# Patient Record
Sex: FEMALE | Race: WHITE | NOT HISPANIC OR LATINO
[De-identification: names, ages, dates, MRNs, and addresses within clinical notes are randomized per-mention and may not be internally consistent; named-entity substitution may affect disease eponyms.]

---

## 2017-03-24 ENCOUNTER — APPOINTMENT (OUTPATIENT)
Dept: THORACIC SURGERY | Facility: CLINIC | Age: 82
End: 2017-03-24

## 2017-03-24 VITALS
HEART RATE: 105 BPM | WEIGHT: 100 LBS | RESPIRATION RATE: 20 BRPM | OXYGEN SATURATION: 96 % | SYSTOLIC BLOOD PRESSURE: 133 MMHG | DIASTOLIC BLOOD PRESSURE: 68 MMHG | TEMPERATURE: 97.3 F | BODY MASS INDEX: 18.89 KG/M2

## 2017-03-29 ENCOUNTER — APPOINTMENT (OUTPATIENT)
Dept: RADIATION ONCOLOGY | Facility: CLINIC | Age: 82
End: 2017-03-29

## 2017-03-29 VITALS
BODY MASS INDEX: 19.24 KG/M2 | DIASTOLIC BLOOD PRESSURE: 65 MMHG | HEART RATE: 102 BPM | WEIGHT: 101.9 LBS | HEIGHT: 61 IN | OXYGEN SATURATION: 94 % | SYSTOLIC BLOOD PRESSURE: 103 MMHG

## 2017-04-04 ENCOUNTER — APPOINTMENT (OUTPATIENT)
Dept: RADIATION ONCOLOGY | Facility: CLINIC | Age: 82
End: 2017-04-04

## 2017-04-04 VITALS
HEIGHT: 61 IN | OXYGEN SATURATION: 93 % | WEIGHT: 101.8 LBS | SYSTOLIC BLOOD PRESSURE: 137 MMHG | BODY MASS INDEX: 19.22 KG/M2 | HEART RATE: 95 BPM | DIASTOLIC BLOOD PRESSURE: 87 MMHG

## 2017-05-04 ENCOUNTER — APPOINTMENT (OUTPATIENT)
Dept: THORACIC SURGERY | Facility: CLINIC | Age: 82
End: 2017-05-04

## 2017-05-04 VITALS
TEMPERATURE: 97 F | RESPIRATION RATE: 18 BRPM | DIASTOLIC BLOOD PRESSURE: 59 MMHG | SYSTOLIC BLOOD PRESSURE: 127 MMHG | WEIGHT: 100 LBS | BODY MASS INDEX: 18.89 KG/M2 | OXYGEN SATURATION: 95 % | HEART RATE: 94 BPM

## 2017-05-18 ENCOUNTER — APPOINTMENT (OUTPATIENT)
Dept: RADIATION ONCOLOGY | Facility: CLINIC | Age: 82
End: 2017-05-18

## 2017-05-18 ENCOUNTER — APPOINTMENT (OUTPATIENT)
Dept: THORACIC SURGERY | Facility: CLINIC | Age: 82
End: 2017-05-18

## 2017-05-18 VITALS
WEIGHT: 102 LBS | BODY MASS INDEX: 19.27 KG/M2 | TEMPERATURE: 97.1 F | HEART RATE: 90 BPM | SYSTOLIC BLOOD PRESSURE: 141 MMHG | DIASTOLIC BLOOD PRESSURE: 63 MMHG | OXYGEN SATURATION: 93 %

## 2017-06-14 ENCOUNTER — APPOINTMENT (OUTPATIENT)
Age: 82
End: 2017-06-14

## 2017-06-14 VITALS
HEART RATE: 85 BPM | RESPIRATION RATE: 18 BRPM | SYSTOLIC BLOOD PRESSURE: 155 MMHG | DIASTOLIC BLOOD PRESSURE: 86 MMHG | OXYGEN SATURATION: 95 %

## 2017-07-20 ENCOUNTER — APPOINTMENT (OUTPATIENT)
Dept: RADIATION ONCOLOGY | Facility: CLINIC | Age: 82
End: 2017-07-20

## 2017-07-20 VITALS
DIASTOLIC BLOOD PRESSURE: 80 MMHG | SYSTOLIC BLOOD PRESSURE: 121 MMHG | BODY MASS INDEX: 18.97 KG/M2 | OXYGEN SATURATION: 91 % | WEIGHT: 100.38 LBS | HEART RATE: 88 BPM

## 2017-07-20 RX ORDER — AZITHROMYCIN 250 MG/1
250 TABLET, FILM COATED ORAL
Qty: 6 | Refills: 0 | Status: DISCONTINUED | COMMUNITY
Start: 2017-04-13

## 2017-09-20 ENCOUNTER — APPOINTMENT (OUTPATIENT)
Dept: RADIATION ONCOLOGY | Facility: CLINIC | Age: 82
End: 2017-09-20
Payer: MEDICARE

## 2017-09-20 VITALS
DIASTOLIC BLOOD PRESSURE: 81 MMHG | OXYGEN SATURATION: 92 % | SYSTOLIC BLOOD PRESSURE: 141 MMHG | WEIGHT: 104.38 LBS | HEART RATE: 79 BPM | BODY MASS INDEX: 19.72 KG/M2

## 2017-09-20 PROCEDURE — 99214 OFFICE O/P EST MOD 30 MIN: CPT | Mod: GC

## 2017-12-20 ENCOUNTER — APPOINTMENT (OUTPATIENT)
Dept: RADIATION ONCOLOGY | Facility: CLINIC | Age: 82
End: 2017-12-20

## 2018-01-23 ENCOUNTER — APPOINTMENT (OUTPATIENT)
Dept: CT IMAGING | Facility: IMAGING CENTER | Age: 83
End: 2018-01-23

## 2018-03-06 ENCOUNTER — APPOINTMENT (OUTPATIENT)
Age: 83
End: 2018-03-06
Payer: MEDICARE

## 2018-03-06 VITALS
HEART RATE: 78 BPM | BODY MASS INDEX: 20.41 KG/M2 | WEIGHT: 108 LBS | RESPIRATION RATE: 18 BRPM | DIASTOLIC BLOOD PRESSURE: 86 MMHG | OXYGEN SATURATION: 95 % | SYSTOLIC BLOOD PRESSURE: 149 MMHG

## 2018-03-06 PROCEDURE — 99214 OFFICE O/P EST MOD 30 MIN: CPT

## 2018-07-18 ENCOUNTER — APPOINTMENT (OUTPATIENT)
Age: 83
End: 2018-07-18
Payer: MEDICARE

## 2018-07-18 VITALS
WEIGHT: 96 LBS | SYSTOLIC BLOOD PRESSURE: 104 MMHG | OXYGEN SATURATION: 96 % | HEART RATE: 94 BPM | RESPIRATION RATE: 18 BRPM | DIASTOLIC BLOOD PRESSURE: 66 MMHG | BODY MASS INDEX: 18.14 KG/M2

## 2018-07-18 PROCEDURE — 99214 OFFICE O/P EST MOD 30 MIN: CPT

## 2018-09-19 ENCOUNTER — APPOINTMENT (OUTPATIENT)
Age: 83
End: 2018-09-19
Payer: MEDICARE

## 2018-09-19 VITALS
BODY MASS INDEX: 17.5 KG/M2 | RESPIRATION RATE: 6 BRPM | DIASTOLIC BLOOD PRESSURE: 93 MMHG | SYSTOLIC BLOOD PRESSURE: 143 MMHG | OXYGEN SATURATION: 97 % | WEIGHT: 92.6 LBS

## 2018-09-19 PROCEDURE — 99214 OFFICE O/P EST MOD 30 MIN: CPT

## 2018-09-19 NOTE — LETTER CLOSING
[Consult Closing:] : Thank you for allowing me to participate in the care of this patient.  If you have any questions, please do not hesitate to contact me. [Sincerely yours,] : Sincerely yours, [Desmond Abbott MD] : Desmond Abbott MD [Attending] : Attending [Department of Radiation Medicine] : Department of Radiation Medicine [ of Radiation Medicine] :  of Radiation Medicine [[facility]] : [facility]

## 2018-09-19 NOTE — VITALS
[Maximal Pain Intensity: 0/10] : 0/10 [Least Pain Intensity: 0/10] : 0/10 [90: Able to carry normal activity; minor signs or symptoms of disease.] : 90: Able to carry normal activity; minor signs or symptoms of disease.  [ECOG Performance Status: 1 - Restricted in physically strenuous activity but ambulatory and able to carry out work of a light or sedentary nature] : Performance Status: 1 - Restricted in physically strenuous activity but ambulatory and able to carry out work of a light or sedentary nature, e.g., light house work, office work

## 2018-09-21 NOTE — REVIEW OF SYSTEMS
[Recent Change In Weight] : ~T recent weight change [SOB on Exertion] : shortness of breath during exertion [Fever] : no fever [Chills] : no chills [Night Sweats] : no night sweats [Fatigue] : no fatigue [Visual Disturbances] : no visual disturbances [Dysphagia] : no dysphagia [Palpitations] : no palpitations [Abdominal Pain] : no abdominal pain [Muscle Weakness] : no muscle weakness [Gait Disturbance] : no gait disturbance [Skin Rash] : no skin rash [Difficulty Walking] : no difficulty walking [Swollen Glands] : no swollen glands [Negative] : Constitutional [FreeTextEntry2] : with exertion [Chest Pain] : no chest pain [Shortness Of Breath] : no shortness of breath [Cough] : no cough

## 2018-09-21 NOTE — REASON FOR VISIT
[Routine Follow-Up] : routine follow-up visit for [Lung Cancer] : lung cancer [Clinical -- TNM Staging] : TNM Stage: c [T: ___] : T[unfilled] [N: ___] : N[unfilled] [M: ___] : M[unfilled] [de-identified] : Right Lung

## 2018-09-21 NOTE — HISTORY OF PRESENT ILLNESS
[FreeTextEntry1] : Ms. Woods received 4800 cGy on 6/14/17  to the right middle lobe for a new NSCLC . She is s/p Right VATS robotic assisted right sub-lobar resection with lymph node dissection 11/2013 and a history of multiple lung cancer including SBRT\par \par 9/19/18- Follow up\par At her last visit  imaging showed new nodules: infectious vs. inflammatory. Plan was to get images for tumor board review, and consider repeat short interval imaging but await recommendations of tumor board.\par \par Today she notes that she feels well with good energy level.  Denies sob or cough. She has notes some wt. loss due to eating less sweets.She has been f/u with her PMD.\par \par 7/18/17-Follow up\par Ms. Woods received 4800 cGy on 6/14/17  to the right middle lobe for a new NSCLC . She is s/p Right VATS robotic assisted right sub-lobar resection with lymph node dissection 11/2013 and a history of multiple lung cancer including SBRT\par At her last visit  imaging was without  clear evidence of recurrent/residual disease with increased atelectatic changes likely due to post-treatment radiation change. \par \par CT chest  with contrast 7/9/18 showed two new nodules in the right upper lobe and one in the left upper division. Differential diagnosis of these representing post inflammatory changes. Metastatic foci should be considered. Follow up CT recommended in 3 weeks. worsening atelectatic changes and bronchiectasis of the lower lungs. There is newly developed bronchiolitis in the left lower lobe. Severe compression of T7 vertebral body without change\par \par Since she has been well until recently when she contracted the flu and is now recovering. She has  a 12 lb wt loss since last visit\par and reports no change in appetite. Notes diarrhea 1-2x day. Has dry cough and sob with exertion. Denies pain.\par She has f/u with PMD 3 weeks ago.\par \par 3/6/18-Follow up\par Ms. Woods is an 84 year old female here for a routine follow up appointment. She received 4800 cGy on 6/14/17  to the right middle lobe for adenocarcinoma. At her last visit  9/2017 she has two lesions and was referred to dermatology.\par \par Since her last f/u appt. in September 2017 she has been feeling well She has gained weight and has f/u with dematology. She had a bx done which was positive for Seborrheic Keratosis. No treatment ordered.\par -denies fatigue. has sob with exertion. Denies cough or chest pain\par \par  CT Chest w/o contrast- more extensive atelectatic change of the lingula. No significant change from prior study\par \par \par \par \par \par \par \par ONCOLOGIC HISTORY- In 2013 she underwent  a VATS right sub-lobar resection with lymph node dissection for biopsy proven lower lobe.  She has been followed thereafter with radiographic surveillance.  In 2014, a left upper lobe nodule increased in size with associated PET avidity.  This lesion was treated with SBRT by Dr. Bailey to a dose of 5000cGy over 5 fractions from 10/6/14-10/15/14. \par \par 12/2016- Post-treatment imaging has shows a complete response of the treated lesion.  However, there is a right spiculated middle lobe nodule that has been increasing in size with minimal associated FDG avidity.  She was recommended for biopsy, which she is adamantly refusing.  She presented to us for consideration of SBRT at which time the decision was made to follow radiographically.  \par 3/29/2017 - Ms Woods had a PET/CT 3/21/2017.  This shows three areas of concern.  In the right lung apex is a 1.3 X 2.1 cm region of scar and bronchiectasis with an SUV of 1.9.  It is mentioned that this likely is secondary to post radiation change.  However, radiation was delivered to the left upper lobe, not right upper lobe.  There is an additional 1.2 X 2.2 cm spiculated mass in the right middle lobe with an SUV of 3.4, prior 2.2.  This is concerning for disease.  We had recommended SBRT to this at time of last f/u.  The third area of concern is an area of consolidation within the lingula of the left lung adjacent to the left fissure.  There is a focus measuring 1.1 X 1.1 cm with an suv max of 2.6.  There was persistent uptake of a rectal lesion that has not changed - etiology unclear.  \par \par 4/4 - She presents today for a f/u visit to discuss review of her imaging.  Clinically she continues to be asymptomatic.  Radiographically, there does not appear to be significant change in the right upper lobe lesion over the previous few years.  Etiology of this is unclear.  The lingula lesion does appear to have uptake and seems enlarged.  This may warrant further evaluation.  \par \par 5/17/17- We reviewed Ms Corona most recent CT scan in Thoracic Tumor board where it was felt that the lesion in the left lung of interest was most consistent with an effusion rather than malignancy.  The recommendation was to proceed with SBRT planning for her right lung nodule.  I discussed this with Ms. Woods.  She has previously had SBRT and so is familiar with the treatment planning process.  Informed consent was signed.  Scheduling will follow. \par \par 9/20/17- \par She last saw us in July 2017 for PTE, at which time she felt well.  CT scan 9/15/17 showed stable right apical consolidation with traction bronchiectasis likely postradiation fibrosis. Stable right middle lobe nodule and adjacent fibrotic scarring\par Stable spiculated perihilar lingula consolidation. New right lower lobe reticulonodular infiltrate. New bilateral lower lobe subpleural wedge shaped opacities. This could be inflammatory.\par \par She feels well today. Starla wheezing, cough. She notes no changes in her breathing since before radiation, although she notes she had a cold a couple weeks ago. She has two lesions on her skin which she is concerned are related to skin cancer. One of these is on her right hip, and one is on her left flank.  She had mild pain with coughing, not not at baseline.\par

## 2018-09-21 NOTE — PHYSICAL EXAM
[Outer Ear] : the ears and nose were normal in appearance [Heart Rate And Rhythm] : heart rate and rhythm were normal [Cervical Lymph Nodes Enlarged Posterior Bilaterally] : posterior cervical [Cervical Lymph Nodes Enlarged Anterior Bilaterally] : anterior cervical [Supraclavicular Lymph Nodes Enlarged Bilaterally] : supraclavicular [Axillary Lymph Nodes Enlarged Bilaterally] : axillary [Musculoskeletal - Swelling] : no joint swelling [Nail Clubbing] : no clubbing  or cyanosis of the fingernails [Motor Tone] : muscle strength and tone were normal [Skin Color & Pigmentation] : normal skin color and pigmentation [No Focal Deficits] : no focal deficits [General Appearance - Alert] : alert [General Appearance - In No Acute Distress] : in no acute distress [Thin] : thin [] : no respiratory distress [Heart Sounds] : normal S1 and S2 [Oriented To Time, Place, And Person] : oriented to person, place, and time [Sclera] : the sclera and conjunctiva were normal [Extraocular Movements] : extraocular movements were intact [Hearing Threshold Finger Rub Not [place]] : hearing was normal [Abdomen Soft] : soft [Normal] : no focal deficits [de-identified] : BS diminished  to  right mid lobe  with scattered rhonchi right base

## 2019-02-08 NOTE — LETTER CLOSING
[Desmond Abbott MD] : Desmond Abbott MD [Attending] : Attending [Department of Radiation Medicine] : Department of Radiation Medicine [ of Radiation Medicine] :  of Radiation Medicine [[facility]] : [facility]

## 2019-02-15 ENCOUNTER — APPOINTMENT (OUTPATIENT)
Dept: RADIATION ONCOLOGY | Facility: CLINIC | Age: 84
End: 2019-02-15
Payer: MEDICARE

## 2019-02-15 VITALS
SYSTOLIC BLOOD PRESSURE: 112 MMHG | RESPIRATION RATE: 18 BRPM | DIASTOLIC BLOOD PRESSURE: 74 MMHG | OXYGEN SATURATION: 96 % | BODY MASS INDEX: 19.46 KG/M2 | HEART RATE: 88 BPM | WEIGHT: 103 LBS | TEMPERATURE: 98.2 F

## 2019-02-15 PROCEDURE — 99214 OFFICE O/P EST MOD 30 MIN: CPT

## 2019-02-15 RX ORDER — COLD-HOT PACK
125 MCG EACH MISCELLANEOUS
Refills: 0 | Status: ACTIVE | COMMUNITY

## 2019-02-15 NOTE — REVIEW OF SYSTEMS
[Negative] : Constitutional [Cough: Grade 0] : Cough: Grade 0 [Chest Pain] : no chest pain [Shortness Of Breath] : no shortness of breath [Cough] : no cough [FreeTextEntry2] : with exertion

## 2019-02-15 NOTE — REASON FOR VISIT
[Routine Follow-Up] : routine follow-up visit for [Lung Cancer] : lung cancer [Clinical -- TNM Staging] : TNM Stage: c [T: ___] : T[unfilled] [N: ___] : N[unfilled] [M: ___] : M[unfilled] [de-identified] : Right Lung

## 2019-02-15 NOTE — HISTORY OF PRESENT ILLNESS
[FreeTextEntry1] : Ms. Woods received 48 Gy over 4 fractions, completed 6/14/17 to the right middle lobe for a new NSCLC . She is s/p Right VATS robotic assisted right sub-lobar resection with lymph node dissection 11/2013 and a history of multiple lung cancer including SBRT to LLL (5000 cGy over 5 fractions, completed October 2014).\par \par 2/15/19- FOLLOW UP\par Ms. Arvizu returns for routine follow up. When she was last seen on 9/19/18, recent imaging showed while ill with cough productive of yellow sputum, new nodules, infectious vs. inflammatory. Plan was for repeat CT chest and tumor board review of imaging. \par \par Chest CT on 2/8/19 showed the following:\par 1.  Compared to 7/9/2018, the small airways disease in the left upper division shows much improvement with resolution of 7 mm and smaller satellite nodules.\par 2.  The atelectatic changes/scars of both lungs are again noted. The right upper lobe changes are slightly more confluent.\par 3.  Status post wedge resection right lower lobe.\par 4.  Emphysematous changes.\par 5.  No new nodule.\par 6.  T7 compression deformity.\par \par Today, she reports she is feeling generally well. Denies SOB, cough, dysphagia, fever, chills, unintentional weight loss. She has gained weight recently, which she attributes to being less active on account of the cold weather. \par \par \par 9/19/18- Follow up\par At her last visit  imaging showed new nodules: infectious vs. inflammatory. Plan was to get images for tumor board review, and consider repeat short interval imaging but await recommendations of tumor board.\par \par Today she notes that she feels well with good energy level.  Denies sob or cough. She has notes some wt. loss due to eating less sweets.She has been f/u with her PMD.\par \par 7/18/17-Follow up\par Ms. Woods received 4800 cGy on 6/14/17  to the right middle lobe for a new NSCLC . She is s/p Right VATS robotic assisted right sub-lobar resection with lymph node dissection 11/2013 and a history of multiple lung cancer including SBRT\par At her last visit  imaging was without  clear evidence of recurrent/residual disease with increased atelectatic changes likely due to post-treatment radiation change. \par \par CT chest  with contrast 7/9/18 showed two new nodules in the right upper lobe and one in the left upper division. Differential diagnosis of these representing post inflammatory changes. Metastatic foci should be considered. Follow up CT recommended in 3 weeks. worsening atelectatic changes and bronchiectasis of the lower lungs. There is newly developed bronchiolitis in the left lower lobe. Severe compression of T7 vertebral body without change\par \par Since she has been well until recently when she contracted the flu and is now recovering. She has  a 12 lb wt loss since last visit\par and reports no change in appetite. Notes diarrhea 1-2x day. Has dry cough and sob with exertion. Denies pain.\par She has f/u with PMD 3 weeks ago.\par \par 3/6/18-Follow up\par Ms. Woods is an 84 year old female here for a routine follow up appointment. She received 4800 cGy on 6/14/17  to the right middle lobe for adenocarcinoma. At her last visit  9/2017 she has two lesions and was referred to dermatology.\par \par Since her last f/u appt. in September 2017 she has been feeling well She has gained weight and has f/u with dematology. She had a bx done which was positive for Seborrheic Keratosis. No treatment ordered.\par -denies fatigue. has sob with exertion. Denies cough or chest pain\par \par  CT Chest w/o contrast- more extensive atelectatic change of the lingula. No significant change from prior study\par \par \par \par \par ONCOLOGIC HISTORY- In 2013 she underwent  a VATS right sub-lobar resection with lymph node dissection for biopsy proven lower lobe.  She has been followed thereafter with radiographic surveillance.  In 2014, a left upper lobe nodule increased in size with associated PET avidity.  This lesion was treated with SBRT by Dr. Bailey to a dose of 5000cGy over 5 fractions from 10/6/14-10/15/14. \par \par 12/2016- Post-treatment imaging has shows a complete response of the treated lesion.  However, there is a right spiculated middle lobe nodule that has been increasing in size with minimal associated FDG avidity.  She was recommended for biopsy, which she is adamantly refusing.  She presented to us for consideration of SBRT at which time the decision was made to follow radiographically.  \par 3/29/2017 - Ms Woods had a PET/CT 3/21/2017.  This shows three areas of concern.  In the right lung apex is a 1.3 X 2.1 cm region of scar and bronchiectasis with an SUV of 1.9.  It is mentioned that this likely is secondary to post radiation change.  However, radiation was delivered to the left upper lobe, not right upper lobe.  There is an additional 1.2 X 2.2 cm spiculated mass in the right middle lobe with an SUV of 3.4, prior 2.2.  This is concerning for disease.  We had recommended SBRT to this at time of last f/u.  The third area of concern is an area of consolidation within the lingula of the left lung adjacent to the left fissure.  There is a focus measuring 1.1 X 1.1 cm with an suv max of 2.6.  There was persistent uptake of a rectal lesion that has not changed - etiology unclear.  \par \par 4/4 - She presents today for a f/u visit to discuss review of her imaging.  Clinically she continues to be asymptomatic.  Radiographically, there does not appear to be significant change in the right upper lobe lesion over the previous few years.  Etiology of this is unclear.  The lingula lesion does appear to have uptake and seems enlarged.  This may warrant further evaluation.  \par \par 5/17/17- We reviewed Ms Corona most recent CT scan in Thoracic Tumor board where it was felt that the lesion in the left lung of interest was most consistent with an effusion rather than malignancy.  The recommendation was to proceed with SBRT planning for her right lung nodule.  I discussed this with Ms. Woods.  She has previously had SBRT and so is familiar with the treatment planning process.  Informed consent was signed.  Scheduling will follow. \par \par 9/20/17- \par She last saw us in July 2017 for PTE, at which time she felt well.  CT scan 9/15/17 showed stable right apical consolidation with traction bronchiectasis likely postradiation fibrosis. Stable right middle lobe nodule and adjacent fibrotic scarring\par Stable spiculated perihilar lingula consolidation. New right lower lobe reticulonodular infiltrate. New bilateral lower lobe subpleural wedge shaped opacities. This could be inflammatory.\par \par She feels well today. Starla wheezing, cough. She notes no changes in her breathing since before radiation, although she notes she had a cold a couple weeks ago. She has two lesions on her skin which she is concerned are related to skin cancer. One of these is on her right hip, and one is on her left flank.  She had mild pain with coughing, not not at baseline.\par

## 2019-02-15 NOTE — PHYSICAL EXAM
[General Appearance - Alert] : alert [General Appearance - In No Acute Distress] : in no acute distress [Thin] : thin [Sclera] : the sclera and conjunctiva were normal [Extraocular Movements] : extraocular movements were intact [Hearing Threshold Finger Rub Not [place]] : hearing was normal [] : no respiratory distress [Heart Sounds] : normal S1 and S2 [Abdomen Soft] : soft [Normal] : no focal deficits [Oriented To Time, Place, And Person] : oriented to person, place, and time [Respiration, Rhythm And Depth] : normal respiratory rhythm and effort [de-identified] : BS diminished  to RML with scattered rhonchi to right base; otherwise clear.

## 2019-08-21 ENCOUNTER — APPOINTMENT (OUTPATIENT)
Dept: RADIATION ONCOLOGY | Facility: CLINIC | Age: 84
End: 2019-08-21
Payer: MEDICARE

## 2019-08-21 VITALS
OXYGEN SATURATION: 96 % | DIASTOLIC BLOOD PRESSURE: 70 MMHG | RESPIRATION RATE: 17 BRPM | BODY MASS INDEX: 19.22 KG/M2 | HEART RATE: 94 BPM | SYSTOLIC BLOOD PRESSURE: 116 MMHG | WEIGHT: 101.7 LBS

## 2019-08-21 PROCEDURE — 99214 OFFICE O/P EST MOD 30 MIN: CPT

## 2019-08-22 NOTE — PHYSICAL EXAM
[General Appearance - Alert] : alert [General Appearance - In No Acute Distress] : in no acute distress [Thin] : thin [Extraocular Movements] : extraocular movements were intact [Sclera] : the sclera and conjunctiva were normal [Hearing Threshold Finger Rub Not [place]] : hearing was normal [Respiration, Rhythm And Depth] : normal respiratory rhythm and effort [] : no respiratory distress [Heart Sounds] : normal S1 and S2 [Abdomen Soft] : soft [Normal] : normal skin color and pigmentation and no rash [Oriented To Time, Place, And Person] : oriented to person, place, and time [Outer Ear] : the ears and nose were normal in appearance [de-identified] : BS diminished  to RML; otherwise clear.

## 2019-08-22 NOTE — HISTORY OF PRESENT ILLNESS
[FreeTextEntry1] : Ms. Woods received 48 Gy over 4 fractions to a RML tumor from 6/6/17- 6/14/17 for a [id number], stage IA NSCLC . She is s/p Right VATS robotic assisted right sub-lobar resection with lymph node dissection 11/2013 and a history of multiple lung cancer including SBRT to LLL (5000 cGy over 5 fractions, completed October 2014).\par \par 8/21/19- FOLLOW UP\par Ms. Woods returns for routine follow up. When she was last seen on 2/15/19, she was doing well and ELIZABETH; plan was to RTC six months with CT chest. \par \par Chest CT done 8/15/19 (R) showed the following:\par -Severe centrolobular emphysematous changes. \par -s/p wedge resection RLL. Patchy consolidation surrounding the sutures appears to be slowly increasing in size. \par -Right apical geographic opacity measures 1.7 x 3.6 x 5 cm, previously measured 1 x 2.9 x 4.7 cm on 7/9/18.\par -Recommend PET/ CT. \par \par Today, she reports she is feeling generally well. She reports SOB after walking more than 5 blocks. Denies SOB at rest, cough, CP,  dysphagia, fever, chills, unintentional weight loss, pain.  \par \par \par 2/15/19- FOLLOW UP\par Ms. Arvizu returns for routine follow up. When she was last seen on 9/19/18, recent imaging showed while ill with cough productive of yellow sputum, new nodules, infectious vs. inflammatory. Plan was for repeat CT chest and tumor board review of imaging. \par \par Chest CT on 2/8/19 showed the following:\par 1.  Compared to 7/9/2018, the small airways disease in the left upper division shows much improvement with resolution of 7 mm and smaller satellite nodules.\par 2.  The atelectatic changes/scars of both lungs are again noted. The right upper lobe changes are slightly more confluent.\par 3.  Status post wedge resection right lower lobe.\par 4.  Emphysematous changes.\par 5.  No new nodule.\par 6.  T7 compression deformity.\par \par Today, she reports she is feeling generally well. Denies SOB, cough, dysphagia, fever, chills, unintentional weight loss. She has gained weight recently, which she attributes to being less active on account of the cold weather. \par \par \par 9/19/18- Follow up\par At her last visit  imaging showed new nodules: infectious vs. inflammatory. Plan was to get images for tumor board review, and consider repeat short interval imaging but await recommendations of tumor board.\par \par Today she notes that she feels well with good energy level.  Denies sob or cough. She has notes some wt. loss due to eating less sweets.She has been f/u with her PMD.\par \par 7/18/17-Follow up\par Ms. Woods received 4800 cGy on 6/14/17  to the right middle lobe for a new NSCLC . She is s/p Right VATS robotic assisted right sub-lobar resection with lymph node dissection 11/2013 and a history of multiple lung cancer including SBRT\par At her last visit  imaging was without  clear evidence of recurrent/residual disease with increased atelectatic changes likely due to post-treatment radiation change. \par \par CT chest  with contrast 7/9/18 showed two new nodules in the right upper lobe and one in the left upper division. Differential diagnosis of these representing post inflammatory changes. Metastatic foci should be considered. Follow up CT recommended in 3 weeks. worsening atelectatic changes and bronchiectasis of the lower lungs. There is newly developed bronchiolitis in the left lower lobe. Severe compression of T7 vertebral body without change\par \par Since she has been well until recently when she contracted the flu and is now recovering. She has  a 12 lb wt loss since last visit\par and reports no change in appetite. Notes diarrhea 1-2x day. Has dry cough and sob with exertion. Denies pain.\par She has f/u with PMD 3 weeks ago.\par \par 3/6/18-Follow up\par Ms. Woods is an 84 year old female here for a routine follow up appointment. She received 4800 cGy on 6/14/17  to the right middle lobe for adenocarcinoma. At her last visit  9/2017 she has two lesions and was referred to dermatology.\par \par Since her last f/u appt. in September 2017 she has been feeling well She has gained weight and has f/u with dematology. She had a bx done which was positive for Seborrheic Keratosis. No treatment ordered.\par -denies fatigue. has sob with exertion. Denies cough or chest pain\par \par  CT Chest w/o contrast- more extensive atelectatic change of the lingula. No significant change from prior study\par \par \par \par \par ONCOLOGIC HISTORY- In 2013 she underwent  a VATS right sub-lobar resection with lymph node dissection for biopsy proven lower lobe.  She has been followed thereafter with radiographic surveillance.  In 2014, a left upper lobe nodule increased in size with associated PET avidity.  This lesion was treated with SBRT by Dr. Bailey to a dose of 5000cGy over 5 fractions from 10/6/14-10/15/14. \par \par 12/2016- Post-treatment imaging has shows a complete response of the treated lesion.  However, there is a right spiculated middle lobe nodule that has been increasing in size with minimal associated FDG avidity.  She was recommended for biopsy, which she is adamantly refusing.  She presented to us for consideration of SBRT at which time the decision was made to follow radiographically.  \par 3/29/2017 - Ms Woods had a PET/CT 3/21/2017.  This shows three areas of concern.  In the right lung apex is a 1.3 X 2.1 cm region of scar and bronchiectasis with an SUV of 1.9.  It is mentioned that this likely is secondary to post radiation change.  However, radiation was delivered to the left upper lobe, not right upper lobe.  There is an additional 1.2 X 2.2 cm spiculated mass in the right middle lobe with an SUV of 3.4, prior 2.2.  This is concerning for disease.  We had recommended SBRT to this at time of last f/u.  The third area of concern is an area of consolidation within the lingula of the left lung adjacent to the left fissure.  There is a focus measuring 1.1 X 1.1 cm with an suv max of 2.6.  There was persistent uptake of a rectal lesion that has not changed - etiology unclear.  \par \par 4/4 - She presents today for a f/u visit to discuss review of her imaging.  Clinically she continues to be asymptomatic.  Radiographically, there does not appear to be significant change in the right upper lobe lesion over the previous few years.  Etiology of this is unclear.  The lingula lesion does appear to have uptake and seems enlarged.  This may warrant further evaluation.  \par \par 5/17/17- We reviewed Ms Corona most recent CT scan in Thoracic Tumor board where it was felt that the lesion in the left lung of interest was most consistent with an effusion rather than malignancy.  The recommendation was to proceed with SBRT planning for her right lung nodule.  I discussed this with Ms. Woods.  She has previously had SBRT and so is familiar with the treatment planning process.  Informed consent was signed.  Scheduling will follow. \par \par 9/20/17- \par She last saw us in July 2017 for PTE, at which time she felt well.  CT scan 9/15/17 showed stable right apical consolidation with traction bronchiectasis likely postradiation fibrosis. Stable right middle lobe nodule and adjacent fibrotic scarring\par Stable spiculated perihilar lingula consolidation. New right lower lobe reticulonodular infiltrate. New bilateral lower lobe subpleural wedge shaped opacities. This could be inflammatory.\par \par She feels well today. Starla wheezing, cough. She notes no changes in her breathing since before radiation, although she notes she had a cold a couple weeks ago. She has two lesions on her skin which she is concerned are related to skin cancer. One of these is on her right hip, and one is on her left flank.  She had mild pain with coughing, not not at baseline.\par

## 2019-08-22 NOTE — REVIEW OF SYSTEMS
[Cough: Grade 0] : Cough: Grade 0 [SOB on Exertion] : shortness of breath during exertion [Negative] : Psychiatric [Chest Pain] : no chest pain [Shortness Of Breath] : no shortness of breath [Wheezing] : no wheezing [Cough] : no cough [FreeTextEntry2] : with exertion

## 2019-08-22 NOTE — REASON FOR VISIT
[Routine Follow-Up] : routine follow-up visit for [Lung Cancer] : lung cancer [T: ___] : T[unfilled] [Clinical -- TNM Staging] : TNM Stage: c [N: ___] : N[unfilled] [M: ___] : M[unfilled] [de-identified] : Right Lung

## 2019-09-04 ENCOUNTER — APPOINTMENT (OUTPATIENT)
Dept: RADIATION ONCOLOGY | Facility: CLINIC | Age: 84
End: 2019-09-04
Payer: MEDICARE

## 2019-09-04 ENCOUNTER — RESULT CHARGE (OUTPATIENT)
Age: 84
End: 2019-09-04

## 2019-09-04 VITALS
HEART RATE: 86 BPM | DIASTOLIC BLOOD PRESSURE: 69 MMHG | WEIGHT: 101 LBS | BODY MASS INDEX: 19.08 KG/M2 | SYSTOLIC BLOOD PRESSURE: 120 MMHG | OXYGEN SATURATION: 96 % | RESPIRATION RATE: 18 BRPM

## 2019-09-04 PROCEDURE — 99214 OFFICE O/P EST MOD 30 MIN: CPT

## 2019-09-06 ENCOUNTER — OUTPATIENT (OUTPATIENT)
Dept: OUTPATIENT SERVICES | Facility: HOSPITAL | Age: 84
LOS: 1 days | End: 2019-09-06
Payer: MEDICARE

## 2019-09-06 DIAGNOSIS — Z01.818 ENCOUNTER FOR OTHER PREPROCEDURAL EXAMINATION: ICD-10-CM

## 2019-09-06 LAB
ANION GAP SERPL CALC-SCNC: 10 MMOL/L — SIGNIFICANT CHANGE UP (ref 5–17)
BASOPHILS # BLD AUTO: 0.05 K/UL — SIGNIFICANT CHANGE UP (ref 0–0.2)
BASOPHILS NFR BLD AUTO: 0.6 % — SIGNIFICANT CHANGE UP (ref 0–2)
BUN SERPL-MCNC: 18 MG/DL — SIGNIFICANT CHANGE UP (ref 7–23)
CALCIUM SERPL-MCNC: 9.4 MG/DL — SIGNIFICANT CHANGE UP (ref 8.4–10.5)
CHLORIDE SERPL-SCNC: 106 MMOL/L — SIGNIFICANT CHANGE UP (ref 96–108)
CO2 SERPL-SCNC: 26 MMOL/L — SIGNIFICANT CHANGE UP (ref 22–31)
CREAT SERPL-MCNC: 0.94 MG/DL — SIGNIFICANT CHANGE UP (ref 0.5–1.3)
EOSINOPHIL # BLD AUTO: 0.15 K/UL — SIGNIFICANT CHANGE UP (ref 0–0.5)
EOSINOPHIL NFR BLD AUTO: 1.8 % — SIGNIFICANT CHANGE UP (ref 0–6)
GLUCOSE SERPL-MCNC: 77 MG/DL — SIGNIFICANT CHANGE UP (ref 70–99)
HCT VFR BLD CALC: 43.1 % — SIGNIFICANT CHANGE UP (ref 34.5–45)
HGB BLD-MCNC: 13.7 G/DL — SIGNIFICANT CHANGE UP (ref 11.5–15.5)
IMM GRANULOCYTES NFR BLD AUTO: 0.1 % — SIGNIFICANT CHANGE UP (ref 0–1.5)
LYMPHOCYTES # BLD AUTO: 3.23 K/UL — SIGNIFICANT CHANGE UP (ref 1–3.3)
LYMPHOCYTES # BLD AUTO: 38.4 % — SIGNIFICANT CHANGE UP (ref 13–44)
MCHC RBC-ENTMCNC: 31.1 PG — SIGNIFICANT CHANGE UP (ref 27–34)
MCHC RBC-ENTMCNC: 31.8 GM/DL — LOW (ref 32–36)
MCV RBC AUTO: 98 FL — SIGNIFICANT CHANGE UP (ref 80–100)
MONOCYTES # BLD AUTO: 0.77 K/UL — SIGNIFICANT CHANGE UP (ref 0–0.9)
MONOCYTES NFR BLD AUTO: 9.2 % — SIGNIFICANT CHANGE UP (ref 2–14)
NEUTROPHILS # BLD AUTO: 4.2 K/UL — SIGNIFICANT CHANGE UP (ref 1.8–7.4)
NEUTROPHILS NFR BLD AUTO: 49.9 % — SIGNIFICANT CHANGE UP (ref 43–77)
NRBC # BLD: 0 /100 WBCS — SIGNIFICANT CHANGE UP (ref 0–0)
PLATELET # BLD AUTO: 234 K/UL — SIGNIFICANT CHANGE UP (ref 150–400)
POTASSIUM SERPL-MCNC: 5.5 MMOL/L — HIGH (ref 3.5–5.3)
POTASSIUM SERPL-SCNC: 5.5 MMOL/L — HIGH (ref 3.5–5.3)
RBC # BLD: 4.4 M/UL — SIGNIFICANT CHANGE UP (ref 3.8–5.2)
RBC # FLD: 15.9 % — HIGH (ref 10.3–14.5)
SODIUM SERPL-SCNC: 142 MMOL/L — SIGNIFICANT CHANGE UP (ref 135–145)
WBC # BLD: 8.41 K/UL — SIGNIFICANT CHANGE UP (ref 3.8–10.5)
WBC # FLD AUTO: 8.41 K/UL — SIGNIFICANT CHANGE UP (ref 3.8–10.5)

## 2019-09-06 PROCEDURE — 93005 ELECTROCARDIOGRAM TRACING: CPT

## 2019-09-06 PROCEDURE — 93010 ELECTROCARDIOGRAM REPORT: CPT

## 2019-09-06 PROCEDURE — 85610 PROTHROMBIN TIME: CPT

## 2019-09-06 PROCEDURE — 85730 THROMBOPLASTIN TIME PARTIAL: CPT

## 2019-09-06 PROCEDURE — 85025 COMPLETE CBC W/AUTO DIFF WBC: CPT

## 2019-09-06 PROCEDURE — 80048 BASIC METABOLIC PNL TOTAL CA: CPT

## 2019-09-06 NOTE — HISTORY OF PRESENT ILLNESS
[FreeTextEntry1] : Ms. Woods received 48 Gy over 4 fractions to a RML tumor from 6/6/17- 6/14/17 for a [id number], stage IA NSCLC . She is s/p Right VATS robotic assisted right sub-lobar resection with lymph node dissection 11/2013 and a history of multiple lung cancer including SBRT to LLL (5000 cGy over 5 fractions, completed October 2014).\par \par 9/4/19- FOLLOW UP\par Ms. Woods returns for routine follow up. When she was last seen on 8/21/19, she was doing well clinically, but recent chest CT showed enlargement of patchy consolidation surrounding sutures in RLL and enlargement of right apical opacity. Plan was to obtain PET/ CT and RTC following the study.  \par \par PET/ CT done 8/27/19 (R) revealed the following:\par -Hypermetabolic parenchymal opacity in the right upper lobe of the lung, which has increased in size, density and intensity since prior PET/CT dated 3/21/2017. These findings are highly suspicious for malignancy.\par -Mildly hypermetabolic parenchymal opacities in the right lower lobe of the lung adjacent to surgical sutures. These opacities have increased in size since prior PET/CT dated 3/21/2017. These findings are nonspecific, and may be infectious/inflammatory in etiology, possibly post therapeutic in etiology as the patient has a history of prior radiation therapy. Continued follow-up chest CT in 6- 12 months would be helpful in further evaluation.\par -Mildly hypermetabolic wedge-shaped opacity in the lingula, which has increased in size since prior PET/CT dated 3/21/2017. These findings are nonspecific, but suggest atelectasis.\par -Otherwise, no abnormal hypermetabolic activity to suggest malignancy at this time.\par -Trace right pleural effusion.\par -Imaging evidence prior granulomatous disease in the abdomen.\par -Diverticular disease. \par \par Today, she reports no change in health status and no interval medical events since she was last seen on 8/21/19.  She reports SOB after walking more than 5 blocks. Denies SOB at rest, cough, CP,  dysphagia, fever, chills, unintentional weight loss, pain.  \par \par 8/21/19- FOLLOW UP\par Ms. Woods returns for routine follow up. When she was last seen on 2/15/19, she was doing well and ELIZABETH; plan was to RTC six months with CT chest. \par \par Chest CT done 8/15/19 (R) showed the following:\par -Severe centrolobular emphysematous changes. \par -s/p wedge resection RLL. Patchy consolidation surrounding the sutures appears to be slowly increasing in size. \par -Right apical geographic opacity measures 1.7 x 3.6 x 5 cm, previously measured 1 x 2.9 x 4.7 cm on 7/9/18.\par -Recommend PET/ CT. \par \par Today, she reports she is feeling generally well. She reports SOB after walking more than 5 blocks. Denies SOB at rest, cough, CP,  dysphagia, fever, chills, unintentional weight loss, pain.  \par \par \par 2/15/19- FOLLOW UP\par Ms. Arvizu returns for routine follow up. When she was last seen on 9/19/18, recent imaging showed while ill with cough productive of yellow sputum, new nodules, infectious vs. inflammatory. Plan was for repeat CT chest and tumor board review of imaging. \par \par Chest CT on 2/8/19 showed the following:\par 1.  Compared to 7/9/2018, the small airways disease in the left upper division shows much improvement with resolution of 7 mm and smaller satellite nodules.\par 2.  The atelectatic changes/scars of both lungs are again noted. The right upper lobe changes are slightly more confluent.\par 3.  Status post wedge resection right lower lobe.\par 4.  Emphysematous changes.\par 5.  No new nodule.\par 6.  T7 compression deformity.\par \par Today, she reports she is feeling generally well. Denies SOB, cough, dysphagia, fever, chills, unintentional weight loss. She has gained weight recently, which she attributes to being less active on account of the cold weather. \par \par \par 9/19/18- Follow up\par At her last visit  imaging showed new nodules: infectious vs. inflammatory. Plan was to get images for tumor board review, and consider repeat short interval imaging but await recommendations of tumor board.\par \par Today she notes that she feels well with good energy level.  Denies sob or cough. She has notes some wt. loss due to eating less sweets.She has been f/u with her PMD.\par \par 7/18/17-Follow up\par Ms. Woods received 4800 cGy on 6/14/17  to the right middle lobe for a new NSCLC . She is s/p Right VATS robotic assisted right sub-lobar resection with lymph node dissection 11/2013 and a history of multiple lung cancer including SBRT\par At her last visit  imaging was without  clear evidence of recurrent/residual disease with increased atelectatic changes likely due to post-treatment radiation change. \par \par CT chest  with contrast 7/9/18 showed two new nodules in the right upper lobe and one in the left upper division. Differential diagnosis of these representing post inflammatory changes. Metastatic foci should be considered. Follow up CT recommended in 3 weeks. worsening atelectatic changes and bronchiectasis of the lower lungs. There is newly developed bronchiolitis in the left lower lobe. Severe compression of T7 vertebral body without change\par \par Since she has been well until recently when she contracted the flu and is now recovering. She has  a 12 lb wt loss since last visit\par and reports no change in appetite. Notes diarrhea 1-2x day. Has dry cough and sob with exertion. Denies pain.\par She has f/u with PMD 3 weeks ago.\par \par 3/6/18-Follow up\par Ms. Woods is an 84 year old female here for a routine follow up appointment. She received 4800 cGy on 6/14/17  to the right middle lobe for adenocarcinoma. At her last visit  9/2017 she has two lesions and was referred to dermatology.\par \par Since her last f/u appt. in September 2017 she has been feeling well She has gained weight and has f/u with dematology. She had a bx done which was positive for Seborrheic Keratosis. No treatment ordered.\par -denies fatigue. has sob with exertion. Denies cough or chest pain\par \par  CT Chest w/o contrast- more extensive atelectatic change of the lingula. No significant change from prior study\par \par \par \par \par ONCOLOGIC HISTORY- In 2013 she underwent  a VATS right sub-lobar resection with lymph node dissection for biopsy proven lower lobe.  She has been followed thereafter with radiographic surveillance.  In 2014, a left upper lobe nodule increased in size with associated PET avidity.  This lesion was treated with SBRT by Dr. Bailey to a dose of 5000cGy over 5 fractions from 10/6/14-10/15/14. \par \par 12/2016- Post-treatment imaging has shows a complete response of the treated lesion.  However, there is a right spiculated middle lobe nodule that has been increasing in size with minimal associated FDG avidity.  She was recommended for biopsy, which she is adamantly refusing.  She presented to us for consideration of SBRT at which time the decision was made to follow radiographically.  \par 3/29/2017 - Ms Woods had a PET/CT 3/21/2017.  This shows three areas of concern.  In the right lung apex is a 1.3 X 2.1 cm region of scar and bronchiectasis with an SUV of 1.9.  It is mentioned that this likely is secondary to post radiation change.  However, radiation was delivered to the left upper lobe, not right upper lobe.  There is an additional 1.2 X 2.2 cm spiculated mass in the right middle lobe with an SUV of 3.4, prior 2.2.  This is concerning for disease.  We had recommended SBRT to this at time of last f/u.  The third area of concern is an area of consolidation within the lingula of the left lung adjacent to the left fissure.  There is a focus measuring 1.1 X 1.1 cm with an suv max of 2.6.  There was persistent uptake of a rectal lesion that has not changed - etiology unclear.  \par \par 4/4 - She presents today for a f/u visit to discuss review of her imaging.  Clinically she continues to be asymptomatic.  Radiographically, there does not appear to be significant change in the right upper lobe lesion over the previous few years.  Etiology of this is unclear.  The lingula lesion does appear to have uptake and seems enlarged.  This may warrant further evaluation.  \par \par 5/17/17- We reviewed Ms Corona most recent CT scan in Thoracic Tumor board where it was felt that the lesion in the left lung of interest was most consistent with an effusion rather than malignancy.  The recommendation was to proceed with SBRT planning for her right lung nodule.  I discussed this with Ms. Woods.  She has previously had SBRT and so is familiar with the treatment planning process.  Informed consent was signed.  Scheduling will follow. \par \par 9/20/17- \par She last saw us in July 2017 for PTE, at which time she felt well.  CT scan 9/15/17 showed stable right apical consolidation with traction bronchiectasis likely postradiation fibrosis. Stable right middle lobe nodule and adjacent fibrotic scarring\par Stable spiculated perihilar lingula consolidation. New right lower lobe reticulonodular infiltrate. New bilateral lower lobe subpleural wedge shaped opacities. This could be inflammatory.\par \par She feels well today. Starla wheezing, cough. She notes no changes in her breathing since before radiation, although she notes she had a cold a couple weeks ago. She has two lesions on her skin which she is concerned are related to skin cancer. One of these is on her right hip, and one is on her left flank.  She had mild pain with coughing, not not at baseline.\par

## 2019-09-06 NOTE — REVIEW OF SYSTEMS
[SOB on Exertion] : shortness of breath during exertion [Negative] : Neurological [Cough: Grade 0] : Cough: Grade 0 [Chest Pain] : no chest pain [Shortness Of Breath] : no shortness of breath [Wheezing] : no wheezing [Cough] : no cough [FreeTextEntry2] : with exertion

## 2019-09-06 NOTE — PHYSICAL EXAM
[General Appearance - Alert] : alert [General Appearance - In No Acute Distress] : in no acute distress [Thin] : thin [Sclera] : the sclera and conjunctiva were normal [Extraocular Movements] : extraocular movements were intact [Outer Ear] : the ears and nose were normal in appearance [Hearing Threshold Finger Rub Not [place]] : hearing was normal [] : no respiratory distress [Respiration, Rhythm And Depth] : normal respiratory rhythm and effort [Heart Sounds] : normal S1 and S2 [Abdomen Soft] : soft [Normal] : no focal deficits [Oriented To Time, Place, And Person] : oriented to person, place, and time [de-identified] : BS diminished to RUL and RML; otherwise clear.

## 2019-09-06 NOTE — REASON FOR VISIT
[Routine Follow-Up] : routine follow-up visit for [Lung Cancer] : lung cancer [Clinical -- TNM Staging] : TNM Stage: c [T: ___] : T[unfilled] [N: ___] : N[unfilled] [M: ___] : M[unfilled] [de-identified] : Right Lung

## 2019-09-12 ENCOUNTER — RESULT CHARGE (OUTPATIENT)
Age: 84
End: 2019-09-12

## 2019-09-13 ENCOUNTER — APPOINTMENT (OUTPATIENT)
Dept: PULMONOLOGY | Facility: CLINIC | Age: 84
End: 2019-09-13
Payer: MEDICARE

## 2019-09-13 VITALS
DIASTOLIC BLOOD PRESSURE: 70 MMHG | OXYGEN SATURATION: 94 % | HEIGHT: 60 IN | BODY MASS INDEX: 19.83 KG/M2 | SYSTOLIC BLOOD PRESSURE: 120 MMHG | TEMPERATURE: 97.6 F | WEIGHT: 101 LBS | HEART RATE: 91 BPM

## 2019-09-13 PROCEDURE — 99204 OFFICE O/P NEW MOD 45 MIN: CPT

## 2019-09-13 NOTE — HISTORY OF PRESENT ILLNESS
[FreeTextEntry1] : 86 year old female nonsmoker with PMHx of lung adenocarcinoma (likely multiple primaries) s/p radiation therapy (patient has refused systemic therapy & biopsies in the past) referred to our team for a navigational bronchoscopy with biopsy of enlarging right apical density which is hypermetabolic on recent surveillance CT & PET from August 2019.\par \par The lung adenocarcinoma was originally diagnosed in 2013  s/p right VATS robotic assisted right sub-lobar resection & lymph node dissection. Patient received radiation with Dr. Bailey for LEFT upper lobe lesion which had increased in size, consistent with new primary lung cancer, in October 2014. PET done April 2015 revealed stability of ground glass mildly FDG-avid opacity in RIGHT lung apex. Patient refused biopsy at that time and instead had a PET CT in September 2017 which demonstrated spiculated lesion in RIGHT middle lobe with increased FDG activity. Refused biopsy again and instead followed with radiation oncology. PET March 2017 revealed increase in size and FDG-avidity in RML lesion with new hypermetabolic lesion in LEFT lingula. Referred for Navigation bronchoscopy but refused at that time; instead received radiation from 6/6/17-6/14/17 for stage 1A NSCLC. Of note, also received SBRT to left lower lobe. Most recent PET from August 2019 revealed hypermetabolic activity in opacity in RUL which has increased in size, density & FDG activity since 2017, as well as mildly hypermetabolic opacities in the RIGHT lower lobe and lingula (likely consistent with radiation changes) which have also increased in size since prior PET in 2017.  \par \par Today, patient reports feeling well overall. Denies shortness of breath at rest but does feel SOB after walking 3 blocks. Does not climb stairs. Denies fever, chills, unintentional weight loss or night sweats. Denies chest pain/tightness, palpitations or lower extremity edema. Patient recalls that she did have a productive cough with yellow sputum 2 weeks ago which has since resolved. \par \par

## 2019-09-13 NOTE — ASSESSMENT
[FreeTextEntry1] : 86 year old female with PMHx of lung adenocarcinoma s/p VATS and sub-lobar resection in 2013 of RLL, originally diagnosed in 2013, as well as multiple lung cancers. Of note, patient is s/p radiation therapy to LLL and RML lesion as she has refused biopsy & systemic therapy, in 2014 & 2017, respectively. Patient now with enlarging opacity in right apex which is FDG positive demonstrated on PET from August 2019, concerning for malignancy (recurrence vs. new primary).\par \par Oxygen saturation on room air at rest 94% \par \par Pulmonary nodule\par - Navigational bronchoscopy with biopsy of RUL opacity as well as linear EBUS with lymph node TBNA for diagnosis & staging. We have discussed the procedure in detail and answered all questions the patient had to her apparent satisfaction. We reviewed her PET-CT during office visit. Additionally, we have discussed the risks of anesthesia, hypoxia, bleeding, infection and pneumothorax. Patient has no questions or concerns at this time & feels comfortable with proceeding with procedure. Procedure scheduled for 9/20/19 730 AM with Dr. Nguyen. Gave patient pre-procedure instructions (NPO past midnight night before) & will follow up with her again via phone next week before procedure. \par - Will obtain pre-procedure coags today \par - RTC in 1 month to see Dr. Addison. Will do PFTs at that time (or sooner should she be a surgical candidate). \par \par \par

## 2019-09-13 NOTE — REVIEW OF SYSTEMS
[Dyspnea] : dyspnea [Negative] : Pulmonary Hypertension [Fever] : no fever [Chills] : no chills [Fatigue] : no fatigue [Poor Appetite] : normal appetite  [Recent Wt Loss (___ Lbs)] : no recent weight loss [Cough] : no cough [Sputum] : not coughing up ~M sputum [Hemoptysis] : no hemoptysis [Chest Tightness] : no chest tightness [Pleuritic Pain] : no pleuritic pain [Frequent URIs] : no frequent upper respiratory infections [Wheezing] : no wheezing [FreeTextEntry8] : +dyspnea on exertion

## 2019-09-13 NOTE — PROCEDURE
[FreeTextEntry1] : CT Chest 8/15/19\par - No mediastinal/hilar lymphadenopathy. Patchy consolidation in right lower lobe surrounding the area of prior wedge resection as well as right apical opacity measuring 1.7 x 3.6 x 5.0 cm; both increasing in size from imaging in 2017 and 2018. Stable lingular consolidation. \par \par PET-CT 8/27/19\par - Hypermetabolic parenchymal opacity in RUL which has increased in density & intensity since March 2017 PET, mildly hypermetabolic parenchymal opacities in RLL adjacent to surgical sutures which has also increased in size from March 2017. Mild hypermetabolic wedge-shaped opacity in lingula. \par

## 2019-09-13 NOTE — PHYSICAL EXAM
[General Appearance - Well Developed] : well developed [Normal Appearance] : normal appearance [Well Groomed] : well groomed [General Appearance - Well Nourished] : well nourished [No Deformities] : no deformities [General Appearance - In No Acute Distress] : no acute distress [Normal Conjunctiva] : the conjunctiva exhibited no abnormalities [Eyelids - No Xanthelasma] : the eyelids demonstrated no xanthelasmas [Normal Oropharynx] : normal oropharynx [Neck Appearance] : the appearance of the neck was normal [Neck Cervical Mass (___cm)] : no neck mass was observed [Jugular Venous Distention Increased] : there was no jugular-venous distention [Thyroid Nodule] : there were no palpable thyroid nodules [Thyroid Diffuse Enlargement] : the thyroid was not enlarged [Heart Rate And Rhythm] : heart rate and rhythm were normal [Heart Sounds] : normal S1 and S2 [Murmurs] : no murmurs present [Respiration, Rhythm And Depth] : normal respiratory rhythm and effort [Exaggerated Use Of Accessory Muscles For Inspiration] : no accessory muscle use [Auscultation Breath Sounds / Voice Sounds] : lungs were clear to auscultation bilaterally [Abnormal Walk] : normal gait [Gait - Sufficient For Exercise Testing] : the gait was sufficient for exercise testing [Nail Clubbing] : no clubbing of the fingernails [Cyanosis, Localized] : no localized cyanosis [Petechial Hemorrhages (___cm)] : no petechial hemorrhages [Skin Color & Pigmentation] : normal skin color and pigmentation [Skin Turgor] : normal skin turgor [] : no rash [Oriented To Time, Place, And Person] : oriented to person, place, and time [Impaired Insight] : insight and judgment were intact [Affect] : the affect was normal

## 2019-09-16 LAB
APTT BLD: 31.2 SEC
INR PPP: 0.92 RATIO
PT BLD: 10.4 SEC

## 2019-10-11 ENCOUNTER — APPOINTMENT (OUTPATIENT)
Dept: PULMONOLOGY | Facility: CLINIC | Age: 84
End: 2019-10-11

## 2019-11-11 ENCOUNTER — APPOINTMENT (OUTPATIENT)
Dept: PULMONOLOGY | Facility: CLINIC | Age: 84
End: 2019-11-11
Payer: MEDICARE

## 2019-11-11 VITALS — SYSTOLIC BLOOD PRESSURE: 128 MMHG | DIASTOLIC BLOOD PRESSURE: 91 MMHG | HEART RATE: 96 BPM

## 2019-11-11 DIAGNOSIS — R91.1 SOLITARY PULMONARY NODULE: ICD-10-CM

## 2019-11-11 PROCEDURE — 99214 OFFICE O/P EST MOD 30 MIN: CPT

## 2019-11-12 DIAGNOSIS — Z01.818 ENCOUNTER FOR OTHER PREPROCEDURAL EXAMINATION: ICD-10-CM

## 2019-11-13 ENCOUNTER — APPOINTMENT (OUTPATIENT)
Dept: PULMONOLOGY | Facility: CLINIC | Age: 84
End: 2019-11-13
Payer: MEDICARE

## 2019-11-13 PROCEDURE — 36415 COLL VENOUS BLD VENIPUNCTURE: CPT

## 2019-11-13 NOTE — DISCUSSION/SUMMARY
[FreeTextEntry1] : 86 year old female with PMHx of lung adenocarcinoma s/p VATS and sub-lobar resection in 2013 of RLL, originally diagnosed in 2013, as well as multiple lung cancers. Of note, patient is s/p radiation therapy to LLL and RML lesion as she has refused biopsy & systemic therapy, in 2014 & 2017, respectively. Patient now with enlarging opacity in right apex which is FDG positive demonstrated on PET from August 2019, concerning for malignancy (recurrence vs. new primary).\par Patient has refused biopsy in past but today, she came back again after discussion with PCP and Dr. Godfrey office. She agreed to get biopsy done.\par \par Oxygen saturation on room air at rest 98% \par \par Plan:\par - Navigational bronchoscopy with biopsy of RUL opacity as well as linear EBUS with lymph node TBNA for diagnosis & staging. \par - I discussed the procedure in detail again with her (showed her PET scan pictures and CT chest pictures again) and answered all questions again.\par - She will need pre-op labs\par

## 2019-11-13 NOTE — REVIEW OF SYSTEMS
[Chills] : no chills [Fever] : no fever [Dry Eyes] : no dryness of the eyes [Eye Irritation] : no ~T irritation of the eyes [Cough] : no cough [Sputum] : not coughing up ~M sputum [Orthopnea] : no orthopnea [Edema] : ~T edema was not present [FreeTextEntry1] : rest of ROS negative

## 2019-11-13 NOTE — PHYSICAL EXAM
[General Appearance - Well Developed] : well developed [Normal Conjunctiva] : the conjunctiva exhibited no abnormalities [General Appearance - Well Nourished] : well nourished [Eyelids - No Xanthelasma] : the eyelids demonstrated no xanthelasmas [Neck Appearance] : the appearance of the neck was normal [Jugular Venous Distention Increased] : there was no jugular-venous distention [Heart Sounds] : normal S1 and S2 [Murmurs] : no murmurs present [Auscultation Breath Sounds / Voice Sounds] : lungs were clear to auscultation bilaterally [Abdomen Soft] : soft [Abnormal Walk] : normal gait [Abdomen Tenderness] : non-tender [Gait - Sufficient For Exercise Testing] : the gait was sufficient for exercise testing [Nail Clubbing] : no clubbing of the fingernails [Cyanosis, Localized] : no localized cyanosis [] : no rash [No Venous Stasis] : no venous stasis [Sensation] : the sensory exam was normal to light touch and pinprick [No Focal Deficits] : no focal deficits [Oriented To Time, Place, And Person] : oriented to person, place, and time [Affect] : the affect was normal [FreeTextEntry1] : thin built [Normal Oropharynx] : abnormal oropharynx [Erythema] : no erythema of the pharynx [Retrognathia] : no retrognathia

## 2019-11-13 NOTE — HISTORY OF PRESENT ILLNESS
[FreeTextEntry1] : 86 year old female nonsmoker with PMHx of lung adenocarcinoma (likely multiple primaries) s/p radiation therapy (patient has refused systemic therapy & biopsies in the past) referred to our team for a navigational bronchoscopy with biopsy of enlarging right apical density which is hypermetabolic on recent surveillance CT & PET from August 2019.\par \par The lung adenocarcinoma was originally diagnosed in 2013 s/p right VATS robotic assisted right sub-lobar resection & lymph node dissection. Patient received radiation with Dr. Bailey for LEFT upper lobe lesion which had increased in size, consistent with new primary lung cancer, in October 2014. PET done April 2015 revealed stability of ground glass mildly FDG-avid opacity in RIGHT lung apex. Patient refused biopsy at that time and instead had a PET CT in September 2017 which demonstrated spiculated lesion in RIGHT middle lobe with increased FDG activity. Refused biopsy again and instead followed with radiation oncology. PET March 2017 revealed increase in size and FDG-avidity in RML lesion with new hypermetabolic lesion in LEFT lingula. Referred for Navigation bronchoscopy but refused at that time; instead received radiation from 6/6/17-6/14/17 for stage 1A NSCLC. Of note, also received SBRT to left lower lobe. Most recent PET from August 2019 revealed hypermetabolic activity in opacity in RUL which has increased in size, density & FDG activity since 2017, as well as mildly hypermetabolic opacities in the RIGHT lower lobe and lingula (likely consistent with radiation changes) which have also increased in size since prior PET in 2017. \par \par Today, patient reports feeling well overall. Denies shortness of breath at rest but does feel SOB after walking 3 blocks. Does not climb stairs. Denies fever, chills, unintentional weight loss or night sweats. Denies chest pain/tightness, palpitations or lower extremity edema. Patient recalls that she did have a productive cough with yellow sputum 2 weeks ago which has since resolved. \par \par 11/13/19:\par Patient missed earlier appointment after repeat CT chest and MRI brain. No brain metastasis on MRI. CT chest showed persistent RUL opacity (which was PET avid in past). Patient has denied for biopsy in past but she discussed with office of Dr. Godfrey, who also suggested her for EBUS with TBNA with TBLB.

## 2019-11-13 NOTE — CONSULT LETTER
[Dear  ___] : Dear  [unfilled], [Please see my note below.] : Please see my note below. [Consult Letter:] : I had the pleasure of evaluating your patient, [unfilled]. [Consult Closing:] : Thank you very much for allowing me to participate in the care of this patient.  If you have any questions, please do not hesitate to contact me. [FreeTextEntry3] : Sincerely\par \par Jose G Addison MD FCCP\par Pulmonary and Critical Care\par [facility]\par

## 2019-11-14 LAB
ANION GAP SERPL CALC-SCNC: 18 MMOL/L
APTT BLD: 29.5 SEC
BASOPHILS # BLD AUTO: 0.05 K/UL
BASOPHILS NFR BLD AUTO: 0.6 %
BUN SERPL-MCNC: 17 MG/DL
CALCIUM SERPL-MCNC: 9.6 MG/DL
CHLORIDE SERPL-SCNC: 104 MMOL/L
CO2 SERPL-SCNC: 23 MMOL/L
CREAT SERPL-MCNC: 1.09 MG/DL
EOSINOPHIL # BLD AUTO: 0.19 K/UL
EOSINOPHIL NFR BLD AUTO: 2.2 %
GLUCOSE SERPL-MCNC: 114 MG/DL
HCT VFR BLD CALC: 47 %
HGB BLD-MCNC: 14.8 G/DL
IMM GRANULOCYTES NFR BLD AUTO: 0.2 %
INR PPP: 0.96 RATIO
LYMPHOCYTES # BLD AUTO: 3 K/UL
LYMPHOCYTES NFR BLD AUTO: 34.4 %
MAN DIFF?: NORMAL
MCHC RBC-ENTMCNC: 31.2 PG
MCHC RBC-ENTMCNC: 31.5 GM/DL
MCV RBC AUTO: 98.9 FL
MONOCYTES # BLD AUTO: 0.75 K/UL
MONOCYTES NFR BLD AUTO: 8.6 %
NEUTROPHILS # BLD AUTO: 4.71 K/UL
NEUTROPHILS NFR BLD AUTO: 54 %
PLATELET # BLD AUTO: 263 K/UL
POTASSIUM SERPL-SCNC: 5.2 MMOL/L
PT BLD: 10.9 SEC
RBC # BLD: 4.75 M/UL
RBC # FLD: 15 %
SODIUM SERPL-SCNC: 145 MMOL/L
WBC # FLD AUTO: 8.72 K/UL

## 2019-11-22 ENCOUNTER — RESULT REVIEW (OUTPATIENT)
Age: 84
End: 2019-11-22

## 2019-11-22 ENCOUNTER — OUTPATIENT (OUTPATIENT)
Dept: OUTPATIENT SERVICES | Facility: HOSPITAL | Age: 84
LOS: 1 days | Discharge: HOME | End: 2019-11-22
Payer: MEDICARE

## 2019-11-22 ENCOUNTER — INPATIENT (INPATIENT)
Facility: HOSPITAL | Age: 84
LOS: 6 days | Discharge: HOME HEALTH | DRG: 306 | End: 2019-11-29
Attending: INTERNAL MEDICINE | Admitting: INTERNAL MEDICINE
Payer: MEDICARE

## 2019-11-22 VITALS
TEMPERATURE: 97 F | SYSTOLIC BLOOD PRESSURE: 108 MMHG | OXYGEN SATURATION: 97 % | HEART RATE: 70 BPM | DIASTOLIC BLOOD PRESSURE: 60 MMHG | WEIGHT: 100.09 LBS | RESPIRATION RATE: 22 BRPM

## 2019-11-22 LAB
ALBUMIN SERPL ELPH-MCNC: 3.5 G/DL — SIGNIFICANT CHANGE UP (ref 3.3–5)
ALP SERPL-CCNC: 81 U/L — SIGNIFICANT CHANGE UP (ref 40–120)
ALT FLD-CCNC: 18 U/L — SIGNIFICANT CHANGE UP (ref 10–45)
ANION GAP SERPL CALC-SCNC: 11 MMOL/L — SIGNIFICANT CHANGE UP (ref 5–17)
ANION GAP SERPL CALC-SCNC: 8 MMOL/L — SIGNIFICANT CHANGE UP (ref 5–17)
APTT BLD: 26.8 SEC — LOW (ref 27.5–36.3)
AST SERPL-CCNC: 30 U/L — SIGNIFICANT CHANGE UP (ref 10–40)
BASE EXCESS BLDA CALC-SCNC: -2.2 MMOL/L — LOW (ref -2–3)
BASOPHILS # BLD AUTO: 0.04 K/UL — SIGNIFICANT CHANGE UP (ref 0–0.2)
BASOPHILS NFR BLD AUTO: 0.4 % — SIGNIFICANT CHANGE UP (ref 0–2)
BILIRUB SERPL-MCNC: 0.2 MG/DL — SIGNIFICANT CHANGE UP (ref 0.2–1.2)
BUN SERPL-MCNC: 19 MG/DL — SIGNIFICANT CHANGE UP (ref 7–23)
BUN SERPL-MCNC: 20 MG/DL — SIGNIFICANT CHANGE UP (ref 7–23)
CALCIUM SERPL-MCNC: 8.8 MG/DL — SIGNIFICANT CHANGE UP (ref 8.4–10.5)
CALCIUM SERPL-MCNC: 9 MG/DL — SIGNIFICANT CHANGE UP (ref 8.4–10.5)
CHLORIDE SERPL-SCNC: 104 MMOL/L — SIGNIFICANT CHANGE UP (ref 96–108)
CHLORIDE SERPL-SCNC: 106 MMOL/L — SIGNIFICANT CHANGE UP (ref 96–108)
CK MB CFR SERPL CALC: 2.6 NG/ML — SIGNIFICANT CHANGE UP (ref 0–6.7)
CK SERPL-CCNC: 75 U/L — SIGNIFICANT CHANGE UP (ref 25–170)
CO2 SERPL-SCNC: 29 MMOL/L — SIGNIFICANT CHANGE UP (ref 22–31)
CO2 SERPL-SCNC: 29 MMOL/L — SIGNIFICANT CHANGE UP (ref 22–31)
CREAT SERPL-MCNC: 1.02 MG/DL — SIGNIFICANT CHANGE UP (ref 0.5–1.3)
CREAT SERPL-MCNC: 1.09 MG/DL — SIGNIFICANT CHANGE UP (ref 0.5–1.3)
EOSINOPHIL # BLD AUTO: 0.03 K/UL — SIGNIFICANT CHANGE UP (ref 0–0.5)
EOSINOPHIL NFR BLD AUTO: 0.3 % — SIGNIFICANT CHANGE UP (ref 0–6)
GAS PNL BLDA: SIGNIFICANT CHANGE UP
GLUCOSE SERPL-MCNC: 139 MG/DL — HIGH (ref 70–99)
GLUCOSE SERPL-MCNC: 179 MG/DL — HIGH (ref 70–99)
GRAM STN FLD: SIGNIFICANT CHANGE UP
HBA1C BLD-MCNC: 5.9 % — HIGH (ref 4–5.6)
HCO3 BLDA-SCNC: 24 MMOL/L — SIGNIFICANT CHANGE UP (ref 21–28)
HCT VFR BLD CALC: 40.1 % — SIGNIFICANT CHANGE UP (ref 34.5–45)
HGB BLD-MCNC: 12.9 G/DL — SIGNIFICANT CHANGE UP (ref 11.5–15.5)
IMM GRANULOCYTES NFR BLD AUTO: 0.6 % — SIGNIFICANT CHANGE UP (ref 0–1.5)
INR BLD: 1.03 — SIGNIFICANT CHANGE UP (ref 0.88–1.16)
LACTATE SERPL-SCNC: 1.7 MMOL/L — SIGNIFICANT CHANGE UP (ref 0.5–2)
LYMPHOCYTES # BLD AUTO: 0.61 K/UL — LOW (ref 1–3.3)
LYMPHOCYTES # BLD AUTO: 6.3 % — LOW (ref 13–44)
MCHC RBC-ENTMCNC: 32.2 GM/DL — SIGNIFICANT CHANGE UP (ref 32–36)
MCHC RBC-ENTMCNC: 32.2 PG — SIGNIFICANT CHANGE UP (ref 27–34)
MCV RBC AUTO: 100 FL — SIGNIFICANT CHANGE UP (ref 80–100)
MONOCYTES # BLD AUTO: 0.17 K/UL — SIGNIFICANT CHANGE UP (ref 0–0.9)
MONOCYTES NFR BLD AUTO: 1.7 % — LOW (ref 2–14)
NEUTROPHILS # BLD AUTO: 8.82 K/UL — HIGH (ref 1.8–7.4)
NEUTROPHILS NFR BLD AUTO: 90.7 % — HIGH (ref 43–77)
NRBC # BLD: 0 /100 WBCS — SIGNIFICANT CHANGE UP (ref 0–0)
NT-PROBNP SERPL-SCNC: 518 PG/ML — HIGH (ref 0–300)
PCO2 BLDA: 45 MMHG — SIGNIFICANT CHANGE UP (ref 32–45)
PH BLDA: 7.34 — LOW (ref 7.35–7.45)
PLATELET # BLD AUTO: 234 K/UL — SIGNIFICANT CHANGE UP (ref 150–400)
PO2 BLDA: 113 MMHG — HIGH (ref 83–108)
POTASSIUM SERPL-MCNC: 4.9 MMOL/L — SIGNIFICANT CHANGE UP (ref 3.5–5.3)
POTASSIUM SERPL-MCNC: 5.4 MMOL/L — HIGH (ref 3.5–5.3)
POTASSIUM SERPL-SCNC: 4.9 MMOL/L — SIGNIFICANT CHANGE UP (ref 3.5–5.3)
POTASSIUM SERPL-SCNC: 5.4 MMOL/L — HIGH (ref 3.5–5.3)
PROT SERPL-MCNC: 5.9 G/DL — LOW (ref 6–8.3)
PROTHROM AB SERPL-ACNC: 11.7 SEC — SIGNIFICANT CHANGE UP (ref 10–12.9)
RBC # BLD: 4.01 M/UL — SIGNIFICANT CHANGE UP (ref 3.8–5.2)
RBC # FLD: 15 % — HIGH (ref 10.3–14.5)
SAO2 % BLDA: 98 % — SIGNIFICANT CHANGE UP (ref 95–100)
SODIUM SERPL-SCNC: 143 MMOL/L — SIGNIFICANT CHANGE UP (ref 135–145)
SODIUM SERPL-SCNC: 144 MMOL/L — SIGNIFICANT CHANGE UP (ref 135–145)
SPECIMEN SOURCE: SIGNIFICANT CHANGE UP
TROPONIN T SERPL-MCNC: <0.01 NG/ML — SIGNIFICANT CHANGE UP (ref 0–0.01)
WBC # BLD: 9.73 K/UL — SIGNIFICANT CHANGE UP (ref 3.8–10.5)
WBC # FLD AUTO: 9.73 K/UL — SIGNIFICANT CHANGE UP (ref 3.8–10.5)

## 2019-11-22 PROCEDURE — 71045 X-RAY EXAM CHEST 1 VIEW: CPT | Mod: 26

## 2019-11-22 PROCEDURE — 31653 BRONCH EBUS SAMPLNG 3/> NODE: CPT | Mod: GC

## 2019-11-22 PROCEDURE — 31627 NAVIGATIONAL BRONCHOSCOPY: CPT | Mod: GC

## 2019-11-22 PROCEDURE — 99284 EMERGENCY DEPT VISIT MOD MDM: CPT

## 2019-11-22 PROCEDURE — 71045 X-RAY EXAM CHEST 1 VIEW: CPT | Mod: 26,76

## 2019-11-22 PROCEDURE — 31625 BRONCHOSCOPY W/BIOPSY(S): CPT | Mod: GC

## 2019-11-22 PROCEDURE — 71045 X-RAY EXAM CHEST 1 VIEW: CPT | Mod: 26,77

## 2019-11-22 PROCEDURE — 93010 ELECTROCARDIOGRAM REPORT: CPT

## 2019-11-22 PROCEDURE — 99223 1ST HOSP IP/OBS HIGH 75: CPT | Mod: GC

## 2019-11-22 PROCEDURE — 31624 DX BRONCHOSCOPE/LAVAGE: CPT | Mod: GC

## 2019-11-22 PROCEDURE — 31654 BRONCH EBUS IVNTJ PERPH LES: CPT | Mod: GC

## 2019-11-22 RX ORDER — AMLODIPINE BESYLATE 2.5 MG/1
10 TABLET ORAL DAILY
Refills: 0 | Status: DISCONTINUED | OUTPATIENT
Start: 2019-11-22 | End: 2019-11-22

## 2019-11-22 RX ORDER — SODIUM CHLORIDE 9 MG/ML
3 INJECTION INTRAMUSCULAR; INTRAVENOUS; SUBCUTANEOUS ONCE
Refills: 0 | Status: COMPLETED | OUTPATIENT
Start: 2019-11-22 | End: 2019-11-22

## 2019-11-22 RX ORDER — NOREPINEPHRINE BITARTRATE/D5W 8 MG/250ML
0.05 PLASTIC BAG, INJECTION (ML) INTRAVENOUS
Qty: 8 | Refills: 0 | Status: DISCONTINUED | OUTPATIENT
Start: 2019-11-22 | End: 2019-11-22

## 2019-11-22 RX ORDER — FENTANYL CITRATE 50 UG/ML
25 INJECTION INTRAVENOUS ONCE
Refills: 0 | Status: DISCONTINUED | OUTPATIENT
Start: 2019-11-22 | End: 2019-11-22

## 2019-11-22 RX ORDER — PROPOFOL 10 MG/ML
20 INJECTION, EMULSION INTRAVENOUS ONCE
Refills: 0 | Status: COMPLETED | OUTPATIENT
Start: 2019-11-22 | End: 2019-11-22

## 2019-11-22 RX ORDER — AMLODIPINE BESYLATE 2.5 MG/1
10 TABLET ORAL EVERY 24 HOURS
Refills: 0 | Status: DISCONTINUED | OUTPATIENT
Start: 2019-11-22 | End: 2019-11-22

## 2019-11-22 RX ORDER — PANTOPRAZOLE SODIUM 20 MG/1
40 TABLET, DELAYED RELEASE ORAL EVERY 24 HOURS
Refills: 0 | Status: DISCONTINUED | OUTPATIENT
Start: 2019-11-22 | End: 2019-11-26

## 2019-11-22 RX ORDER — PROPOFOL 10 MG/ML
5 INJECTION, EMULSION INTRAVENOUS
Qty: 1000 | Refills: 0 | Status: DISCONTINUED | OUTPATIENT
Start: 2019-11-22 | End: 2019-11-22

## 2019-11-22 RX ORDER — CHLORHEXIDINE GLUCONATE 213 G/1000ML
1 SOLUTION TOPICAL
Refills: 0 | Status: DISCONTINUED | OUTPATIENT
Start: 2019-11-22 | End: 2019-11-24

## 2019-11-22 RX ORDER — PROPOFOL 10 MG/ML
30 INJECTION, EMULSION INTRAVENOUS ONCE
Refills: 0 | Status: COMPLETED | OUTPATIENT
Start: 2019-11-22 | End: 2019-11-22

## 2019-11-22 RX ADMIN — PROPOFOL 1.36 MICROGRAM(S)/KG/MIN: 10 INJECTION, EMULSION INTRAVENOUS at 13:46

## 2019-11-22 RX ADMIN — FENTANYL CITRATE 25 MICROGRAM(S): 50 INJECTION INTRAVENOUS at 12:23

## 2019-11-22 RX ADMIN — FENTANYL CITRATE 25 MICROGRAM(S): 50 INJECTION INTRAVENOUS at 14:00

## 2019-11-22 RX ADMIN — Medication 4.26 MICROGRAM(S)/KG/MIN: at 13:46

## 2019-11-22 RX ADMIN — PROPOFOL 20 MILLIGRAM(S): 10 INJECTION, EMULSION INTRAVENOUS at 12:10

## 2019-11-22 RX ADMIN — SODIUM CHLORIDE 3 MILLILITER(S): 9 INJECTION INTRAMUSCULAR; INTRAVENOUS; SUBCUTANEOUS at 12:43

## 2019-11-22 RX ADMIN — PANTOPRAZOLE SODIUM 40 MILLIGRAM(S): 20 TABLET, DELAYED RELEASE ORAL at 15:11

## 2019-11-22 RX ADMIN — PROPOFOL 30 MILLIGRAM(S): 10 INJECTION, EMULSION INTRAVENOUS at 12:30

## 2019-11-22 NOTE — ED PROVIDER NOTE - OBJECTIVE STATEMENT
86F unknown full PMH p/w respiratory failure. PT was intubated for outpt Bronch today/lung ca/bx. Pt was hypertensive pre and post procedure. Post extubation, pt hypoxic and hypertensive and re-intubated. Already evaluated by ICU - reported +lung sliding and b-line predominant and given 40 lasix. Presumed APE. Pt currently intubated and accepted by ICU but no beds available and per protocol needs to go through ED. No additional concerns reported.

## 2019-11-22 NOTE — H&P ADULT - NSHPLABSRESULTS_GEN_ALL_CORE
LABS:                         12.9   9.73  )-----------( 234      ( 22 Nov 2019 12:43 )             40.1     11-22    143  |  106  |  19  ----------------------------<  179<H>  5.4<H>   |  29  |  1.02    Ca    8.8      22 Nov 2019 12:43    TPro  5.9<L>  /  Alb  3.5  /  TBili  0.2  /  DBili  x   /  AST  30  /  ALT  18  /  AlkPhos  81  11-22    PT/INR - ( 22 Nov 2019 12:43 )   PT: 11.7 sec;   INR: 1.03          PTT - ( 22 Nov 2019 12:43 )  PTT:26.8 sec    CARDIAC MARKERS ( 22 Nov 2019 12:43 )  x     / <0.01 ng/mL / 75 U/L / x     / 2.6 ng/mL      Serum Pro-Brain Natriuretic Peptide: 518 pg/mL (11-22 @ 12:43)    Lactate, Blood: 1.7 mmoL/L (11-22 @ 12:39)      RADIOLOGY, EKG & ADDITIONAL TESTS:

## 2019-11-22 NOTE — ED PROVIDER NOTE - CLINICAL SUMMARY MEDICAL DECISION MAKING FREE TEXT BOX
86F unknown full PMH p/w respiratory failure. PT was intubated for outpt Bronch today/lung ca/bx. Pt was hypertensive pre and post procedure. Post extubation, pt hypoxic and hypertensive and re-intubated. Already evaluated by ICU - reported +lung sliding and b-line predominant and given 40 lasix. Presumed APE. Pt currently intubated and accepted by ICU but no beds available and per protocol needs to go through ED. No additional concerns reported.   Will admit to ICU, pt being managed by ICU team. clinically no indication for further immediate ED management.

## 2019-11-22 NOTE — H&P ADULT - ASSESSMENT
86F with pmh of lung adenocarcinoma s/p radiation presented for opt elective bronchoscopy likely developed flash pulm edema requiring reintubation.  Plan for admission to MICU for diuresis and extubation.     Neurology  Awake and alert. Off sedation     Cardiovascular   Initially on Levophed in setting of hypotension due to sedation. Now off pressors and all sedation. Maintaining MAPs >65 with /48   -F/u echo     Respiratory  #Acute Hypoxic Respiratory Failure   2/2 to flash pulmonary edema s/p extubation in setting of outpatient bronchoscopy and hypertensive with sBPs> 200. Re-intubated. CXR with bilateral asymmetrical opacities/right pleural effusion. Received Lasix 40mg IV in ED.   -Good urine output- 100-150cc/hr s/p Lasix 40mg x1, continue to monitor and diurese as needed   -Strict Is/Os  -Repeat cxr with stable lung pathology/right pleural effusion.  -Successfully extubated in later afternoon, now saturating at 95% on 2L NC. Hemodynamically stable     Heme  H/o Lung adenocarcinoma s/p radiation.      F: none  E: replete K <4, Mg <2  N: Mechanical soft  GI Ppx: Protonix   DVT Ppx:SCDs  Code status: FULL   Dispo: MICU

## 2019-11-22 NOTE — CHART NOTE - NSCHARTNOTEFT_GEN_A_CORE
HOSPITAL COURSE:   86F with pmh of lung adenocarcinoma s/p radiation presented for outpatient elective bronchoscopy for BAL and lymph node biopsy. She was hypertensive to SBP 200s during the procedure. Post procedure, pt was extubated however was noted to be hypoxic to 70s with tachypnea. Pt was then reintubated. Xray chest showed pulm congestion and US lungs with B lines likely 2/2 to flash pulmonary edema. She was given 40 IV lasix. In ER, Pt was briefly hypotensive after propofol initiation and required Levophed. By the time she was brought up to the floors she was off sedation and off Levophed. She reported pain due to the intubation but denied chest pain, abdominal pain, sob. Unable to obtain full history and ROS as patient intubated at time of exam. In ED vs: 97.3, 70, 108/60, 22, 100% on vent 40% FiO2. Significant Labs: K+ 5.5, Glucose 179, HgbA1C 5.9, . CXR with bilateral asymmetrical opacities/right pleural effusion. EKG nsr. Patient admitted to the MICU for further management. In the late afternoon, she was successfully extubated and saturating well on 2L NC. She was placed on strict Is/Os with good urine output and is hemodynamically stable. F/u echo. At this time she is deemed stable for stepdown to [facility].

## 2019-11-22 NOTE — ED ADULT NURSE REASSESSMENT NOTE - NS ED NURSE REASSESS COMMENT FT1
Patient transported to [facility] at 1330. Bedside report given to CHIDI Bobby. Patient safely transported on cardiac monitor to floor without incidents during transport.

## 2019-11-22 NOTE — CONSULT NOTE ADULT - SUBJECTIVE AND OBJECTIVE BOX
86F with pmh of lung adenocarcinoma s/p radiation presented for opt elective bronchoscopy. She underwent BAL and lymph node biopsy.  She was hypertensive to SBP 200s during the procedure. Post procedure, pt was extubated however was noted to be hypoxic to 70s with tachypnea. Pt was reintubated. Xray chest showed pulm congestion. US lungs with B lines. She was given 40 IV lasix. Pt to be admitted to ICU.   In ER, Pt was briefly hypotensive after propofol initiation and required Levophed.       SUBJECTIVE / INTERVAL HPI: Patient seen and examined at bedside. Intubated and sedated     VITAL SIGNS:  Vital Signs Last 24 Hrs  T(C): 36.4 (22 Nov 2019 14:00), Max: 36.4 (22 Nov 2019 14:00)  T(F): 97.6 (22 Nov 2019 14:00), Max: 97.6 (22 Nov 2019 14:00)  HR: 60 (22 Nov 2019 16:39) (50 - 70)  BP: 169/86 (22 Nov 2019 13:30) (50/26 - 169/86)  BP(mean): --  RR: 14 (22 Nov 2019 16:39) (11 - 22)  SpO2: 94% (22 Nov 2019 16:39) (88% - 100%)    PHYSICAL EXAM:    General: Intubated and sedated   HEENT: NC/AT; PERRL, anicteric sclera;   Neck: supple  Cardiovascular: +S1/S2; RRR  Respiratory: Bibasilar crackles   Gastrointestinal: soft, NT/ND; +BSx4  Extremities: WWP; no edema, clubbing or cyanosis  Vascular: 2+ radial, DP/PT pulses B/L  Neurological: Intubated and sedated.    MEDICATIONS:  MEDICATIONS  (STANDING):  chlorhexidine 2% Cloths 1 Application(s) Topical <User Schedule>  norepinephrine Infusion 0.05 MICROgram(s)/kG/Min (4.256 mL/Hr) IV Continuous <Continuous>  pantoprazole  Injectable 40 milliGRAM(s) IV Push every 24 hours  propofol Infusion 5 MICROgram(s)/kG/Min (1.362 mL/Hr) IV Continuous <Continuous>    MEDICATIONS  (PRN):      ALLERGIES:  Allergies    Bactrim (Unknown)  Cleocin HCl (Unknown)  Flagyl (Unknown)  iodine (Anaphylaxis)  IV Contrast (Anaphylaxis)  Levaquin (Other; Rash)  penicillin (Unknown)  Zithromax (Unknown)    Intolerances        LABS:                        12.9   9.73  )-----------( 234      ( 22 Nov 2019 12:43 )             40.1     11-22    143  |  106  |  19  ----------------------------<  179<H>  5.4<H>   |  29  |  1.02    Ca    8.8      22 Nov 2019 12:43    TPro  5.9<L>  /  Alb  3.5  /  TBili  0.2  /  DBili  x   /  AST  30  /  ALT  18  /  AlkPhos  81  11-22    PT/INR - ( 22 Nov 2019 12:43 )   PT: 11.7 sec;   INR: 1.03          PTT - ( 22 Nov 2019 12:43 )  PTT:26.8 sec    CAPILLARY BLOOD GLUCOSE          RADIOLOGY & ADDITIONAL TESTS: Reviewed.    ASSESSMENT:    PLAN:

## 2019-11-22 NOTE — ED ADULT TRIAGE NOTE - CHIEF COMPLAINT QUOTE
as per endoscopy staff patient had bronchoscopy but after extubation patient started to go into respiratory distress.

## 2019-11-22 NOTE — ED ADULT NURSE NOTE - OBJECTIVE STATEMENT
Patient brought in from outpatient endoscopy status post bronchoscopy. Patient was brought in with anesthesiologist, 2 outpatient RNs, and MD. Per anesthesiologist, patient was extubated after procedure was complete. Patient became extremely SOB and was reintubated due to flash pulmonary edema. Per MDs, patient's BP was ranging in 200s/100s after procedure. Patient presented to ED with A line L radius, 20g IV R wrist; patient was on propofol and LR running through R wrist IV. Patient presented with BP of 108/60; patient arousable and agitated. Wrist restraints applied to prevent self-extubation and pulling out lines. NP Rickey Moore administered 20 mg propofol IVP due to agitation. Patient was started on Levophed due to BP of 89/57 and HR 66. Patient's BP and HR vigorously monitored due to propofol and levophed drips.

## 2019-11-22 NOTE — AIRWAY REMOVAL NOTE  ADULT & PEDS - ARTIFICAL AIRWAY REMOVAL COMMENTS
PT extubated, refused the bipap, RN made aware. PT  tolerating well on RA. no respiratory distress noted at this time.

## 2019-11-22 NOTE — H&P ADULT - NSICDXPASTSURGICALHX_GEN_ALL_CORE_FT
PAST SURGICAL HISTORY:  History of surgery to major organs, presenting hazards to health removal of R-sided adenocarcinoma, negative lymphnodes

## 2019-11-22 NOTE — H&P ADULT - NSHPPHYSICALEXAM_GEN_ALL_CORE
VITAL SIGNS:  T(C): 36.5 (11-22-19 @ 17:33), Max: 36.5 (11-22-19 @ 17:33)  T(F): 97.7 (11-22-19 @ 17:33), Max: 97.7 (11-22-19 @ 17:33)  HR: 66 (11-22-19 @ 19:00) (50 - 82)  BP: 169/86 (11-22-19 @ 13:30) (50/26 - 169/86)  BP(mean): --  RR: 13 (11-22-19 @ 19:00) (11 - 51)  SpO2: 95% (11-22-19 @ 19:00) (88% - 100%)  Wt(kg): --    PHYSICAL EXAM:    Constitutional: WDWN resting in bed; NAD  Head: NC/AT  Eyes: PERRL, EOMI, anicteric sclera  ENT: no nasal discharge; uvula midline, no oropharyngeal erythema or exudates; MMM  Neck: supple; no JVD or thyromegaly  Respiratory: intubated, crackles heard at bases b/l   Cardiac: +S1/S2; RRR; no M/R/G; PMI non-displaced  Extremities: WWP, no clubbing or cyanosis; no peripheral edema  Musculoskeletal: no joint swelling, tenderness or erythema  Vascular: 2+ radial, femoral, DP/PT pulses B/L  Dermatologic: skin warm, dry and intact  Neurologic: awake and alert, off sedation

## 2019-11-22 NOTE — H&P ADULT - HISTORY OF PRESENT ILLNESS
86F with pmh of lung adenocarcinoma s/p radiation presented for outpatient elective bronchoscopy for BAL and lymph node biopsy. She was hypertensive to SBP 200s during the procedure. Post procedure, pt was extubated however was noted to be hypoxic to 70s with tachypnea. Pt was then reintubated. Xray chest showed pulm congestion and US lungs with B lines likely 2/2 to flash pulmonary edema. She was given 40 IV lasix. In ER, Pt was briefly hypotensive after propofol initiation and required Levophed. By the time she was brought up to the floors she was off sedation and off Levophed. She reported pain due to the intubation but denied chest pain, abdominal pain, sob. Unable to obtain full history and ROS as patient intubated at time of exam.     In ED vs: 97.3, 70, 108/60, 22, 100% on vent 40% FiO2  Significant Labs: K+ 5.5, Glucose 179, HgbA1C 5.9,    CXR with bilateral asymmetrical opacities/right pleural effusion. EKG nsr.   Patient admitted to the MICU for further management 86F with pmh of lung adenocarcinoma s/p radiation presented for outpatient elective bronchoscopy for BAL and lymph node biopsy. She was hypertensive to SBP 200s during the procedure. Post procedure, pt was extubated however was noted to be hypoxic to 70s with tachypnea. Pt was then reintubated. Xray chest showed pulm congestion and US lungs with B lines likely 2/2 to flash pulmonary edema. She was given 40 IV lasix. In ER, Pt was briefly hypotensive after propofol initiation and required Levophed. By the time she was brought up to the floors she was off sedation and off Levophed. She reported pain due to the intubation but denied chest pain, abdominal pain, sob. Unable to obtain full history and ROS as patient intubated at time of exam.     In ED vs: 97.3, 70, 108/60, 22, 100% on vent 40% FiO2.   Significant Labs: K+ 5.5, Glucose 179, HgbA1C 5.9, .   CXR with bilateral asymmetrical opacities/right pleural effusion. EKG nsr.   Patient admitted to the MICU for further management.

## 2019-11-22 NOTE — ED ADULT NURSE REASSESSMENT NOTE - NS ED NURSE REASSESS COMMENT FT1
At approximately 1230, patient became agitated and anxious. NP Oscar in the room and administered 30 mg propofol IVP due to patient agitation. Patient's BP decreased to 50s/30s and HR of 50s at approximately 1240. Levophed increased and propofol paused. Levophed and propofol then titrated to acceptable vital sign readings and patient presentation. ICU called to bedside as well as GRAHAM Moore. Will continue to monitor.

## 2019-11-23 LAB
ANION GAP SERPL CALC-SCNC: 7 MMOL/L — SIGNIFICANT CHANGE UP (ref 5–17)
B PERT IGG+IGM PNL SER: SIGNIFICANT CHANGE UP
BUN SERPL-MCNC: 19 MG/DL — SIGNIFICANT CHANGE UP (ref 7–23)
CALCIUM SERPL-MCNC: 8.7 MG/DL — SIGNIFICANT CHANGE UP (ref 8.4–10.5)
CHLORIDE SERPL-SCNC: 103 MMOL/L — SIGNIFICANT CHANGE UP (ref 96–108)
CO2 SERPL-SCNC: 29 MMOL/L — SIGNIFICANT CHANGE UP (ref 22–31)
COLOR FLD: SIGNIFICANT CHANGE UP
COMMENT - FLUIDS: SIGNIFICANT CHANGE UP
CREAT SERPL-MCNC: 1.07 MG/DL — SIGNIFICANT CHANGE UP (ref 0.5–1.3)
FLUID INTAKE SUBSTANCE CLASS: SIGNIFICANT CHANGE UP
FLUID SEGMENTED GRANULOCYTES: SIGNIFICANT CHANGE UP
GLUCOSE SERPL-MCNC: 92 MG/DL — SIGNIFICANT CHANGE UP (ref 70–99)
HCT VFR BLD CALC: 34.7 % — SIGNIFICANT CHANGE UP (ref 34.5–45)
HGB BLD-MCNC: 11.3 G/DL — LOW (ref 11.5–15.5)
MAGNESIUM SERPL-MCNC: 1.8 MG/DL — SIGNIFICANT CHANGE UP (ref 1.6–2.6)
MCHC RBC-ENTMCNC: 32 PG — SIGNIFICANT CHANGE UP (ref 27–34)
MCHC RBC-ENTMCNC: 32.6 GM/DL — SIGNIFICANT CHANGE UP (ref 32–36)
MCV RBC AUTO: 98.3 FL — SIGNIFICANT CHANGE UP (ref 80–100)
NIGHT BLUE STAIN TISS: SIGNIFICANT CHANGE UP
NRBC # BLD: 0 /100 WBCS — SIGNIFICANT CHANGE UP (ref 0–0)
PHOSPHATE SERPL-MCNC: 3.5 MG/DL — SIGNIFICANT CHANGE UP (ref 2.5–4.5)
PLATELET # BLD AUTO: 236 K/UL — SIGNIFICANT CHANGE UP (ref 150–400)
POTASSIUM SERPL-MCNC: 4.2 MMOL/L — SIGNIFICANT CHANGE UP (ref 3.5–5.3)
POTASSIUM SERPL-SCNC: 4.2 MMOL/L — SIGNIFICANT CHANGE UP (ref 3.5–5.3)
RBC # BLD: 3.53 M/UL — LOW (ref 3.8–5.2)
RBC # FLD: 15.1 % — HIGH (ref 10.3–14.5)
RCV VOL RI: 4 /UL — SIGNIFICANT CHANGE UP (ref 0–5)
SODIUM SERPL-SCNC: 139 MMOL/L — SIGNIFICANT CHANGE UP (ref 135–145)
SPECIMEN SOURCE FLD: SIGNIFICANT CHANGE UP
SPECIMEN SOURCE: SIGNIFICANT CHANGE UP
TOTAL NUCLEATED CELL COUNT, BODY FLUID: 11 /UL — HIGH (ref 0–5)
TUBE TYPE: SIGNIFICANT CHANGE UP
WBC # BLD: 10.88 K/UL — HIGH (ref 3.8–10.5)
WBC # FLD AUTO: 10.88 K/UL — HIGH (ref 3.8–10.5)

## 2019-11-23 PROCEDURE — 71045 X-RAY EXAM CHEST 1 VIEW: CPT | Mod: 26

## 2019-11-23 PROCEDURE — 93306 TTE W/DOPPLER COMPLETE: CPT | Mod: 26

## 2019-11-23 PROCEDURE — 99233 SBSQ HOSP IP/OBS HIGH 50: CPT | Mod: GC

## 2019-11-23 RX ORDER — HEPARIN SODIUM 5000 [USP'U]/ML
5000 INJECTION INTRAVENOUS; SUBCUTANEOUS EVERY 8 HOURS
Refills: 0 | Status: DISCONTINUED | OUTPATIENT
Start: 2019-11-23 | End: 2019-11-23

## 2019-11-23 RX ORDER — HEPARIN SODIUM 5000 [USP'U]/ML
5000 INJECTION INTRAVENOUS; SUBCUTANEOUS EVERY 12 HOURS
Refills: 0 | Status: DISCONTINUED | OUTPATIENT
Start: 2019-11-23 | End: 2019-11-29

## 2019-11-23 RX ADMIN — CHLORHEXIDINE GLUCONATE 1 APPLICATION(S): 213 SOLUTION TOPICAL at 06:38

## 2019-11-23 RX ADMIN — PANTOPRAZOLE SODIUM 40 MILLIGRAM(S): 20 TABLET, DELAYED RELEASE ORAL at 15:11

## 2019-11-23 RX ADMIN — HEPARIN SODIUM 5000 UNIT(S): 5000 INJECTION INTRAVENOUS; SUBCUTANEOUS at 17:58

## 2019-11-23 NOTE — PROGRESS NOTE ADULT - SUBJECTIVE AND OBJECTIVE BOX
OVERNIGHT EVENTS:    SUBJECTIVE / INTERVAL HPI: Patient seen and examined at bedside.     VITAL SIGNS:  Vital Signs Last 24 Hrs  T(C): 36.5 (23 Nov 2019 01:43), Max: 36.5 (22 Nov 2019 17:33)  T(F): 97.7 (23 Nov 2019 01:43), Max: 97.7 (22 Nov 2019 17:33)  HR: 66 (23 Nov 2019 04:00) (50 - 82)  BP: 169/86 (22 Nov 2019 13:30) (50/26 - 169/86)  BP(mean): --  RR: 22 (23 Nov 2019 04:00) (11 - 51)  SpO2: 94% (23 Nov 2019 04:00) (88% - 100%)    PHYSICAL EXAM:    General: WDWN  HEENT: NC/AT; PERRL, anicteric sclera; MMM  Neck: supple  Cardiovascular: +S1/S2; RRR  Respiratory: CTA B/L; no W/R/R  Gastrointestinal: soft, NT/ND; +BSx4  Extremities: WWP; no edema, clubbing or cyanosis  Vascular: 2+ radial, DP/PT pulses B/L  Neurological: AAOx3; no focal deficits    MEDICATIONS:  MEDICATIONS  (STANDING):  chlorhexidine 2% Cloths 1 Application(s) Topical <User Schedule>  pantoprazole  Injectable 40 milliGRAM(s) IV Push every 24 hours    MEDICATIONS  (PRN):      ALLERGIES:  Allergies    Bactrim (Unknown)  Cleocin HCl (Unknown)  Flagyl (Unknown)  iodine (Anaphylaxis)  IV Contrast (Anaphylaxis)  Levaquin (Other; Rash)  penicillin (Unknown)  Zithromax (Unknown)    Intolerances        LABS:                        12.9   9.73  )-----------( 234      ( 22 Nov 2019 12:43 )             40.1     11-22    144  |  104  |  20  ----------------------------<  139<H>  4.9   |  29  |  1.09    Ca    9.0      22 Nov 2019 19:41    TPro  5.9<L>  /  Alb  3.5  /  TBili  0.2  /  DBili  x   /  AST  30  /  ALT  18  /  AlkPhos  81  11-22    PT/INR - ( 22 Nov 2019 12:43 )   PT: 11.7 sec;   INR: 1.03          PTT - ( 22 Nov 2019 12:43 )  PTT:26.8 sec    CAPILLARY BLOOD GLUCOSE          RADIOLOGY & ADDITIONAL TESTS: Reviewed. HOSPITAL COURSE:   HOSPITAL COURSE:   86F with pmh of lung adenocarcinoma s/p radiation presented for outpatient elective bronchoscopy for BAL and lymph node biopsy. She was hypertensive to SBP 200s during the procedure. Post procedure, pt was extubated however was noted to be hypoxic to 70s with tachypnea. Pt was then reintubated. Xray chest showed pulm congestion and US lungs with B lines likely 2/2 to flash pulmonary edema. She was given 40 IV lasix. In ER, Pt was briefly hypotensive after propofol initiation and required Levophed. By the time she was brought up to the floors she was off sedation and off Levophed. She reported pain due to the intubation but denied chest pain, abdominal pain, sob. Unable to obtain full history and ROS as patient intubated at time of exam. In ED vs: 97.3, 70, 108/60, 22, 100% on vent 40% FiO2. Significant Labs: K+ 5.5, Glucose 179, HgbA1C 5.9, . CXR with bilateral asymmetrical opacities/right pleural effusion. EKG nsr. Patient admitted to the MICU for further management. In the late afternoon, she was successfully extubated and saturating well on 2L NC. She was placed on strict Is/Os with good urine output and is hemodynamically stable. F/u echo. At this time she is deemed stable for stepdown to RMF.       OVERNIGHT EVENTS: K+ improved. Noted to have 10 beats of possible Vtach but asymptomatic and sleeping. Reported throat pain after extubation.     SUBJECTIVE / INTERVAL HPI: Patient seen and examined at bedside. Patient resting in bed reporting a cough. She says she feels better, denies chest pain, sob, nausea, vomiting, abdominal pain.     VITAL SIGNS:  Vital Signs Last 24 Hrs  T(C): 36.5 (23 Nov 2019 01:43), Max: 36.5 (22 Nov 2019 17:33)  T(F): 97.7 (23 Nov 2019 01:43), Max: 97.7 (22 Nov 2019 17:33)  HR: 66 (23 Nov 2019 04:00) (50 - 82)  BP: 169/86 (22 Nov 2019 13:30) (50/26 - 169/86)  BP(mean): --  RR: 22 (23 Nov 2019 04:00) (11 - 51)  SpO2: 94% (23 Nov 2019 04:00) (88% - 100%)    PHYSICAL EXAM:    General: WDWN, resting comfortably in bed, NAD   HEENT: NC/AT; PERRL, anicteric sclera; MMM  Neck: supple  Cardiovascular: +S1/S2; RRR  Respiratory: on 2L NC, CTA B/L; no W/R/R  Gastrointestinal: soft, NT/ND; +BSx4  Extremities: WWP; no edema, clubbing or cyanosis  Vascular: 2+ radial, DP/PT pulses B/L  Neurological: AAOx3; no focal deficits    MEDICATIONS:  MEDICATIONS  (STANDING):  chlorhexidine 2% Cloths 1 Application(s) Topical <User Schedule>  pantoprazole  Injectable 40 milliGRAM(s) IV Push every 24 hours    MEDICATIONS  (PRN):      ALLERGIES:  Allergies    Bactrim (Unknown)  Cleocin HCl (Unknown)  Flagyl (Unknown)  iodine (Anaphylaxis)  IV Contrast (Anaphylaxis)  Levaquin (Other; Rash)  penicillin (Unknown)  Zithromax (Unknown)    Intolerances        LABS:                        12.9   9.73  )-----------( 234      ( 22 Nov 2019 12:43 )             40.1     11-22    144  |  104  |  20  ----------------------------<  139<H>  4.9   |  29  |  1.09    Ca    9.0      22 Nov 2019 19:41    TPro  5.9<L>  /  Alb  3.5  /  TBili  0.2  /  DBili  x   /  AST  30  /  ALT  18  /  AlkPhos  81  11-22    PT/INR - ( 22 Nov 2019 12:43 )   PT: 11.7 sec;   INR: 1.03          PTT - ( 22 Nov 2019 12:43 )  PTT:26.8 sec    CAPILLARY BLOOD GLUCOSE          RADIOLOGY & ADDITIONAL TESTS: Reviewed.

## 2019-11-23 NOTE — PROGRESS NOTE ADULT - ATTENDING COMMENTS
Comfortable on NCO2. F/U Echo and with Cardiology. F/U biopsy. Comfortable on NCO2. Echo noted with severe MR and suspect flash pulm edema after volume load; f/u with Cardiology consult. consider low dose Ace I for afterload reduction.  s/p diuresis. F/U biopsy.

## 2019-11-23 NOTE — PROGRESS NOTE ADULT - SUBJECTIVE AND OBJECTIVE BOX
86F with pmh of lung adenocarcinoma s/p radiation presented for outpatient elective bronchoscopy for BAL and lymph node biopsy. She was hypertensive to SBP 200s during the procedure. Post procedure, pt was extubated however was noted to be hypoxic to 70s with tachypnea. Pt was then reintubated. Xray chest showed pulm congestion and US lungs with B lines likely 2/2 to flash pulmonary edema. She was given 40 IV lasix. In ER, Pt was briefly hypotensive after propofol initiation and required Levophed. By the time she was brought up to the floors she was off sedation and off Levophed. She reported pain due to the intubation but denied chest pain, abdominal pain, sob. Unable to obtain full history and ROS as patient intubated at time of exam.     In ED vs: 97.3, 70, 108/60, 22, 100% on vent 40% FiO2.   Significant Labs: K+ 5.5, Glucose 179, HgbA1C 5.9, .   CXR with bilateral asymmetrical opacities/right pleural effusion. EKG nsr.   Patient admitted to the MICU for further management.     Patient History:   Past Medical, Past Surgical, and Family History:  PAST MEDICAL HISTORY:  Malignant neoplasm of bronchus and lung, unspecified site Lung cancer.     PAST SURGICAL HISTORY:  History of surgery to major organs, presenting hazards to health removal of R-sided adenocarcinoma, negative lymphnodes.       	  MEDICATIONS:          pantoprazole  Injectable 40 milliGRAM(s) IV Push every 24 hours      chlorhexidine 2% Cloths 1 Application(s) Topical <User Schedule>  heparin  Injectable 5000 Unit(s) SubCutaneous every 12 hours      Complaint:     Otherwise 12 point review of systems is normal.    PHYSICAL EXAM:    Constitutional: NAD  Neck: supple, no masses, no JVD  Respiratory: CTA b/l, good air entry b/l, no wheezing, rhonchi, rales, with normal respiratory effort and no intercostal retractions  Cardiovascular: RRR, normal S1S2, no M/R/G  Gastrointestinal: soft, NTND, no masses palpable, BS normal in all four quadrants,   Extremities:  no c/c/e  Neurological: AAOx3      ICU Vital Signs Last 24 Hrs  T(C): 37.1 (23 Nov 2019 16:13), Max: 37.2 (23 Nov 2019 10:15)  T(F): 98.7 (23 Nov 2019 16:13), Max: 98.9 (23 Nov 2019 10:15)  HR: 78 (23 Nov 2019 16:13) (52 - 94)  BP: 138/76 (23 Nov 2019 16:13) (120/82 - 151/97)  BP(mean): 101 (23 Nov 2019 11:07) (101 - 101)  ABP: 124/46 (23 Nov 2019 10:00) (102/46 - 138/50)  ABP(mean): 70 (23 Nov 2019 10:00) (62 - 90)  RR: 18 (23 Nov 2019 16:13) (11 - 51)  SpO2: 98% (23 Nov 2019 16:13) (89% - 98%)    LABS:	 	  CARDIAC MARKERS:  Troponin T, Serum: <0.01 ng/mL [0.00 - 0.01] (11-22 @ 12:43)                      11.3   10.88 )-----------( 236      ( 23 Nov 2019 05:50 )             34.7     11-23    139  |  103  |  19  ----------------------------<  92  4.2   |  29  |  1.07    Ca    8.7      23 Nov 2019 05:50  Phos  3.5     11-23  Mg     1.8     11-23    TPro  5.9<L>  /  Alb  3.5  /  TBili  0.2  /  DBili  x   /  AST  30  /  ALT  18  /  AlkPhos  81  11-22    proBNP: Serum Pro-Brain Natriuretic Peptide: 518 pg/mL (11-22 @ 12:43)    Lipid Profile:   HgA1c: Hemoglobin A1C, Whole Blood: 5.9 % (11-22 @ 12:43)      ASSESSMENT/PLAN: 	            86F with pmh of lung adenocarcinoma s/p radiation presented for opt elective bronchoscopy likely developed flash pulm edema requiring reintubation.  Plan for admission to MICU for diuresis and extubation.     Neurology  Awake and alert. Off sedation     Cardiovascular   Initially on Levophed in setting of hypotension due to sedation. Now off pressors and all sedation. Maintaining MAPs >65 with /48   -F/u echo     Respiratory  #Acute Hypoxic Respiratory Failure   2/2 to flash pulmonary edema s/p extubation in setting of outpatient bronchoscopy and hypertensive with sBPs> 200. Re-intubated. CXR with bilateral asymmetrical opacities/right pleural effusion. Received Lasix 40mg IV in ED.   -Good urine output- 100-150cc/hr s/p Lasix 40mg x1, continue to monitor and diurese as needed   -Strict Is/Os  -Repeat cxr with stable lung pathology/right pleural effusion.  -Successfully extubated in later afternoon, now saturating at 95% on 2L NC. Hemodynamically stable       Heme  H/o Lung adenocarcinoma s/p radiation.      F: none  E: replete K <4, Mg <2  N: Mechanical soft  GI Ppx: Protonix   DVT Ppx:SCDs  Code status: FULL   Dispo: MICU

## 2019-11-23 NOTE — PROGRESS NOTE ADULT - ASSESSMENT
86F with pmh of lung adenocarcinoma s/p radiation presented for opt elective bronchoscopy likely developed flash pulm edema requiring reintubation.  Plan for admission to MICU for diuresis and extubation.     Neurology  Awake and alert. Off sedation     Cardiovascular   Initially on Levophed in setting of hypotension due to sedation. Now off pressors and all sedation. Maintaining MAPs >65 with /48   -F/u echo     Respiratory  #Acute Hypoxic Respiratory Failure   2/2 to flash pulmonary edema s/p extubation in setting of outpatient bronchoscopy and hypertensive with sBPs> 200. Re-intubated. CXR with bilateral asymmetrical opacities/right pleural effusion. Received Lasix 40mg IV in ED.   -Good urine output- 100-150cc/hr s/p Lasix 40mg x1, continue to monitor and diurese as needed   -Strict Is/Os  -Repeat cxr with stable lung pathology/right pleural effusion.  -Successfully extubated in later afternoon, now saturating at 95% on 2L NC. Hemodynamically stable     Heme  H/o Lung adenocarcinoma s/p radiation.      F: none  E: replete K <4, Mg <2  N: Mechanical soft  GI Ppx: Protonix   DVT Ppx:SCDs  Code status: FULL   Dispo: MICU 86F with pmh of lung adenocarcinoma s/p radiation presented for opt elective bronchoscopy likely developed flash pulm edema requiring reintubation.  Plan for admission to MICU for diuresis and extubation.     Neurology  AAOx3    Cardiovascular   Initially on Levophed in setting of hypotension due to sedation. Now off pressors and all sedation. Maintaining MAPs >65 with /48   -F/u echo     Respiratory  #Acute Hypoxic Respiratory Failure   2/2 to flash pulmonary edema s/p extubation in setting of outpatient bronchoscopy and hypertensive with sBPs> 200. Re-intubated. CXR with bilateral asymmetrical opacities/right pleural effusion. Received Lasix 40mg IV in ED.   -Good urine output- 100-150cc/hr s/p Lasix 40mg x1, continue to monitor and diurese as needed   -Strict Is/Os  -Repeat cxr with stable lung pathology/right pleural effusion.  -Successfully extubated in late afternoon on 11/22, saturating at 95% on 2L NC. Hemodynamically stable     Heme  H/o Lung adenocarcinoma s/p radiation.      F: none  E: replete K <4, Mg <2  N: Mechanical soft  GI Ppx: Protonix   DVT Ppx:SCDs  Code status: FULL   Dispo: MICU

## 2019-11-24 DIAGNOSIS — R63.8 OTHER SYMPTOMS AND SIGNS CONCERNING FOOD AND FLUID INTAKE: ICD-10-CM

## 2019-11-24 DIAGNOSIS — K59.00 CONSTIPATION, UNSPECIFIED: ICD-10-CM

## 2019-11-24 DIAGNOSIS — I34.0 NONRHEUMATIC MITRAL (VALVE) INSUFFICIENCY: ICD-10-CM

## 2019-11-24 DIAGNOSIS — Z91.89 OTHER SPECIFIED PERSONAL RISK FACTORS, NOT ELSEWHERE CLASSIFIED: ICD-10-CM

## 2019-11-24 DIAGNOSIS — J96.90 RESPIRATORY FAILURE, UNSPECIFIED, UNSPECIFIED WHETHER WITH HYPOXIA OR HYPERCAPNIA: ICD-10-CM

## 2019-11-24 DIAGNOSIS — C34.90 MALIGNANT NEOPLASM OF UNSPECIFIED PART OF UNSPECIFIED BRONCHUS OR LUNG: ICD-10-CM

## 2019-11-24 LAB
ALBUMIN SERPL ELPH-MCNC: 3.6 G/DL — SIGNIFICANT CHANGE UP (ref 3.3–5)
ALP SERPL-CCNC: 90 U/L — SIGNIFICANT CHANGE UP (ref 40–120)
ALT FLD-CCNC: 17 U/L — SIGNIFICANT CHANGE UP (ref 10–45)
ANION GAP SERPL CALC-SCNC: 11 MMOL/L — SIGNIFICANT CHANGE UP (ref 5–17)
AST SERPL-CCNC: 24 U/L — SIGNIFICANT CHANGE UP (ref 10–40)
BILIRUB SERPL-MCNC: 0.4 MG/DL — SIGNIFICANT CHANGE UP (ref 0.2–1.2)
BUN SERPL-MCNC: 16 MG/DL — SIGNIFICANT CHANGE UP (ref 7–23)
CALCIUM SERPL-MCNC: 9.5 MG/DL — SIGNIFICANT CHANGE UP (ref 8.4–10.5)
CHLORIDE SERPL-SCNC: 103 MMOL/L — SIGNIFICANT CHANGE UP (ref 96–108)
CO2 SERPL-SCNC: 29 MMOL/L — SIGNIFICANT CHANGE UP (ref 22–31)
CREAT SERPL-MCNC: 0.99 MG/DL — SIGNIFICANT CHANGE UP (ref 0.5–1.3)
GLUCOSE SERPL-MCNC: 89 MG/DL — SIGNIFICANT CHANGE UP (ref 70–99)
HCT VFR BLD CALC: 39.8 % — SIGNIFICANT CHANGE UP (ref 34.5–45)
HGB BLD-MCNC: 12.8 G/DL — SIGNIFICANT CHANGE UP (ref 11.5–15.5)
MAGNESIUM SERPL-MCNC: 1.9 MG/DL — SIGNIFICANT CHANGE UP (ref 1.6–2.6)
MCHC RBC-ENTMCNC: 32 PG — SIGNIFICANT CHANGE UP (ref 27–34)
MCHC RBC-ENTMCNC: 32.2 GM/DL — SIGNIFICANT CHANGE UP (ref 32–36)
MCV RBC AUTO: 99.5 FL — SIGNIFICANT CHANGE UP (ref 80–100)
NRBC # BLD: 0 /100 WBCS — SIGNIFICANT CHANGE UP (ref 0–0)
PHOSPHATE SERPL-MCNC: 3 MG/DL — SIGNIFICANT CHANGE UP (ref 2.5–4.5)
PLATELET # BLD AUTO: 239 K/UL — SIGNIFICANT CHANGE UP (ref 150–400)
POTASSIUM SERPL-MCNC: 4.4 MMOL/L — SIGNIFICANT CHANGE UP (ref 3.5–5.3)
POTASSIUM SERPL-SCNC: 4.4 MMOL/L — SIGNIFICANT CHANGE UP (ref 3.5–5.3)
PROT SERPL-MCNC: 6.5 G/DL — SIGNIFICANT CHANGE UP (ref 6–8.3)
RBC # BLD: 4 M/UL — SIGNIFICANT CHANGE UP (ref 3.8–5.2)
RBC # FLD: 15 % — HIGH (ref 10.3–14.5)
SODIUM SERPL-SCNC: 143 MMOL/L — SIGNIFICANT CHANGE UP (ref 135–145)
WBC # BLD: 11.16 K/UL — HIGH (ref 3.8–10.5)
WBC # FLD AUTO: 11.16 K/UL — HIGH (ref 3.8–10.5)

## 2019-11-24 PROCEDURE — 71045 X-RAY EXAM CHEST 1 VIEW: CPT | Mod: 26

## 2019-11-24 RX ORDER — POLYETHYLENE GLYCOL 3350 17 G/17G
17 POWDER, FOR SOLUTION ORAL DAILY
Refills: 0 | Status: DISCONTINUED | OUTPATIENT
Start: 2019-11-24 | End: 2019-11-25

## 2019-11-24 RX ADMIN — HEPARIN SODIUM 5000 UNIT(S): 5000 INJECTION INTRAVENOUS; SUBCUTANEOUS at 06:55

## 2019-11-24 RX ADMIN — PANTOPRAZOLE SODIUM 40 MILLIGRAM(S): 20 TABLET, DELAYED RELEASE ORAL at 14:09

## 2019-11-24 RX ADMIN — HEPARIN SODIUM 5000 UNIT(S): 5000 INJECTION INTRAVENOUS; SUBCUTANEOUS at 18:32

## 2019-11-24 RX ADMIN — Medication 1 TABLET(S): at 14:12

## 2019-11-24 NOTE — DIETITIAN INITIAL EVALUATION ADULT. - CONTINUE CURRENT NUTRITION CARE PLAN
Recommend use of oral nutrition supplements should pt be agreeable - recommend ensure enlive x2 per day (350kcal/20gm prot per 1 can)/yes

## 2019-11-24 NOTE — CHART NOTE - NSCHARTNOTEFT_GEN_A_CORE
Upon Nutritional Assessment by the Registered Dietitian your patient was determined to meet criteria / has evidence of the following diagnosis/diagnoses:          [ ]  Mild Protein Calorie Malnutrition        [ x ]  Moderate Protein Calorie Malnutrition        [ ] Severe Protein Calorie Malnutrition        [ ] Unspecified Protein Calorie Malnutrition        [ ] Underweight / BMI <19        [ ] Morbid Obesity / BMI > 40      Findings as based on:  •  Comprehensive nutrition assessment and consultation: +NPFE: Muscle Wasting- Temporal [ moderate ]  Clavicle/Pectoral [ Mild ]  Shoulder/Deltoid [ Mild ]  Interosseous [ Mild ]; Fat Wasting- Orbital [ Mild ]; 1+ edema which may be masking wt loss        The following diet has been recommended: Please see RD note (11/24)       PROVIDER Section:     By signing this assessment you are acknowledging and agree with the diagnosis/diagnoses assigned by the Registered Dietitian    Comments:

## 2019-11-24 NOTE — PROGRESS NOTE ADULT - PROBLEM SELECTOR PLAN 2
Patient has a history of adenocarcinoma of the lung s/p radiation.  - continues to complain of hemoptysis   - no longer requiring Lasix

## 2019-11-24 NOTE — DIETITIAN INITIAL EVALUATION ADULT. - ADD RECOMMEND
1. Monitor PO intake/appetite, GI distress (recommend bowel regime PRN, will provide prune juice at meals), diet tolerance, labs, weights.  2. Recommend MVI daily.  3. RD to remain available for additional nutrition interventions as needed.

## 2019-11-24 NOTE — PROGRESS NOTE ADULT - SUBJECTIVE AND OBJECTIVE BOX
Interventional, Pulmonary, Critical, Chest Special Procedures Initial for     Pt was seen and fully examined by myself.     Time spent with patient in minutes:123    Patient is a 86y old  Female who presents with a chief complaint of Acute hypoxic respiratory failure 2/2 pulmonary edema (23 Nov 2019 16:46)Events leading to this admission and hospital course reviewed. The patient ill appearing, now off BIPAP, some productive cough pain free when seen    HPI:  86F with pmh of lung adenocarcinoma s/p RLLobectomy , S/P contralateral ANANYA lesions XRT, no CHX,  presented for outpatient elective bronchoscopy for BAL and lymph node biopsy. She was hypertensive to SBP 200s during the procedure. Post procedure, pt was extubated however was noted to be hypoxic to 70s with tachypnea. Pt was then reintubated. Xray chest showed pulm congestion and US lungs with B lines likely 2/2 to flash pulmonary edema. She was given 40 IV lasix. In ER, Pt was briefly hypotensive after propofol initiation and required Levophed. By the time she was brought up to the floors she was off sedation and off Levophed. She reported pain due to the intubation but denied chest pain, abdominal pain, sob.     In ED vs: 97.3, 70, 108/60, 22, 100% on vent 40% FiO2.   Significant Labs: K+ 5.5, Glucose 179, HgbA1C 5.9, .   CXR with bilateral asymmetrical opacities/right pleural effusion. EKG nsr.   Patient admitted to the MICU for further management. (22 Nov 2019 15:55)      REVIEW OF SYSTEMS: as above +  Constitutional: No fever, weight loss, chills + fatigue  Eyes: No eye pain, visual disturbances, or discharge  ENMT:  + difficulty hearing, tinnitus, vertigo; No sinus or throat pain. No epistaxis, dysphagia, +dysphonia, hoarseness no odynophagia  Neck: No pain, stiffness or neck swelling.  No masses or deformities  Respiratory: N+cough, wheezing, hemoptysis  - COPD  - ILD   - PE   - ASTHMA     - PNEUMONIA  Cardiovascular: No chest pain, dysrhythmia, palpitations, dizziness or edema - CAD   - CHF   - HTN  Gastrointestinal: No abdominal or epigastric pain. No nausea, vomiting or hematemesis; No diarrhea or constipation. No melena or hematochezia, Icterus.          Genitourinary: No dysuria, frequency, hematuria or incontinence   - CKD/FREDI      - ESRD  Neurological: No headaches, memory loss, loss of strength, numbness or tremors      -DEMENTIA     - STROKE    - SEIZURE  Skin: No itching, burning, rashes or lesions   Lymph Nodes: No enlarged glands  Endocrine: No heat or cold intolerance; No hair loss       - DM     - THYROID DISORDER  Musculoskeletal: No joint pain or swelling; No muscle, back or extremity pain, No edema  Psychiatric: No depression, anxiety, mood swings or difficulty sleeping  Heme/Lymph: No easy bruising or bleeding gums         - ANEMIA      + CANCER   -COAGULOPATHY  Allergy and Immunologic: No hives or eczema    PAST MEDICAL & SURGICAL HISTORY:  Malignant neoplasm of bronchus and lung, unspecified site: Lung cancer  History of surgery to major organs, presenting hazards to health: removal of R-sided adenocarcinoma, negative lymphnodes    FAMILY HISTORY:  No pertinent family history: No significant family history    SOCIAL HISTORY:      - Tobacco     - ETOH    Allergies    Bactrim (Unknown)  Cleocin HCl (Unknown)  Flagyl (Unknown)  Honey (Unknown)  iodine (Anaphylaxis)  IV Contrast (Anaphylaxis)  Levaquin (Other; Rash)  penicillin (Unknown)  Zithromax (Unknown)    Intolerances      Vital Signs Last 24 Hrs  T(C): 36.6 (24 Nov 2019 08:41), Max: 37.1 (23 Nov 2019 16:13)  T(F): 97.8 (24 Nov 2019 08:41), Max: 98.7 (23 Nov 2019 16:13)  HR: 77 (24 Nov 2019 08:41) (74 - 90)  BP: 134/79 (24 Nov 2019 08:41) (123/79 - 138/76)  BP(mean): --  RR: 18 (24 Nov 2019 08:41) (17 - 19)  SpO2: 95% (24 Nov 2019 08:41) (93% - 98%)    11-23 @ 07:01  -  11-24 @ 07:00  --------------------------------------------------------  IN: 0 mL / OUT: 160 mL / NET: -160 mL          MEDICATIONS:  MEDICATIONS  (STANDING):  heparin  Injectable 5000 Unit(s) SubCutaneous every 12 hours  multivitamin 1 Tablet(s) Oral daily  pantoprazole  Injectable 40 milliGRAM(s) IV Push every 24 hours    MEDICATIONS  (PRN):      PHYSICAL EXAM:  Un Comfortable, no immediater distress  Eyes: PERRL, EOM intact; conjunctiva and sclera clear  Head: Normocephalic;  No Trauma  ENMT: No nasal discharge, +hoarseness, cough no  hemoptysis  Neck: Supple; non tender; no masses or deformities.    No JVD  Respiratory:  - WHEEZING   + scattered  RHONCHI  - RALES  + CRACKLES.  Diminished breath sounds  BILATERAL  RIGHT > LEFT   Cardiovascular: Regular rate and rhythm. S1 and S2 Normal; 3/5 CODY murmurs, gallops or rubs     - PPM/AICD  Gastrointestinal: Soft non-tender, non-distended; Normal bowel sounds; No hepatosplenomegaly.     -PEG    -  GT   - SCHROEDER  Genitourinary: No costovertebral angle tenderness. No dysuria  Extremities: AROM, No clubbing, cyanosis or edema    Vascular: Peripheral pulses palpable 2+ bilaterally  Neurological: Alert and responisve to stimuli   Skin: Warm and dry. No obvious rash  Lymph Nodes: No acute cervical or supraclavicular adenopathy  Psychiatric: Cooperative and appropriate mood    DEVICES:  - DENTURES   +IV R / L     - ETUBE   -TRACH   -CTUBE  R / L    LABS:                          12.8   11.16 )-----------( 239      ( 24 Nov 2019 07:54 )             39.8     11-24    143  |  103  |  16  ----------------------------<  89  4.4   |  29  |  0.99    Ca    9.5      24 Nov 2019 07:54  Phos  3.0     11-24  Mg     1.9     11-24    TPro  6.5  /  Alb  3.6  /  TBili  0.4  /  DBili  x   /  AST  24  /  ALT  17  /  AlkPhos  90  11-24    < from: Xray Chest 1 View- PORTABLE-Routine (11.24.19 @ 07:09) >    EXAM:  XR CHEST PORTABLE ROUTINE 1V                          PROCEDURE DATE:  11/24/2019          INTERPRETATION:  Clinical History: Pulmonary edema    Portable examination of the chest demonstrates no interval change lung   pathology in comparisonto prior examination of the chest 11/23/2019. The   heart is within normal limits in transverse diameter. Dextroscoliosis   thoracic spine.    Impression: No interval change lung pathology    < end of copied text >    RADIOLOGY & ADDITIONAL STUDIES (The following images were personally reviewed):

## 2019-11-24 NOTE — PROGRESS NOTE ADULT - PROBLEM SELECTOR PLAN 4
Patient found to have severe mitral regurge on echo.   - spoke with Dr. Monreal, who will see the patient tomorrow  - f/u cardiology recommendations

## 2019-11-24 NOTE — DIETITIAN INITIAL EVALUATION ADULT. - MALNUTRITION
Suspect Moderate malnutrition: +NPFE: Muscle Wasting- Temporal [ moderate ]  Clavicle/Pectoral [ Mild ]  Shoulder/Deltoid [ Mild ]  Interosseous [ Mild ]; Fat Wasting- Orbital [ Mild ]; 1+ edema (left ankle;  right ankle) Suspect Moderate

## 2019-11-24 NOTE — PROGRESS NOTE ADULT - ASSESSMENT
ASSESSMENT/PLAN 86F with MR. koengi/ of bilateral lung adenocarcinoma , respiratory failure,    1. O2 2LNC humidify, BIPAP on stand by  2. Bronchodilators:  Atrovent/ albuterol q 4 – 6 hours as needed  3. Corticosteroids: off  4. ID/Antibiotics: off pending bronch CX  5. Cardiac/HTN: Cardiology consult, ECHO  6. GI: Rx/ prophylaxis c PPI/H2B  7. Heme: Rx/VT prophylaxis c SQH/SCD/AC  8. Aspiration precautions at all times  9. Obtain chest CT non contrast to compare c 08/19  Discussed with managing team

## 2019-11-24 NOTE — DIETITIAN INITIAL EVALUATION ADULT. - OTHER INFO
86F with pmhx of lung adenocarcinoma s/p radiation presented for outpatient elective bronchoscopy for BAL and lymph node biopsy. She was hypertensive during the procedure. Post procedure, was extubated however was noted to be hypoxic with tachypnea then reintubated - Acute Hypoxic Respiratory Failure 2/2 to flash pulmonary edema s/p extubation in setting of outpatient bronchoscopy and hypertensive. Now off pressors and all sedation, Pt successfully extubated now on RMF. EF 66%. No pain per flow sheets. 1+ edema (left ankle;  right ankle). No pressure ulcers, Abrasion noted. +BM (11/21).  Pt on [facility] soft diet. 86F with pmhx of lung adenocarcinoma s/p radiation presented for outpatient elective bronchoscopy for BAL and lymph node biopsy. She was hypertensive during the procedure. Post procedure, was extubated however was noted to be hypoxic with tachypnea then reintubated - Acute Hypoxic Respiratory Failure 2/2 to flash pulmonary edema s/p extubation in setting of outpatient bronchoscopy and hypertensive. Now off pressors and all sedation, Pt successfully extubated now on RMF. EF 66%. No pain per flow sheets. 1+ edema (left ankle;  right ankle). No pressure ulcers, Abrasion noted. +BM (11/21).  Pt reports eating simple regular food PTA, good PO intake does not take oral nutrition supplements. Honey allergy reported, added to EMR today. Pt on [facility] soft diet - noted with limited upper teeth, however No issues chewing/swallowing at this time. Pt reports decreased PO intake presently 2/2 lack of BM x3 days, pt however unwilling to accept oral nutrition supplements. Reports wt stable at 100 pounds, noted wt of 100 pounds presently however pt with edema as well. +NPFE: Muscle Wasting- Temporal [ moderate ]  Clavicle/Pectoral [ Mild ]  Shoulder/Deltoid [ Mild ]  Interosseous [ Mild ]; Fat Wasting- Orbital [ Mild ].   Encourage PO intake during meals & reviewed importance. Discussed tips for GI relief.   Please see below for nutritions recommendations. Recs made with team.

## 2019-11-24 NOTE — PROGRESS NOTE ADULT - PROBLEM SELECTOR PLAN 5
E: replete K >4, Mg >2  N: Mechanical soft  GI Ppx: Protonix   DVT Ppx:SCDs  Code status: FULL   Dispo: [facility]

## 2019-11-24 NOTE — DIETITIAN INITIAL EVALUATION ADULT. - PERTINENT MEDS FT
For your acne:  Apply Finacea gel to all affected areas in the morning.  At night, apply a pea-size amount of the tazorac gel diffusely to all areas of the face.   Use a gentle wash such as Cetaphil, CeraVe or neutrogena.  If you experience dryness, add in a Cetaphil, CeraVe or Neutrogena or other non-comedogenic moisturizer. It is important it says 'non-comedogenic' because this implies that it won't clog pores.  If the dryness is severe or continues despite use of the moisturizer, decrease use of the nighttime medication to every other night until your skin adjusts to this medication.    It is important to avoid picking and squeezing/popping the acne bumps. This causes the acne to persist longer and may lead to scarring or dark spots.  Throwing away magnifying mirrors can help minimize the urge to pick.  Spot treating acne (attempting to treat once the acne bump appears) is not considered particularly effective.  Spot treatment (salicylic acid with sandalwood) may be helpful. This is available over-the counter.   It takes 3 months to see the full effect. Keep using this medication until you follow up so we can see the peak benefit.      Spironolactone is a treatment for acne which is used in adult females who tend to have outbreaks on the lower cheeks and chin. This type of acne tends to be hormonal and benefits most from this medication.   This medication overall is very well tolerated. Those with kidney disease or heart failure should not take this medication. You should also avoid this medication if you are on a diuretic or other medication which increases your potassium levels.  We check baseline labs to make sure your kidney function and potassium levels are normal. This medication can also cause dizziness upon standing, particularly in patient's with low blood pressure. This typically improves after 2 weeks and is helped by drinking lots of water. Other side effects include breast swelling/tenderness or  spotting in between periods. These side effects improve if you take an oral contraceptive with the spironolactone. It is crucial that you do not get pregnant on this medication. If you are concerned that you may have gotten pregnant while on this medication, please stop the medication and contact the clinic.    Instructions:  Please go to the lab and get your baseline labs, if ordered.  Once the labs return as normal, take 1 pill of the spironolactone (50mg) once daily in morning.    Miradry is available at [facility]  We recommend you make an appointment with Mildred Zhong NP or Arturo Smyth MD.  [facility] [place] [address]  Call [phone] or [phone]      MiraDry is a non-invasive device which creates heat to result in selective destruction of the sweat glands.  Wile the sweat glands are being heated, the top layers of the skin are simultaneously cooled and protected.  Sweat glands are not believed to grow back after treatment so the effect can be seen almost immediately and can permanently reduce sweat reduction. Fortunately, the sweat glands in your armpits only represent 2% of your bodies sweat glands so your body should still be able to prevent itself from overheating. Some patients may only need one treatment but, for best results, two procedures spaced three months apart are recommended (occasionally a third treatment may be required.) Since the benefits are long lasting, this is an expensive procedure and commonly costs around $3,000.    miraDry was also FDA-cleared in 2015 to permanently eliminate underarm hair and odor-producing glands.      In April 2012, clinical data from the [facility] showed that miraDry was successful in reducing underarm sweat in over 90% of patients. The average sweat reduction was 82%. Patients rated their satisfaction with the treatment at 90%.    This device is not currently being used to treat the hands or  feet.     The miraDry procedure is performed in the physician's office and typically takes one hour. Local anesthesia (usually lidocaine injections) will be administered before starting the treatment. Patients usually experience little to no discomfort during the procedure and there is minimal to no downtime afterwards. A mild over-the-counter pain medication and ice packs are generally recommended for a few days. Most patients are able to return to normal activities or work right after the procedure, and can typically resume exercise within several days.    Common and minor side effects of miraDry include underarm swelling, redness, and tenderness lasting for several days. Numbness and tingling can occur in the upper arm or armpit and may last for about 5 weeks.    A miraDry representative reports that \"it's normal\" that immediately after a treatment there will be no sweat at all in the area treated (100% dry). After 2 to 3 weeks, though, some sweating will return. This change in sweating output is due to post-treatment swelling and its dissipation. The amount of long-term sweat reduction achieved via miraDry treatment is very personal. On average for hyperhidrosis patients, after two treatments, sweat is reduced 82%. Some patients needing more sweat reduction could benefit from a third treatment.   Very rarely, a person may have some sweat glands that are either too deep or too shallow for the microwave energy to reach (the microwave energy works in a zone 2mm to 5mm below the skin's surface, where most sweat glands reside). Also rarely, some patients may have \"resistant\" glands.     Take glycopyrrolate: take as directed for hyperhidrosis           reviewed (11/24)

## 2019-11-24 NOTE — DIETITIAN INITIAL EVALUATION ADULT. - ENERGY NEEDS
Height: 5 feet 3 inches, Weight (Current): 100 pounds,  pounds +/-10%, %IBW %86.9, BMI 17.7  current body wt used for energy calculations as pt falls within % IBW; Adjusted for suspected malnutrition

## 2019-11-24 NOTE — PROGRESS NOTE ADULT - PROBLEM SELECTOR PLAN 1
#Acute Hypoxic Respiratory Failure   2/2 to flash pulmonary edema s/p extubation in setting of outpatient bronchoscopy and hypertensive with sBPs> 200. Re-intubated. CXR with bilateral asymmetrical opacities/right pleural effusion. Received Lasix 40mg IV in ED.   - patient hemodynamically stable, saturating well on 2L NC  - s/p Lasix yesterday, good urinary out-put  - no longer requiring lasix  - strict Is/Os

## 2019-11-24 NOTE — PROGRESS NOTE ADULT - SUBJECTIVE AND OBJECTIVE BOX
OVERNIGHT EVENTS:  No events overnight.    SUBJECTIVE / INTERVAL HPI:   Patient seen and examined at bedside. Complains of coughing up blood, increased urination, and constipation. Patient denies nausea, vomiting, diarrhea, fever, chills, night sweats, shortness of breath, wheezing, chest pain, dysuria, or increased urinary frequency.      VITAL SIGNS:  Vital Signs Last 24 Hrs  T(C): 36.6 (24 Nov 2019 08:41), Max: 37.1 (23 Nov 2019 16:13)  T(F): 97.8 (24 Nov 2019 08:41), Max: 98.7 (23 Nov 2019 16:13)  HR: 77 (24 Nov 2019 08:41) (74 - 90)  BP: 134/79 (24 Nov 2019 08:41) (123/79 - 138/76)  BP(mean): --  RR: 18 (24 Nov 2019 08:41) (17 - 19)  SpO2: 95% (24 Nov 2019 08:41) (93% - 98%)    11-23-19 @ 07:01  -  11-24-19 @ 07:00  --------------------------------------------------------  IN: 0 mL / OUT: 160 mL / NET: -160 mL        PHYSICAL EXAM:  General: WDWN, elderly female, sitting in bed comfrotably   HEENT: NC/AT; PERRL, anicteric sclera; MMM  Neck: supple  Cardiovascular: +S1/S2; RRR  Respiratory: +coughing, +hemoptysis, +rhonchi b/l; no W/R  Gastrointestinal: soft, NT/ND; +BSx4  Extremities: WWP; no lower extremity edema, clubbing or cyanosis  Vascular: 2+ radial, DP/PT pulses B/L  Neurological: AAOx3; no focal deficits, CN2-12 grossly intact, mentating well     MEDICATIONS:  MEDICATIONS  (STANDING):  heparin  Injectable 5000 Unit(s) SubCutaneous every 12 hours  multivitamin 1 Tablet(s) Oral daily  pantoprazole  Injectable 40 milliGRAM(s) IV Push every 24 hours    MEDICATIONS  (PRN):      ALLERGIES:  Allergies    Bactrim (Unknown)  Cleocin HCl (Unknown)  Flagyl (Unknown)  Honey (Unknown)  iodine (Anaphylaxis)  IV Contrast (Anaphylaxis)  Levaquin (Other; Rash)  penicillin (Unknown)  Zithromax (Unknown)    Intolerances        LABS:                        12.8   11.16 )-----------( 239      ( 24 Nov 2019 07:54 )             39.8     11-24    143  |  103  |  16  ----------------------------<  89  4.4   |  29  |  0.99    Ca    9.5      24 Nov 2019 07:54  Phos  3.0     11-24  Mg     1.9     11-24    TPro  6.5  /  Alb  3.6  /  TBili  0.4  /  DBili  x   /  AST  24  /  ALT  17  /  AlkPhos  90  11-24        CAPILLARY BLOOD GLUCOSE            RADIOLOGY & ADDITIONAL TESTS:   Reviewed. OVERNIGHT EVENTS:  No events overnight.    SUBJECTIVE / INTERVAL HPI:   Patient seen and examined at bedside. Complains of coughing up blood, increased urination, and constipation. Patient denies nausea, vomiting, diarrhea, fever, chills, night sweats, shortness of breath, wheezing, chest pain, dysuria, or increased urinary frequency.      VITAL SIGNS:  Vital Signs Last 24 Hrs  T(C): 36.6 (24 Nov 2019 08:41), Max: 37.1 (23 Nov 2019 16:13)  T(F): 97.8 (24 Nov 2019 08:41), Max: 98.7 (23 Nov 2019 16:13)  HR: 77 (24 Nov 2019 08:41) (74 - 90)  BP: 134/79 (24 Nov 2019 08:41) (123/79 - 138/76)  BP(mean): --  RR: 18 (24 Nov 2019 08:41) (17 - 19)  SpO2: 95% (24 Nov 2019 08:41) (93% - 98%)    11-23-19 @ 07:01  -  11-24-19 @ 07:00  --------------------------------------------------------  IN: 0 mL / OUT: 160 mL / NET: -160 mL        PHYSICAL EXAM:  General: WDWN, elderly female, sitting in bed comfortably   HEENT: NC/AT; PERRL, anicteric sclera; MMM  Neck: supple  Cardiovascular: +S1/S2; RRR  Respiratory: +coughing, +hemoptysis, +rhonchi b/l; no W/R, +on NC  Gastrointestinal: soft, NT/ND; +BSx4  Extremities: WWP; no lower extremity edema, clubbing or cyanosis  Vascular: 2+ radial, DP/PT pulses B/L  Neurological: AAOx3; no focal deficits, CN2-12 grossly intact, mentating well     MEDICATIONS:  MEDICATIONS  (STANDING):  heparin  Injectable 5000 Unit(s) SubCutaneous every 12 hours  multivitamin 1 Tablet(s) Oral daily  pantoprazole  Injectable 40 milliGRAM(s) IV Push every 24 hours    MEDICATIONS  (PRN):      ALLERGIES:  Allergies    Bactrim (Unknown)  Cleocin HCl (Unknown)  Flagyl (Unknown)  Honey (Unknown)  iodine (Anaphylaxis)  IV Contrast (Anaphylaxis)  Levaquin (Other; Rash)  penicillin (Unknown)  Zithromax (Unknown)    Intolerances        LABS:                        12.8   11.16 )-----------( 239      ( 24 Nov 2019 07:54 )             39.8     11-24    143  |  103  |  16  ----------------------------<  89  4.4   |  29  |  0.99    Ca    9.5      24 Nov 2019 07:54  Phos  3.0     11-24  Mg     1.9     11-24    TPro  6.5  /  Alb  3.6  /  TBili  0.4  /  DBili  x   /  AST  24  /  ALT  17  /  AlkPhos  90  11-24        CAPILLARY BLOOD GLUCOSE            RADIOLOGY & ADDITIONAL TESTS:   Reviewed.

## 2019-11-25 DIAGNOSIS — J15.9 UNSPECIFIED BACTERIAL PNEUMONIA: ICD-10-CM

## 2019-11-25 LAB
-  AMPICILLIN/SULBACTAM: SIGNIFICANT CHANGE UP
-  AMPICILLIN: SIGNIFICANT CHANGE UP
-  CEFTRIAXONE: SIGNIFICANT CHANGE UP
-  CHLORAMPHENICOL: SIGNIFICANT CHANGE UP
-  CIPROFLOXACIN: SIGNIFICANT CHANGE UP
-  CLARITHROMYCIN: SIGNIFICANT CHANGE UP
-  LEVOFLOXACIN: SIGNIFICANT CHANGE UP
-  TRIMETHOPRIM/SULFAMETHOXAZOLE: SIGNIFICANT CHANGE UP
ANION GAP SERPL CALC-SCNC: 7 MMOL/L — SIGNIFICANT CHANGE UP (ref 5–17)
BUN SERPL-MCNC: 14 MG/DL — SIGNIFICANT CHANGE UP (ref 7–23)
CALCIUM SERPL-MCNC: 9.2 MG/DL — SIGNIFICANT CHANGE UP (ref 8.4–10.5)
CHLORIDE SERPL-SCNC: 104 MMOL/L — SIGNIFICANT CHANGE UP (ref 96–108)
CO2 SERPL-SCNC: 31 MMOL/L — SIGNIFICANT CHANGE UP (ref 22–31)
CREAT SERPL-MCNC: 0.88 MG/DL — SIGNIFICANT CHANGE UP (ref 0.5–1.3)
CULTURE RESULTS: SIGNIFICANT CHANGE UP
GALACTOMANNAN AG SPEC IA-ACNC: <0.5 INDEX — SIGNIFICANT CHANGE UP
GLUCOSE SERPL-MCNC: 96 MG/DL — SIGNIFICANT CHANGE UP (ref 70–99)
HCT VFR BLD CALC: 37.7 % — SIGNIFICANT CHANGE UP (ref 34.5–45)
HGB BLD-MCNC: 12.4 G/DL — SIGNIFICANT CHANGE UP (ref 11.5–15.5)
MAGNESIUM SERPL-MCNC: 2 MG/DL — SIGNIFICANT CHANGE UP (ref 1.6–2.6)
MCHC RBC-ENTMCNC: 32.5 PG — SIGNIFICANT CHANGE UP (ref 27–34)
MCHC RBC-ENTMCNC: 32.9 GM/DL — SIGNIFICANT CHANGE UP (ref 32–36)
MCV RBC AUTO: 98.7 FL — SIGNIFICANT CHANGE UP (ref 80–100)
METHOD TYPE: SIGNIFICANT CHANGE UP
NON-GYNECOLOGICAL CYTOLOGY STUDY: SIGNIFICANT CHANGE UP
NRBC # BLD: 0 /100 WBCS — SIGNIFICANT CHANGE UP (ref 0–0)
ORGANISM # SPEC MICROSCOPIC CNT: SIGNIFICANT CHANGE UP
ORGANISM # SPEC MICROSCOPIC CNT: SIGNIFICANT CHANGE UP
PHOSPHATE SERPL-MCNC: 3.1 MG/DL — SIGNIFICANT CHANGE UP (ref 2.5–4.5)
PLATELET # BLD AUTO: 242 K/UL — SIGNIFICANT CHANGE UP (ref 150–400)
POTASSIUM SERPL-MCNC: 4.1 MMOL/L — SIGNIFICANT CHANGE UP (ref 3.5–5.3)
POTASSIUM SERPL-SCNC: 4.1 MMOL/L — SIGNIFICANT CHANGE UP (ref 3.5–5.3)
RBC # BLD: 3.82 M/UL — SIGNIFICANT CHANGE UP (ref 3.8–5.2)
RBC # FLD: 14.9 % — HIGH (ref 10.3–14.5)
SODIUM SERPL-SCNC: 142 MMOL/L — SIGNIFICANT CHANGE UP (ref 135–145)
SPECIMEN SOURCE: SIGNIFICANT CHANGE UP
WBC # BLD: 9.09 K/UL — SIGNIFICANT CHANGE UP (ref 3.8–10.5)
WBC # FLD AUTO: 9.09 K/UL — SIGNIFICANT CHANGE UP (ref 3.8–10.5)

## 2019-11-25 PROCEDURE — 99232 SBSQ HOSP IP/OBS MODERATE 35: CPT

## 2019-11-25 PROCEDURE — 71045 X-RAY EXAM CHEST 1 VIEW: CPT | Mod: 26

## 2019-11-25 PROCEDURE — 99024 POSTOP FOLLOW-UP VISIT: CPT

## 2019-11-25 PROCEDURE — 99232 SBSQ HOSP IP/OBS MODERATE 35: CPT | Mod: GC

## 2019-11-25 PROCEDURE — 99223 1ST HOSP IP/OBS HIGH 75: CPT

## 2019-11-25 RX ORDER — CEFPODOXIME PROXETIL 100 MG
200 TABLET ORAL EVERY 24 HOURS
Refills: 0 | Status: DISCONTINUED | OUTPATIENT
Start: 2019-11-25 | End: 2019-11-29

## 2019-11-25 RX ORDER — LISINOPRIL 2.5 MG/1
2.5 TABLET ORAL DAILY
Refills: 0 | Status: DISCONTINUED | OUTPATIENT
Start: 2019-11-25 | End: 2019-11-29

## 2019-11-25 RX ORDER — FUROSEMIDE 40 MG
20 TABLET ORAL ONCE
Refills: 0 | Status: COMPLETED | OUTPATIENT
Start: 2019-11-25 | End: 2019-11-25

## 2019-11-25 RX ORDER — POLYETHYLENE GLYCOL 3350 17 G/17G
17 POWDER, FOR SOLUTION ORAL EVERY 12 HOURS
Refills: 0 | Status: DISCONTINUED | OUTPATIENT
Start: 2019-11-25 | End: 2019-11-29

## 2019-11-25 RX ADMIN — Medication 20 MILLIGRAM(S): at 19:16

## 2019-11-25 RX ADMIN — LISINOPRIL 2.5 MILLIGRAM(S): 2.5 TABLET ORAL at 13:26

## 2019-11-25 RX ADMIN — POLYETHYLENE GLYCOL 3350 17 GRAM(S): 17 POWDER, FOR SOLUTION ORAL at 17:32

## 2019-11-25 RX ADMIN — Medication 1 TABLET(S): at 11:05

## 2019-11-25 RX ADMIN — HEPARIN SODIUM 5000 UNIT(S): 5000 INJECTION INTRAVENOUS; SUBCUTANEOUS at 17:31

## 2019-11-25 RX ADMIN — PANTOPRAZOLE SODIUM 40 MILLIGRAM(S): 20 TABLET, DELAYED RELEASE ORAL at 13:26

## 2019-11-25 RX ADMIN — HEPARIN SODIUM 5000 UNIT(S): 5000 INJECTION INTRAVENOUS; SUBCUTANEOUS at 06:38

## 2019-11-25 RX ADMIN — Medication 200 MILLIGRAM(S): at 17:32

## 2019-11-25 RX ADMIN — POLYETHYLENE GLYCOL 3350 17 GRAM(S): 17 POWDER, FOR SOLUTION ORAL at 11:05

## 2019-11-25 NOTE — PROGRESS NOTE ADULT - SUBJECTIVE AND OBJECTIVE BOX
OVERNIGHT EVENTS:  Overnight, no acute events.     SUBJECTIVE / INTERVAL HPI:  Patient seen and examined at bedside. Patient denies nausea, vomiting, diarrhea, fever, chills, night sweats, shortness of breath, wheezing, chest pain, dysuria, or increased urinary frequency. Still complaining of productive cough/hemoptysis. Also complaining of constipation, would like a natural laxative.       VITAL SIGNS:  Vital Signs Last 24 Hrs  T(C): 36.7 (25 Nov 2019 09:12), Max: 36.7 (24 Nov 2019 20:45)  T(F): 98 (25 Nov 2019 09:12), Max: 98.1 (24 Nov 2019 20:45)  HR: 72 (25 Nov 2019 09:12) (72 - 90)  BP: 125/72 (25 Nov 2019 09:12) (125/72 - 133/73)  BP(mean): --  RR: 18 (25 Nov 2019 09:12) (16 - 20)  SpO2: 97% (25 Nov 2019 09:12) (94% - 98%)      PHYSICAL EXAM:  General: +thin frail elderly female   HEENT: NC/AT; PERRL, anicteric sclera; MMM  Neck: supple, no JVD   Cardiovascular: +S1/S2; RRR  Respiratory: +rhonchi b/l, +coughing, +hemoptysis; no W/R  Gastrointestinal: soft, NT/ND; +BSx4  Extremities: WWP; no edema, clubbing or cyanosis  Vascular: 2+ radial, DP/PT pulses B/L  Neurological: AAOx3; no focal deficits, CN2-12 grossly intact, conversant, mentating well     MEDICATIONS:  MEDICATIONS  (STANDING):  heparin  Injectable 5000 Unit(s) SubCutaneous every 12 hours  lisinopril 2.5 milliGRAM(s) Oral daily  multivitamin 1 Tablet(s) Oral daily  pantoprazole  Injectable 40 milliGRAM(s) IV Push every 24 hours  polyethylene glycol 3350 17 Gram(s) Oral daily    MEDICATIONS  (PRN):      ALLERGIES:  Allergies    Bactrim (Unknown)  Cleocin HCl (Unknown)  Flagyl (Unknown)  Honey (Unknown)  iodine (Anaphylaxis)  IV Contrast (Anaphylaxis)  Levaquin (Other; Rash)  penicillin (Unknown)  Zithromax (Unknown)    Intolerances        LABS:                        12.4   9.09  )-----------( 242      ( 25 Nov 2019 07:09 )             37.7     11-25    142  |  104  |  14  ----------------------------<  96  4.1   |  31  |  0.88    Ca    9.2      25 Nov 2019 07:09  Phos  3.1     11-25  Mg     2.0     11-25    TPro  6.5  /  Alb  3.6  /  TBili  0.4  /  DBili  x   /  AST  24  /  ALT  17  /  AlkPhos  90  11-24        CAPILLARY BLOOD GLUCOSE            RADIOLOGY & ADDITIONAL TESTS:   Reviewed.

## 2019-11-25 NOTE — CONSULT NOTE ADULT - SUBJECTIVE AND OBJECTIVE BOX
Patient is a 86y old  Female who presents with a chief complaint of Acute hypoxic respiratory failure 2/2 pulmonary edema (25 Nov 2019 13:44)       HPI:  86F with pmh of lung adenocarcinoma s/p radiation presented for outpatient elective bronchoscopy for BAL and lymph node biopsy. She was hypertensive to SBP 200s during the procedure. Post procedure, pt was extubated however was noted to be hypoxic to 70s with tachypnea. Pt was then reintubated. Xray chest showed pulm congestion and US lungs with B lines likely 2/2 to flash pulmonary edema. She was given 40 IV lasix. In ER, Pt was briefly hypotensive after propofol initiation and required Levophed. By the time she was brought up to the floors she was off sedation and off Levophed. She reported pain due to the intubation but denied chest pain, abdominal pain, sob. Unable to obtain full history and ROS as patient intubated at time of exam.     In ED vs: 97.3, 70, 108/60, 22, 100% on vent 40% FiO2.   Significant Labs: K+ 5.5, Glucose 179, HgbA1C 5.9, .   CXR with bilateral asymmetrical opacities/right pleural effusion. EKG nsr.   Patient admitted to the MICU for further management. (22 Nov 2019 15:55)      PAST MEDICAL & SURGICAL HISTORY:  Malignant neoplasm of bronchus and lung, unspecified site: Lung cancer  History of surgery to major organs, presenting hazards to health: removal of R-sided adenocarcinoma, negative lymphnodes      MEDICATIONS  (STANDING):  heparin  Injectable 5000 Unit(s) SubCutaneous every 12 hours  lisinopril 2.5 milliGRAM(s) Oral daily  multivitamin 1 Tablet(s) Oral daily  pantoprazole  Injectable 40 milliGRAM(s) IV Push every 24 hours  polyethylene glycol 3350 17 Gram(s) Oral daily    MEDICATIONS  (PRN):      FAMILY HISTORY:  No pertinent family history: No significant family history      CBC Full  -  ( 25 Nov 2019 07:09 )  WBC Count : 9.09 K/uL  RBC Count : 3.82 M/uL  Hemoglobin : 12.4 g/dL  Hematocrit : 37.7 %  Platelet Count - Automated : 242 K/uL  Mean Cell Volume : 98.7 fl  Mean Cell Hemoglobin : 32.5 pg  Mean Cell Hemoglobin Concentration : 32.9 gm/dL  Auto Neutrophil # : x  Auto Lymphocyte # : x  Auto Monocyte # : x  Auto Eosinophil # : x  Auto Basophil # : x  Auto Neutrophil % : x  Auto Lymphocyte % : x  Auto Monocyte % : x  Auto Eosinophil % : x  Auto Basophil % : x      11-25    142  |  104  |  14  ----------------------------<  96  4.1   |  31  |  0.88    Ca    9.2      25 Nov 2019 07:09  Phos  3.1     11-25  Mg     2.0     11-25    TPro  6.5  /  Alb  3.6  /  TBili  0.4  /  DBili  x   /  AST  24  /  ALT  17  /  AlkPhos  90  11-24            Radiology:      < from: Xray Chest 1 View- PORTABLE-Routine (11.25.19 @ 11:46) >    EXAM:  XR CHEST PORTABLE ROUTINE 1V                          PROCEDURE DATE:  11/25/2019          INTERPRETATION:  Clinical history/reason for exam: Pulmonary edema.    Single frontal view.    Comparison: November 24, 2019.    Findings/  impression: Heart size within normal limits, thoracic aortic   calcification. Stable lung pathology. Stable bony structures.   Dextroscoliosis.              Vital Signs Last 24 Hrs  T(C): 36.7 (25 Nov 2019 09:12), Max: 36.7 (24 Nov 2019 20:45)  T(F): 98 (25 Nov 2019 09:12), Max: 98.1 (24 Nov 2019 20:45)  HR: 72 (25 Nov 2019 09:12) (72 - 90)  BP: 125/72 (25 Nov 2019 09:12) (125/72 - 133/73)  BP(mean): --  RR: 18 (25 Nov 2019 09:12) (16 - 20)  SpO2: 97% (25 Nov 2019 09:12) (94% - 98%)    REVIEW OF SYSTEMS:    CONSTITUTIONAL:  fatigue  EYES: No eye pain, visual disturbances, or discharge  ENMT:  No difficulty hearing, tinnitus, vertigo; No sinus or throat pain  NECK: No pain or stiffness  BREASTS: No pain, masses, or nipple discharge  RESPIRATORY: No cough, wheezing, chills or hemoptysis; No shortness of breath  CARDIOVASCULAR: No chest pain, palpitations, dizziness, or leg swelling  GASTROINTESTINAL: No abdominal or epigastric pain. No nausea, vomiting, or hematemesis; No diarrhea or constipation. No melena or hematochezia.  GENITOURINARY: No dysuria, frequency, hematuria, or incontinence  NEUROLOGICAL: No headaches, memory loss, loss of strength, numbness, or tremors  SKIN: No itching, burning, rashes, or lesions   LYMPH NODES: No enlarged glands  ENDOCRINE: No heat or cold intolerance; No hair loss  MUSCULOSKELETAL: No joint pain or swelling; No muscle, back, or extremity pain  PSYCHIATRIC: No depression, anxiety, mood swings, or difficulty sleeping  HEME/LYMPH: No easy bruising, or bleeding gums  ALLERGY AND IMMUNOLOGIC: No hives or eczema  VASCULAR: no swelling, erythema   : no dysuria, retention, incontinence      Physical Exam: 85 yo  woman lying in semi Quintero's position, c/o feeling tired    Head: normocephalic, atraumatic    Eyes: PERRLA, EOMI, no nystagmus, sclera anicteric    ENT: nasal discharge, uvula midline, no oropharyngeal erythema/exudate    Neck: supple, negative JVD, negative carotid bruits, no thyromegaly    Chest: bibasilar crackles    Cardiovascular: regular rate and rhythm, II/VI CODY    Abdomen: soft, non distended, non tender, negative rebound/guarding, normal bowel sounds, neg hepatosplenomegaly    Extremities: WWP, neg cyanosis/clubbing/edema, negative calf tenderness to palpation, negative Pearl's sign    :     Neurologic Exam:    Alert and oriented to person, place, date/year, speech fluent w/o dysarthria, recent and remote memory intact, repetition intact, comprehension intact    Cranial Nerves:     II:                       pupils equal, round and reactive to light, visual fields intact   III/ IV/VI:            extraocular movements intact, neg nystagmus, neg ptosis  V:                       facial sensation intact, V1-3 normal  VII:                     face symmetric, no droop, normal eye closure and smile  VIII:                    hearing intact to finger rub bilaterally  IX/ X:                 soft palate rise symmetrical  XI:                      head turning, shoulder shrug normal  XII:                     tongue midline    Motor Exam:    Upper Extremities:     RIght:   no focal weakness               negative drift    Left :   no focal weakness               negative drift    Lower Extremities:                 Right:    no focal weakness                 Left:      no focal weakness                 Sensory:    intact to LT/PP in all UE/LE dermatomes    DTR:            = biceps/     triceps/     brachioradialis                      = patella/   medial hamstring/ankle                      neg clonus                      neg Babinski                          Gait:  not tested        PM&R Impression:    1) deconditioned  2) no focal weakness        Recommendations:    1) Physical therapy focusing on therapeutic exercises, bed mobility/transfer out of bed evaluation, progressive ambulation with assistive devices prn.    2) Disposition Plan/Recs:  pending functional progress

## 2019-11-25 NOTE — CONSULT NOTE ADULT - SUBJECTIVE AND OBJECTIVE BOX
REASON FOR CONSULT:    HISTORY OF PRESENT ILLNESS:    86F with pmh of lung adenocarcinoma s/p radiation presented for outpatient elective bronchoscopy for BAL and lymph node biopsy. She was hypertensive to SBP 200s during the procedure. Post procedure, pt was extubated however was noted to be hypoxic to 70s with tachypnea. Pt was then reintubated. Xray chest showed pulm congestion and US lungs with B lines likely 2/2 to flash pulmonary edema. She was given 40 IV lasix. In ER, Pt was briefly hypotensive after propofol initiation and required Levophed. By the time she was brought up to the floors she was off sedation and off Levophed. She reported pain due to the intubation but denied chest pain, abdominal pain, sob. Unable to obtain full history and ROS as patient intubated at time of exam.     PAST MEDICAL & SURGICAL HISTORY:  Malignant neoplasm of bronchus and lung, unspecified site: Lung cancer  History of surgery to major organs, presenting hazards to health: removal of R-sided adenocarcinoma, negative lymphnodes      [ ] Diabetes   [ ] Hypertension  [ ] Hyperlipidemia  [ ] CAD  [ ] PCI  [ ] CABG    PREVIOUS DIAGNOSTIC TESTING:    [ ] Echocardiogram:  [ ]  Catheterization:  [ ] Stress Test:  	    MEDICATIONS:          pantoprazole  Injectable 40 milliGRAM(s) IV Push every 24 hours  polyethylene glycol 3350 17 Gram(s) Oral daily      heparin  Injectable 5000 Unit(s) SubCutaneous every 12 hours  multivitamin 1 Tablet(s) Oral daily      FAMILY HISTORY:  No pertinent family history: No significant family history      SOCIAL HISTORY:    [ x] Non-smoker  [ ] Smoker  [ ] Alcohol    FAMILY HX: no cad in first degree relatives    Allergies    Bactrim (Unknown)  Cleocin HCl (Unknown)  Flagyl (Unknown)  Honey (Unknown)  iodine (Anaphylaxis)  IV Contrast (Anaphylaxis)  Levaquin (Other; Rash)  penicillin (Unknown)  Zithromax (Unknown)    Intolerances    	    REVIEW OF SYSTEMS:    [x] as per HPI  CONSTITUTIONAL: No fever, weight loss, or fatigue  ENT:  No difficulty hearing, tinnitus, vertigo; No sinus or throat pain  RESPIRATORY: No cough, wheezing, chills or hemoptysis; No Shortness of Breath  CARDIOVASCULAR: No chest pain, palpitations, dizziness, or leg swelling  GASTROINTESTINAL: No abdominal or epigastric pain. No nausea, vomiting, or hematemesis; No diarrhea or constipation. No melena or hematochezia.  GENITOURINARY: No dysuria, frequency, hematuria, or incontinence  NEUROLOGICAL: No headaches, memory loss, loss of strength, numbness, or tremors  MUSCULOSKELETAL: No joint pain or swelling; No muscle, back, or extremity pain  [x] All others negative	  [ ] Unable to obtain    PHYSICAL EXAM:  T(C): 36.7 (11-25-19 @ 09:12), Max: 36.7 (11-24-19 @ 20:45)  HR: 72 (11-25-19 @ 09:12) (72 - 77)  BP: 125/72 (11-25-19 @ 09:12) (125/72 - 133/73)  RR: 18 (11-25-19 @ 09:12) (16 - 20)  SpO2: 97% (11-25-19 @ 09:12) (94% - 98%)  Wt(kg): --  I&O's Summary      Appearance: Normal	  HEENT:   Normal oral mucosa, PERRL, EOMI	  Lymphatic: No lymphadenopathy  Cardiovascular: Normal S1 S2, No JVD, No murmurs, No edema  Respiratory: Lungs clear to auscultation	  Psychiatry: A & O x 3, Mood & affect appropriate  Gastrointestinal:  Soft, Non-tender, + BS	  Skin: No rashes, No ecchymoses, No cyanosis	  Neurologic: Non-focal  Extremities: Normal range of motion, No clubbing, cyanosis or edema  Vascular: Peripheral pulses palpable 2+ bilaterally    TELEMETRY: 	    ECG:  < from: 12 Lead ECG (09.06.19 @ 15:49) >  Ventricular Rate 69 BPM    Atrial Rate 69 BPM    P-R Interval 120 ms    QRS Duration 78 ms    Q-T Interval 396 ms    QTC Calculation(Bezet) 424 ms    P Axis 79 degrees    R Axis 58 degrees    T Axis 62 degrees    Diagnosis Line Normal sinus rhythm with sinus arrhythmia  Possible Left atrial enlargement  Septal infarct , age undetermined    Confirmed by PINKY LIANG MD ([id number]) on 9/9/2019 10:26:59 AM    < end of copied text >  < from: 12 Lead ECG (09.06.19 @ 15:49) >  Ventricular Rate 69 BPM    Atrial Rate 69 BPM    P-R Interval 120 ms    QRS Duration 78 ms    Q-T Interval 396 ms    QTC Calculation(Bezet) 424 ms    P Axis 79 degrees    R Axis 58 degrees    T Axis 62 degrees    Diagnosis Line Normal sinus rhythm with sinus arrhythmia  Possible Left atrial enlargement  Septal infarct , age undetermined    Confirmed by PINKY LIANG MD ([id number]) on 9/9/2019 10:26:59 AM    < end of copied text >     ECHO: < from: Echocardiogram (11.23.19 @ 08:42) >  Left Ventricle:  The left ventricle is normal in size, wall thickness, and systolic   function with a calculated ejection fraction of 66%. There are no   regional wall motion abnormalities seen. Analysis of left ventricular   diastolic function and filling pressure is made challenging by the   presence of significant mitral annular calcification.     Right Ventricle:  The right ventricle is normal in size and systolic function. The   tricuspid annular plane systolic excursion (TAPSE) is 19 mm (normal 17mm   or higher). RV tissue Doppler S' is 13 cm/s (normal >10cm/s).     Left Atrium:  The left atrium is normal in size. SHADE is 23.8ml/m2.     Right Atrium:  The right atrium is normal in size.     Aortic Valve:  Fibrocalcific tricuspid aorticvalve without significant stenosis. There   is no aortic regurgitation.     Pulmonic Valve:  Structurally normal pulmonic valve with normal leaflet excursion. There   is trace pulmonic regurgitation.     Mitral Valve:  The mitral leaflets are thickened and calcified. There is posterior   leaflet prolapse with a possible flail segment There is severe mitral   regurgitation.     Tricuspid Valve:  Structurally normal tricuspid valve with normal leaflet excursion. There   is trace tricuspid regurgitation. Pulmonary artery systolic pressure   (estimated using the tricuspid regurgitant gradient and an estimate of   right atrial pressure) is 36.9 mmHg.     Aorta:  The aortic root is normal in size and structure.     Venous:  The inferior vena cava is normal in size (<2.1cm) with normal inspiratory   collapse (>50%) consistent with normal right atrial pressure (  3, range 0-5mmHg).     Pericardium:  The pericardium is normal in appearance and thickness without significant   pericardial effusion.         < end of copied text >    STRESS:  CATH:  	  RADIOLOGY:  CXR:  CT:  US:   	  	  LABS:	 	    CARDIAC MARKERS:                                  12.4   9.09  )-----------( 242      ( 25 Nov 2019 07:09 )             37.7     11-25    142  |  104  |  14  ----------------------------<  96  4.1   |  31  |  0.88    Ca    9.2      25 Nov 2019 07:09  Phos  3.1     11-25  Mg     2.0     11-25    TPro  6.5  /  Alb  3.6  /  TBili  0.4  /  DBili  x   /  AST  24  /  ALT  17  /  AlkPhos  90  11-24    proBNP:   Lipid Profile:   HgA1c:   TSH:     ASSESSMENT/PLAN: 	    1. HFpEF - Acute Hypoxic Respiratory Failure in setting of volume overload and severe MR  2/2 to flash pulmonary edema s/p extubation in setting of outpatient bronchoscopy and hypertensive with sBPs> 200. Re-intubated. CXR with bilateral asymmetrical opacities/right pleural effusion. Received Lasix 40mg IV in ED.   -Successfully extubated in late afternoon on 11/22, saturating at 95% on 2L NC. Hemodynamically stable   - please check I/Os and keep net euvolemic to net negative - van give PRN Lasic 20 IV for in WoB or desaturation  -daily CXR  - start low dose acei lisinopril 2.5mg for afterload reduction    2. 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    3. Heme -H/o Lung adenocarcinoma s/p radiation.

## 2019-11-25 NOTE — PROGRESS NOTE ADULT - PROBLEM SELECTOR PLAN 5
E: replete K >4, Mg >2  N: Mechanical soft  GI Ppx: Protonix   DVT Ppx: SCDs  Code status: full code   Dispo: [facility] Patient found to have severe mitral regurge on echo.   - Dr. Monreal following, recommendations appreciated   - plan for CARLTON tomorrow, so NPO after midnight

## 2019-11-25 NOTE — PROGRESS NOTE ADULT - SUBJECTIVE AND OBJECTIVE BOX
PULMONARY CONSULT FOLLOW-UP NOTE    INTERVAL HISTORY:   Reviewed chart and overnight events; patient seen and examined at bedside.  not in distress  c/o cough with blood tinged sputum   no CP / fever .   Nose bleed +      MEDICATIONS:  Pulmonary:    Antimicrobials:  cefpodoxime 200 milliGRAM(s) Oral every 12 hours    Anticoagulants:  heparin  Injectable 5000 Unit(s) SubCutaneous every 12 hours    Cardiac:  lisinopril 2.5 milliGRAM(s) Oral daily      Allergies    Bactrim (Unknown)  Cleocin HCl (Unknown)  Flagyl (Unknown)  Honey (Unknown)  iodine (Anaphylaxis)  IV Contrast (Anaphylaxis)  Levaquin (Other; Rash)  penicillin (Unknown)  Zithromax (Unknown)    Intolerances        Vital Signs Last 24 Hrs  T(C): 36.6 (25 Nov 2019 16:58), Max: 36.7 (24 Nov 2019 20:45)  T(F): 97.9 (25 Nov 2019 16:58), Max: 98.1 (24 Nov 2019 20:45)  HR: 88 (25 Nov 2019 16:58) (72 - 90)  BP: 135/75 (25 Nov 2019 16:58) (125/72 - 135/75)  BP(mean): --  RR: 18 (25 Nov 2019 16:58) (18 - 20)  SpO2: 96% (25 Nov 2019 16:58) (94% - 97%)        PHYSICAL EXAM:  Constitutional: WDWN  HEENT: NC/AT; PERRL, anicteric sclera; MMM  Neck: supple  Cardiovascular: +S1/S2, RRR  Respiratory:  Crackles present diffusely   Gastrointestinal: soft, NT/ND; +BSx4  Extremities: WWP; no edema, clubbing or cyanosis  Vascular: 2+ radial, DP/PT pulses B/L  Neurological: AAOx3; no focal deficits    LABS:      CBC Full  -  ( 25 Nov 2019 07:09 )  WBC Count : 9.09 K/uL  RBC Count : 3.82 M/uL  Hemoglobin : 12.4 g/dL  Hematocrit : 37.7 %  Platelet Count - Automated : 242 K/uL  Mean Cell Volume : 98.7 fl  Mean Cell Hemoglobin : 32.5 pg  Mean Cell Hemoglobin Concentration : 32.9 gm/dL  Auto Neutrophil # : x  Auto Lymphocyte # : x  Auto Monocyte # : x  Auto Eosinophil # : x  Auto Basophil # : x  Auto Neutrophil % : x  Auto Lymphocyte % : x  Auto Monocyte % : x  Auto Eosinophil % : x  Auto Basophil % : x    11-25    142  |  104  |  14  ----------------------------<  96  4.1   |  31  |  0.88    Ca    9.2      25 Nov 2019 07:09  Phos  3.1     11-25  Mg     2.0     11-25    TPro  6.5  /  Alb  3.6  /  TBili  0.4  /  DBili  x   /  AST  24  /  ALT  17  /  AlkPhos  90  11-24        RADIOLOGY & ADDITIONAL STUDIES:  CXR reviewed.

## 2019-11-25 NOTE — PROGRESS NOTE ADULT - PROBLEM SELECTOR PLAN 4
Patient found to have severe mitral regurge on echo.   - Dr. Monreal following, recommendations appreciated   - plan for CARLTON tomorrow, so NPO after midnight Patient complaining of constipation.   - started on Miralax   - monitor for BM

## 2019-11-25 NOTE — PROGRESS NOTE ADULT - ASSESSMENT
85 yo woman with hx of RLL adenocarcinoma s/p R VATS RA RLLx in 2013 by Dr. Caicedo and ANANYA adenoCA s/p radiation therapy in 2016 who presented on 11/22 for elective bronchoscopy for BAL and endobronchial lymph node biopsy and developed flash pulmonary edema post-procedure, no w extubated.       Acute hypoxic respiratory failure:   2/2 resolving pulmonary edema in setting of Severe MR.   Finding of Flail posterior leaflet on MR on ECHO. Patient is refusing idea of CARLTON or any invasive procedure.   - Continue with diuresis.   - Small amount of hemoptysis is expected but will keep a close eye on her.   - strict I/O and daily weights  - CT surgery recs structural heart team evaluation for possible lesley-clip  - Treat H parainfluenza guided by sensitivity results.     Recurrent AdenoCA:    She has presented on 11/22 for elective bronchoscopy for BAL and endobronchial lymph node biopsy now unfortunately has adenocarcinoma from her RUL biopsy , LN station negative though.   - We have not spoken to her about biopsy results as results were not available at that time.   - Consider Heme onc consult.

## 2019-11-25 NOTE — PHYSICAL THERAPY INITIAL EVALUATION ADULT - PERTINENT HX OF CURRENT PROBLEM, REHAB EVAL
Pt. is an 86 y.o female admitted for an elective bronchoscopy/lymph node biopsy for known  lung adenocarcinoma s/p radiation TX. Hospital course complicated by flash  pulmonary edema requiring re-intubation.

## 2019-11-25 NOTE — CONSULT NOTE ADULT - SUBJECTIVE AND OBJECTIVE BOX
Surgeon: Dr. Schumacher    Requesting Physician: Dr. Nur    HISTORY OF PRESENT ILLNESS (Need 4):    86F with pmh of RLL adenocarcinoma s/p R VATS RA RLLx in 2013 by Dr. Caicedo and ANANYA adenoCA s/p radiation therapy in 2016 who presented on 11/22 for elective bronchoscopy for BAL and endobronchial lymph node biopsy for biopsy proven RUL adenoCA. She was hypertensive to SBP 200s during the procedure and post procedure after extubation she was noted to be hypoxic and tachypnic requiring emergent reintubation.  Xray chest showed pulmonary vascular congestion 2/2 to flash pulmonary edema. She was transferred to ICU and diuresed for a total of 1.2 liters and was extubated the following afternoon and transferred to step down unit.  Once stable on 11/23 she underwent a TTE which revealed severe MR with a posterior leaflet prolapse and a possible flail segment.  She admits to dyspnea after ambulating more than 3 blocks, but denies chest pain, orthopnea, PND, or LE edema.    PAST MEDICAL & SURGICAL HISTORY:  Malignant neoplasm of bronchus and lung, unspecified site: Lung cancer  History of surgery to major organs, presenting hazards to health: removal of R-sided adenocarcinoma, negative lymphnodes      MEDICATIONS  (STANDING):  heparin  Injectable 5000 Unit(s) SubCutaneous every 12 hours  lisinopril 2.5 milliGRAM(s) Oral daily  multivitamin 1 Tablet(s) Oral daily  pantoprazole  Injectable 40 milliGRAM(s) IV Push every 24 hours  polyethylene glycol 3350 17 Gram(s) Oral daily    MEDICATIONS  (PRN):      Allergies    Bactrim (Unknown)  Cleocin HCl (Unknown)  Flagyl (Unknown)  Honey (Unknown)  iodine (Anaphylaxis)  IV Contrast (Anaphylaxis)  Levaquin (Other; Rash)  penicillin (Unknown)  Zithromax (Unknown)      SOCIAL HISTORY:  Smoker:  NO        PACK YEARS: 15             WHEN QUIT 10 years ago  ETOH use:   NO                Ilicit Drug use:   NO  Assisted device use (Cane / Walker): ambulates independently  Live with: lives alone and remains independent in all ADLs    FAMILY HISTORY:  No pertinent family history: No significant family history    Review of Systems (Need 10):  CONSTITUTIONAL: Denies fevers / chills, sweats, fatigue, weight loss, weight gain                                       NEURO:  Denies parathesias, seizures, syncope, confusion                                                                                  EYES:  Denies blurry vision, discharge, pain, loss of vision                                                                                    ENMT:  Denies difficulty hearing, vertigo, dysphagia, epistaxis, recent dental work                                       CV:  Denies chest pain, palpitations, BROWN, orthopnea                                                                                           RESPIRATORY:  Denies wWheezing, SOB, cough / sputum, hemoptysis                                                               GI:  Denies nausea, vomiting, diarrhea, constipation, melena                                                                          : Denies hematuria, dysuria, urgency, incontinence                                                                                          MUSKULOSKELETAL:  Denies arthritis, joint swelling, muscle weakness                                                             SKIN/BREAST:  Denies rash, itching, hair loss, masses                                                                                              PSYCH:  Denies depression, anxiety, suicidal ideation                                                                                                HEME/LYMPH:  Denies bruises easily, enlarged lymph nodes, tender lymph nodes                                          ENDOCRINE:  Denies cold intolerance, heat intolerance, polydipsia                                                                      Vital Signs Last 24 Hrs  T(C): 36.7 (25 Nov 2019 09:12), Max: 36.7 (24 Nov 2019 20:45)  T(F): 98 (25 Nov 2019 09:12), Max: 98.1 (24 Nov 2019 20:45)  HR: 72 (25 Nov 2019 09:12) (72 - 90)  BP: 125/72 (25 Nov 2019 09:12) (125/72 - 133/73)  BP(mean): --  RR: 18 (25 Nov 2019 09:12) (16 - 20)  SpO2: 97% (25 Nov 2019 09:12) (94% - 98%)    Physical Exam (Need 8)  CONSTITUTIONAL: WN/WD, NAD  NEURO: A&Ox3                      EYES: EOMI, sclera anicteric  ENMT: MMM  CV: S1S2 RRR, 2/6 CODY apex  RESPIRATORY: CTA b/l no W/R/R  GI: soft NT/ND +BS  : no chacon  MUSKULOSKELETAL:  +Kyphosis  SKIN / BREAST: no rashes/lesions                                                          LABS:                        12.4   9.09  )-----------( 242      ( 25 Nov 2019 07:09 )             37.7     11-25    142  |  104  |  14  ----------------------------<  96  4.1   |  31  |  0.88    Ca    9.2      25 Nov 2019 07:09  Phos  3.1     11-25  Mg     2.0     11-25    TPro  6.5  /  Alb  3.6  /  TBili  0.4  /  DBili  x   /  AST  24  /  ALT  17  /  AlkPhos  90  11-24    RADIOLOGY & ADDITIONAL STUDIES:    CXR: improving PVC    EKG: NSR @ 69 bpm    TTE / CARLTON:  severe MR with a posterior leaflet prolapse and a possible flail segment, normal EF

## 2019-11-25 NOTE — PROGRESS NOTE ADULT - SUBJECTIVE AND OBJECTIVE BOX
Off service.  Called by Pulmonary Service.  The patient discussed, she follows with [facility]  Management taken over by  [facility] from now on.  Thank You

## 2019-11-25 NOTE — CONSULT NOTE ADULT - SUBJECTIVE AND OBJECTIVE BOX
Surgeon: Dr. Nur    Requesting Physician: Dr. Monreal    HISTORY OF PRESENT ILLNESS (Need 4):    86F with pmh of lung adenocarcinoma s/p radiation presented on 11/22 for elective bronchoscopy for BAL and lymph node biopsy. She was hypertensive to SBP 200s during the procedure and post procedure after extubation she was noted to be hypoxic tachypnic and was emergently reintubated. Xray chest showed significant pulmonary vascular congestion likely 2/2 to flash pulmonary edema. She was transferred to ICU and diuresed for a total of 1.2 liters and was extubated the following afternoon and transferred to step down unit.  Once stable on 11/23 she underwent a TTE which revealed severe MR with a posterior leaflet prolapse and a possible flail segment.      PAST MEDICAL & SURGICAL HISTORY:  Malignant neoplasm of bronchus and lung, unspecified site: Lung cancer  History of surgery to major organs, presenting hazards to health: removal of R-sided adenocarcinoma, negative lymphnodes      MEDICATIONS  (STANDING):  heparin  Injectable 5000 Unit(s) SubCutaneous every 12 hours  lisinopril 2.5 milliGRAM(s) Oral daily  multivitamin 1 Tablet(s) Oral daily  pantoprazole  Injectable 40 milliGRAM(s) IV Push every 24 hours  polyethylene glycol 3350 17 Gram(s) Oral daily    MEDICATIONS  (PRN):      Allergies    Bactrim (Unknown)  Cleocin HCl (Unknown)  Flagyl (Unknown)  Honey (Unknown)  iodine (Anaphylaxis)  IV Contrast (Anaphylaxis)  Levaquin (Other; Rash)  penicillin (Unknown)  Zithromax (Unknown)    Intolerances        SOCIAL HISTORY:  Smoker:  YES / NO        PACK YEARS:                         WHEN QUIT?  ETOH use:  YES / NO               FREQUENCY / QUANTITY:  Ilicit Drug use:  YES / NO  Occupation:  Assisted device use (Cane / Walker):  Live with:    FAMILY HISTORY:  No pertinent family history: No significant family history      Review of Systems (Need 10):  CONSTITUTIONAL: Denies fevers / chills, sweats, fatigue, weight loss, weight gain                                       NEURO:  Denies parathesias, seizures, syncope, confusion                                                                                  EYES:  Denies blurry vision, discharge, pain, loss of vision                                                                                    ENMT:  Denies difficulty hearing, vertigo, dysphagia, epistaxis, recent dental work                                       CV:  Denies chest pain, palpitations, BROWN, orthopnea                                                                                           RESPIRATORY:  Denies wWheezing, SOB, cough / sputum, hemoptysis                                                               GI:  Denies nausea, vomiting, diarrhea, constipation, melena                                                                          : Denies hematuria, dysuria, urgency, incontinence                                                                                          MUSKULOSKELETAL:  Denies arthritis, joint swelling, muscle weakness                                                             SKIN/BREAST:  Denies rash, itching, hair loss, masses                                                                                              PSYCH:  Denies depression, anxiety, suicidal ideation                                                                                                HEME/LYMPH:  Denies bruises easily, enlarged lymph nodes, tender lymph nodes                                          ENDOCRINE:  Denies cold intolerance, heat intolerance, polydipsia                                                                      Vital Signs Last 24 Hrs  T(C): 36.7 (25 Nov 2019 09:12), Max: 36.7 (24 Nov 2019 20:45)  T(F): 98 (25 Nov 2019 09:12), Max: 98.1 (24 Nov 2019 20:45)  HR: 72 (25 Nov 2019 09:12) (72 - 90)  BP: 125/72 (25 Nov 2019 09:12) (125/72 - 133/73)  BP(mean): --  RR: 18 (25 Nov 2019 09:12) (16 - 20)  SpO2: 97% (25 Nov 2019 09:12) (94% - 98%)    Physical Exam (Need 8)  CONSTITUTIONAL:                                                                          WNL  NEURO:                                                                                             WNL                      EYES:                                                                                                  WNL  ENMT:                                                                                                WNL  CV:                                                                                                      WNL  RESPIRATORY:                                                                                  WNL  GI:                                                                                                       WNL  : SCHROEDER + / -                                                                                 WNL  MUSKULOSKELETAL:                                                                       WNL  SKIN / BREAST:                                                                                 WNL                                                          LABS:                        12.4   9.09  )-----------( 242      ( 25 Nov 2019 07:09 )             37.7     11-25    142  |  104  |  14  ----------------------------<  96  4.1   |  31  |  0.88    Ca    9.2      25 Nov 2019 07:09  Phos  3.1     11-25  Mg     2.0     11-25    TPro  6.5  /  Alb  3.6  /  TBili  0.4  /  DBili  x   /  AST  24  /  ALT  17  /  AlkPhos  90  11-24                RADIOLOGY & ADDITIONAL STUDIES:  CAROTID U/S:    CXR:    CT Scan:    EKG:    TTE / CARLTON:    Cardiac Cath: Surgeon: Dr. Nur    Requesting Physician: Dr. Monreal    HISTORY OF PRESENT ILLNESS (Need 4):    86F with pmh of RLL adenocarcinoma s/p R VATS RA RLLx in 2013 by Dr. Caicedo and ANANYA adenoCA s/p radiation therapy in 2016 who presented on 11/22 for elective bronchoscopy for BAL and endobronchial lymph node biopsy for biopsy proven RUL adenoCA. She was hypertensive to SBP 200s during the procedure and post procedure after extubation she was noted to be hypoxic and tachypnic requiring emergent reintubation.  Xray chest showed pulmonary vascular congestion 2/2 to flash pulmonary edema. She was transferred to ICU and diuresed for a total of 1.2 liters and was extubated the following afternoon and transferred to step down unit.  Once stable on 11/23 she underwent a TTE which revealed severe MR with a posterior leaflet prolapse and a possible flail segment.  She admits to dyspnea after ambulating more than 3 blocks, but denies chest pain, orthopnea, PND, or LE edema.    PAST MEDICAL & SURGICAL HISTORY:  Malignant neoplasm of bronchus and lung, unspecified site: Lung cancer  History of surgery to major organs, presenting hazards to health: removal of R-sided adenocarcinoma, negative lymphnodes      MEDICATIONS  (STANDING):  heparin  Injectable 5000 Unit(s) SubCutaneous every 12 hours  lisinopril 2.5 milliGRAM(s) Oral daily  multivitamin 1 Tablet(s) Oral daily  pantoprazole  Injectable 40 milliGRAM(s) IV Push every 24 hours  polyethylene glycol 3350 17 Gram(s) Oral daily    MEDICATIONS  (PRN):      Allergies    Bactrim (Unknown)  Cleocin HCl (Unknown)  Flagyl (Unknown)  Honey (Unknown)  iodine (Anaphylaxis)  IV Contrast (Anaphylaxis)  Levaquin (Other; Rash)  penicillin (Unknown)  Zithromax (Unknown)    SOCIAL HISTORY:  Smoker:  NO        PACK YEARS: 15             WHEN QUIT 10 years ago  ETOH use:   NO                Ilicit Drug use:   NO  Assisted device use (Cane / Walker): ambulates independently  Live with: lives alone and remains independent in all ADLs    FAMILY HISTORY:  No pertinent family history: No significant family history    Review of Systems (Need 10):  CONSTITUTIONAL: Denies fevers / chills, sweats, fatigue, weight loss, weight gain                                       NEURO:  Denies parathesias, seizures, syncope, confusion                                                                                  EYES:  Denies blurry vision, discharge, pain, loss of vision                                                                                    ENMT:  Denies difficulty hearing, vertigo, dysphagia, epistaxis, recent dental work                                       CV:  Denies chest pain, palpitations, BROWN, orthopnea                                                                                           RESPIRATORY:  Denies wWheezing, SOB, cough / sputum, hemoptysis                                                               GI:  Denies nausea, vomiting, diarrhea, constipation, melena                                                                          : Denies hematuria, dysuria, urgency, incontinence                                                                                          MUSKULOSKELETAL:  Denies arthritis, joint swelling, muscle weakness                                                             SKIN/BREAST:  Denies rash, itching, hair loss, masses                                                                                              PSYCH:  Denies depression, anxiety, suicidal ideation                                                                                                HEME/LYMPH:  Denies bruises easily, enlarged lymph nodes, tender lymph nodes                                          ENDOCRINE:  Denies cold intolerance, heat intolerance, polydipsia                                                                      Vital Signs Last 24 Hrs  T(C): 36.7 (25 Nov 2019 09:12), Max: 36.7 (24 Nov 2019 20:45)  T(F): 98 (25 Nov 2019 09:12), Max: 98.1 (24 Nov 2019 20:45)  HR: 72 (25 Nov 2019 09:12) (72 - 90)  BP: 125/72 (25 Nov 2019 09:12) (125/72 - 133/73)  BP(mean): --  RR: 18 (25 Nov 2019 09:12) (16 - 20)  SpO2: 97% (25 Nov 2019 09:12) (94% - 98%)    Physical Exam (Need 8)  CONSTITUTIONAL: WN/WD, NAD  NEURO: A&Ox3                      EYES: EOMI, sclera anicteric  ENMT: MMM  CV: S1S2 RRR, 2/6 CODY apex  RESPIRATORY: CTA b/l no W/R/R  GI: soft NT/ND +BS  : no chacon  MUSKULOSKELETAL:  +Kyphosis  SKIN / BREAST: no rashes/lesions                                                          LABS:                        12.4   9.09  )-----------( 242      ( 25 Nov 2019 07:09 )             37.7     11-25    142  |  104  |  14  ----------------------------<  96  4.1   |  31  |  0.88    Ca    9.2      25 Nov 2019 07:09  Phos  3.1     11-25  Mg     2.0     11-25    TPro  6.5  /  Alb  3.6  /  TBili  0.4  /  DBili  x   /  AST  24  /  ALT  17  /  AlkPhos  90  11-24    RADIOLOGY & ADDITIONAL STUDIES:    CXR: improving PVC    EKG: NSR @ 69 bpm    TTE / CARLTON:  severe MR with a posterior leaflet prolapse and a possible flail segment, normal EF

## 2019-11-25 NOTE — PROGRESS NOTE ADULT - PROBLEM SELECTOR PLAN 1
#Acute Hypoxic Respiratory Failure   2/2 to flash pulmonary edema s/p extubation in setting of outpatient bronchoscopy and hypertensive with sBPs> 200. Re-intubated. CXR with bilateral asymmetrical opacities/right pleural effusion, now s/p extubation.  - patient hemodynamically stable, saturating well on RA   - no longer requiring lasix  - strict Is/Os Patient found to have a sputum culture growing H. influenza.   - starting on cefpodoxime 200 mg q12

## 2019-11-25 NOTE — PROGRESS NOTE ADULT - ASSESSMENT
86F with PMH of lung adenocarcinoma s/p radiation presented for opt elective bronchoscopy likely developed flash pulm edema requiring reintubation, now s/p extubation and saturating well, awaiting CARLTON tomorrow.

## 2019-11-25 NOTE — PROGRESS NOTE ADULT - ATTENDING COMMENTS
Seen and examined by me; agree w above. Seen and examined by me; agree w above. Flash pulmonary edema at bronchoscopy Friday; growing H parainfluenza on cultures. Agree w diuresis and Abx.

## 2019-11-25 NOTE — PROGRESS NOTE ADULT - PROBLEM SELECTOR PLAN 3
Patient complaining of constipation.   - started on Miralax   - monitor for BM Patient has a history of adenocarcinoma of the lung s/p radiation.  - continues to complain of hemoptysis   - no longer requiring Lasix

## 2019-11-25 NOTE — PROGRESS NOTE ADULT - PROBLEM SELECTOR PLAN 2
Patient has a history of adenocarcinoma of the lung s/p radiation.  - continues to complain of hemoptysis   - no longer requiring Lasix #Acute Hypoxic Respiratory Failure   2/2 to flash pulmonary edema s/p extubation in setting of outpatient bronchoscopy and hypertensive with sBPs> 200. Re-intubated. CXR with bilateral asymmetrical opacities/right pleural effusion, now s/p extubation.  - patient hemodynamically stable, saturating well on RA   - no longer requiring lasix  - strict Is/Os

## 2019-11-25 NOTE — PROGRESS NOTE ADULT - PROBLEM SELECTOR PLAN 6
1) PCP Contacted on Admission: (Y/N) --> Name & Phone #:  2) Date of Contact with PCP:  3) PCP Contacted at Discharge: (Y/N, N/A)  4) Summary of Handoff Given to PCP:   5) Post-Discharge Appointment Date and Location: E: replete K >4, Mg >2  N: Mechanical soft  GI Ppx: Protonix   DVT Ppx: SCDs  Code status: full code   Dispo: [facility]

## 2019-11-26 ENCOUNTER — TRANSCRIPTION ENCOUNTER (OUTPATIENT)
Age: 84
End: 2019-11-26

## 2019-11-26 LAB
ANION GAP SERPL CALC-SCNC: 12 MMOL/L — SIGNIFICANT CHANGE UP (ref 5–17)
BUN SERPL-MCNC: 14 MG/DL — SIGNIFICANT CHANGE UP (ref 7–23)
CALCIUM SERPL-MCNC: 9.2 MG/DL — SIGNIFICANT CHANGE UP (ref 8.4–10.5)
CHLORIDE SERPL-SCNC: 100 MMOL/L — SIGNIFICANT CHANGE UP (ref 96–108)
CO2 SERPL-SCNC: 30 MMOL/L — SIGNIFICANT CHANGE UP (ref 22–31)
CREAT SERPL-MCNC: 0.9 MG/DL — SIGNIFICANT CHANGE UP (ref 0.5–1.3)
CULTURE RESULTS: SIGNIFICANT CHANGE UP
GLUCOSE SERPL-MCNC: 126 MG/DL — HIGH (ref 70–99)
GRAM STN FLD: SIGNIFICANT CHANGE UP
HCT VFR BLD CALC: 41.5 % — SIGNIFICANT CHANGE UP (ref 34.5–45)
HGB BLD-MCNC: 12.7 G/DL — SIGNIFICANT CHANGE UP (ref 11.5–15.5)
MAGNESIUM SERPL-MCNC: 1.9 MG/DL — SIGNIFICANT CHANGE UP (ref 1.6–2.6)
MCHC RBC-ENTMCNC: 30.6 GM/DL — LOW (ref 32–36)
MCHC RBC-ENTMCNC: 31.1 PG — SIGNIFICANT CHANGE UP (ref 27–34)
MCV RBC AUTO: 101.7 FL — HIGH (ref 80–100)
NRBC # BLD: 0 /100 WBCS — SIGNIFICANT CHANGE UP (ref 0–0)
PHOSPHATE SERPL-MCNC: 3.2 MG/DL — SIGNIFICANT CHANGE UP (ref 2.5–4.5)
PLATELET # BLD AUTO: 261 K/UL — SIGNIFICANT CHANGE UP (ref 150–400)
POTASSIUM SERPL-MCNC: 4 MMOL/L — SIGNIFICANT CHANGE UP (ref 3.5–5.3)
POTASSIUM SERPL-SCNC: 4 MMOL/L — SIGNIFICANT CHANGE UP (ref 3.5–5.3)
RBC # BLD: 4.08 M/UL — SIGNIFICANT CHANGE UP (ref 3.8–5.2)
RBC # FLD: 14.6 % — HIGH (ref 10.3–14.5)
SODIUM SERPL-SCNC: 142 MMOL/L — SIGNIFICANT CHANGE UP (ref 135–145)
SPECIMEN SOURCE: SIGNIFICANT CHANGE UP
WBC # BLD: 11.33 K/UL — HIGH (ref 3.8–10.5)
WBC # FLD AUTO: 11.33 K/UL — HIGH (ref 3.8–10.5)

## 2019-11-26 PROCEDURE — 99232 SBSQ HOSP IP/OBS MODERATE 35: CPT | Mod: GC

## 2019-11-26 PROCEDURE — 71045 X-RAY EXAM CHEST 1 VIEW: CPT | Mod: 26

## 2019-11-26 RX ORDER — PANTOPRAZOLE SODIUM 20 MG/1
40 TABLET, DELAYED RELEASE ORAL EVERY 24 HOURS
Refills: 0 | Status: DISCONTINUED | OUTPATIENT
Start: 2019-11-26 | End: 2019-11-29

## 2019-11-26 RX ADMIN — Medication 200 MILLIGRAM(S): at 12:36

## 2019-11-26 RX ADMIN — Medication 200 MILLIGRAM(S): at 06:27

## 2019-11-26 RX ADMIN — POLYETHYLENE GLYCOL 3350 17 GRAM(S): 17 POWDER, FOR SOLUTION ORAL at 06:27

## 2019-11-26 RX ADMIN — POLYETHYLENE GLYCOL 3350 17 GRAM(S): 17 POWDER, FOR SOLUTION ORAL at 19:05

## 2019-11-26 RX ADMIN — PANTOPRAZOLE SODIUM 40 MILLIGRAM(S): 20 TABLET, DELAYED RELEASE ORAL at 19:07

## 2019-11-26 RX ADMIN — HEPARIN SODIUM 5000 UNIT(S): 5000 INJECTION INTRAVENOUS; SUBCUTANEOUS at 06:27

## 2019-11-26 RX ADMIN — Medication 1 TABLET(S): at 12:36

## 2019-11-26 RX ADMIN — HEPARIN SODIUM 5000 UNIT(S): 5000 INJECTION INTRAVENOUS; SUBCUTANEOUS at 19:06

## 2019-11-26 NOTE — PROGRESS NOTE ADULT - ASSESSMENT
per Internal Medicine    86F with PMH of lung adenocarcinoma s/p radiation presented for opt elective bronchoscopy likely developed flash pulm edema requiring reintubation, now s/p extubation and saturating well, refused CARLTON tomorrow, now under the care of Dr. Stephens for heme-onc.     Problem/Plan - 1:  ·  Problem: Pneumonia, bacterial.  Plan: Patient found to have a sputum culture growing H. influenza.   - reduced cefpodoxime from 200 mg q12 to q24 due to Cr clearance per pharmacy.     Problem/Plan - 2:  ·  Problem: Respiratory failure.  Plan: #Acute Hypoxic Respiratory Failure   2/2 to flash pulmonary edema s/p extubation in setting of outpatient bronchoscopy and hypertensive with sBPs> 200. Re-intubated. CXR with bilateral asymmetrical opacities/right pleural effusion, now s/p extubation.  - patient hemodynamically stable, saturating well on RA   - no longer requiring Lasix  - strict Is/Os.     Problem/Plan - 3:  ·  Problem: Lung cancer.  Plan: Patient has a history of adenocarcinoma of the lung s/p radiation.  - continues to complain of hemoptysis   - no longer requiring Lasix  - Dr. Stephens consulted for heme-onc, f/u recommendations   - patient is stating that she does not want extensive interventions at this time, but will f/u her discussion with Dr. Stephens about treating adenocarcinoma.     Problem/Plan - 4:  ·  Problem: Constipation.  Plan: Patient complaining of constipation.   - started on Miralax   - still no BM, so given dulcolax suppository.     Problem/Plan - 5:  ·  Problem: Mitral regurgitation.  Plan: Patient found to have severe mitral regurge on echo.   - Dr. Monreal following, recommendations appreciated   - patient refused CARLTON today, so no further cardio work-up at this time.     Problem/Plan - 6:  Problem: Nutrition, metabolism, and development symptoms. Plan: E: replete K >4, Mg >2  N: Mechanical soft  GI Ppx: Protonix   DVT Ppx: SCDs  Code status: full code   Dispo: F.

## 2019-11-26 NOTE — PROGRESS NOTE ADULT - PROBLEM SELECTOR PLAN 2
#Acute Hypoxic Respiratory Failure   2/2 to flash pulmonary edema s/p extubation in setting of outpatient bronchoscopy and hypertensive with sBPs> 200. Re-intubated. CXR with bilateral asymmetrical opacities/right pleural effusion, now s/p extubation.  - patient hemodynamically stable, saturating well on RA   - no longer requiring Lasix  - strict Is/Os

## 2019-11-26 NOTE — PROGRESS NOTE ADULT - PROBLEM SELECTOR PLAN 5
Patient found to have severe mitral regurge on echo.   - Dr. Monreal following, recommendations appreciated   - patient refused CARLTON today, so no further cardio work-up at this time

## 2019-11-26 NOTE — PROGRESS NOTE ADULT - PROBLEM SELECTOR PLAN 1
Patient found to have a sputum culture growing H. influenza.   - reduced cefpodoxime from 200 mg q12 to q24 due to Cr clearance per pharmacy

## 2019-11-26 NOTE — PROGRESS NOTE ADULT - SUBJECTIVE AND OBJECTIVE BOX
PULMONARY CONSULT FOLLOW-UP NOTE    INTERVAL HISTORY:   Reviewed chart and overnight events; patient seen and examined at bedside.  no new symptoms  cough present. still has blood tinged sputum  no nose bleed.   93% on Room Air.   Was told about biopsy results.     MEDICATIONS:  Pulmonary:    Antimicrobials:  cefpodoxime 200 milliGRAM(s) Oral every 12 hours    Anticoagulants:  heparin  Injectable 5000 Unit(s) SubCutaneous every 12 hours    Cardiac:  lisinopril 2.5 milliGRAM(s) Oral daily      Allergies    Bactrim (Unknown)  Cleocin HCl (Unknown)  Flagyl (Unknown)  Honey (Unknown)  iodine (Anaphylaxis)  IV Contrast (Anaphylaxis)  Levaquin (Other; Rash)  penicillin (Unknown)  Zithromax (Unknown)    Intolerances        Vital Signs Last 24 Hrs  T(C): 36.3 (26 Nov 2019 05:35), Max: 36.7 (25 Nov 2019 09:12)  T(F): 97.3 (26 Nov 2019 05:35), Max: 98 (25 Nov 2019 09:12)  HR: 97 (26 Nov 2019 05:35) (72 - 97)  BP: 118/71 (26 Nov 2019 05:35) (112/71 - 135/75)  BP(mean): --  RR: 20 (26 Nov 2019 05:35) (16 - 20)  SpO2: 98% (26 Nov 2019 05:35) (96% - 98%)        PHYSICAL EXAM:  Constitutional: WDWN  HEENT: NC/AT; PERRL, anicteric sclera; MMM  Neck: supple  Cardiovascular: +S1/S2, RRR, systolic murmur present.   Respiratory: rales present in bases left > right, improved.   Gastrointestinal: soft, NT/ND; +BSx4  Extremities: WWP; no edema, clubbing or cyanosis  Vascular: 2+ radial, DP/PT pulses B/L  Neurological: AAOx3; no focal deficits    LABS:      CBC Full  -  ( 25 Nov 2019 07:09 )  WBC Count : 9.09 K/uL  RBC Count : 3.82 M/uL  Hemoglobin : 12.4 g/dL  Hematocrit : 37.7 %  Platelet Count - Automated : 242 K/uL  Mean Cell Volume : 98.7 fl  Mean Cell Hemoglobin : 32.5 pg  Mean Cell Hemoglobin Concentration : 32.9 gm/dL  Auto Neutrophil # : x  Auto Lymphocyte # : x  Auto Monocyte # : x  Auto Eosinophil # : x  Auto Basophil # : x  Auto Neutrophil % : x  Auto Lymphocyte % : x  Auto Monocyte % : x  Auto Eosinophil % : x  Auto Basophil % : x    11-25    142  |  104  |  14  ----------------------------<  96  4.1   |  31  |  0.88    Ca    9.2      25 Nov 2019 07:09  Phos  3.1     11-25  Mg     2.0     11-25    RADIOLOGY & ADDITIONAL STUDIES:  CXR reviewed.

## 2019-11-26 NOTE — PROGRESS NOTE ADULT - ASSESSMENT
87 yo woman with hx of RLL adenocarcinoma s/p R VATS RA RLLx in 2013 by Dr. Caicedo and ANANYA adenoCA s/p radiation therapy in 2016 who presented on 11/22 for elective bronchoscopy for BAL and endobronchial lymph node biopsy and developed flash pulmonary edema post-procedure, no w extubated.       Acute hypoxic respiratory failure:   2/2 resolving pulmonary edema in setting of Severe MR.   Finding of Flail posterior leaflet on MR on ECHO. Patient is refusing idea of CARLTON or any invasive procedure.   - Continue with diuresis.   - Small amount of hemoptysis, nothing alarming but expected after TBNA.   - strict I/O and daily weights.  -Treat H parainfluenza guided by sensitivity results.   - Incentive spirometer.     Severe MR:  TTE shows severe MR , which cause flash ulm edema, denies undergoing any invasive procedure including CARLTON.   seen by Structural HF , CT surg.  - Evaluation for possible lesley-clip.  - C/w diuresis.      Recurrent AdenoCA:    She has presented on 11/22 for elective bronchoscopy for BAL and endobronchial lymph node biopsy now unfortunately has adenocarcinoma from her RUL biopsy , LN station negative though.   - Now she knows about recurrent adenocarcinoma on RUL biopsy. Hoping radiation will take care of it.   - Consider Heme onc consult.

## 2019-11-26 NOTE — PROGRESS NOTE ADULT - PROBLEM SELECTOR PLAN 6
E: replete K >4, Mg >2  N: Mechanical soft  GI Ppx: Protonix   DVT Ppx: SCDs  Code status: full code   Dispo: [facility]

## 2019-11-26 NOTE — PROGRESS NOTE ADULT - SUBJECTIVE AND OBJECTIVE BOX
OVERNIGHT EVENTS:  No events overnight.     SUBJECTIVE / INTERVAL HPI:   Patient seen and examined at bedside. Patient denies nausea, vomiting, diarrhea, fever, chills, night sweats, shortness of breath,  wheezing, chest pain, dysuria, or increased urinary frequency. States that she is still coughing up blood and feels constipated but has no other complaints at this time. Adamantly refusing CARLTON even after risks and benefits of the procedure were discussed at length.     VITAL SIGNS:  Vital Signs Last 24 Hrs  T(C): 36.8 (26 Nov 2019 09:14), Max: 36.8 (26 Nov 2019 09:14)  T(F): 98.2 (26 Nov 2019 09:14), Max: 98.2 (26 Nov 2019 09:14)  HR: 98 (26 Nov 2019 09:14) (81 - 98)  BP: 123/81 (26 Nov 2019 09:14) (112/71 - 135/75)  BP(mean): --  RR: 18 (26 Nov 2019 09:14) (16 - 20)  SpO2: 93% (26 Nov 2019 09:14) (93% - 98%)      PHYSICAL EXAM:  General: +thin frail elderly woman  HEENT: NC/AT; PERRL, anicteric sclera; MMM  Neck: supple, no JVD  Cardiovascular: +S1/S2; RRR, no m/g/r  Respiratory: CTA B/L; no W/R/R, +coughing, +hemoptysis   Gastrointestinal: soft, NT/ND; +BSx4  Extremities: WWP; no edema, clubbing or cyanosis  Vascular: 2+ radial, DP/PT pulses B/L  Neurological: AAOx3; no focal deficits, CN2-12 grossly intact, ambulates well, mentating well     MEDICATIONS:  MEDICATIONS  (STANDING):  bisacodyl Suppository 10 milliGRAM(s) Rectal once  cefpodoxime 200 milliGRAM(s) Oral every 24 hours  heparin  Injectable 5000 Unit(s) SubCutaneous every 12 hours  lisinopril 2.5 milliGRAM(s) Oral daily  multivitamin 1 Tablet(s) Oral daily  pantoprazole    Tablet 40 milliGRAM(s) Oral every 24 hours  polyethylene glycol 3350 17 Gram(s) Oral every 12 hours    MEDICATIONS  (PRN):      ALLERGIES:  Allergies    Bactrim (Unknown)  Cleocin HCl (Unknown)  Flagyl (Unknown)  Honey (Unknown)  iodine (Anaphylaxis)  IV Contrast (Anaphylaxis)  Levaquin (Other; Rash)  penicillin (Unknown)  Zithromax (Unknown)    Intolerances        LABS:                        12.7   11.33 )-----------( 261      ( 26 Nov 2019 10:35 )             41.5     11-26    142  |  100  |  14  ----------------------------<  126<H>  4.0   |  30  |  0.90    Ca    9.2      26 Nov 2019 10:35  Phos  3.2     11-26  Mg     1.9     11-26          CAPILLARY BLOOD GLUCOSE    RADIOLOGY & ADDITIONAL TESTS:   Reviewed.

## 2019-11-26 NOTE — PROGRESS NOTE ADULT - ASSESSMENT
86F with PMH of lung adenocarcinoma s/p radiation presented for opt elective bronchoscopy likely developed flash pulm edema requiring reintubation, now s/p extubation and saturating well, refused CARLTON tomorrow, now under the care of Dr. Stephens for heme-onc.

## 2019-11-26 NOTE — PROGRESS NOTE ADULT - PROBLEM SELECTOR PLAN 4
Patient complaining of constipation.   - started on Miralax   - still no BM, so given dulcolax suppository

## 2019-11-26 NOTE — DISCHARGE NOTE NURSING/CASE MANAGEMENT/SOCIAL WORK - PATIENT PORTAL LINK FT
You can access the FollowMyHealth Patient Portal offered by [facility] by registering at the following website: [email or web address]. By joining [employer]’s FollowMyHealth portal, you will also be able to view your health information using other applications (apps) compatible with our system.

## 2019-11-26 NOTE — PROGRESS NOTE ADULT - PROBLEM SELECTOR PLAN 3
Patient has a history of adenocarcinoma of the lung s/p radiation.  - continues to complain of hemoptysis   - no longer requiring Lasix  - Dr. Stephens consulted for heme-onc, f/u recommendations   - patient is stating that she does not want extensive interventions at this time, but will f/u her discussion with Dr. Stephens about treating adenocarcinoma

## 2019-11-26 NOTE — PROGRESS NOTE ADULT - SUBJECTIVE AND OBJECTIVE BOX
Physical Medicine and Rehabilitation Progress Note:    Patient is a 86y old  Female who presents with a chief complaint of Acute hypoxic respiratory failure 2/2 pulmonary edema (26 Nov 2019 14:46)      HPI:  86F with pmh of lung adenocarcinoma s/p radiation presented for outpatient elective bronchoscopy for BAL and lymph node biopsy. She was hypertensive to SBP 200s during the procedure. Post procedure, pt was extubated however was noted to be hypoxic to 70s with tachypnea. Pt was then reintubated. Xray chest showed pulm congestion and US lungs with B lines likely 2/2 to flash pulmonary edema. She was given 40 IV lasix. In ER, Pt was briefly hypotensive after propofol initiation and required Levophed. By the time she was brought up to the floors she was off sedation and off Levophed. She reported pain due to the intubation but denied chest pain, abdominal pain, sob. Unable to obtain full history and ROS as patient intubated at time of exam.     In ED vs: 97.3, 70, 108/60, 22, 100% on vent 40% FiO2.   Significant Labs: K+ 5.5, Glucose 179, HgbA1C 5.9, .   CXR with bilateral asymmetrical opacities/right pleural effusion. EKG nsr.   Patient admitted to the MICU for further management. (22 Nov 2019 15:55)                            12.7   11.33 )-----------( 261      ( 26 Nov 2019 10:35 )             41.5       11-26    142  |  100  |  14  ----------------------------<  126<H>  4.0   |  30  |  0.90    Ca    9.2      26 Nov 2019 10:35  Phos  3.2     11-26  Mg     1.9     11-26      Vital Signs Last 24 Hrs  T(C): 36.8 (26 Nov 2019 09:14), Max: 36.8 (26 Nov 2019 09:14)  T(F): 98.2 (26 Nov 2019 09:14), Max: 98.2 (26 Nov 2019 09:14)  HR: 98 (26 Nov 2019 09:14) (81 - 98)  BP: 123/81 (26 Nov 2019 09:14) (112/71 - 135/75)  BP(mean): --  RR: 18 (26 Nov 2019 09:14) (16 - 20)  SpO2: 93% (26 Nov 2019 09:14) (93% - 98%)    MEDICATIONS  (STANDING):  bisacodyl Suppository 10 milliGRAM(s) Rectal once  cefpodoxime 200 milliGRAM(s) Oral every 24 hours  heparin  Injectable 5000 Unit(s) SubCutaneous every 12 hours  lisinopril 2.5 milliGRAM(s) Oral daily  multivitamin 1 Tablet(s) Oral daily  pantoprazole    Tablet 40 milliGRAM(s) Oral every 24 hours  polyethylene glycol 3350 17 Gram(s) Oral every 12 hours    MEDICATIONS  (PRN):    Currently Undergoing Physical Therapy at bedside.    Functional Status Assessment:      Cognitive/Perceptual/Neuro  Cognitive/Neuro/Behavioral [WDL Definition: Alert; opens eyes spontaneously; arouses to voice or touch; oriented x 4; follows commands; speech spontaneous, logical; purposeful motor response; behavior appropriate to situation]: WDL    Therapeutic Interventions      Bed Mobility  Bed Mobility Training Rehab Potential: good, to achieve stated therapy goals  Bed Mobility Training Rolling/Turning: independent  Bed Mobility Training Scooting: independent  Bed Mobility Training Bridging: independent  Bed Mobility Training Sit-to-Supine: independent  Bed Mobility Training Supine-to-Sit: independent    Sit-Stand Transfer Training  Sit-to-Stand Transfer Training Rehab Potential: good, to achieve stated therapy goals  Transfer Training Sit-to-Stand Transfer: supervision;  1 person assist;  weight-bearing as tolerated  Transfer Training Stand-to-Sit Transfer: supervision;  weight-bearing as tolerated  Sit-to-Stand Transfer Training Transfer Safety Analysis: decreased weight-shifting ability    Gait Training  Gait Training Rehab Potential: good, to achieve stated therapy goals  Gait Training: supervision;  1 person assist;  weight-bearing as tolerated   100 feet;  x2  Gait Analysis: decreased beth;  decreased step length;  impaired balance  Type of Rest Type of Rest: standing  Duration of Rest Duration of Rest: 2min     Therapeutic Exercise  Therapeutic Exercise Rehab Effort: good  Therapeutic Exercise Detail: LAQ, HR, hip flexion x 10 bilat.             PM&R Impression: as above    Current Disposition Plan Recommendations: d/c home, home P.T.

## 2019-11-27 LAB
ANION GAP SERPL CALC-SCNC: 9 MMOL/L — SIGNIFICANT CHANGE UP (ref 5–17)
BUN SERPL-MCNC: 18 MG/DL — SIGNIFICANT CHANGE UP (ref 7–23)
CALCIUM SERPL-MCNC: 9.5 MG/DL — SIGNIFICANT CHANGE UP (ref 8.4–10.5)
CHLORIDE SERPL-SCNC: 99 MMOL/L — SIGNIFICANT CHANGE UP (ref 96–108)
CO2 SERPL-SCNC: 33 MMOL/L — HIGH (ref 22–31)
CREAT SERPL-MCNC: 0.99 MG/DL — SIGNIFICANT CHANGE UP (ref 0.5–1.3)
FUNGITELL B-D-GLUCAN,  BRONCHIAL LAVAGE: SIGNIFICANT CHANGE UP
GLUCOSE SERPL-MCNC: 109 MG/DL — HIGH (ref 70–99)
HCT VFR BLD CALC: 43 % — SIGNIFICANT CHANGE UP (ref 34.5–45)
HGB BLD-MCNC: 13.3 G/DL — SIGNIFICANT CHANGE UP (ref 11.5–15.5)
MAGNESIUM SERPL-MCNC: 2.1 MG/DL — SIGNIFICANT CHANGE UP (ref 1.6–2.6)
MCHC RBC-ENTMCNC: 30.9 GM/DL — LOW (ref 32–36)
MCHC RBC-ENTMCNC: 31.1 PG — SIGNIFICANT CHANGE UP (ref 27–34)
MCV RBC AUTO: 100.7 FL — HIGH (ref 80–100)
NRBC # BLD: 0 /100 WBCS — SIGNIFICANT CHANGE UP (ref 0–0)
PHOSPHATE SERPL-MCNC: 3.1 MG/DL — SIGNIFICANT CHANGE UP (ref 2.5–4.5)
PLATELET # BLD AUTO: 282 K/UL — SIGNIFICANT CHANGE UP (ref 150–400)
POTASSIUM SERPL-MCNC: 4.2 MMOL/L — SIGNIFICANT CHANGE UP (ref 3.5–5.3)
POTASSIUM SERPL-SCNC: 4.2 MMOL/L — SIGNIFICANT CHANGE UP (ref 3.5–5.3)
RBC # BLD: 4.27 M/UL — SIGNIFICANT CHANGE UP (ref 3.8–5.2)
RBC # FLD: 14.6 % — HIGH (ref 10.3–14.5)
SODIUM SERPL-SCNC: 141 MMOL/L — SIGNIFICANT CHANGE UP (ref 135–145)
WBC # BLD: 14.19 K/UL — HIGH (ref 3.8–10.5)
WBC # FLD AUTO: 14.19 K/UL — HIGH (ref 3.8–10.5)

## 2019-11-27 RX ORDER — BENZOCAINE AND MENTHOL 5; 1 G/100ML; G/100ML
1 LIQUID ORAL ONCE
Refills: 0 | Status: COMPLETED | OUTPATIENT
Start: 2019-11-27 | End: 2019-11-27

## 2019-11-27 RX ADMIN — HEPARIN SODIUM 5000 UNIT(S): 5000 INJECTION INTRAVENOUS; SUBCUTANEOUS at 18:15

## 2019-11-27 RX ADMIN — POLYETHYLENE GLYCOL 3350 17 GRAM(S): 17 POWDER, FOR SOLUTION ORAL at 05:46

## 2019-11-27 RX ADMIN — PANTOPRAZOLE SODIUM 40 MILLIGRAM(S): 20 TABLET, DELAYED RELEASE ORAL at 13:35

## 2019-11-27 RX ADMIN — HEPARIN SODIUM 5000 UNIT(S): 5000 INJECTION INTRAVENOUS; SUBCUTANEOUS at 05:46

## 2019-11-27 RX ADMIN — BENZOCAINE AND MENTHOL 1 LOZENGE: 5; 1 LIQUID ORAL at 12:11

## 2019-11-27 RX ADMIN — Medication 1 TABLET(S): at 12:11

## 2019-11-27 RX ADMIN — LISINOPRIL 2.5 MILLIGRAM(S): 2.5 TABLET ORAL at 05:46

## 2019-11-27 RX ADMIN — Medication 200 MILLIGRAM(S): at 12:11

## 2019-11-27 NOTE — CONSULT NOTE ADULT - ASSESSMENT
86F with pmh of RLL adenocarcinoma s/p R VATS RA RLLx in 2013 by Dr. Caicedo and ANANYA adenoCA s/p radiation therapy in 2016 who presented on 11/22 for elective bronchoscopy for BAL and endobronchial lymph node biopsy for biopsy proven RUL adenoCA and developed flash pulmonary edema post-procedure and is now found to have severe MR with flail posterior leaflet.    Plan:  Problem 1: Severe MR with flail posterior leaflet   - Dr. Schumacher and Dr. Bernard to review echo images and determine if valve is amendable to Darling-clip   - Patient refuses open heart surgery and reluctant to undergo even percutaneous procedures   - at present she does not feel that the MR impacts her quality of life, but she is open to the idea of darling-clip in the future if she finds her symptoms worsening   - continue with medical management of pulmonary edema including diuresis with strict I/O and daily weights    Problem 2: RUL adenoCA   - lymph nodes appear negative on EBUS, would need to discuss treatment plan and prognosis of lung CA prior to any surgical or endovascular procedure    Problem 3: anaphylactic contrast allergy   - if patient is agreeable to intervention she would need contrast load for coronary angiogram and CTA as part of pre-op workup for mitraclip   - would need to premedicate with steroids prior to any contrast study    Problem 4: HTN   - Bp well controlled, c/w lisinopril    I have reviewed clinical labs tests and reports, radiology tests and reports, as well as old patient medical records, and discussed with the refering physician.
86F with pmh of lung adenocarcinoma s/p radiation presented for opt elective bronchoscopy likely developed flash pulm edema requiring reintubation.  Plan for admission to MICU for diuresis and extubation.   - Pt is s/p  40 IV lasix. Monitor UO and assess need for further diuresis  - Extubation trial in MICU   - Echocardiogram to assess cardiac function
The patient has severely calcified mitral annulus and apparently has severe mitral valvular disease.  She does not have a mitral facies.  The patient does have severe emphysema.  The patient has a cancer confined to the right lung and this adenocarcinoma has not yet been subjected to molecular testing.  There were no pulmonary function studies to evaluate the possibility of resection or local therapy such as radiation therapy.  The patient has reasonably high performance although she gets fatigued with 3 blocks of walking.
per Internal Medicine    Problem/Plan - 1:  ·  Problem: Respiratory failure.  Plan: #Acute Hypoxic Respiratory Failure   2/2 to flash pulmonary edema s/p extubation in setting of outpatient bronchoscopy and hypertensive with sBPs> 200. Re-intubated. CXR with bilateral asymmetrical opacities/right pleural effusion. Received Lasix 40mg IV in ED.   - patient hemodynamically stable, saturating well on 2L NC  - s/p Lasix yesterday, good urinary out-put  - no longer requiring lasix  - strict Is/Os.     Problem/Plan - 2:  ·  Problem: Lung cancer.  Plan: Patient has a history of adenocarcinoma of the lung s/p radiation.  - continues to complain of hemoptysis   - no longer requiring Lasix.     Problem/Plan - 3:  ·  Problem: Constipation.  Plan: Patient complaining of constipation.   - started on Miralax   - monitor for BM.     Problem/Plan - 4:  ·  Problem: Mitral regurgitation.  Plan: Patient found to have severe mitral regurge on echo.   - spoke with Dr. Monreal, who will see the patient tomorrow  - f/u cardiology recommendations.     Problem/Plan - 5:  ·  Problem: Nutrition, metabolism, and development symptoms.  Plan: E: replete K >4, Mg >2  N: Mechanical soft  GI Ppx: Protonix   DVT Ppx:SCDs  Code status: FULL   Dispo: [facility].
86F with pmh of RLL adenocarcinoma s/p R VATS RA RLLx in 2013 by Dr. Caicedo and ANANYA adenoCA s/p radiation therapy in 2016 who presented on 11/22 for elective bronchoscopy for BAL and endobronchial lymph node biopsy for biopsy proven RUL adenoCA and developed flash pulmonary edema post-procedure and is now found to have severe MR with flail posterior leaflet.    Plan:  Problem 1: Severe MR with flail posterior leaflet   - patient refuses open heart surgery, she feels that at this point in her life she prefers to remain comfortable for however much time she has left.   - continue with medical management of pulmonary edema including diuresis with strict I/O and daily weights   - recommend structural heart team evaluation for possible lesley-clip    Problem 2: RUL adenoCA   - lymph nodes appear negative on EBUS, would need to discuss treatment plan and prognosis of lung CA prior to any surgical or endovascular procedure    Problem 3: anaphylactic contrast allergy   - if patient is agreeable to intervention she would need contrast load for coronary angiogram as part of pre-op workup for MVR or mitraclip   - would need to premedicate with steroids prior to any contrast study    Problem 4: HTN   - Bp well controlled, c/w lisinopril    I have reviewed clinical labs tests and reports, radiology tests and reports, as well as old patient medical records, and discussed with the refering physician.

## 2019-11-27 NOTE — PROGRESS NOTE ADULT - PROBLEM SELECTOR PLAN 2
#Acute Hypoxic Respiratory Failure   2/2 to flash pulmonary edema s/p extubation in setting of outpatient bronchoscopy and hypertensive with sBPs> 200. Re-intubated. CXR with bilateral asymmetrical opacities/right pleural effusion, now s/p extubation.  - patient hemodynamically stable, saturating well on RA   - no longer requiring Lasix  - continue to monitor strict Is/Os

## 2019-11-27 NOTE — PROGRESS NOTE ADULT - ASSESSMENT
86F with PMH of lung adenocarcinoma s/p radiation presented for opt elective bronchoscopy likely developed flash pulm edema requiring reintubation, now s/p extubation and saturating well. Seen today by Dr. Stephens (Heme/Onc)

## 2019-11-27 NOTE — CONSULT NOTE ADULT - REASON FOR ADMISSION
Acute hypoxic respiratory failure 2/2 pulmonary edema

## 2019-11-27 NOTE — PROGRESS NOTE ADULT - PROBLEM SELECTOR PLAN 3
Patient has a history of adenocarcinoma of the lung s/p radiation.  - continues to complain of hemoptysis   - no longer requiring Lasix  - Dr. Stephens consulted for heme-onc, appreciate recs, will sent CEA with AM labs  - patient is stating that she does not want extensive interventions at this time, but will f/u her discussion with Dr. Stephens about treating adenocarcinoma

## 2019-11-27 NOTE — CONSULT NOTE ADULT - ATTENDING COMMENTS
Draw blood for CEA antigen.  Draw blood for  which also may be elevated in non-small cell lung cancer.  I have asked pathology to get molecular targets identified on this patient.  The patient may need to have echocardiogram to determine whether her dyspnea on exertion is related to her cancer or related to cardiac function so that palliating this elderly woman might be with a valvuloplasty as opposed to mitral valve replacement.  Any chemotherapy considered for this patient should be mild and the possibility of radiosurgery should be entertained by consultation with radiation therapy.
Patient seen and examined with house-staff during bedside rounds.  Resident note read, including vitals, physical findings, laboratory data, and radiological reports.   Revisions included below.  Direct personal management at bed side and extensive interpretation of the data.  Plan was outlined and discussed in details with the housestaff.  Decision making of high complexity  Action taken for acute disease activity to reflect the level of care provided:  - medication reconciliation  - review laboratory data  Patient develop acute respiratory failure started to pulmonary edema post procedure. Patient was given diuretics. Patient improved with adequate diuresis. Cardiac enzymes are negative. EKG unchanged. Sedation was discontinued. Patient was successfully extubated. Followup on echocardiogram. The patient is hemodynamically stable
Thank you.  History and exam noted.  Severe MR with NYHA III symptoms, although recent episode of pulmonary edema in the setting of a hypertensive crisis.  Biopsy proven recurrent lung malignancy.    Patient adamantly refuses all procedural intervention, including transcatheter techniques.    Suggest review by Structural Heart Team down the track if medical therapy does not seem to be sufficient and/or if patient changes her mind and wishes to discuss options for intervention.

## 2019-11-27 NOTE — CHART NOTE - NSCHARTNOTEFT_GEN_A_CORE
Admitting Diagnosis:   Patient is a 86y old  Female who presents with a chief complaint of Acute hypoxic respiratory failure 2/2 pulmonary edema (27 Nov 2019 11:45)      PAST MEDICAL & SURGICAL HISTORY:  Malignant neoplasm of bronchus and lung, unspecified site: Lung cancer  History of surgery to major organs, presenting hazards to health: removal of R-sided adenocarcinoma, negative lymphnodes      Current Nutrition Order: regular     PO Intake: Good (%) [   ]  Fair (50-75%) [   ] Poor (<25%) [ x  ]    GI Issues: No apparent N/V/C/D with last BM today (ordered for Dulcolax, miralax)    Pain: Not reporting pain at this time     Skin Integrity: 1+ edema b/l ankles, no pressure injury    Labs:   11-27    141  |  99  |  18  ----------------------------<  109<H>  4.2   |  33<H>  |  0.99    Ca    9.5      27 Nov 2019 06:42  Phos  3.1     11-27  Mg     2.1     11-27      CAPILLARY BLOOD GLUCOSE          Medications:  MEDICATIONS  (STANDING):  bisacodyl Suppository 10 milliGRAM(s) Rectal once  cefpodoxime 200 milliGRAM(s) Oral every 24 hours  heparin  Injectable 5000 Unit(s) SubCutaneous every 12 hours  lisinopril 2.5 milliGRAM(s) Oral daily  multivitamin 1 Tablet(s) Oral daily  pantoprazole    Tablet 40 milliGRAM(s) Oral every 24 hours  polyethylene glycol 3350 17 Gram(s) Oral every 12 hours    MEDICATIONS  (PRN):      Weight:  Admit wt- 45.4kg    Weight Change: No new wts to assess    Nutrition Focused Physical Exam: Completed [ x, 11/24 ]  Not Pertinent [   ]  Moderate PCM 11/24, please see chart note.     Estimated energy needs:   Height: 5 feet 3 inches, Weight (Current): 100 pounds,  pounds +/-10%, %IBW %86.9, BMI 17.7  current body wt used for energy calculations as pt falls within % IBW; Adjusted for suspected malnutrition  25-30kcal/kg= 1132-1359kcal  1.2-1.4gms pro/kg= 54-64gms protein  30-35ml/kg= 1359-1585ml water    Subjective:   Pt seen for nutrition follow up. 86F with PMH of lung adenocarcinoma s/p radiation presented for opt elective bronchoscopy likely developed flash pulm edema requiring reintubation, now s/p extubation and saturating well. Seen today by Dr. Stephnes (Heme/Onc). Pt seen resting in bed, awake, alert. Pt is on regular diet w/ poor PO intake 2/2 poor appetite, reports coughing up blood (team aware). Pt continues to decline all ONS. Diet education provided. Nutrition recs below. RD to follow up per protocol.     Previous Nutrition Diagnosis:  Malnutrition Suspect Moderate RT presumed intake<EER AEB mild/moderate muscle wasting, mild fat wasting, 1+ edema    Active [ x  ]  Resolved [   ]    Goal: Pt will meet at least 75% of nutrient needs to prevent further s/s of malnutrition.    Recommendations:  1. Recommend c/w regular diet (monitor need for texture modified diet)  2. Weekly wts  3. Pain and bowel regimen per team  4. Recommend c/w daily MVI    Education: Encouraged PO as able, encouraged consumption of protein rich foods (examples of soft proteins provided to pt), discussed food ordering protocol    Risk Level: High [ x  ] Moderate [   ] Low [   ]

## 2019-11-27 NOTE — PROGRESS NOTE ADULT - SUBJECTIVE AND OBJECTIVE BOX
OVERNIGHT EVENTS: No acute events reported overnight    SUBJECTIVE / INTERVAL HPI: Patient seen and examined at bedside. Seen walking from bathroom to bed. Reports BROWN with longer walks but no SOB at rest. Denies chest pain at time of exam. Denies abdominal pain. Denies nausea, vomiting, constipation or diarrhea. Denies headache or altered mental status. Patient endorses mild sore throat and requests cough drop.    VITAL SIGNS:  T(C): 36.5 (27 Nov 2019 09:38), Max: 36.7 (26 Nov 2019 20:34)  T(F): 97.7 (27 Nov 2019 09:38), Max: 98 (26 Nov 2019 20:34)  HR: 82 (27 Nov 2019 09:38) (80 - 88)  BP: 101/61 (27 Nov 2019 09:38) (101/61 - 113/72)  RR: 18 (27 Nov 2019 09:38) (18 - 18)  SpO2: 98% (27 Nov 2019 09:38) (94% - 98%)    PHYSICAL EXAM:  General: WDWN, NAD,  HEENT: NC/AT; PERRL, anicteric sclera; MMM, no lesions noted in oropharynx   Neck: supple, no JVD  Cardiovascular: +S1/S2; RRR, no M/G/R   Respiratory: CTA B/L; no W/R/R  Gastrointestinal: soft, NT/ND; +BSx4, no masses appreciated, normal liver span appreciated on exam  Extremities: WWP; no edema, clubbing or cyanosis  Vascular: 2+ radial  Neurological: AAOx3, normal gait, CN 2-12 grossly intact    MEDICATIONS:  MEDICATIONS  (STANDING):  benzocaine 15 mG/menthol 3.6 mG (Sugar-Free) Lozenge 1 Lozenge Oral once  bisacodyl Suppository 10 milliGRAM(s) Rectal once  cefpodoxime 200 milliGRAM(s) Oral every 24 hours  heparin  Injectable 5000 Unit(s) SubCutaneous every 12 hours  lisinopril 2.5 milliGRAM(s) Oral daily  multivitamin 1 Tablet(s) Oral daily  pantoprazole    Tablet 40 milliGRAM(s) Oral every 24 hours  polyethylene glycol 3350 17 Gram(s) Oral every 12 hours    ALLERGIES:   Bactrim (Unknown)  Cleocin HCl (Unknown)  Flagyl (Unknown)  Honey (Unknown)  iodine (Anaphylaxis)  IV Contrast (Anaphylaxis)  Levaquin (Other; Rash)  penicillin (Unknown)  Zithromax (Unknown)    LABS:                      13.3   14.19 )-----------( 282      ( 27 Nov 2019 06:42 )             43.0     11-27    141  |  99  |  18  ----------------------------<  109<H>  4.2   |  33<H>  |  0.99    Ca    9.5      27 Nov 2019 06:42  Phos  3.1     11-27  Mg     2.1     11-27    RADIOLOGY & ADDITIONAL TESTS: Reviewed.

## 2019-11-27 NOTE — PROGRESS NOTE ADULT - PROBLEM SELECTOR PLAN 6
E: replete K >4, Mg >2  N: Mechanical soft  GI Ppx: Protonix   DVT Ppx: SCDs  Code status: full code   Dispo: [facility]

## 2019-11-27 NOTE — CONSULT NOTE ADULT - SUBJECTIVE AND OBJECTIVE BOX
This 86-year-old spry woman was in very good health until approximately 9 months ago when she began to have increasing shortness of breath.  The patient was found to have a right lung mass which was biopsied and found to contain cancer.  She was also told that her mitral valve was not very good.  The patient has smoked approximately 30-40 pack years and gave up cigarettes when her   several years ago.    Examination of the head ears eyes nose and throat demonstratesNo abnormality other than poor dental health.  Examination of the neck demonstrates no palpable lymphadenopathy, jugular venous distention or thyroidomegaly.  The lung sounds are distant with rhonchorous breath sounds in the right lung on inspiration both anteriorly and posteriorly.  The heart sounds are regular with no mitral click or auscultatory evidence for murmur, rub or gallop.  The patient has mild truncal obesity but there is no palpable or percussible at a megaly or splenomegaly.  The patient has no chronic venous stasis changes in the lower extremities.  Neurologically there is no gross abnormality and higher cortical, cerebellar, motor or sensory functions.  The patient has no rash.

## 2019-11-27 NOTE — PROGRESS NOTE ADULT - PROBLEM SELECTOR PLAN 1
Patient found to have a sputum culture growing H. influenza. Denies diarrhea, nausea or vomiting. Not currently meeting SIRS criteria  - c/w cefpodoxime from 200 mg q12 to q24 due to Cr clearance per pharmacy

## 2019-11-27 NOTE — PROGRESS NOTE ADULT - PROBLEM SELECTOR PLAN 4
Patient not complaining of constipation.   - started on Miralax yesterday  - still no BM, so given dulcolax suppository

## 2019-11-28 LAB
ANION GAP SERPL CALC-SCNC: 9 MMOL/L — SIGNIFICANT CHANGE UP (ref 5–17)
BUN SERPL-MCNC: 19 MG/DL — SIGNIFICANT CHANGE UP (ref 7–23)
CALCIUM SERPL-MCNC: 9.3 MG/DL — SIGNIFICANT CHANGE UP (ref 8.4–10.5)
CANCER AG125 SERPL-ACNC: 41 U/ML — HIGH
CEA SERPL-MCNC: 6.6 NG/ML — HIGH (ref 0–3.8)
CHLORIDE SERPL-SCNC: 104 MMOL/L — SIGNIFICANT CHANGE UP (ref 96–108)
CO2 SERPL-SCNC: 30 MMOL/L — SIGNIFICANT CHANGE UP (ref 22–31)
CREAT SERPL-MCNC: 0.87 MG/DL — SIGNIFICANT CHANGE UP (ref 0.5–1.3)
GLUCOSE SERPL-MCNC: 102 MG/DL — HIGH (ref 70–99)
HCT VFR BLD CALC: 37.3 % — SIGNIFICANT CHANGE UP (ref 34.5–45)
HGB BLD-MCNC: 12 G/DL — SIGNIFICANT CHANGE UP (ref 11.5–15.5)
MAGNESIUM SERPL-MCNC: 1.9 MG/DL — SIGNIFICANT CHANGE UP (ref 1.6–2.6)
MCHC RBC-ENTMCNC: 32.2 GM/DL — SIGNIFICANT CHANGE UP (ref 32–36)
MCHC RBC-ENTMCNC: 32.2 PG — SIGNIFICANT CHANGE UP (ref 27–34)
MCV RBC AUTO: 100 FL — SIGNIFICANT CHANGE UP (ref 80–100)
NRBC # BLD: 0 /100 WBCS — SIGNIFICANT CHANGE UP (ref 0–0)
PLATELET # BLD AUTO: 241 K/UL — SIGNIFICANT CHANGE UP (ref 150–400)
POTASSIUM SERPL-MCNC: 3.9 MMOL/L — SIGNIFICANT CHANGE UP (ref 3.5–5.3)
POTASSIUM SERPL-SCNC: 3.9 MMOL/L — SIGNIFICANT CHANGE UP (ref 3.5–5.3)
RBC # BLD: 3.73 M/UL — LOW (ref 3.8–5.2)
RBC # FLD: 14.8 % — HIGH (ref 10.3–14.5)
SODIUM SERPL-SCNC: 143 MMOL/L — SIGNIFICANT CHANGE UP (ref 135–145)
WBC # BLD: 10.31 K/UL — SIGNIFICANT CHANGE UP (ref 3.8–10.5)
WBC # FLD AUTO: 10.31 K/UL — SIGNIFICANT CHANGE UP (ref 3.8–10.5)

## 2019-11-28 RX ADMIN — Medication 1 TABLET(S): at 11:10

## 2019-11-28 RX ADMIN — PANTOPRAZOLE SODIUM 40 MILLIGRAM(S): 20 TABLET, DELAYED RELEASE ORAL at 13:18

## 2019-11-28 RX ADMIN — HEPARIN SODIUM 5000 UNIT(S): 5000 INJECTION INTRAVENOUS; SUBCUTANEOUS at 05:43

## 2019-11-28 RX ADMIN — LISINOPRIL 2.5 MILLIGRAM(S): 2.5 TABLET ORAL at 05:43

## 2019-11-28 RX ADMIN — HEPARIN SODIUM 5000 UNIT(S): 5000 INJECTION INTRAVENOUS; SUBCUTANEOUS at 17:43

## 2019-11-28 RX ADMIN — Medication 200 MILLIGRAM(S): at 11:11

## 2019-11-29 ENCOUNTER — TRANSCRIPTION ENCOUNTER (OUTPATIENT)
Age: 84
End: 2019-11-29

## 2019-11-29 VITALS
HEART RATE: 93 BPM | SYSTOLIC BLOOD PRESSURE: 106 MMHG | TEMPERATURE: 98 F | DIASTOLIC BLOOD PRESSURE: 64 MMHG | OXYGEN SATURATION: 96 % | RESPIRATION RATE: 18 BRPM

## 2019-11-29 PROCEDURE — 84100 ASSAY OF PHOSPHORUS: CPT

## 2019-11-29 PROCEDURE — 88173 CYTOPATH EVAL FNA REPORT: CPT

## 2019-11-29 PROCEDURE — 99285 EMERGENCY DEPT VISIT HI MDM: CPT | Mod: 25

## 2019-11-29 PROCEDURE — 80053 COMPREHEN METABOLIC PANEL: CPT

## 2019-11-29 PROCEDURE — 93306 TTE W/DOPPLER COMPLETE: CPT

## 2019-11-29 PROCEDURE — 87102 FUNGUS ISOLATION CULTURE: CPT

## 2019-11-29 PROCEDURE — 89051 BODY FLUID CELL COUNT: CPT

## 2019-11-29 PROCEDURE — 87449 NOS EACH ORGANISM AG IA: CPT

## 2019-11-29 PROCEDURE — 93005 ELECTROCARDIOGRAM TRACING: CPT

## 2019-11-29 PROCEDURE — 84132 ASSAY OF SERUM POTASSIUM: CPT

## 2019-11-29 PROCEDURE — 82378 CARCINOEMBRYONIC ANTIGEN: CPT

## 2019-11-29 PROCEDURE — 82803 BLOOD GASES ANY COMBINATION: CPT

## 2019-11-29 PROCEDURE — 88305 TISSUE EXAM BY PATHOLOGIST: CPT

## 2019-11-29 PROCEDURE — 82550 ASSAY OF CK (CPK): CPT

## 2019-11-29 PROCEDURE — 97161 PT EVAL LOW COMPLEX 20 MIN: CPT

## 2019-11-29 PROCEDURE — 83880 ASSAY OF NATRIURETIC PEPTIDE: CPT

## 2019-11-29 PROCEDURE — 82330 ASSAY OF CALCIUM: CPT

## 2019-11-29 PROCEDURE — 87070 CULTURE OTHR SPECIMN AEROBIC: CPT

## 2019-11-29 PROCEDURE — 83605 ASSAY OF LACTIC ACID: CPT

## 2019-11-29 PROCEDURE — 87184 SC STD DISK METHOD PER PLATE: CPT

## 2019-11-29 PROCEDURE — 85027 COMPLETE CBC AUTOMATED: CPT

## 2019-11-29 PROCEDURE — 94660 CPAP INITIATION&MGMT: CPT

## 2019-11-29 PROCEDURE — 83036 HEMOGLOBIN GLYCOSYLATED A1C: CPT

## 2019-11-29 PROCEDURE — 84484 ASSAY OF TROPONIN QUANT: CPT

## 2019-11-29 PROCEDURE — 86304 IMMUNOASSAY TUMOR CA 125: CPT

## 2019-11-29 PROCEDURE — 80048 BASIC METABOLIC PNL TOTAL CA: CPT

## 2019-11-29 PROCEDURE — 85025 COMPLETE CBC W/AUTO DIFF WBC: CPT

## 2019-11-29 PROCEDURE — 71045 X-RAY EXAM CHEST 1 VIEW: CPT

## 2019-11-29 PROCEDURE — 99232 SBSQ HOSP IP/OBS MODERATE 35: CPT

## 2019-11-29 PROCEDURE — 87116 MYCOBACTERIA CULTURE: CPT

## 2019-11-29 PROCEDURE — 97116 GAIT TRAINING THERAPY: CPT

## 2019-11-29 PROCEDURE — 88112 CYTOPATH CELL ENHANCE TECH: CPT

## 2019-11-29 PROCEDURE — 82553 CREATINE MB FRACTION: CPT

## 2019-11-29 PROCEDURE — 87206 SMEAR FLUORESCENT/ACID STAI: CPT

## 2019-11-29 PROCEDURE — 87015 SPECIMEN INFECT AGNT CONCNTJ: CPT

## 2019-11-29 PROCEDURE — 85610 PROTHROMBIN TIME: CPT

## 2019-11-29 PROCEDURE — 85730 THROMBOPLASTIN TIME PARTIAL: CPT

## 2019-11-29 PROCEDURE — 83735 ASSAY OF MAGNESIUM: CPT

## 2019-11-29 PROCEDURE — 87305 ASPERGILLUS AG IA: CPT

## 2019-11-29 PROCEDURE — 87252 VIRUS INOCULATION TISSUE: CPT

## 2019-11-29 PROCEDURE — 84295 ASSAY OF SERUM SODIUM: CPT

## 2019-11-29 PROCEDURE — 36415 COLL VENOUS BLD VENIPUNCTURE: CPT

## 2019-11-29 RX ORDER — BENZOCAINE AND MENTHOL 5; 1 G/100ML; G/100ML
1 LIQUID ORAL EVERY 6 HOURS
Refills: 0 | Status: DISCONTINUED | OUTPATIENT
Start: 2019-11-29 | End: 2019-11-29

## 2019-11-29 RX ORDER — LISINOPRIL 2.5 MG/1
1 TABLET ORAL
Qty: 30 | Refills: 0
Start: 2019-11-29 | End: 2019-12-28

## 2019-11-29 RX ORDER — CEFPODOXIME PROXETIL 100 MG
1 TABLET ORAL
Qty: 10 | Refills: 0
Start: 2019-11-29 | End: 2019-12-08

## 2019-11-29 RX ADMIN — Medication 1 TABLET(S): at 11:29

## 2019-11-29 RX ADMIN — HEPARIN SODIUM 5000 UNIT(S): 5000 INJECTION INTRAVENOUS; SUBCUTANEOUS at 05:49

## 2019-11-29 RX ADMIN — Medication 200 MILLIGRAM(S): at 11:29

## 2019-11-29 RX ADMIN — LISINOPRIL 2.5 MILLIGRAM(S): 2.5 TABLET ORAL at 05:49

## 2019-11-29 RX ADMIN — PANTOPRAZOLE SODIUM 40 MILLIGRAM(S): 20 TABLET, DELAYED RELEASE ORAL at 14:47

## 2019-11-29 NOTE — PROGRESS NOTE ADULT - SUBJECTIVE AND OBJECTIVE BOX
coverage Dr Zavala    Pt seen and examined   no complaints  doing spirometry  on nasal canula  denies sob    REVIEW OF SYSTEMS:  Constitutional: No fever, weight loss or fatigue  Cardiovascular: No chest pain, palpitations, dizziness or leg swelling  Gastrointestinal: No abdominal or epigastric pain. No nausea, No vomiting ; No diarrhea or constipation.   Skin: No itching, burning, rashes or lesions       MEDICATIONS:  MEDICATIONS  (STANDING):  bisacodyl Suppository 10 milliGRAM(s) Rectal once  cefpodoxime 200 milliGRAM(s) Oral every 24 hours  heparin  Injectable 5000 Unit(s) SubCutaneous every 12 hours  lisinopril 2.5 milliGRAM(s) Oral daily  multivitamin 1 Tablet(s) Oral daily  pantoprazole    Tablet 40 milliGRAM(s) Oral every 24 hours  polyethylene glycol 3350 17 Gram(s) Oral every 12 hours    MEDICATIONS  (PRN):  benzocaine 15 mG/menthol 3.6 mG (Sugar-Free) Lozenge 1 Lozenge Oral every 6 hours PRN Sore Throat      Allergies    Bactrim (Unknown)  Cleocin HCl (Unknown)  Flagyl (Unknown)  Honey (Unknown)  iodine (Anaphylaxis)  IV Contrast (Anaphylaxis)  Levaquin (Other; Rash)  penicillin (Unknown)  Zithromax (Unknown)    Intolerances        Vital Signs Last 24 Hrs  T(C): 36.4 (29 Nov 2019 08:40), Max: 36.8 (28 Nov 2019 16:30)  T(F): 97.5 (29 Nov 2019 08:40), Max: 98.3 (28 Nov 2019 16:30)  HR: 84 (29 Nov 2019 08:40) (75 - 87)  BP: 102/63 (29 Nov 2019 08:40) (102/63 - 114/68)  BP(mean): --  RR: 16 (29 Nov 2019 08:40) (15 - 16)  SpO2: 97% (29 Nov 2019 08:40) (95% - 97%)      PHYSICAL EXAM:    General: ; in no acute distress  HEENT: MMM, conjunctiva and sclera clear  Lungs: clear apices decreased at bases  Heart: regular  Gastrointestinal: Soft non-tender non-distended; Normal bowel sounds; No hepatosplenomegaly  Skin: Warm and dry. No obvious rash    LABS:      CBC Full  -  ( 28 Nov 2019 07:04 )  WBC Count : 10.31 K/uL  RBC Count : 3.73 M/uL  Hemoglobin : 12.0 g/dL  Hematocrit : 37.3 %  Platelet Count - Automated : 241 K/uL  Mean Cell Volume : 100.0 fl  Mean Cell Hemoglobin : 32.2 pg  Mean Cell Hemoglobin Concentration : 32.2 gm/dL  Auto Neutrophil # : x  Auto Lymphocyte # : x  Auto Monocyte # : x  Auto Eosinophil # : x  Auto Basophil # : x  Auto Neutrophil % : x  Auto Lymphocyte % : x  Auto Monocyte % : x  Auto Eosinophil % : x  Auto Basophil % : x    11-28    143  |  104  |  19  ----------------------------<  102<H>  3.9   |  30  |  0.87    Ca    9.3      28 Nov 2019 07:04  Mg     1.9     11-28                        RADIOLOGY & ADDITIONAL STUDIES (The following images were personally reviewed):

## 2019-11-29 NOTE — DISCHARGE NOTE PROVIDER - NSDCMRMEDTOKEN_GEN_ALL_CORE_FT
acetaminophen 325 mg oral tablet: 2 tab(s) orally every 6 hours, As needed,  pain  bisacodyl 5 mg oral delayed release tablet: 1 tab(s) orally once a day (at bedtime).  Please continue until you have regular bowel movements.  cefpodoxime 200 mg oral tablet: 1 tab(s) orally every 24 hours  docusate sodium 100 mg oral capsule: 1 cap(s) orally 3 times a day.  Please continue until you have regular bowel movements.  lisinopril 2.5 mg oral tablet: 1 tab(s) orally once a day  Norvasc 5 mg oral tablet: 1 tab(s) orally once a day  senna oral tablet: 2 tab(s) orally once a day (at bedtime).  Please continue until you have regular bowel movements.

## 2019-11-29 NOTE — PROGRESS NOTE ADULT - PROBLEM SELECTOR PLAN 5
Patient found to have severe mitral regurgitation on echo.   - Dr. Monreal following, recommendations appreciated   - patient refused CARLTON today, so no further cardio work-up at this time

## 2019-11-29 NOTE — PROGRESS NOTE ADULT - ASSESSMENT
86F with PMH of lung adenocarcinoma s/p radiation presented for opt elective bronchoscopy likely developed flash pulm edema requiring reintubation, now s/p extubation and saturating well. Plan for outpatient rad-onc.

## 2019-11-29 NOTE — PROGRESS NOTE ADULT - SUBJECTIVE AND OBJECTIVE BOX
Chief Complaint/Reason for Consult: sob  INTERVAL HPI: stable no sob, euvolemic  	  MEDICATIONS:  lisinopril 2.5 milliGRAM(s) Oral daily    cefpodoxime 200 milliGRAM(s) Oral every 24 hours        bisacodyl Suppository 10 milliGRAM(s) Rectal once  pantoprazole    Tablet 40 milliGRAM(s) Oral every 24 hours  polyethylene glycol 3350 17 Gram(s) Oral every 12 hours      benzocaine 15 mG/menthol 3.6 mG (Sugar-Free) Lozenge 1 Lozenge Oral every 6 hours PRN  heparin  Injectable 5000 Unit(s) SubCutaneous every 12 hours  multivitamin 1 Tablet(s) Oral daily      REVIEW OF SYSTEMS:  [x] As per HPI  CONSTITUTIONAL: No fever, weight loss, or fatigue  RESPIRATORY: No cough, wheezing, chills or hemoptysis; No Shortness of Breath  CARDIOVASCULAR: No chest pain, palpitations, dizziness, or leg swelling  GASTROINTESTINAL: No abdominal or epigastric pain. No nausea, vomiting, or hematemesis; No diarrhea or constipation. No melena or hematochezia.  MUSCULOSKELETAL: No joint pain or swelling; No muscle, back, or extremity pain  [x] All others negative	  [ ] Unable to obtain    PHYSICAL EXAM:  T(C): 36.4 (11-29-19 @ 08:40), Max: 36.8 (11-28-19 @ 16:30)  HR: 84 (11-29-19 @ 08:40) (75 - 87)  BP: 102/63 (11-29-19 @ 08:40) (102/63 - 114/68)  RR: 16 (11-29-19 @ 08:40) (15 - 16)  SpO2: 97% (11-29-19 @ 08:40) (95% - 97%)  Wt(kg): --  I&O's Summary        Appearance: Normal	  HEENT:   Normal oral mucosa  Cardiovascular: Normal S1 S2, No JVD, No murmurs, No edema  Respiratory: Lungs clear to auscultation	  Gastrointestinal:  Soft, Non-tender, + BS	  Extremities: Normal range of motion, No clubbing, cyanosis or edema  Vascular: Peripheral pulses palpable 2+ bilaterally    TELEMETRY: 	    ECG:   	  RADIOLOGY:   CXR:  CT:  US:    CARDIAC TESTING:  Echocardiogram:  Catheterization:  Stress Test:      LABS:	 	    CARDIAC MARKERS:                                  12.0   10.31 )-----------( 241      ( 28 Nov 2019 07:04 )             37.3     11-28    143  |  104  |  19  ----------------------------<  102<H>  3.9   |  30  |  0.87    Ca    9.3      28 Nov 2019 07:04  Mg     1.9     11-28      proBNP:   Lipid Profile:   HgA1c:   TSH:     ASSESSMENT/PLAN: 	    1. HFpEF - Acute Hypoxic Respiratory Failure in setting of volume overload and severe MR  2/2 to flash pulmonary edema s/p extubation in setting of outpatient bronchoscopy and hypertensive with sBPs> 200. Re-intubated. CXR with bilateral asymmetrical opacities/right pleural effusion. Received Lasix 40mg IV in ED.   -CTS/SHD recs appreciated for MV repair   at present she does not feel that the MR impacts her quality of life, but she is open to the idea of lesley-clip in the future if she finds her symptoms worsening   - continue with medical management of pulmonary edema including diuresis with strict I/O and daily weights  - low dose acei lisinopril 2.5mg for afterload reduction    2. 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    3. Heme -H/o Lung adenocarcinoma s/p radiation.

## 2019-11-29 NOTE — PROGRESS NOTE ADULT - PROBLEM/PLAN-5
Declines
DISPLAY PLAN FREE TEXT

## 2019-11-29 NOTE — DISCHARGE NOTE PROVIDER - CARE PROVIDERS DIRECT ADDRESSES
,DirectAddress_Unknown,DirectAddress_Unknown ,DirectAddress_Unknown,DirectAddress_Unknown,[email or web address]

## 2019-11-29 NOTE — DISCHARGE NOTE PROVIDER - NSDCFUADDAPPT_GEN_ALL_CORE_FT
On the day of your discharge, the offices of your primary doctors, Dr. Zavala (primary care) and Dr. Stephens (hematology/oncology) were both closed. Please call both of these physicians to schedule an appointment within the next 2 weeks.  In addition, the radiation oncology team will reach out to you to schedule an appointment for next week as discussed with you on Wednesday. On the day of your discharge, the offices of your primary doctors, Dr. Zavala (primary care) and Dr. Stephens (hematology/oncology) were both closed. Please call both of these physicians to schedule an appointment within the next 2 weeks.  In addition, you have an appointment scheduled with Dr. Godfrey (radiation oncology) scheduled for next Tuesday.

## 2019-11-29 NOTE — PROGRESS NOTE ADULT - SUBJECTIVE AND OBJECTIVE BOX
OVERNIGHT EVENTS: No acute events overnight    SUBJECTIVE / INTERVAL HPI: Patient seen and examined at bedside. Patient reports continued pain in throat relieved with cough drops. No chest pain or shortness of breath. Endorses abdominal distention but denies pain and endorses good urine output and regular stool output. Denies nausea, vomiting, diarrhea or constipation.    VITAL SIGNS:  Vital Signs Last 24 Hrs  T(C): 36.6 (29 Nov 2019 05:34), Max: 36.8 (28 Nov 2019 16:30)  T(F): 97.8 (29 Nov 2019 05:34), Max: 98.3 (28 Nov 2019 16:30)  HR: 75 (29 Nov 2019 05:34) (75 - 87)  BP: 110/67 (29 Nov 2019 05:34) (102/65 - 114/68)  RR: 16 (29 Nov 2019 05:34) (15 - 16)  SpO2: 96% (29 Nov 2019 05:34) (95% - 96%)    PHYSICAL EXAM:  General: WDWN  HEENT: NC/AT; PERRL, anicteric sclera; MMM  Neck: supple  Cardiovascular: +S1/S2; RRR  Respiratory: CTA B/L; no W/R/R  Gastrointestinal: soft, NT, mildly distended without masses; +BSx4  Extremities: WWP; no edema, clubbing or cyanosis  Vascular: 2+ radial  Neurological: AAOx3; no focal deficits    MEDICATIONS:  MEDICATIONS  (STANDING):  bisacodyl Suppository 10 milliGRAM(s) Rectal once  cefpodoxime 200 milliGRAM(s) Oral every 24 hours  heparin  Injectable 5000 Unit(s) SubCutaneous every 12 hours  lisinopril 2.5 milliGRAM(s) Oral daily  multivitamin 1 Tablet(s) Oral daily  pantoprazole    Tablet 40 milliGRAM(s) Oral every 24 hours  polyethylene glycol 3350 17 Gram(s) Oral every 12 hours    MEDICATIONS  (PRN):  benzocaine 15 mG/menthol 3.6 mG (Sugar-Free) Lozenge 1 Lozenge Oral every 6 hours PRN Sore Throat      ALLERGIES:  Allergies    Bactrim (Unknown)  Cleocin HCl (Unknown)  Flagyl (Unknown)  Honey (Unknown)  iodine (Anaphylaxis)  IV Contrast (Anaphylaxis)  Levaquin (Other; Rash)  penicillin (Unknown)  Zithromax (Unknown)    Intolerances        LABS:                        12.0   10.31 )-----------( 241      ( 28 Nov 2019 07:04 )             37.3     11-28    143  |  104  |  19  ----------------------------<  102<H>  3.9   |  30  |  0.87    Ca    9.3      28 Nov 2019 07:04  Mg     1.9     11-28          CAPILLARY BLOOD GLUCOSE          RADIOLOGY & ADDITIONAL TESTS: Reviewed.

## 2019-11-29 NOTE — PROGRESS NOTE ADULT - REASON FOR ADMISSION
Acute hypoxic respiratory failure 2/2 pulmonary edema

## 2019-11-29 NOTE — DISCHARGE NOTE PROVIDER - NSDCCAREPROVSEEN_GEN_ALL_CORE_FT
Gigi Lau - Medicine resident PGY1  Maria G Bustillo - Medicine resident PGY3  Stefano Zavala - Medicine attending

## 2019-11-29 NOTE — PROGRESS NOTE ADULT - PROBLEM SELECTOR PLAN 7
1) PCP Contacted on Admission: (Y) --> Sally  2) Date of Contact with PCP:  3) PCP Contacted at Discharge: (Y/N, N/A)  4) Summary of Handoff Given to PCP:   5) Post-Discharge Appointment Date and Location:
1) PCP Contacted on Admission: (Y/N) --> Name & Phone #:  2) Date of Contact with PCP:  3) PCP Contacted at Discharge: (Y/N, N/A)  4) Summary of Handoff Given to PCP:   5) Post-Discharge Appointment Date and Location:

## 2019-11-29 NOTE — PROGRESS NOTE ADULT - PROBLEM SELECTOR PLAN 6
E: replete K >4, Mg >2  N: Mechanical soft  GI Ppx: Protonix   DVT Ppx: SCDs  Code status: full code   Dispo: [facility]

## 2019-11-29 NOTE — DISCHARGE NOTE PROVIDER - NSDCCPCAREPLAN_GEN_ALL_CORE_FT
PRINCIPAL DISCHARGE DIAGNOSIS  Diagnosis: Respiratory failure  Assessment and Plan of Treatment: You were admitted with respirtory failure which occured following a study of your lungs callled a bronchoscopy. After this occured, you had to have a breathing tube put in place with a ventilator to assist in your breathing. While this was very serious, the breathing tube was removed within several hours of admission after your lungs improved.   You also were also diagnosed with a pneumonia, an infection of the lungs. This condition was treated with an antibiotic called cefpodoxime. Please continue to take this medication for 10 more days to complete the full course of treatment. It is very important to take this antibiotic everyday to prevent the infection from returning and worsening compared to the previous infection.  If you begin to experience worsening shortness of breath, chest pain, or feel as though you may lose consiousness, please return to the closest emergency room for further evaluation and treatment.

## 2019-11-29 NOTE — PROGRESS NOTE ADULT - PROBLEM SELECTOR PLAN 3
Patient has a history of adenocarcinoma of the lung s/p radiation. Plan for restart of radiation next week as an outpatient  - continues to complain of hemoptysis   - no longer requiring Lasix  - Dr. Stephens consulted for heme-onc, appreciate recs, will sent CEA with AM labs  - patient is stating that she does not want extensive interventions at this time, but will f/u her discussion with Dr. Stephens about treating adenocarcinoma

## 2019-11-29 NOTE — DISCHARGE NOTE PROVIDER - CARE PROVIDER_API CALL
Sadiq Stephens)  Internal Medicine; Medical Oncology  [address] [place] [address]  Phone: [phone]  Fax: [phone]  Follow Up Time: 2 weeks    Stefano Zavala)  Medicine  [address] [place] [address]  Phone: [phone]  Fax: [phone]  Follow Up Time: 2 weeks Sadiq Stephens)  Internal Medicine; Medical Oncology  [address] [place] [address]  Phone: [phone]  Fax: [phone]  Follow Up Time: 2 weeks    Stefano Zavala)  Medicine  [address] [place] [address]  Phone: [phone]  Fax: [phone]  Follow Up Time: 2 weeks    Shawn Godfrey)  Radiation Oncology  [facility]  Dept of Radiation Medicine, [address] [place] [address]  Phone: [phone]  Fax: [phone]  Established Patient  Scheduled Appointment: 12/03/2019

## 2019-11-29 NOTE — PROGRESS NOTE ADULT - PROBLEM SELECTOR PLAN 1
Patient found to have a sputum culture growing H. influenza. Denies diarrhea, nausea or vomiting. Not currently meeting SIRS criteria  - c/w cefpodoxime from 200 mg q24 due to Cr clearance per pharmacy

## 2019-11-29 NOTE — DISCHARGE NOTE PROVIDER - PROVIDER TOKENS
PROVIDER:[TOKEN:[[id number]],FOLLOWUP:[2 weeks]],PROVIDER:[TOKEN:[[id number]],FOLLOWUP:[2 weeks]] PROVIDER:[TOKEN:[[id number]],FOLLOWUP:[2 weeks]],PROVIDER:[TOKEN:[[id number]],FOLLOWUP:[2 weeks]],PROVIDER:[TOKEN:[[id number]],SCHEDULEDAPPT:[12/03/2019],ESTABLISHEDPATIENT:[T]]

## 2019-11-29 NOTE — PROGRESS NOTE ADULT - SUBJECTIVE AND OBJECTIVE BOX
Ms. Nolberto Woods is a patient well known to Radiation Medicine/ Dr. Godfrey.  Ms. Woods received 48 Gy over 4 fractions to a RML tumor from 6/6/17- 6/14/17 for a [id number], stage IA NSCLC . She is s/p Right VATS robotic assisted right sub-lobar resection with lymph node dissection 11/2013 and a history of multiple lung cancer including SBRT to LLL (5000 cGy over 5 fractions, completed October 2014).  She was last seen in clinic on 9/4/19, at which time the plan was to present her case at thoracic tumor board given recent chest CT that revealed enlargement of patchy consolidation surrounding sutures in RLL and enlargement of right apical opacity, as well as a PET/ CT that revealed hypermetabolic parenchymal opacity in the right upper lobe of the lung, which has increased in size, density and intensity since prior PET/CT dated 3/21/2017, findings highly suspicious for malignancy.  Tumor board consensus was to proceed with EBUS/ bronchoscopy/ biopsy, which was scheduled for 9/20/19. However, patient arrived for procedure and expressed fears/ paranoia about neighbor wanting to harm her. She then had a brain MRI, which was negative for metastases. Patient then declined to proceed with biopsy, and opted for close surveillance with CT chest every three months. However, she decided to proceed with EBUS/ bronch/ biopsy, which was done 11/22/19. Biopsy showed RUL mass positive for adenocarcinoma, which was discussed with pulm team. She is scheduled for follow up with Dr. Godfrey on Tuesday, 12/3/19 to discuss radiotherapy.

## 2019-11-29 NOTE — DISCHARGE NOTE PROVIDER - HOSPITAL COURSE
Patient is  86F with past medical history of lung adenocarcinoma s/p radiation    Presented for elective bronchoscopy who subsequently developed flash pulmonary edema requiring reintubation.    Problem List/Main Diagnoses (system-based):     1. Respiratory failure - Patient developed respiratory failure secondary to flash pulmonary edema following extubation in the setting of an outpatient broncoscopy. She was then re-intubated and briefly required propofol and levophed. After admission she was found to be hemodynamically stable and was extubated and stepped down to the regional medical floor for further management. On RMF, she remained hemodynamically stable and was weened from O2 supplemental O2 at 2LMP via NC to room air with no further episodes of desaturation.        2. Pneumonia - Patient found to have a sputum culture which grew H. influenza. She was started on cefpodoxime 200 mg q12hrs, later transitioned to q24 4hrs to complete a 14 day course. At time of discharge, no s/s of infection or respiratory distress.         3. Lung adenocarcinoma - Previously diagnosed history of lung adenocarcinoma treated with radiation therapy. Dr. Stephens (Heme/Onc) follows her as an outpatient and saw her in the hospital. As per heme/onc recs, a CEA and CA-125 were sent for testing. Rad-Onc was consulted and was familiar with the patients care, they recommended further treatment as an outpatient. The patient was discharged from the hospital to follow up with Dr. Stephens as an outpatient for further management        New medications: Cefpidoxime 200 mg PO q24hrs x14 days, course to end on 12/9    Labs to be followed outpatient: None    Exam to be followed outpatient: None

## 2019-12-03 ENCOUNTER — APPOINTMENT (OUTPATIENT)
Dept: RADIATION ONCOLOGY | Facility: CLINIC | Age: 84
End: 2019-12-03
Payer: MEDICARE

## 2019-12-03 VITALS
WEIGHT: 100 LBS | RESPIRATION RATE: 18 BRPM | DIASTOLIC BLOOD PRESSURE: 70 MMHG | BODY MASS INDEX: 19.53 KG/M2 | HEART RATE: 92 BPM | OXYGEN SATURATION: 95 % | SYSTOLIC BLOOD PRESSURE: 118 MMHG

## 2019-12-03 PROCEDURE — 99215 OFFICE O/P EST HI 40 MIN: CPT | Mod: 25

## 2019-12-03 RX ORDER — LISINOPRIL 2.5 MG/1
2.5 TABLET ORAL
Qty: 30 | Refills: 0 | Status: ACTIVE | COMMUNITY
Start: 2019-11-30

## 2019-12-04 ENCOUNTER — APPOINTMENT (OUTPATIENT)
Dept: RADIATION ONCOLOGY | Facility: CLINIC | Age: 84
End: 2019-12-04
Payer: MEDICARE

## 2019-12-04 NOTE — PHYSICAL EXAM
[General Appearance - Alert] : alert [General Appearance - In No Acute Distress] : in no acute distress [Thin] : thin [Sclera] : the sclera and conjunctiva were normal [Hearing Threshold Finger Rub Not Imperial] : hearing was normal [Outer Ear] : the ears and nose were normal in appearance [Extraocular Movements] : extraocular movements were intact [Heart Sounds] : normal S1 and S2 [Respiration, Rhythm And Depth] : normal respiratory rhythm and effort [] : no respiratory distress [Abdomen Soft] : soft [Normal] : no focal deficits [Oriented To Time, Place, And Person] : oriented to person, place, and time [Exaggerated Use Of Accessory Muscles For Inspiration] : no accessory muscle use [Bowel Sounds] : normal bowel sounds [de-identified] : BS diminished to RUL and RML; mild expiratory wheeze throughout.

## 2019-12-04 NOTE — HISTORY OF PRESENT ILLNESS
[FreeTextEntry1] : Ms. Woods received 48 Gy over 4 fractions to a RML tumor from 6/6/17- 6/14/17 for a [id number], stage IA NSCLC . She is s/p Right VATS robotic assisted right sub-lobar resection with lymph node dissection 11/2013 and a history of multiple lung cancer including SBRT to LLL (5000 cGy over 5 fractions, completed October 2014).\par \par 12/4/19- SNA\par Ms. Woods returns for consideration of radiation therapy to an adenocarcinoma of the RUL.  When she was last seen on 9/4/19, plan was to present her case at thoracic tumor board, given recent CT chest and PET showing enlargement of right apical opacity, hypermetabolic. Tumor board consensus was to proceed with EBUS/ navigational bronchoscopy, biopsy, which was scheduled for 9/20/19. She presented to the procedure and expressed fears regarding her neighbor wanting to harm her. She was then sent for brain MRI to rule out metastasis. Brain MRI done 9/23/19 showed no evidence of metastatic disease or acute intracranial process. \par \par Repeat chest CT done at [facility] on 10/1/19 showed a solid and ground glass masslike consolidation in the RIGHT apex measuring 2.8 x 3.7 cm with solid component demonstrating significant enhancement. \par \par Patient then declined to proceed with biopsy, opted to monitor with repeat CT chest in 3 months. However, patient then opted to proceed with EBUS/ navigational bronch/ biopsy, which was done 11/22/19 and showed the following pathology: \par -Lymph node, 11R, EBUS- guided FNA: negative for malignant cells.\par -Lymph node, 4R, EBUS- guided FNA: negative for malignant cells. \par -Lymph node, 7, EBUS- guided FNA: negative for malignant cells. \par -Lymph node, 11L, EBUS- guided FNA: negative for malignant cells.\par -Lung, RUL, FNA: Positive- adenocarcinoma. \par \par Of note, no images of biopsy could be located in PACS. \par \par She was hospitalized (discharged 11/29) due to hypoxic respiratory failure in setting of volume overload and severe mitral regurgitation secondary to flash pulmonary edema s/p extubation after bronchoscopy and hypertensive with sBPs> 200. She was also found to have pneumonia, currently on Cefpodoxime and will complete course in six days.  Today, she reports she feels well. She has BROWN, which is stable. Denies CP, SOB, hemoptysis, fever, chills, fatigue. Reports good appetite. She does note discomfort to posterior chest wall, near scapula. Not taking anything for the pain. She attributes the discomfort to carrying heavy bag of groceries this weekend. \par \par Of note, patient declined to do imaging here at [facility] despite recommendation to do so. \par \par ______________________\par 9/4/19- FOLLOW UP\par Ms. Woods returns for routine follow up. When she was last seen on 8/21/19, she was doing well clinically, but recent chest CT showed enlargement of patchy consolidation surrounding sutures in RLL and enlargement of right apical opacity. Plan was to obtain PET/ CT and RTC following the study.  \par \par PET/ CT done 8/27/19 ([facility]) revealed the following:\par -Hypermetabolic parenchymal opacity in the right upper lobe of the lung, which has increased in size, density and intensity since prior PET/CT dated 3/21/2017. These findings are highly suspicious for malignancy.\par -Mildly hypermetabolic parenchymal opacities in the right lower lobe of the lung adjacent to surgical sutures. These opacities have increased in size since prior PET/CT dated 3/21/2017. These findings are nonspecific, and may be infectious/inflammatory in etiology, possibly post therapeutic in etiology as the patient has a history of prior radiation therapy. Continued follow-up chest CT in 6- 12 months would be helpful in further evaluation.\par -Mildly hypermetabolic wedge-shaped opacity in the lingula, which has increased in size since prior PET/CT dated 3/21/2017. These findings are nonspecific, but suggest atelectasis.\par -Otherwise, no abnormal hypermetabolic activity to suggest malignancy at this time.\par -Trace right pleural effusion.\par -Imaging evidence prior granulomatous disease in the abdomen.\par -Diverticular disease. \par \par Today, she reports no change in health status and no interval medical events since she was last seen on 8/21/19.  She reports SOB after walking more than 5 blocks. Denies SOB at rest, cough, CP,  dysphagia, fever, chills, unintentional weight loss, pain.  \par \par 8/21/19- FOLLOW UP\par Ms. Woods returns for routine follow up. When she was last seen on 2/15/19, she was doing well and ELIZABETH; plan was to RTC six months with CT chest. \par \par Chest CT done 8/15/19 ([facility]) showed the following:\par -Severe centrolobular emphysematous changes. \par -s/p wedge resection RLL. Patchy consolidation surrounding the sutures appears to be slowly increasing in size. \par -Right apical geographic opacity measures 1.7 x 3.6 x 5 cm, previously measured 1 x 2.9 x 4.7 cm on 7/9/18.\par -Recommend PET/ CT. \par \par Today, she reports she is feeling generally well. She reports SOB after walking more than 5 blocks. Denies SOB at rest, cough, CP,  dysphagia, fever, chills, unintentional weight loss, pain.  \par \par \par 2/15/19- FOLLOW UP\par Ms. Arvizu returns for routine follow up. When she was last seen on 9/19/18, recent imaging showed while ill with cough productive of yellow sputum, new nodules, infectious vs. inflammatory. Plan was for repeat CT chest and tumor board review of imaging. \par \par Chest CT on 2/8/19 showed the following:\par 1.  Compared to 7/9/2018, the small airways disease in the left upper division shows much improvement with resolution of 7 mm and smaller satellite nodules.\par 2.  The atelectatic changes/scars of both lungs are again noted. The right upper lobe changes are slightly more confluent.\par 3.  Status post wedge resection right lower lobe.\par 4.  Emphysematous changes.\par 5.  No new nodule.\par 6.  T7 compression deformity.\par \par Today, she reports she is feeling generally well. Denies SOB, cough, dysphagia, fever, chills, unintentional weight loss. She has gained weight recently, which she attributes to being less active on account of the cold weather. \par \par \par 9/19/18- Follow up\par At her last visit  imaging showed new nodules: infectious vs. inflammatory. Plan was to get images for tumor board review, and consider repeat short interval imaging but await recommendations of tumor board.\par \par Today she notes that she feels well with good energy level.  Denies sob or cough. She has notes some wt. loss due to eating less sweets.She has been f/u with her PMD.\par \par 7/18/17-Follow up\par Ms. Woods received 4800 cGy on 6/14/17  to the right middle lobe for a new NSCLC . She is s/p Right VATS robotic assisted right sub-lobar resection with lymph node dissection 11/2013 and a history of multiple lung cancer including SBRT\par At her last visit  imaging was without  clear evidence of recurrent/residual disease with increased atelectatic changes likely due to post-treatment radiation change. \par \par CT chest  with contrast 7/9/18 showed two new nodules in the right upper lobe and one in the left upper division. Differential diagnosis of these representing post inflammatory changes. Metastatic foci should be considered. Follow up CT recommended in 3 weeks. worsening atelectatic changes and bronchiectasis of the lower lungs. There is newly developed bronchiolitis in the left lower lobe. Severe compression of T7 vertebral body without change\par \par Since she has been well until recently when she contracted the flu and is now recovering. She has  a 12 lb wt loss since last visit\par and reports no change in appetite. Notes diarrhea 1-2x day. Has dry cough and sob with exertion. Denies pain.\par She has f/u with PMD 3 weeks ago.\par \par 3/6/18-Follow up\par Ms. Wodos is an 84 year old female here for a routine follow up appointment. She received 4800 cGy on 6/14/17  to the right middle lobe for adenocarcinoma. At her last visit  9/2017 she has two lesions and was referred to dermatology.\par \par Since her last f/u appt. in September 2017 she has been feeling well She has gained weight and has f/u with dematology. She had a bx done which was positive for Seborrheic Keratosis. No treatment ordered.\par -denies fatigue. has sob with exertion. Denies cough or chest pain\par \par  CT Chest w/o contrast- more extensive atelectatic change of the lingula. No significant change from prior study\par \par \par \par \par ONCOLOGIC HISTORY- In 2013 she underwent  a VATS right sub-lobar resection with lymph node dissection for biopsy proven lower lobe.  She has been followed thereafter with radiographic surveillance.  In 2014, a left upper lobe nodule increased in size with associated PET avidity.  This lesion was treated with SBRT by Dr. Bailey to a dose of 5000cGy over 5 fractions from 10/6/14-10/15/14. \par \par 12/2016- Post-treatment imaging has shows a complete response of the treated lesion.  However, there is a right spiculated middle lobe nodule that has been increasing in size with minimal associated FDG avidity.  She was recommended for biopsy, which she is adamantly refusing.  She presented to us for consideration of SBRT at which time the decision was made to follow radiographically.  \par 3/29/2017 - Ms Woods had a PET/CT 3/21/2017.  This shows three areas of concern.  In the right lung apex is a 1.3 X 2.1 cm region of scar and bronchiectasis with an SUV of 1.9.  It is mentioned that this likely is secondary to post radiation change.  However, radiation was delivered to the left upper lobe, not right upper lobe.  There is an additional 1.2 X 2.2 cm spiculated mass in the right middle lobe with an SUV of 3.4, prior 2.2.  This is concerning for disease.  We had recommended SBRT to this at time of last f/u.  The third area of concern is an area of consolidation within the lingula of the left lung adjacent to the left fissure.  There is a focus measuring 1.1 X 1.1 cm with an suv max of 2.6.  There was persistent uptake of a rectal lesion that has not changed - etiology unclear.  \par \par 4/4 - She presents today for a f/u visit to discuss review of her imaging.  Clinically she continues to be asymptomatic.  Radiographically, there does not appear to be significant change in the right upper lobe lesion over the previous few years.  Etiology of this is unclear.  The lingula lesion does appear to have uptake and seems enlarged.  This may warrant further evaluation.  \par \par 5/17/17- We reviewed Ms Corona most recent CT scan in Thoracic Tumor board where it was felt that the lesion in the left lung of interest was most consistent with an effusion rather than malignancy.  The recommendation was to proceed with SBRT planning for her right lung nodule.  I discussed this with Ms. Woods.  She has previously had SBRT and so is familiar with the treatment planning process.  Informed consent was signed.  Scheduling will follow. \par \par 9/20/17- \par She last saw us in July 2017 for PTE, at which time she felt well.  CT scan 9/15/17 showed stable right apical consolidation with traction bronchiectasis likely postradiation fibrosis. Stable right middle lobe nodule and adjacent fibrotic scarring\par Stable spiculated perihilar lingula consolidation. New right lower lobe reticulonodular infiltrate. New bilateral lower lobe subpleural wedge shaped opacities. This could be inflammatory.\par \par She feels well today. Denies wheezing, cough. She notes no changes in her breathing since before radiation, although she notes she had a cold a couple weeks ago. She has two lesions on her skin which she is concerned are related to skin cancer. One of these is on her right hip, and one is on her left flank.  She had mild pain with coughing, not not at baseline.\par

## 2019-12-04 NOTE — REVIEW OF SYSTEMS
[SOB on Exertion] : shortness of breath during exertion [Negative] : Psychiatric [Cough: Grade 0] : Cough: Grade 0 [Chest Pain] : no chest pain [Shortness Of Breath] : no shortness of breath [Wheezing] : no wheezing [Cough] : no cough [FreeTextEntry2] : with exertion

## 2019-12-04 NOTE — REASON FOR VISIT
[Consideration of Curative Therapy] : consideration of curative therapy for [Lung Cancer] : lung cancer [Clinical -- TNM Staging] : TNM Stage: c [T: ___] : T[unfilled] [M: ___] : M[unfilled] [N: ___] : N[unfilled] [de-identified] : Right Lung

## 2019-12-06 LAB — RESPIRATORY PATH PNL SPEC CULT: SIGNIFICANT CHANGE UP

## 2019-12-12 DIAGNOSIS — J96.01 ACUTE RESPIRATORY FAILURE WITH HYPOXIA: ICD-10-CM

## 2019-12-12 DIAGNOSIS — C34.11 MALIGNANT NEOPLASM OF UPPER LOBE, RIGHT BRONCHUS OR LUNG: ICD-10-CM

## 2019-12-12 DIAGNOSIS — J10.08 INFLUENZA DUE TO OTHER IDENTIFIED INFLUENZA VIRUS WITH OTHER SPECIFIED PNEUMONIA: ICD-10-CM

## 2019-12-12 DIAGNOSIS — Z78.1 PHYSICAL RESTRAINT STATUS: ICD-10-CM

## 2019-12-12 DIAGNOSIS — Z88.1 ALLERGY STATUS TO OTHER ANTIBIOTIC AGENTS STATUS: ICD-10-CM

## 2019-12-12 DIAGNOSIS — J14 PNEUMONIA DUE TO HEMOPHILUS INFLUENZAE: ICD-10-CM

## 2019-12-12 DIAGNOSIS — I11.0 HYPERTENSIVE HEART DISEASE WITH HEART FAILURE: ICD-10-CM

## 2019-12-12 DIAGNOSIS — K59.00 CONSTIPATION, UNSPECIFIED: ICD-10-CM

## 2019-12-12 DIAGNOSIS — I34.0 NONRHEUMATIC MITRAL (VALVE) INSUFFICIENCY: ICD-10-CM

## 2019-12-12 DIAGNOSIS — Z91.041 RADIOGRAPHIC DYE ALLERGY STATUS: ICD-10-CM
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
## 2019-12-12 DIAGNOSIS — Z88.0 ALLERGY STATUS TO PENICILLIN: ICD-10-CM

## 2019-12-12 DIAGNOSIS — E44.0 MODERATE PROTEIN-CALORIE MALNUTRITION: ICD-10-CM

## 2019-12-12 DIAGNOSIS — J81.0 ACUTE PULMONARY EDEMA: ICD-10-CM

## 2019-12-12 DIAGNOSIS — I50.30 UNSPECIFIED DIASTOLIC (CONGESTIVE) HEART FAILURE: ICD-10-CM

## 2019-12-12 DIAGNOSIS — Z92.3 PERSONAL HISTORY OF IRRADIATION: ICD-10-CM

## 2019-12-21 LAB
CULTURE RESULTS: SIGNIFICANT CHANGE UP
SPECIMEN SOURCE: SIGNIFICANT CHANGE UP

## 2020-01-07 NOTE — REASON FOR VISIT
[Lung Cancer] : lung cancer [Routine On-Treatment] : routine on-treatment visit for [Clinical -- TNM Staging] : TNM Stage: c [T: ___] : T[unfilled] [N: ___] : N[unfilled] [M: ___] : M[unfilled]

## 2020-01-08 VITALS
RESPIRATION RATE: 18 BRPM | HEART RATE: 90 BPM | SYSTOLIC BLOOD PRESSURE: 138 MMHG | DIASTOLIC BLOOD PRESSURE: 80 MMHG | WEIGHT: 101 LBS | OXYGEN SATURATION: 96 % | BODY MASS INDEX: 19.73 KG/M2

## 2020-01-08 NOTE — HISTORY OF PRESENT ILLNESS
[FreeTextEntry1] : Ms. Woods received 48 Gy over 4 fractions to a RML tumor from 6/6/17- 6/14/17 for a [id number], stage IA NSCLC . She is s/p Right VATS robotic assisted right sub-lobar resection with lymph node dissection 11/2013 and a history of multiple lung cancers including SBRT to LLL (5000 cGy over 5 fractions, completed October 2014). She had chest CT done at [facility] on 10/1/19 that showed a solid and ground glass masslike consolidation in the RIGHT apex measuring 2.8 x 3.7 cm with solid component demonstrating significant enhancement. She then underwent EBUS/ navigational bronch/ biopsy on 11/22/19; FNA of RUL mass was positive for adenocarcinoma, nodes negative.\par \par 1/8/2020- OTV- Ms. Woods has completed  1000 cGy/ 2500 cGy to RUL tumor #1 and RUL tumor #2.  Today, she \par -Needs PTE.  She offers no complaints. \par \par ____________________________\par 12/4/19- SNA\par Ms. Woods returns for consideration of radiation therapy to an adenocarcinoma of the RUL.  When she was last seen on 9/4/19, plan was to present her case at thoracic tumor board, given recent CT chest and PET showing enlargement of right apical opacity, hypermetabolic. Tumor board consensus was to proceed with EBUS/ navigational bronchoscopy, biopsy, which was scheduled for 9/20/19. She presented to the procedure and expressed fears regarding her neighbor wanting to harm her. She was then sent for brain MRI to rule out metastasis. Brain MRI done 9/23/19 showed no evidence of metastatic disease or acute intracranial process. \par \par Repeat chest CT done at [facility] on 10/1/19 showed a solid and ground glass masslike consolidation in the RIGHT apex measuring 2.8 x 3.7 cm with solid component demonstrating significant enhancement. \par \par Patient then declined to proceed with biopsy, opted to monitor with repeat CT chest in 3 months. However, patient then opted to proceed with EBUS/ navigational bronch/ biopsy, which was done 11/22/19 and showed the following pathology: \par -Lymph node, 11R, EBUS- guided FNA: negative for malignant cells.\par -Lymph node, 4R, EBUS- guided FNA: negative for malignant cells. \par -Lymph node, 7, EBUS- guided FNA: negative for malignant cells. \par -Lymph node, 11L, EBUS- guided FNA: negative for malignant cells.\par -Lung, RUL, FNA: Positive- adenocarcinoma. \par \par Of note, no images of biopsy could be located in PACS. \par \par She was hospitalized (discharged 11/29) due to hypoxic respiratory failure in setting of volume overload and severe mitral regurgitation secondary to flash pulmonary edema s/p extubation after bronchoscopy and hypertensive with sBPs> 200. She was also found to have pneumonia, currently on Cefpodoxime and will complete course in six days.  Today, she reports she feels well. She has BROWN, which is stable. Denies CP, SOB, hemoptysis, fever, chills, fatigue. Reports good appetite. She does note discomfort to posterior chest wall, near scapula. Not taking anything for the pain. She attributes the discomfort to carrying heavy bag of groceries this weekend. \par \par Of note, patient declined to do imaging here at [facility] despite recommendation to do so. \par \par ______________________\par 9/4/19- FOLLOW UP\par Ms. Woods returns for routine follow up. When she was last seen on 8/21/19, she was doing well clinically, but recent chest CT showed enlargement of patchy consolidation surrounding sutures in RLL and enlargement of right apical opacity. Plan was to obtain PET/ CT and RTC following the study.  \par \par PET/ CT done 8/27/19 ([facility]) revealed the following:\par -Hypermetabolic parenchymal opacity in the right upper lobe of the lung, which has increased in size, density and intensity since prior PET/CT dated 3/21/2017. These findings are highly suspicious for malignancy.\par -Mildly hypermetabolic parenchymal opacities in the right lower lobe of the lung adjacent to surgical sutures. These opacities have increased in size since prior PET/CT dated 3/21/2017. These findings are nonspecific, and may be infectious/inflammatory in etiology, possibly post therapeutic in etiology as the patient has a history of prior radiation therapy. Continued follow-up chest CT in 6- 12 months would be helpful in further evaluation.\par -Mildly hypermetabolic wedge-shaped opacity in the lingula, which has increased in size since prior PET/CT dated 3/21/2017. These findings are nonspecific, but suggest atelectasis.\par -Otherwise, no abnormal hypermetabolic activity to suggest malignancy at this time.\par -Trace right pleural effusion.\par -Imaging evidence prior granulomatous disease in the abdomen.\par -Diverticular disease. \par \par Today, she reports no change in health status and no interval medical events since she was last seen on 8/21/19.  She reports SOB after walking more than 5 blocks. Denies SOB at rest, cough, CP,  dysphagia, fever, chills, unintentional weight loss, pain.  \par \par 8/21/19- FOLLOW UP\par Ms. Woods returns for routine follow up. When she was last seen on 2/15/19, she was doing well and ELIZABETH; plan was to RTC six months with CT chest. \par \par Chest CT done 8/15/19 ([facility]) showed the following:\par -Severe centrolobular emphysematous changes. \par -s/p wedge resection RLL. Patchy consolidation surrounding the sutures appears to be slowly increasing in size. \par -Right apical geographic opacity measures 1.7 x 3.6 x 5 cm, previously measured 1 x 2.9 x 4.7 cm on 7/9/18.\par -Recommend PET/ CT. \par \par Today, she reports she is feeling generally well. She reports SOB after walking more than 5 blocks. Denies SOB at rest, cough, CP,  dysphagia, fever, chills, unintentional weight loss, pain.  \par \par \par 2/15/19- FOLLOW UP\par Ms. Arvizu returns for routine follow up. When she was last seen on 9/19/18, recent imaging showed while ill with cough productive of yellow sputum, new nodules, infectious vs. inflammatory. Plan was for repeat CT chest and tumor board review of imaging. \par \par Chest CT on 2/8/19 showed the following:\par 1.  Compared to 7/9/2018, the small airways disease in the left upper division shows much improvement with resolution of 7 mm and smaller satellite nodules.\par 2.  The atelectatic changes/scars of both lungs are again noted. The right upper lobe changes are slightly more confluent.\par 3.  Status post wedge resection right lower lobe.\par 4.  Emphysematous changes.\par 5.  No new nodule.\par 6.  T7 compression deformity.\par \par Today, she reports she is feeling generally well. Denies SOB, cough, dysphagia, fever, chills, unintentional weight loss. She has gained weight recently, which she attributes to being less active on account of the cold weather. \par \par \par 9/19/18- Follow up\par At her last visit  imaging showed new nodules: infectious vs. inflammatory. Plan was to get images for tumor board review, and consider repeat short interval imaging but await recommendations of tumor board.\par \par Today she notes that she feels well with good energy level.  Denies sob or cough. She has notes some wt. loss due to eating less sweets.She has been f/u with her PMD.\par \par 7/18/17-Follow up\par Ms. Woods received 4800 cGy on 6/14/17  to the right middle lobe for a new NSCLC . She is s/p Right VATS robotic assisted right sub-lobar resection with lymph node dissection 11/2013 and a history of multiple lung cancer including SBRT\par At her last visit  imaging was without  clear evidence of recurrent/residual disease with increased atelectatic changes likely due to post-treatment radiation change. \par \par CT chest  with contrast 7/9/18 showed two new nodules in the right upper lobe and one in the left upper division. Differential diagnosis of these representing post inflammatory changes. Metastatic foci should be considered. Follow up CT recommended in 3 weeks. worsening atelectatic changes and bronchiectasis of the lower lungs. There is newly developed bronchiolitis in the left lower lobe. Severe compression of T7 vertebral body without change\par \par Since she has been well until recently when she contracted the flu and is now recovering. She has  a 12 lb wt loss since last visit\par and reports no change in appetite. Notes diarrhea 1-2x day. Has dry cough and sob with exertion. Denies pain.\par She has f/u with PMD 3 weeks ago.\par \par 3/6/18-Follow up\par Ms. Woods is an 84 year old female here for a routine follow up appointment. She received 4800 cGy on 6/14/17  to the right middle lobe for adenocarcinoma. At her last visit  9/2017 she has two lesions and was referred to dermatology.\par \par Since her last f/u appt. in September 2017 she has been feeling well She has gained weight and has f/u with dematology. She had a bx done which was positive for Seborrheic Keratosis. No treatment ordered.\par -denies fatigue. has sob with exertion. Denies cough or chest pain\par \par  CT Chest w/o contrast- more extensive atelectatic change of the lingula. No significant change from prior study\par \par \par \par \par ONCOLOGIC HISTORY- In 2013 she underwent  a VATS right sub-lobar resection with lymph node dissection for biopsy proven lower lobe.  She has been followed thereafter with radiographic surveillance.  In 2014, a left upper lobe nodule increased in size with associated PET avidity.  This lesion was treated with SBRT by Dr. Bailey to a dose of 5000cGy over 5 fractions from 10/6/14-10/15/14. \par \par 12/2016- Post-treatment imaging has shows a complete response of the treated lesion.  However, there is a right spiculated middle lobe nodule that has been increasing in size with minimal associated FDG avidity.  She was recommended for biopsy, which she is adamantly refusing.  She presented to us for consideration of SBRT at which time the decision was made to follow radiographically.  \par 3/29/2017 - Ms Woods had a PET/CT 3/21/2017.  This shows three areas of concern.  In the right lung apex is a 1.3 X 2.1 cm region of scar and bronchiectasis with an SUV of 1.9.  It is mentioned that this likely is secondary to post radiation change.  However, radiation was delivered to the left upper lobe, not right upper lobe.  There is an additional 1.2 X 2.2 cm spiculated mass in the right middle lobe with an SUV of 3.4, prior 2.2.  This is concerning for disease.  We had recommended SBRT to this at time of last f/u.  The third area of concern is an area of consolidation within the lingula of the left lung adjacent to the left fissure.  There is a focus measuring 1.1 X 1.1 cm with an suv max of 2.6.  There was persistent uptake of a rectal lesion that has not changed - etiology unclear.  \par \par 4/4 - She presents today for a f/u visit to discuss review of her imaging.  Clinically she continues to be asymptomatic.  Radiographically, there does not appear to be significant change in the right upper lobe lesion over the previous few years.  Etiology of this is unclear.  The lingula lesion does appear to have uptake and seems enlarged.  This may warrant further evaluation.  \par \par 5/17/17- We reviewed Ms Corona most recent CT scan in Thoracic Tumor board where it was felt that the lesion in the left lung of interest was most consistent with an effusion rather than malignancy.  The recommendation was to proceed with SBRT planning for her right lung nodule.  I discussed this with Ms. Woods.  She has previously had SBRT and so is familiar with the treatment planning process.  Informed consent was signed.  Scheduling will follow. \par \par 9/20/17- \par She last saw us in July 2017 for PTE, at which time she felt well.  CT scan 9/15/17 showed stable right apical consolidation with traction bronchiectasis likely postradiation fibrosis. Stable right middle lobe nodule and adjacent fibrotic scarring\par Stable spiculated perihilar lingula consolidation. New right lower lobe reticulonodular infiltrate. New bilateral lower lobe subpleural wedge shaped opacities. This could be inflammatory.\par \par She feels well today. Denies wheezing, cough. She notes no changes in her breathing since before radiation, although she notes she had a cold a couple weeks ago. She has two lesions on her skin which she is concerned are related to skin cancer. One of these is on her right hip, and one is on her left flank.  She had mild pain with coughing, not not at baseline.\par

## 2020-01-08 NOTE — PHYSICAL EXAM
[General Appearance - Alert] : alert [General Appearance - In No Acute Distress] : in no acute distress [Thin] : thin [Extraocular Movements] : extraocular movements were intact [Sclera] : the sclera and conjunctiva were normal [Outer Ear] : the ears and nose were normal in appearance [Hearing Threshold Finger Rub Not Evangeline] : hearing was normal [] : no respiratory distress [Respiration, Rhythm And Depth] : normal respiratory rhythm and effort [Heart Sounds] : normal S1 and S2 [Exaggerated Use Of Accessory Muscles For Inspiration] : no accessory muscle use [Bowel Sounds] : normal bowel sounds [Abdomen Soft] : soft [Normal] : normal skin color and pigmentation and no rash [Oriented To Time, Place, And Person] : oriented to person, place, and time [de-identified] : BS diminished to RUL and RML; mild expiratory wheeze throughout.

## 2020-01-08 NOTE — DISEASE MANAGEMENT
[Clinical] : TNM Stage: c [N/A] : Currently not applicable [TTNM] : 1a [NTNM] : 0 [MTNM] : 0 [de-identified] : 2500 cGy [de-identified] : 1000 cGy [de-identified] : RUL tumor #1 and #2

## 2020-01-08 NOTE — REVIEW OF SYSTEMS
[Cough: Grade 0] : Cough: Grade 0 [SOB on Exertion] : shortness of breath during exertion [Negative] : Psychiatric [FreeTextEntry2] : with exertion [Shortness Of Breath] : no shortness of breath [Chest Pain] : no chest pain [Wheezing] : no wheezing [Cough] : no cough

## 2020-01-11 LAB
CULTURE RESULTS: SIGNIFICANT CHANGE UP
SPECIMEN SOURCE: SIGNIFICANT CHANGE UP

## 2020-01-17 ENCOUNTER — APPOINTMENT (OUTPATIENT)
Dept: PULMONOLOGY | Facility: CLINIC | Age: 85
End: 2020-01-17
Payer: MEDICARE

## 2020-01-17 ENCOUNTER — NON-APPOINTMENT (OUTPATIENT)
Age: 85
End: 2020-01-17

## 2020-01-17 VITALS
TEMPERATURE: 97.4 F | DIASTOLIC BLOOD PRESSURE: 70 MMHG | BODY MASS INDEX: 19.24 KG/M2 | WEIGHT: 98 LBS | SYSTOLIC BLOOD PRESSURE: 110 MMHG | HEART RATE: 102 BPM | OXYGEN SATURATION: 92 % | HEIGHT: 60 IN | RESPIRATION RATE: 12 BRPM

## 2020-01-17 DIAGNOSIS — R05 COUGH: ICD-10-CM

## 2020-01-17 PROCEDURE — 99214 OFFICE O/P EST MOD 30 MIN: CPT

## 2020-01-21 LAB
BACTERIA SPT CULT: NORMAL
BASOPHILS # BLD AUTO: 0.05 K/UL
BASOPHILS NFR BLD AUTO: 0.6 %
CRP SERPL-MCNC: 7.63 MG/DL
EOSINOPHIL # BLD AUTO: 0.13 K/UL
EOSINOPHIL NFR BLD AUTO: 1.6 %
HCT VFR BLD CALC: 42.8 %
HGB BLD-MCNC: 13.5 G/DL
IMM GRANULOCYTES NFR BLD AUTO: 0.4 %
LYMPHOCYTES # BLD AUTO: 1.37 K/UL
LYMPHOCYTES NFR BLD AUTO: 17.3 %
MAN DIFF?: NORMAL
MCHC RBC-ENTMCNC: 31.5 GM/DL
MCHC RBC-ENTMCNC: 32 PG
MCV RBC AUTO: 101.4 FL
MONOCYTES # BLD AUTO: 1.02 K/UL
MONOCYTES NFR BLD AUTO: 12.8 %
NEUTROPHILS # BLD AUTO: 5.34 K/UL
NEUTROPHILS NFR BLD AUTO: 67.3 %
PLATELET # BLD AUTO: 346 K/UL
RAPID RVP RESULT: NOT DETECTED
RBC # BLD: 4.22 M/UL
RBC # FLD: 14.4 %
WBC # FLD AUTO: 7.94 K/UL

## 2020-01-21 NOTE — ASSESSMENT
[FreeTextEntry1] : 86 year old female with PMHx of lung adenocarcinoma s/p VATS and sub-lobar resection in 2013 of RLL, ANANYA ca s/p SBRT in 2017, undergoing SBRT of RUL adeno ca ( presumed new primary).\par \par Patient cough is violent, mildly productive, sputum on inspection is not worrisome, but she is not able to bring up phlegm well. Her auscultation exam and POCUS suggest presence of bilateral lung infiltrates, however this is not the most specific exam for diagnosing pna considering her existing lung pathology.\par Sickness has been going on for last 2 weeks, it is possible it started at viral infection and progressed to some bacterial superinfection.\par Will check RVP, cbc and CRP.\par Patient to complete 5 day course of Levaquin ( EKG check, normal QTC intervals).We will check CBC, CRP, RVP and sputum culture. If CBC and CRP normal will stop. She has skipped radiation sessions due to productive cough with white sputum for past 2 weeks. \par  Patient advised to restart her lisinopril for BP control. \par OV in 2 weeks or earlier if needed.\par \par

## 2020-01-21 NOTE — PROCEDURE
[FreeTextEntry1] : Bedside ultrasound done today 1/17/20\par -Bilateral focal B-line pattern, no well define consolidation, no pleff. Anteriorly a-line predominant.\par Interpretation:\par Not dense multifocal infiltrates \par \par \par CT Chest 8/15/19\par - No mediastinal/hilar lymphadenopathy. Patchy consolidation in right lower lobe surrounding the area of prior wedge resection as well as right apical opacity measuring 1.7 x 3.6 x 5.0 cm; both increasing in size from imaging in 2017 and 2018. Stable lingular consolidation. \par \par PET-CT 8/27/19\par - Hypermetabolic parenchymal opacity in RUL which has increased in density & intensity since March 2017 PET, mildly hypermetabolic parenchymal opacities in RLL adjacent to surgical sutures which has also increased in size from March 2017. Mild hypermetabolic wedge-shaped opacity in lingula. \par

## 2020-01-21 NOTE — REVIEW OF SYSTEMS
[Cough] : cough [Sputum] : sputum  [Negative] : Pulmonary Hypertension [Fever] : no fever [Chills] : no chills [Poor Appetite] : normal appetite  [Fatigue] : no fatigue [Dyspnea] : no dyspnea [Hemoptysis] : no hemoptysis [Recent Wt Loss (___ Lbs)] : no recent weight loss [Chest Tightness] : no chest tightness [Wheezing] : no wheezing [Pleuritic Pain] : no pleuritic pain [Frequent URIs] : no frequent upper respiratory infections

## 2020-01-21 NOTE — HISTORY OF PRESENT ILLNESS
[FreeTextEntry1] : 86 year old female nonsmoker with PMHx of multiple lung adenocarcinoma (multiple primaries) s/p resection and radiation therapy,referred to our team for a navigational bronchoscopy with biopsy of enlarging right apical density which is hypermetabolic on recent surveillance CT & PET from August 2019.\par \par PMHx:\par 11/2013 : sublobar resection of RLL with LN diss for lung adeno (KRAS+)\par 2017: SBRT to ANANYA nodule, suspicious of another primary, but not biopsies ( patient refused), treated empirically with SBRT.\par 11/22/2019: Bronch with RUL bx / staging ebus: lung adeno with LN 4R,4L,7,11L all negative for malignancy.\par \par \par 1/17/20\par Patient presents today for a sick visit.  She is currently undergoing radiation to RUL tumor- she has underwent 3 sessions with Dr. Godfrey/Dr. Hill and has 2 more sessions remaining ( received 1000cGy, remaining 1500cGy). her last 2 sessions has been cancelled because she cannot lie still for SBRT due to coughing fits.  Describes cough as productive of thick white/grey sputum. Denies hemoptysis or brown/green sputum. Also with nasal congestion and runny nose. Awakes up during the night coughing. States she had the flu but she wasn't tested. Denies fevers, night sweats but does have chills. Denies sick contacts. Has had poor appetite and has lost a few lbs lately.  Has not been taking her lisinopril. \par Not taking any medications. \par \par \par \par \par

## 2020-01-21 NOTE — PHYSICAL EXAM
[General Appearance - Well Developed] : well developed [Normal Appearance] : normal appearance [Well Groomed] : well groomed [General Appearance - Well Nourished] : well nourished [No Deformities] : no deformities [General Appearance - In No Acute Distress] : no acute distress [Eyelids - No Xanthelasma] : the eyelids demonstrated no xanthelasmas [Normal Conjunctiva] : the conjunctiva exhibited no abnormalities [Normal Oropharynx] : normal oropharynx [Neck Appearance] : the appearance of the neck was normal [Neck Cervical Mass (___cm)] : no neck mass was observed [Jugular Venous Distention Increased] : there was no jugular-venous distention [Thyroid Nodule] : there were no palpable thyroid nodules [Thyroid Diffuse Enlargement] : the thyroid was not enlarged [Heart Sounds] : normal S1 and S2 [Heart Rate And Rhythm] : heart rate and rhythm were normal [Murmurs] : no murmurs present [Respiration, Rhythm And Depth] : normal respiratory rhythm and effort [Exaggerated Use Of Accessory Muscles For Inspiration] : no accessory muscle use [Abnormal Walk] : normal gait [Gait - Sufficient For Exercise Testing] : the gait was sufficient for exercise testing [Nail Clubbing] : no clubbing of the fingernails [Petechial Hemorrhages (___cm)] : no petechial hemorrhages [Cyanosis, Localized] : no localized cyanosis [Affect] : the affect was normal [Oriented To Time, Place, And Person] : oriented to person, place, and time [Kyphosis] : kyphosis [Bowel Sounds] : normal bowel sounds [Abdomen Soft] : soft [Skin Color & Pigmentation] : normal skin color and pigmentation [Skin Turgor] : normal skin turgor [] : no rash [Sensation] : the sensory exam was normal to light touch and pinprick [No Focal Deficits] : no focal deficits [Mood] : the mood was normal [FreeTextEntry1] : +crackles primarily in bases; left more than right; anterior clear, no wheezing  [FreeTextEntry2] : trace LE edema

## 2020-01-22 DIAGNOSIS — J18.9 PNEUMONIA, UNSPECIFIED ORGANISM: ICD-10-CM

## 2020-01-22 RX ORDER — CEFPODOXIME PROXETIL 200 MG/1
200 TABLET, FILM COATED ORAL TWICE DAILY
Qty: 14 | Refills: 0 | Status: ACTIVE | COMMUNITY
Start: 2019-11-30 | End: 1900-01-01

## 2020-02-13 NOTE — REASON FOR VISIT
[Routine On-Treatment] : routine on-treatment visit for [Post-Treatment Evaluation] : post-treatment evaluation for [Clinical -- TNM Staging] : TNM Stage: c [T: ___] : T[unfilled] [Lung Cancer] : lung cancer [M: ___] : M[unfilled] [N: ___] : N[unfilled]

## 2020-02-14 ENCOUNTER — APPOINTMENT (OUTPATIENT)
Dept: RADIATION ONCOLOGY | Facility: CLINIC | Age: 85
End: 2020-02-14
Payer: MEDICARE

## 2020-02-14 VITALS
DIASTOLIC BLOOD PRESSURE: 80 MMHG | HEART RATE: 88 BPM | WEIGHT: 100 LBS | OXYGEN SATURATION: 97 % | SYSTOLIC BLOOD PRESSURE: 124 MMHG | BODY MASS INDEX: 19.53 KG/M2 | RESPIRATION RATE: 16 BRPM

## 2020-02-14 PROCEDURE — 99211 OFF/OP EST MAY X REQ PHY/QHP: CPT | Mod: 25

## 2020-02-14 RX ORDER — LEVOFLOXACIN 750 MG/1
750 TABLET, FILM COATED ORAL DAILY
Qty: 5 | Refills: 0 | Status: DISCONTINUED | COMMUNITY
Start: 2020-01-17 | End: 2020-02-14

## 2020-02-14 NOTE — HISTORY OF PRESENT ILLNESS
[FreeTextEntry1] : Ms. Woods received 48 Gy over 4 fractions to a RML tumor from 6/6/17- 6/14/17 for a [id number], stage IA NSCLC . She is s/p Right VATS robotic assisted right sub-lobar resection with lymph node dissection 11/2013 and a history of multiple lung cancers including SBRT to LLL (5000 cGy over 5 fractions, completed October 2014). She had chest CT done at [facility] on 10/1/19 that showed a solid and ground glass masslike consolidation in the RIGHT apex measuring 2.8 x 3.7 cm with solid component demonstrating significant enhancement. She then underwent EBUS/ navigational bronch/ biopsy on 11/22/19; FNA of RUL mass was positive for adenocarcinoma, nodes negative.She then received SBRT 3000 cGy out of a planned 5000 cGy to a RUL tumor.  She did not complete course of RT due to presumed CAP resulting in severe coughing fits (patient declined cough suppressants), and for which her pulmonologist treated her with Cefpodoxime. \par \par \par 2/14/2020- PTE\par Ms. Woods returns for post treatment evaluation. Today, she reports she feels much better, still has some nasal congestion but cough has resolved. Has baseline SOB with moderate exertion. Denies fever, chills, SOB, CP, palpitations. She developed an allergic reaction to the Levofloxacin and notes skin is still a bit pruritic. No other complaints.\par \par \par ____________________________\par 12/4/19- SNA\par Ms. Woods returns for consideration of radiation therapy to an adenocarcinoma of the RUL.  When she was last seen on 9/4/19, plan was to present her case at thoracic tumor board, given recent CT chest and PET showing enlargement of right apical opacity, hypermetabolic. Tumor board consensus was to proceed with EBUS/ navigational bronchoscopy, biopsy, which was scheduled for 9/20/19. She presented to the procedure and expressed fears regarding her neighbor wanting to harm her. She was then sent for brain MRI to rule out metastasis. Brain MRI done 9/23/19 showed no evidence of metastatic disease or acute intracranial process. \par \par Repeat chest CT done at [facility] on 10/1/19 showed a solid and ground glass masslike consolidation in the RIGHT apex measuring 2.8 x 3.7 cm with solid component demonstrating significant enhancement. \par \par Patient then declined to proceed with biopsy, opted to monitor with repeat CT chest in 3 months. However, patient then opted to proceed with EBUS/ navigational bronch/ biopsy, which was done 11/22/19 and showed the following pathology: \par -Lymph node, 11R, EBUS- guided FNA: negative for malignant cells.\par -Lymph node, 4R, EBUS- guided FNA: negative for malignant cells. \par -Lymph node, 7, EBUS- guided FNA: negative for malignant cells. \par -Lymph node, 11L, EBUS- guided FNA: negative for malignant cells.\par -Lung, RUL, FNA: Positive- adenocarcinoma. \par \par Of note, no images of biopsy could be located in PACS. \par \par She was hospitalized (discharged 11/29) due to hypoxic respiratory failure in setting of volume overload and severe mitral regurgitation secondary to flash pulmonary edema s/p extubation after bronchoscopy and hypertensive with sBPs> 200. She was also found to have pneumonia, currently on Cefpodoxime and will complete course in six days.  Today, she reports she feels well. She has BROWN, which is stable. Denies CP, SOB, hemoptysis, fever, chills, fatigue. Reports good appetite. She does note discomfort to posterior chest wall, near scapula. Not taking anything for the pain. She attributes the discomfort to carrying heavy bag of groceries this weekend. \par \par Of note, patient declined to do imaging here at [facility] despite recommendation to do so. \par \par ______________________\par 9/4/19- FOLLOW UP\par Ms. Woods returns for routine follow up. When she was last seen on 8/21/19, she was doing well clinically, but recent chest CT showed enlargement of patchy consolidation surrounding sutures in RLL and enlargement of right apical opacity. Plan was to obtain PET/ CT and RTC following the study.  \par \par PET/ CT done 8/27/19 ([facility]) revealed the following:\par -Hypermetabolic parenchymal opacity in the right upper lobe of the lung, which has increased in size, density and intensity since prior PET/CT dated 3/21/2017. These findings are highly suspicious for malignancy.\par -Mildly hypermetabolic parenchymal opacities in the right lower lobe of the lung adjacent to surgical sutures. These opacities have increased in size since prior PET/CT dated 3/21/2017. These findings are nonspecific, and may be infectious/inflammatory in etiology, possibly post therapeutic in etiology as the patient has a history of prior radiation therapy. Continued follow-up chest CT in 6- 12 months would be helpful in further evaluation.\par -Mildly hypermetabolic wedge-shaped opacity in the lingula, which has increased in size since prior PET/CT dated 3/21/2017. These findings are nonspecific, but suggest atelectasis.\par -Otherwise, no abnormal hypermetabolic activity to suggest malignancy at this time.\par -Trace right pleural effusion.\par -Imaging evidence prior granulomatous disease in the abdomen.\par -Diverticular disease. \par \par Today, she reports no change in health status and no interval medical events since she was last seen on 8/21/19.  She reports SOB after walking more than 5 blocks. Denies SOB at rest, cough, CP,  dysphagia, fever, chills, unintentional weight loss, pain.  \par \par 8/21/19- FOLLOW UP\par Ms. Woods returns for routine follow up. When she was last seen on 2/15/19, she was doing well and ELIZABETH; plan was to RTC six months with CT chest. \par \par Chest CT done 8/15/19 ([facility]) showed the following:\par -Severe centrolobular emphysematous changes. \par -s/p wedge resection RLL. Patchy consolidation surrounding the sutures appears to be slowly increasing in size. \par -Right apical geographic opacity measures 1.7 x 3.6 x 5 cm, previously measured 1 x 2.9 x 4.7 cm on 7/9/18.\par -Recommend PET/ CT. \par \par Today, she reports she is feeling generally well. She reports SOB after walking more than 5 blocks. Denies SOB at rest, cough, CP,  dysphagia, fever, chills, unintentional weight loss, pain.  \par \par \par 2/15/19- FOLLOW UP\par Ms. Arvizu returns for routine follow up. When she was last seen on 9/19/18, recent imaging showed while ill with cough productive of yellow sputum, new nodules, infectious vs. inflammatory. Plan was for repeat CT chest and tumor board review of imaging. \par \par Chest CT on 2/8/19 showed the following:\par 1.  Compared to 7/9/2018, the small airways disease in the left upper division shows much improvement with resolution of 7 mm and smaller satellite nodules.\par 2.  The atelectatic changes/scars of both lungs are again noted. The right upper lobe changes are slightly more confluent.\par 3.  Status post wedge resection right lower lobe.\par 4.  Emphysematous changes.\par 5.  No new nodule.\par 6.  T7 compression deformity.\par \par Today, she reports she is feeling generally well. Denies SOB, cough, dysphagia, fever, chills, unintentional weight loss. She has gained weight recently, which she attributes to being less active on account of the cold weather. \par \par \par 9/19/18- Follow up\par At her last visit  imaging showed new nodules: infectious vs. inflammatory. Plan was to get images for tumor board review, and consider repeat short interval imaging but await recommendations of tumor board.\par \par Today she notes that she feels well with good energy level.  Denies sob or cough. She has notes some wt. loss due to eating less sweets.She has been f/u with her PMD.\par \par 7/18/17-Follow up\par Ms. Woods received 4800 cGy on 6/14/17  to the right middle lobe for a new NSCLC . She is s/p Right VATS robotic assisted right sub-lobar resection with lymph node dissection 11/2013 and a history of multiple lung cancer including SBRT\par At her last visit  imaging was without  clear evidence of recurrent/residual disease with increased atelectatic changes likely due to post-treatment radiation change. \par \par CT chest  with contrast 7/9/18 showed two new nodules in the right upper lobe and one in the left upper division. Differential diagnosis of these representing post inflammatory changes. Metastatic foci should be considered. Follow up CT recommended in 3 weeks. worsening atelectatic changes and bronchiectasis of the lower lungs. There is newly developed bronchiolitis in the left lower lobe. Severe compression of T7 vertebral body without change\par \par Since she has been well until recently when she contracted the flu and is now recovering. She has  a 12 lb wt loss since last visit\par and reports no change in appetite. Notes diarrhea 1-2x day. Has dry cough and sob with exertion. Denies pain.\par She has f/u with PMD 3 weeks ago.\par \par 3/6/18-Follow up\par Ms. Woods is an 84 year old female here for a routine follow up appointment. She received 4800 cGy on 6/14/17  to the right middle lobe for adenocarcinoma. At her last visit  9/2017 she has two lesions and was referred to dermatology.\par \par Since her last f/u appt. in September 2017 she has been feeling well She has gained weight and has f/u with dematology. She had a bx done which was positive for Seborrheic Keratosis. No treatment ordered.\par -denies fatigue. has sob with exertion. Denies cough or chest pain\par \par  CT Chest w/o contrast- more extensive atelectatic change of the lingula. No significant change from prior study\par \par \par _______________________\par ONCOLOGIC HISTORY- In 2013 she underwent  a VATS right sub-lobar resection with lymph node dissection for biopsy proven lower lobe.  She has been followed thereafter with radiographic surveillance.  In 2014, a left upper lobe nodule increased in size with associated PET avidity.  This lesion was treated with SBRT by Dr. Bailey to a dose of 5000cGy over 5 fractions from 10/6/14-10/15/14. \par \par 12/2016- Post-treatment imaging has shows a complete response of the treated lesion.  However, there is a right spiculated middle lobe nodule that has been increasing in size with minimal associated FDG avidity.  She was recommended for biopsy, which she is adamantly refusing.  She presented to us for consideration of SBRT at which time the decision was made to follow radiographically.  \par 3/29/2017 - Ms Woods had a PET/CT 3/21/2017.  This shows three areas of concern.  In the right lung apex is a 1.3 X 2.1 cm region of scar and bronchiectasis with an SUV of 1.9.  It is mentioned that this likely is secondary to post radiation change.  However, radiation was delivered to the left upper lobe, not right upper lobe.  There is an additional 1.2 X 2.2 cm spiculated mass in the right middle lobe with an SUV of 3.4, prior 2.2.  This is concerning for disease.  We had recommended SBRT to this at time of last f/u.  The third area of concern is an area of consolidation within the lingula of the left lung adjacent to the left fissure.  There is a focus measuring 1.1 X 1.1 cm with an suv max of 2.6.  There was persistent uptake of a rectal lesion that has not changed - etiology unclear.  \par \par 4/4 - She presents today for a f/u visit to discuss review of her imaging.  Clinically she continues to be asymptomatic.  Radiographically, there does not appear to be significant change in the right upper lobe lesion over the previous few years.  Etiology of this is unclear.  The lingula lesion does appear to have uptake and seems enlarged.  This may warrant further evaluation.  \par \par 5/17/17- We reviewed Ms Corona most recent CT scan in Thoracic Tumor board where it was felt that the lesion in the left lung of interest was most consistent with an effusion rather than malignancy.  The recommendation was to proceed with SBRT planning for her right lung nodule.  I discussed this with Ms. Woods.  She has previously had SBRT and so is familiar with the treatment planning process.  Informed consent was signed.  Scheduling will follow. \par \par 9/20/17- \par She last saw us in July 2017 for PTE, at which time she felt well.  CT scan 9/15/17 showed stable right apical consolidation with traction bronchiectasis likely postradiation fibrosis. Stable right middle lobe nodule and adjacent fibrotic scarring\par Stable spiculated perihilar lingula consolidation. New right lower lobe reticulonodular infiltrate. New bilateral lower lobe subpleural wedge shaped opacities. This could be inflammatory.\par \par She feels well today. Denies wheezing, cough. She notes no changes in her breathing since before radiation, although she notes she had a cold a couple weeks ago. She has two lesions on her skin which she is concerned are related to skin cancer. One of these is on her right hip, and one is on her left flank.  She had mild pain with coughing, not not at baseline.\par

## 2020-02-14 NOTE — PHYSICAL EXAM
[General Appearance - Alert] : alert [General Appearance - In No Acute Distress] : in no acute distress [Extraocular Movements] : extraocular movements were intact [Sclera] : the sclera and conjunctiva were normal [Thin] : thin [Outer Ear] : the ears and nose were normal in appearance [] : no respiratory distress [Hearing Threshold Finger Rub Not Deuel] : hearing was normal [Respiration, Rhythm And Depth] : normal respiratory rhythm and effort [Heart Sounds] : normal S1 and S2 [Exaggerated Use Of Accessory Muscles For Inspiration] : no accessory muscle use [Abdomen Soft] : soft [Bowel Sounds] : normal bowel sounds [Normal] : no focal deficits [Oriented To Time, Place, And Person] : oriented to person, place, and time [de-identified] : BS diminished to RML, otherwise clear.  [de-identified] : She has the vestiges of an allergic reaction to her antiobiotics.

## 2020-02-27 ENCOUNTER — APPOINTMENT (OUTPATIENT)
Dept: PULMONOLOGY | Facility: CLINIC | Age: 85
End: 2020-02-27

## 2020-03-05 ENCOUNTER — APPOINTMENT (OUTPATIENT)
Dept: PULMONOLOGY | Facility: CLINIC | Age: 85
End: 2020-03-05
Payer: MEDICARE

## 2020-03-05 VITALS
HEART RATE: 114 BPM | OXYGEN SATURATION: 97 % | HEIGHT: 60 IN | SYSTOLIC BLOOD PRESSURE: 100 MMHG | DIASTOLIC BLOOD PRESSURE: 70 MMHG | TEMPERATURE: 97.9 F | BODY MASS INDEX: 19.24 KG/M2 | WEIGHT: 98 LBS

## 2020-03-05 PROCEDURE — 99214 OFFICE O/P EST MOD 30 MIN: CPT

## 2020-03-06 NOTE — PHYSICAL EXAM
[General Appearance - Well Developed] : well developed [Well Groomed] : well groomed [General Appearance - In No Acute Distress] : no acute distress [No Deformities] : no deformities [General Appearance - Well Nourished] : well nourished [Eyelids - No Xanthelasma] : the eyelids demonstrated no xanthelasmas [Normal Conjunctiva] : the conjunctiva exhibited no abnormalities [Neck Appearance] : the appearance of the neck was normal [Jugular Venous Distention Increased] : there was no jugular-venous distention [Neck Cervical Mass (___cm)] : no neck mass was observed [Thyroid Diffuse Enlargement] : the thyroid was not enlarged [Thyroid Nodule] : there were no palpable thyroid nodules [Heart Rate And Rhythm] : heart rate and rhythm were normal [Heart Sounds] : normal S1 and S2 [Murmurs] : no murmurs present [Respiration, Rhythm And Depth] : normal respiratory rhythm and effort [Exaggerated Use Of Accessory Muscles For Inspiration] : no accessory muscle use [Abdomen Soft] : soft [Abnormal Walk] : normal gait [Bowel Sounds] : normal bowel sounds [Nail Clubbing] : no clubbing of the fingernails [Cyanosis, Localized] : no localized cyanosis [Gait - Sufficient For Exercise Testing] : the gait was sufficient for exercise testing [Petechial Hemorrhages (___cm)] : no petechial hemorrhages [] : no rash [Sensation] : the sensory exam was normal to light touch and pinprick [Mood] : the mood was normal [Oriented To Time, Place, And Person] : oriented to person, place, and time [Affect] : the affect was normal [Skin Turgor] : normal skin turgor [Skin Color & Pigmentation] : normal skin color and pigmentation [No Acute Distress] : no acute distress [Well Nourished] : well nourished [Normal Oropharynx] : normal oropharynx [Normal Appearance] : normal appearance [Supple] : supple [No Neck Mass] : no neck mass [Normal Rate/Rhythm] : normal rate/rhythm [Normal Pulses] : normal pulses [No Resp Distress] : no resp distress [Clear to Auscultation Bilaterally] : clear to auscultation bilaterally [Kyphosis] : kyphosis [Benign] : benign [Not Tender] : not tender [Normal Gait] : normal gait [No Clubbing] : no clubbing [No Edema] : no edema [No Focal Deficits] : no focal deficits [No Motor Deficits] : no motor deficits [Oriented x3] : oriented x3 [Normal Mood] : normal mood [Normal Affect] : normal affect [TextBox_125] : healing rash through out the back/torso, legs [FreeTextEntry2] : trace LE edema [FreeTextEntry1] : +red scattered small papules on skin to b/l legs & back s/p reaction to Levaquin- appear to be healing well

## 2020-03-06 NOTE — REVIEW OF SYSTEMS
[Negative] : Endocrine [Fever] : no fever [Chills] : no chills [Fatigue] : no fatigue [Poor Appetite] : normal appetite  [Recent Wt Loss (___ Lbs)] : no recent weight loss [Cough] : no cough [Sputum] : not coughing up ~M sputum [Hemoptysis] : no hemoptysis [Dyspnea] : no dyspnea [Chest Tightness] : no chest tightness [Pleuritic Pain] : no pleuritic pain [Frequent URIs] : no frequent upper respiratory infections [Wheezing] : no wheezing

## 2020-03-06 NOTE — HISTORY OF PRESENT ILLNESS
[FreeTextEntry1] : 86 year old female nonsmoker with PMHx of multiple lung adenocarcinoma (multiple primaries) s/p resection and radiation therapy,referred to our team for a navigational bronchoscopy with biopsy of enlarging right apical density which is was identified as another primary lung ca on 11/22/2019.\par \par PMHx:\par 11/2013 : sublobar resection of RLL with LN diss for lung adeno (KRAS+)\par 2017: SBRT to ANANYA nodule, suspicious of another primary, but not biopsies ( patient refused), treated empirically with SBRT.\par 11/22/2019: Bronch with RUL bx / staging ebus: lung adeno with LN 4R,4L,7,11L all negative for malignancy.\par \par Patient underwent SBRT to  RUL tumor with  Dr. Godfrey/Dr. Hill. Treatment was completed on  2/28 with interruption of treatment for approximately 1 month due to cough likely related so URI/mild pneumonia.\par \par 3/5/20\par Patient presents today for follow up visit. No longer coughing. No sputum. No fevers, night sweats. No more nasal congestion. Appetite is good. Finished 5 rounds of radiation- last session was last Friday. Tolerated well. \par \par

## 2020-03-06 NOTE — ASSESSMENT
[FreeTextEntry1] : 86 year old female with PMHx of lung adenocarcinoma s/p VATS and sub-lobar resection in 2013 of RLL, ANANYA ca s/p SBRT in 2017, undergoing SBRT of RUL adeno ca ( new primary).\par \par Patient feeling well today. Had URI  with progression to mild pneumonia back in February with persistent cough which has resolved. \par \par Patient has finished 5 sessions of radiation. Last session was a week ago. Will do CT chest in 3 months to evaluate response to radiation therapy. \par \par RTC after CT chest \par \par \par

## 2020-07-30 ENCOUNTER — OUTPATIENT (OUTPATIENT)
Dept: OUTPATIENT SERVICES | Facility: HOSPITAL | Age: 85
LOS: 1 days | End: 2020-07-30
Payer: MEDICARE

## 2020-07-30 LAB — GLUCOSE BLDC GLUCOMTR-MCNC: 105 MG/DL — HIGH (ref 70–99)

## 2020-07-30 PROCEDURE — A9552: CPT

## 2020-07-30 PROCEDURE — 82962 GLUCOSE BLOOD TEST: CPT

## 2020-07-30 PROCEDURE — 78815 PET IMAGE W/CT SKULL-THIGH: CPT | Mod: 26

## 2020-07-30 PROCEDURE — 78815 PET IMAGE W/CT SKULL-THIGH: CPT

## 2020-08-07 ENCOUNTER — APPOINTMENT (OUTPATIENT)
Dept: PULMONOLOGY | Facility: CLINIC | Age: 85
End: 2020-08-07
Payer: MEDICARE

## 2020-08-07 VITALS
HEART RATE: 70 BPM | RESPIRATION RATE: 12 BRPM | SYSTOLIC BLOOD PRESSURE: 90 MMHG | HEIGHT: 60 IN | TEMPERATURE: 97.9 F | BODY MASS INDEX: 16.69 KG/M2 | DIASTOLIC BLOOD PRESSURE: 70 MMHG | OXYGEN SATURATION: 94 % | WEIGHT: 85 LBS

## 2020-08-07 PROCEDURE — 99214 OFFICE O/P EST MOD 30 MIN: CPT

## 2020-08-07 NOTE — PHYSICAL EXAM
[No Acute Distress] : no acute distress [Well Nourished] : well nourished [Normal Appearance] : normal appearance [Normal Oropharynx] : normal oropharynx [No Neck Mass] : no neck mass [Normal Rate/Rhythm] : normal rate/rhythm [No Murmurs] : no murmurs [No Resp Distress] : no resp distress [No Abnormalities] : no abnormalities [Benign] : benign [Not Tender] : not tender [Normal Gait] : normal gait [Gait - Sufficient For Exercise Testing] : gait sufficient for exercise testing [No Edema] : no edema [No Clubbing] : no clubbing [Normal Color/ Pigmentation] : normal color/ pigmentation [No Focal Deficits] : no focal deficits [No Rash] : no rash [Normal Affect] : normal affect [No Motor Deficits] : no motor deficits [Oriented x3] : oriented x3 [TextBox_68] : Decrease breath sounds on right side

## 2020-08-07 NOTE — REVIEW OF SYSTEMS
[Dyspnea] : dyspnea [Fever] : no fever [Dry Eyes] : no dry eyes [Nasal Congestion] : no nasal congestion [Cough] : no cough [Chest Tightness] : no chest tightness [Postnasal Drip] : no postnasal drip [Sputum] : no sputum [Pleuritic Pain] : no pleuritic pain [Orthopnea] : no orthopnea [Chest Discomfort] : no chest discomfort [Hay Fever] : no hay fever [TextBox_151] : rest of ROS negative [Nasal Discharge] : no nasal discharge

## 2020-08-07 NOTE — DISCUSSION/SUMMARY
[FreeTextEntry1] : 87 year old female with PMHx of lung adenocarcinoma s/p VATS and sub-lobar resection in 2013 of RLL, originally diagnosed in 2013, as well as multiple lung cancers. Of note, patient is s/p radiation therapy to LLL and RML lesion as she has refused biopsy & systemic therapy, in 2014 & 2017, respectively. Patient had enlarging opacity in right apex which was FDG positive demonstrated on PET from August 2019, concerning for malignancy (recurrence vs. new primary).\par Patient underwent navigational bronchoscopy 11/22/19 with Dr. Nguyen. FNA was positive for lung adenocarcinoma in RUL, lymph nodes negative.\par She completed a course of SBRT to RUL tumor from Jan-Feb 2020.\par \par Repeat CT at [facility] 6/5/20 revealed potentially new 2.5 x 1.0 cm posterior RUL opacity and PET-CT 7/30/20 revealed 2 new FDG-avid subcentimeter nodules in left subpleural region as well as larger pleural effusion.\par \par \par Oxygen saturation on room air at rest 94%\par \par Plan:\par - CBC, PT/INR/APTT done today\par - Patient had questions about thoracentesis and she would like to think about it further. 30 minutes were spent with patient and procedure was explained along with complication. \par - Patient denies SOB at present. She agreed to come back on Tuesday for thoracentesis. Patient is aware that she needs to come back to ED if she starts to complain of SOB at rest or exertion\par - Patient to be presented at multidisciplinary Thoracic Tumor Board next Thursday 8/13/20\par

## 2020-08-07 NOTE — REASON FOR VISIT
[Follow-Up] : a follow-up visit [Abnormal CXR/ Chest CT] : an abnormal CXR/ chest CT [Lung Cancer] : lung cancer [Pulmonary Nodules] : pulmonary nodules [TextBox_44] : '

## 2020-08-07 NOTE — HISTORY OF PRESENT ILLNESS
[TextBox_4] : 87 year old female nonsmoker with PMHx of lung adenocarcinoma (likely multiple primaries) s/p radiation therapy (patient has refused systemic therapy & biopsies in the past) referred to our team for a navigational bronchoscopy with biopsy of enlarging right apical density which is hypermetabolic on recent surveillance CT & PET from August 2019.\par \par The lung adenocarcinoma was originally diagnosed in 2013 s/p right VATS robotic assisted right sub-lobar resection & lymph node dissection. Patient received radiation with Dr. Bailey for LEFT upper lobe lesion which had increased in size, consistent with new primary lung cancer, in October 2014. PET done April 2015 revealed stability of ground glass mildly FDG-avid opacity in RIGHT lung apex. Patient refused biopsy at that time and instead had a PET CT in September 2017 which demonstrated spiculated lesion in RIGHT middle lobe with increased FDG activity. Refused biopsy again and instead followed with radiation oncology. PET March 2017 revealed increase in size and FDG-avidity in RML lesion with new hypermetabolic lesion in LEFT lingula. Referred for Navigation bronchoscopy but refused at that time; instead received radiation from 6/6/17-6/14/17 for stage 1A NSCLC. Of note, also received SBRT to left lower lobe. Most recent PET from August 2019 revealed hypermetabolic activity in opacity in RUL which has increased in size, density & FDG activity since 2017, as well as mildly hypermetabolic opacities in the RIGHT lower lobe and lingula (likely consistent with radiation changes) which have also increased in size since prior PET in 2017. \par \par Today, patient reports feeling well overall. Denies shortness of breath at rest but does feel SOB after walking 3 blocks. Does not climb stairs. Denies fever, chills, unintentional weight loss or night sweats. Denies chest pain/tightness, palpitations or lower extremity edema. Patient recalls that she did have a productive cough with yellow sputum 2 weeks ago which has since resolved. \par \par 11/13/19:\par Patient missed earlier appointment after repeat CT chest and MRI brain. No brain metastasis on MRI. CT chest showed persistent RUL opacity (which was PET avid in past). Patient has denied for biopsy in past but she discussed with office of Dr. Godfrey, who also suggested her for EBUS with TBNA with TBLB. \par \par 8/7/20:\par Patient presents today for follow up visit and to review findings from recent chest imaging. Of note, patient underwent navigational bronchoscopy 11/22/19 with Dr. Nguyen. FNA was positive for lung adenocarcinoma in RUL, lymph nodes negative. After bronchoscopy, patient went into pulmonary edema (hx of valvular disease, HFpEF) and ended up in MICU.She later developed CAP in Jan and was treated with course of Cefpodoxime. \par \par Ms. Woods has completed a course of SBRT to RUL tumor from Jan-Feb 2020 with Dr. Cortez (though, the course was temporarily interrupted because of her pneumonia and ongoing cough at the time). \par \par Repeat CT at [facility] 6/5/20 revealed potentially new 2.5 x 1.0 cm posterior RUL opacity & relative stability of right apical opacity since 10/2019  (we are in the process of obtaining the CD from [facility] to upload to our PACS). Subsequent PET-CT 7/30/20 revealed 2 new FDG-avid subcentimeter nodules in left subpleural region as well as larger pleural effusion \par \par Today, patient is denying any new complaint. When, I asked her if she feels more SOB than usual then she said that sometimes she feels SOB. Denying cough, fever or chest pain.\par

## 2020-08-11 ENCOUNTER — APPOINTMENT (OUTPATIENT)
Dept: PULMONOLOGY | Facility: CLINIC | Age: 85
End: 2020-08-11
Payer: MEDICARE

## 2020-08-11 ENCOUNTER — LABORATORY RESULT (OUTPATIENT)
Age: 85
End: 2020-08-11

## 2020-08-11 ENCOUNTER — RESULT REVIEW (OUTPATIENT)
Age: 85
End: 2020-08-11

## 2020-08-11 ENCOUNTER — APPOINTMENT (OUTPATIENT)
Dept: PULMONOLOGY | Facility: CLINIC | Age: 85
End: 2020-08-11

## 2020-08-11 ENCOUNTER — OUTPATIENT (OUTPATIENT)
Dept: OUTPATIENT SERVICES | Facility: HOSPITAL | Age: 85
LOS: 1 days | End: 2020-08-11
Payer: MEDICARE

## 2020-08-11 VITALS
HEART RATE: 103 BPM | OXYGEN SATURATION: 95 % | BODY MASS INDEX: 17.47 KG/M2 | TEMPERATURE: 98.1 F | WEIGHT: 89 LBS | HEIGHT: 60 IN | SYSTOLIC BLOOD PRESSURE: 100 MMHG | DIASTOLIC BLOOD PRESSURE: 60 MMHG

## 2020-08-11 LAB
ALBUMIN SERPL ELPH-MCNC: 4.1 G/DL
ALP BLD-CCNC: 135 U/L
ALT SERPL-CCNC: 31 U/L
ANION GAP SERPL CALC-SCNC: 16 MMOL/L
APTT BLD: 32.4 SEC
AST SERPL-CCNC: 42 U/L
BASOPHILS # BLD AUTO: 0.04 K/UL
BASOPHILS NFR BLD AUTO: 0.5 %
BILIRUB SERPL-MCNC: 0.4 MG/DL
BUN SERPL-MCNC: 13 MG/DL
CALCIUM SERPL-MCNC: 9.3 MG/DL
CHLORIDE SERPL-SCNC: 104 MMOL/L
CO2 SERPL-SCNC: 26 MMOL/L
CREAT SERPL-MCNC: 0.86 MG/DL
EOSINOPHIL # BLD AUTO: 0.06 K/UL
EOSINOPHIL NFR BLD AUTO: 0.7 %
GLUCOSE SERPL-MCNC: 92 MG/DL
HCT VFR BLD CALC: 50.4 %
HGB BLD-MCNC: 15.3 G/DL
IMM GRANULOCYTES NFR BLD AUTO: 0.5 %
INR PPP: 0.94 RATIO
LYMPHOCYTES # BLD AUTO: 1.27 K/UL
LYMPHOCYTES NFR BLD AUTO: 14.6 %
MAN DIFF?: NORMAL
MCHC RBC-ENTMCNC: 30.4 GM/DL
MCHC RBC-ENTMCNC: 30.7 PG
MCV RBC AUTO: 101.2 FL
MONOCYTES # BLD AUTO: 0.7 K/UL
MONOCYTES NFR BLD AUTO: 8.1 %
NEUTROPHILS # BLD AUTO: 6.56 K/UL
NEUTROPHILS NFR BLD AUTO: 75.6 %
PLATELET # BLD AUTO: 268 K/UL
POTASSIUM SERPL-SCNC: 4.4 MMOL/L
PROT SERPL-MCNC: 6.6 G/DL
PT BLD: 11.1 SEC
RBC # BLD: 4.98 M/UL
RBC # FLD: 17.1 %
SODIUM SERPL-SCNC: 147 MMOL/L
WBC # FLD AUTO: 8.67 K/UL

## 2020-08-11 PROCEDURE — 88112 CYTOPATH CELL ENHANCE TECH: CPT | Mod: 26

## 2020-08-11 PROCEDURE — 88342 IMHCHEM/IMCYTCHM 1ST ANTB: CPT

## 2020-08-11 PROCEDURE — 88341 IMHCHEM/IMCYTCHM EA ADD ANTB: CPT

## 2020-08-11 PROCEDURE — 88341 IMHCHEM/IMCYTCHM EA ADD ANTB: CPT | Mod: 26,59

## 2020-08-11 PROCEDURE — 88344 IMHCHEM/IMCYTCHM EA MLT ANTB: CPT | Mod: 26

## 2020-08-11 PROCEDURE — 99214 OFFICE O/P EST MOD 30 MIN: CPT | Mod: 25

## 2020-08-11 PROCEDURE — 88305 TISSUE EXAM BY PATHOLOGIST: CPT | Mod: 26

## 2020-08-11 PROCEDURE — 71046 X-RAY EXAM CHEST 2 VIEWS: CPT

## 2020-08-11 PROCEDURE — 32555Z: CUSTOM

## 2020-08-11 PROCEDURE — 71046 X-RAY EXAM CHEST 2 VIEWS: CPT | Mod: 26

## 2020-08-11 PROCEDURE — 88112 CYTOPATH CELL ENHANCE TECH: CPT

## 2020-08-11 PROCEDURE — 88305 TISSUE EXAM BY PATHOLOGIST: CPT

## 2020-08-11 PROCEDURE — 88342 IMHCHEM/IMCYTCHM 1ST ANTB: CPT | Mod: 26,59

## 2020-08-11 PROCEDURE — 88344 IMHCHEM/IMCYTCHM EA MLT ANTB: CPT

## 2020-08-14 LAB — NON-GYNECOLOGICAL CYTOLOGY STUDY: SIGNIFICANT CHANGE UP

## 2020-08-18 ENCOUNTER — INPATIENT (INPATIENT)
Facility: HOSPITAL | Age: 85
LOS: 3 days | Discharge: HOME | DRG: 555 | End: 2020-08-22
Attending: PSYCHIATRY & NEUROLOGY | Admitting: PSYCHIATRY & NEUROLOGY
Payer: MEDICARE

## 2020-08-18 VITALS
TEMPERATURE: 98 F | SYSTOLIC BLOOD PRESSURE: 133 MMHG | OXYGEN SATURATION: 100 % | HEART RATE: 88 BPM | DIASTOLIC BLOOD PRESSURE: 67 MMHG | RESPIRATION RATE: 22 BRPM

## 2020-08-18 DIAGNOSIS — Z29.9 ENCOUNTER FOR PROPHYLACTIC MEASURES, UNSPECIFIED: ICD-10-CM
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
## 2020-08-18 DIAGNOSIS — R63.8 OTHER SYMPTOMS AND SIGNS CONCERNING FOOD AND FLUID INTAKE: ICD-10-CM

## 2020-08-18 LAB
A1C WITH ESTIMATED AVERAGE GLUCOSE RESULT: 6 % — HIGH (ref 4–5.6)
ALBUMIN SERPL ELPH-MCNC: 3.6 G/DL — SIGNIFICANT CHANGE UP (ref 3.3–5)
ALBUMIN SERPL ELPH-MCNC: 3.7 G/DL — SIGNIFICANT CHANGE UP (ref 3.3–5)
ALBUMIN SERPL ELPH-MCNC: 3.7 G/DL — SIGNIFICANT CHANGE UP (ref 3.3–5)
ALP SERPL-CCNC: 105 U/L — SIGNIFICANT CHANGE UP (ref 40–120)
ALP SERPL-CCNC: SIGNIFICANT CHANGE UP U/L (ref 40–120)
ALP SERPL-CCNC: SIGNIFICANT CHANGE UP U/L (ref 40–120)
ALT FLD-CCNC: 22 U/L — SIGNIFICANT CHANGE UP (ref 10–45)
ALT FLD-CCNC: SIGNIFICANT CHANGE UP U/L (ref 10–45)
ALT FLD-CCNC: SIGNIFICANT CHANGE UP U/L (ref 10–45)
ANION GAP SERPL CALC-SCNC: 10 MMOL/L — SIGNIFICANT CHANGE UP (ref 5–17)
ANION GAP SERPL CALC-SCNC: 13 MMOL/L — SIGNIFICANT CHANGE UP (ref 5–17)
APTT BLD: 19.7 SEC — LOW (ref 27.5–35.5)
AST SERPL-CCNC: 25 U/L — SIGNIFICANT CHANGE UP (ref 10–40)
AST SERPL-CCNC: SIGNIFICANT CHANGE UP U/L (ref 10–40)
AST SERPL-CCNC: SIGNIFICANT CHANGE UP U/L (ref 10–40)
BASOPHILS # BLD AUTO: 0.03 K/UL — SIGNIFICANT CHANGE UP (ref 0–0.2)
BASOPHILS NFR BLD AUTO: 0.3 % — SIGNIFICANT CHANGE UP (ref 0–2)
BILIRUB SERPL-MCNC: 0.4 MG/DL — SIGNIFICANT CHANGE UP (ref 0.2–1.2)
BILIRUB SERPL-MCNC: 0.4 MG/DL — SIGNIFICANT CHANGE UP (ref 0.2–1.2)
BILIRUB SERPL-MCNC: 0.5 MG/DL — SIGNIFICANT CHANGE UP (ref 0.2–1.2)
BUN SERPL-MCNC: 15 MG/DL — SIGNIFICANT CHANGE UP (ref 7–23)
CALCIUM SERPL-MCNC: 9 MG/DL — SIGNIFICANT CHANGE UP (ref 8.4–10.5)
CALCIUM SERPL-MCNC: 9.2 MG/DL — SIGNIFICANT CHANGE UP (ref 8.4–10.5)
CALCIUM SERPL-MCNC: 9.6 MG/DL — SIGNIFICANT CHANGE UP (ref 8.4–10.5)
CHLORIDE SERPL-SCNC: 100 MMOL/L — SIGNIFICANT CHANGE UP (ref 96–108)
CHLORIDE SERPL-SCNC: 101 MMOL/L — SIGNIFICANT CHANGE UP (ref 96–108)
CHLORIDE SERPL-SCNC: 104 MMOL/L — SIGNIFICANT CHANGE UP (ref 96–108)
CHOLEST SERPL-MCNC: 216 MG/DL — HIGH (ref 10–199)
CO2 SERPL-SCNC: 21 MMOL/L — LOW (ref 22–31)
CO2 SERPL-SCNC: 26 MMOL/L — SIGNIFICANT CHANGE UP (ref 22–31)
CO2 SERPL-SCNC: 30 MMOL/L — SIGNIFICANT CHANGE UP (ref 22–31)
CREAT SERPL-MCNC: 0.87 MG/DL — SIGNIFICANT CHANGE UP (ref 0.5–1.3)
CREAT SERPL-MCNC: 0.9 MG/DL — SIGNIFICANT CHANGE UP (ref 0.5–1.3)
CREAT SERPL-MCNC: 0.95 MG/DL — SIGNIFICANT CHANGE UP (ref 0.5–1.3)
EOSINOPHIL # BLD AUTO: 0.06 K/UL — SIGNIFICANT CHANGE UP (ref 0–0.5)
EOSINOPHIL NFR BLD AUTO: 0.5 % — SIGNIFICANT CHANGE UP (ref 0–6)
ERYTHROCYTE [SEDIMENTATION RATE] IN BLOOD: 26 MM/HR — SIGNIFICANT CHANGE UP
ESTIMATED AVERAGE GLUCOSE: 126 MG/DL — HIGH (ref 68–114)
GLUCOSE SERPL-MCNC: 103 MG/DL — HIGH (ref 70–99)
GLUCOSE SERPL-MCNC: 112 MG/DL — HIGH (ref 70–99)
GLUCOSE SERPL-MCNC: 149 MG/DL — HIGH (ref 70–99)
HCT VFR BLD CALC: 47.2 % — HIGH (ref 34.5–45)
HDLC SERPL-MCNC: 80 MG/DL — SIGNIFICANT CHANGE UP
HGB BLD-MCNC: 15.3 G/DL — SIGNIFICANT CHANGE UP (ref 11.5–15.5)
IMM GRANULOCYTES NFR BLD AUTO: 0.3 % — SIGNIFICANT CHANGE UP (ref 0–1.5)
INR BLD: 0.92 — SIGNIFICANT CHANGE UP (ref 0.88–1.16)
LACTATE SERPL-SCNC: 1.4 MMOL/L — SIGNIFICANT CHANGE UP (ref 0.5–2)
LACTATE SERPL-SCNC: 2.1 MMOL/L — HIGH (ref 0.5–2)
LIPID PNL WITH DIRECT LDL SERPL: 107 MG/DL — HIGH
LYMPHOCYTES # BLD AUTO: 2.52 K/UL — SIGNIFICANT CHANGE UP (ref 1–3.3)
LYMPHOCYTES # BLD AUTO: 23.1 % — SIGNIFICANT CHANGE UP (ref 13–44)
MCHC RBC-ENTMCNC: 31.7 PG — SIGNIFICANT CHANGE UP (ref 27–34)
MCHC RBC-ENTMCNC: 32.4 GM/DL — SIGNIFICANT CHANGE UP (ref 32–36)
MCV RBC AUTO: 97.7 FL — SIGNIFICANT CHANGE UP (ref 80–100)
MONOCYTES # BLD AUTO: 0.87 K/UL — SIGNIFICANT CHANGE UP (ref 0–0.9)
MONOCYTES NFR BLD AUTO: 8 % — SIGNIFICANT CHANGE UP (ref 2–14)
NEUTROPHILS # BLD AUTO: 7.42 K/UL — HIGH (ref 1.8–7.4)
NEUTROPHILS NFR BLD AUTO: 67.8 % — SIGNIFICANT CHANGE UP (ref 43–77)
NRBC # BLD: 0 /100 WBCS — SIGNIFICANT CHANGE UP (ref 0–0)
PLATELET # BLD AUTO: 267 K/UL — SIGNIFICANT CHANGE UP (ref 150–400)
POTASSIUM SERPL-MCNC: 5 MMOL/L — SIGNIFICANT CHANGE UP (ref 3.5–5.3)
POTASSIUM SERPL-MCNC: SIGNIFICANT CHANGE UP MMOL/L (ref 3.5–5.3)
POTASSIUM SERPL-MCNC: SIGNIFICANT CHANGE UP MMOL/L (ref 3.5–5.3)
POTASSIUM SERPL-SCNC: 5 MMOL/L — SIGNIFICANT CHANGE UP (ref 3.5–5.3)
POTASSIUM SERPL-SCNC: SIGNIFICANT CHANGE UP MMOL/L (ref 3.5–5.3)
POTASSIUM SERPL-SCNC: SIGNIFICANT CHANGE UP MMOL/L (ref 3.5–5.3)
PROT SERPL-MCNC: 6.7 G/DL — SIGNIFICANT CHANGE UP (ref 6–8.3)
PROT SERPL-MCNC: 7.2 G/DL — SIGNIFICANT CHANGE UP (ref 6–8.3)
PROT SERPL-MCNC: 7.4 G/DL — SIGNIFICANT CHANGE UP (ref 6–8.3)
PROTHROM AB SERPL-ACNC: 11.1 SEC — SIGNIFICANT CHANGE UP (ref 10.6–13.6)
RBC # BLD: 4.83 M/UL — SIGNIFICANT CHANGE UP (ref 3.8–5.2)
RBC # FLD: 16.6 % — HIGH (ref 10.3–14.5)
SARS-COV-2 RNA SPEC QL NAA+PROBE: SIGNIFICANT CHANGE UP
SODIUM SERPL-SCNC: 135 MMOL/L — SIGNIFICANT CHANGE UP (ref 135–145)
SODIUM SERPL-SCNC: 136 MMOL/L — SIGNIFICANT CHANGE UP (ref 135–145)
SODIUM SERPL-SCNC: 143 MMOL/L — SIGNIFICANT CHANGE UP (ref 135–145)
TOTAL CHOLESTEROL/HDL RATIO MEASUREMENT: 2.7 RATIO — LOW (ref 3.3–7.1)
TRIGL SERPL-MCNC: 143 MG/DL — SIGNIFICANT CHANGE UP (ref 10–149)
TROPONIN T SERPL-MCNC: <0.01 NG/ML — SIGNIFICANT CHANGE UP (ref 0–0.01)
TSH SERPL-MCNC: 2.68 UIU/ML — SIGNIFICANT CHANGE UP (ref 0.35–4.94)
WBC # BLD: 10.93 K/UL — HIGH (ref 3.8–10.5)
WBC # FLD AUTO: 10.93 K/UL — HIGH (ref 3.8–10.5)

## 2020-08-18 PROCEDURE — 93010 ELECTROCARDIOGRAM REPORT: CPT

## 2020-08-18 PROCEDURE — 72192 CT PELVIS W/O DYE: CPT | Mod: 26

## 2020-08-18 PROCEDURE — 70450 CT HEAD/BRAIN W/O DYE: CPT | Mod: 26

## 2020-08-18 PROCEDURE — 99291 CRITICAL CARE FIRST HOUR: CPT | Mod: CS

## 2020-08-18 PROCEDURE — 71045 X-RAY EXAM CHEST 1 VIEW: CPT | Mod: 26

## 2020-08-18 RX ORDER — SODIUM CHLORIDE 9 MG/ML
500 INJECTION INTRAMUSCULAR; INTRAVENOUS; SUBCUTANEOUS ONCE
Refills: 0 | Status: COMPLETED | OUTPATIENT
Start: 2020-08-18 | End: 2020-08-18

## 2020-08-18 RX ORDER — ENOXAPARIN SODIUM 100 MG/ML
30 INJECTION SUBCUTANEOUS EVERY 24 HOURS
Refills: 0 | Status: DISCONTINUED | OUTPATIENT
Start: 2020-08-18 | End: 2020-08-22

## 2020-08-18 RX ORDER — ATORVASTATIN CALCIUM 80 MG/1
40 TABLET, FILM COATED ORAL AT BEDTIME
Refills: 0 | Status: DISCONTINUED | OUTPATIENT
Start: 2020-08-18 | End: 2020-08-22

## 2020-08-18 RX ORDER — ASPIRIN/CALCIUM CARB/MAGNESIUM 324 MG
81 TABLET ORAL ONCE
Refills: 0 | Status: COMPLETED | OUTPATIENT
Start: 2020-08-18 | End: 2020-08-18

## 2020-08-18 RX ORDER — ASPIRIN/CALCIUM CARB/MAGNESIUM 324 MG
81 TABLET ORAL EVERY 24 HOURS
Refills: 0 | Status: DISCONTINUED | OUTPATIENT
Start: 2020-08-19 | End: 2020-08-22

## 2020-08-18 RX ADMIN — SODIUM CHLORIDE 500 MILLILITER(S): 9 INJECTION INTRAMUSCULAR; INTRAVENOUS; SUBCUTANEOUS at 12:25

## 2020-08-18 RX ADMIN — Medication 81 MILLIGRAM(S): at 12:24

## 2020-08-18 RX ADMIN — ENOXAPARIN SODIUM 30 MILLIGRAM(S): 100 INJECTION SUBCUTANEOUS at 21:36

## 2020-08-18 RX ADMIN — ATORVASTATIN CALCIUM 40 MILLIGRAM(S): 80 TABLET, FILM COATED ORAL at 21:37

## 2020-08-18 NOTE — H&P ADULT - NSHPLABSRESULTS_GEN_ALL_CORE
.  LABS:                         15.3   10.93 )-----------( 267      ( 18 Aug 2020 11:34 )             47.2     08-18    136  |  100  |  15  ----------------------------<  112<H>  see note   |  26  |  0.87    Ca    9.0      18 Aug 2020 13:03    TPro  7.2  /  Alb  3.7  /  TBili  0.4  /  DBili  x   /  AST  see note  /  ALT  see note  /  AlkPhos  see note  08-18    PT/INR - ( 18 Aug 2020 11:34 )   PT: 11.1 sec;   INR: 0.92          PTT - ( 18 Aug 2020 11:34 )  PTT:19.7 sec    CARDIAC MARKERS ( 18 Aug 2020 11:34 )  x     / <0.01 ng/mL / x     / x     / x            Lactate, Blood: 2.1 mmol/L (08-18 @ 13:30)      RADIOLOGY, EKG & ADDITIONAL TESTS: Reviewed.       CT Brain Stroke Protocol (08.18.20 @ 11:44) >    FINDINGS: The CT examination demonstrates the ventricles, cisternal spaces, and cortical sulci to be within normal limits for the patient's age. There is no midline shift or extra axial fluid collections.  Patchy areas of diminished attenuation within the periventricular and subcortical white matter lucency are noted, with chronic microangiopathic ischemic disease. There is a lacunar infarct in the right putamen. There is nointracranial hemorrhage or acute transcortical infarct.    The bony windows demonstrates no fractures. The visualized paranasal sinuses are within normal limits. The mastoid air cells are well aerated.    IMPRESSION:    1. No intracranial hemorrhage or acute transcortical infarct.  2. Chronic microangiopathic disease and age-appropriate volume loss.      CT Pelvis Bony Only No Cont (08.18.20 @ 13:28) >    FINDINGS: CT of the bony pelvis was performed in the axial plane. Reconstructions were performed in the sagittal and coronal planes. No prior studies are available for comparison.    Evaluation of the bony pelvis demonstrates osteopenia. No fracture. No dislocation. Degenerative change of the lower lumbar spine. Mild anterolisthesis of L4 on L5. Evaluation of the internal pelvic organs demonstrate extensive aortoiliac calcification. Extensive sigmoid diverticulosis. Fecal loading of stool throughout the large bowel.    IMPRESSION:    Negative for fracture.

## 2020-08-18 NOTE — H&P ADULT - ASSESSMENT
87y Female with PMHx of lung adenocarcinoma s/p radiation who presents with right leg weakness associated with fall and subsequent LOC on ED. Stroke code was called, NIHSS 0. HCT showed no signs of acute infarct or hemorrhage. Patient likely with vasovagal in ED however given patient's right leg muscle "giving out" it is possible patient had a stroke or TIA, further workup needed.

## 2020-08-18 NOTE — ED ADULT NURSE NOTE - OBJECTIVE STATEMENT
Pt presented to ED for fall ~2 hours ago. pt states her legs gave out from under her and she hit her lower back and posterior head. Pt was telling story in triage when she became unresponsive and briefly went unconscious. Pt was carried into resuc. room placed on ECG and and Pox. EKG done state, Pt placed on 15L NRB. Pt responsive and AAOx3 within 30 seconds. Stroke alert called, MD Benitez at bedside. Pt to CT on monitor with RN. L 18g PIV in place, labs sent. POC . Pt weaned to 3L NC. Hx of lung CA, supposed to be wearing 3L NC but has not been able to at home.      1200- 500 cc NS infusing. Pt pleasant, AAOx3. CTH negative. Plan for CT of pelvis due to fall. Pt appears well, Passed swallow screen.

## 2020-08-18 NOTE — ED ADULT NURSE NOTE - NSIMPLEMENTINTERV_GEN_ALL_ED
Implemented All Fall with Harm Risk Interventions:  [place] to call system. Call bell, personal items and telephone within reach. Instruct patient to call for assistance. Room bathroom lighting operational. Non-slip footwear when patient is off stretcher. Physically safe environment: no spills, clutter or unnecessary equipment. Stretcher in lowest position, wheels locked, appropriate side rails in place. Provide visual cue, wrist band, yellow gown, etc. Monitor gait and stability. Monitor for mental status changes and reorient to person, place, and time. Review medications for side effects contributing to fall risk. Reinforce activity limits and safety measures with patient and family. Provide visual clues: red socks.

## 2020-08-18 NOTE — ED PROVIDER NOTE - CLINICAL SUMMARY MEDICAL DECISION MAKING FREE TEXT BOX
87F with a h/o lung adenocarcinoma who p/w dizziness, unsteady gait (feeling her "leg come out" from under her) and fall 2 hours PTA. She states she hit her head, ?LOC. Pt was noted to appear confused standing outside the ER and while being triaged she fainted (was caught by RN, no trauma) and had LOC of <1min without seizure activity. Pt quickly regained consciousness and c/o dizziness, no headache, no vision or speech change, no n/t/w in ext.  Stroke code called upon arrival however CTAs were not performed due to allergy/anaphylaxis to IV contrast per sunrise chart.  CT head with no bleed or emergent finding, pt seen by neuro Dr. Roe and will admit to neuro for MRI and r/o CVA. baby asa ordered.

## 2020-08-18 NOTE — ED PROVIDER NOTE - OBJECTIVE STATEMENT
87F with a h/o lung adenocarcinoma who p/w dizziness, unsteady gait (feeling her "leg come out" from under her) and fall 2 hours PTA. She states she hit her head, ?LOC. Pt was noted to appear confused standing outside the ER and while being traiged she fainted (was caught by RN, no trauma) and had LOC of <1min without seizure activity. Pt quickly regained consciousness and c/o dizziness, no headache, no vision or speech change, no n/t/w in ext.   Stroke code called upon arrival however CTAs were not performed due to allergy/anaphylaxis to IV contrast per sunrise chart.

## 2020-08-18 NOTE — H&P ADULT - HISTORY OF PRESENT ILLNESS
88 yo F presented to ED after right leg weakness and fall. 87y Female with PMHx of lung adenocarcinoma s/p radiation who presents with right leg weakness associated with fall. Patient lives at home alone and was trying to stand on a bench to get something at home when her right leg muscle "gave out" on her and she fell back on her lower back and head, denies LOC. She presented to [facility] and was sitting at triage when she had a moment talking to the RN where she stared off and was unresponsive for 2-3 seconds and then fell to the side. ED RN was able to catch patient and put patient on stretcher, patient's BP was 133/67 and patient regained consciousness did not remember event. Stroke code was called, NIHSS 0. HCT showed no signs of acute infarct or hemorrhage. Unable to pursue CTA as patient with anaphylaxis to contrast allergy. Patient denies acute onset of numbness, tingling, slurred speech, blurry vision, double vision, dizziness, headache.     Last known well time/Time of onset of symptoms: 8/18/2020 4896

## 2020-08-18 NOTE — ED ADULT TRIAGE NOTE - CHIEF COMPLAINT QUOTE
pt ambulatory into ED reporting mechanical fall ~2 hours ago with +head strike, denies LOC or anticoagulation. c/o back and sacral pain, denies extremity pain, no active bleeding. c/o dizziness and blurry vision. while sitting in triage chair, pt became unresponsive and syncope, taken directly to resus room

## 2020-08-18 NOTE — H&P ADULT - PROBLEM SELECTOR PLAN 1
Patient presented with right leg weakness and fall from chair. In ED, patient with witnessed episode of staring and syncopal episode.  - CT Head: No intracranial hemorrhage or acute transcortical infarct. Chronic microangiopathic disease and age-appropriate volume loss.  - A1c: 6.0%    Plan:  - f/u lipid panel, TSH, ESR  - f/u MR Angio Head and Neck Non Contrast Patient presented with right leg weakness and fall from chair. In ED, patient with witnessed episode of staring and syncopal episode. Stroke code was called, NIHSS 0.  - CT Head: No intracranial hemorrhage or acute transcortical infarct. Chronic microangiopathic disease and age-appropriate volume loss.  - A1c: 6.0%    Plan:  - Goal SBP<180  - f/u lipid panel, TSH, ESR  - f/u MR Angio Head and Neck Non Contrast    Secondary Stroke Prevention Medication  - Start aspirin 81mg daily  - Start atorvastatin 40mg PO daily- cholesterol and stroke prevention   - Start lovenox SQ and SCDs for DVT prophylaxis

## 2020-08-18 NOTE — H&P ADULT - PROBLEM SELECTOR PLAN 2
- CT Head: No intracranial hemorrhage or acute transcortical infarct.  - CT Pelvis: No fracture    - f/u PT/OT recs - CT Head: No intracranial hemorrhage or acute transcortical infarct.  - CT Pelvis: No fracture  - f/u PT/OT recs

## 2020-08-18 NOTE — ED PROVIDER NOTE - PHYSICAL EXAMINATION
GEN: Elderly, thin, frail, awake, alert, oriented to person, place, time/situation, NTAF  ENT: Airway patent, Nasal mucosa clear. Mouth with normal mucosa.  EYES: Clear bilaterally. PERRL, EOMI  RESPIRATORY: Breathing comfortably with normal RR. No W/C/R, no hypoxia or resp distress.  CARDIAC: Regular rate and rhythm, no M/R/G  ABDOMEN: Soft, nontender, +bowel sounds, no rebound, rigidity, or guarding.  MSK: Range of motion is not limited, no deformities noted.  NEURO: Alert and oriented x 3. Cn 2-12 intact. Strength 5/5 and sensation intact in all 4 extremities. no pronator drift. gait not done. Please refer to neuro note for detailed exam.  SKIN: Skin normal color for race, warm, dry and intact. No evidence of rash.  PSYCH: Alert and oriented to person, place, time/situation. normal mood and affect. no apparent risk to self or others.

## 2020-08-18 NOTE — ED ADULT NURSE NOTE - PSH
History of surgery to major organs, presenting hazards to health  removal of R-sided adenocarcinoma, negative lymphnodes

## 2020-08-18 NOTE — CONSULT NOTE ADULT - ASSESSMENT
87y Female with PMHx of lung adenocarcinoma s/p radiation who presents with right leg weakness associated with fall and subsequent LOC on ED. Stroke code was called, NIHSS 0. HCT showed no signs of acute infarct or hemorrhage. Unable to pursue CTA as patient with anaphylaxis to contrast allergy. TPA not given as patient with rapidly improving neuro deficits. Patient likely with vasovagal in ED however given patient's right leg muscle "giving out" it is possible patient had a stroke or TIA, further workup needed.     - Closely follow neuro checks every 2-4 hours   - Repeat HCT for acute changes in neuro exam  - Obtain MRA Brain and MRA Neck without contrast (MRI head studies included in MRAs)  - Goal SBP < 180  - Bedside Dysphagia Screen  - PT/OT  - Obtain Hemoglobin A1C, Lipid Profile Panel, and TSH    Secondary Stroke Prevention Medication  - Start aspirin 81mg daily  - Start atorvastatin 40mg PO daily- cholesterol and stroke prevention   - Start lovenox SQ and SCDs for DVT prophylaxis

## 2020-08-18 NOTE — CONSULT NOTE ADULT - SUBJECTIVE AND OBJECTIVE BOX
**STROKE CODE CONSULT NOTE**    Last known well time/Time of onset of symptoms: 8/18/2020 1134    HPI: 87y Female with PMHx of lung adenocarcinoma s/p radiation who presents with right leg weakness associated with fall.     T(C): 36.6 (08-18-20 @ 13:42), Max: 36.6 (08-18-20 @ 13:42)  HR: 87 (08-18-20 @ 13:42) (84 - 88)  BP: 156/69 (08-18-20 @ 13:42) (133/67 - 156/69)  RR: 18 (08-18-20 @ 13:42) (18 - 22)  SpO2: 97% (08-18-20 @ 13:42) (95% - 100%)    PAST MEDICAL & SURGICAL HISTORY:  Malignant neoplasm of bronchus and lung, unspecified site: Lung cancer  History of surgery to major organs, presenting hazards to health: removal of R-sided adenocarcinoma, negative lymphnodes      FAMILY HISTORY:  No pertinent family history: No significant family history      SOCIAL HISTORY:    MEDICATIONS  (STANDING):  atorvastatin 40 milliGRAM(s) Oral at bedtime    MEDICATIONS  (PRN):    Allergies    Bactrim (Unknown)  Cleocin HCl (Unknown)  Flagyl (Unknown)  Honey (Unknown)  iodine (Anaphylaxis)  IV Contrast (Anaphylaxis)  Levaquin (Other; Rash)  penicillin (Unknown)  Zithromax (Unknown)    Intolerances      Vital Signs Last 24 Hrs  T(C): 36.6 (18 Aug 2020 13:42), Max: 36.6 (18 Aug 2020 13:42)  T(F): 97.8 (18 Aug 2020 13:42), Max: 97.8 (18 Aug 2020 13:42)  HR: 87 (18 Aug 2020 13:42) (84 - 88)  BP: 156/69 (18 Aug 2020 13:42) (133/67 - 156/69)  BP(mean): --  RR: 18 (18 Aug 2020 13:42) (18 - 22)  SpO2: 97% (18 Aug 2020 13:42) (95% - 100%)    Physical exam:  Neurologic:  -Mental status: Awake, alert, oriented to person, place, and time. Speech is fluent with intact naming, repetition, and comprehension, no dysarthria. Recent and remote memory intact. Follows commands. Attention/concentration intact. Fund of knowledge appropriate.  -Cranial nerves:   II: Visual fields are full to confrontation.  III, IV, VI: Extraocular movements are intact without nystagmus. Pupils equally round and reactive to light  V:  Facial sensation V1-V3 equal and intact   VII: Face is symmetric with normal eye closure and smile  Motor: Normal bulk and tone. No pronator drift. Strength bilateral upper extremity 5/5, bilateral lower extremities 5/5.  Sensation: Intact to light touch bilaterally. No neglect or extinction on double simultaneous testing.  Coordination: No dysmetria on finger-to-nose   Reflexes: Downgoing toes bilaterally     NIHSS: 0    Fingerstick Blood Glucose: CAPILLARY BLOOD GLUCOSE      POCT Blood Glucose.: 126 mg/dL (18 Aug 2020 11:22)    LABS:                        15.3   10.93 )-----------( 267      ( 18 Aug 2020 11:34 )             47.2     08-18    136  |  100  |  15  ----------------------------<  112<H>  see note   |  26  |  0.87    Ca    9.0      18 Aug 2020 13:03    TPro  7.2  /  Alb  3.7  /  TBili  0.4  /  DBili  x   /  AST  see note  /  ALT  see note  /  AlkPhos  see note  08-18    PT/INR - ( 18 Aug 2020 11:34 )   PT: 11.1 sec;   INR: 0.92          PTT - ( 18 Aug 2020 11:34 )  PTT:19.7 sec  CARDIAC MARKERS ( 18 Aug 2020 11:34 )  x     / <0.01 ng/mL / x     / x     / x              RADIOLOGY & ADDITIONAL STUDIES:  < from: CT Brain Stroke Protocol (08.18.20 @ 11:44) >  1. No intracranial hemorrhage or acute transcortical infarct.  2. Chronic microangiopathic disease and age-appropriate volume loss.      -----------------------------------------------------------------------------------------------------------------  IV-tPA (Y/N):   no  Reason IV-tPA not given: no rapidly improving neuro deficits **STROKE CODE CONSULT NOTE**    Last known well time/Time of onset of symptoms: 8/18/2020 1134    HPI: 87y Female with PMHx of lung adenocarcinoma s/p radiation who presents with right leg weakness associated with fall. Patient lives at home alone and was trying to stand on a bench to get something at home when her right leg muscle "gave out" on her and she fell back on her lower back and head, denies LOC. She presented to [facility] and was sitting at triage when she had a moment talking to the RN where she stared off and was unresponsive for 2-3 seconds and then fell to the side. ED RN was able to catch patient and put patient on stretcher, patient's BP was 133/67 and patient regained consciousness did not remember event. Stroke code was called, NIHSS 0. HCT showed no signs of acute infarct or hemorrhage. Unable to pursue CTA as patient with anaphylaxis to contrast allergy. Patient denies acute onset of numbness, tingling, slurred speech, blurry vision, double vision, dizziness, headache.     T(C): 36.6 (08-18-20 @ 13:42), Max: 36.6 (08-18-20 @ 13:42)  HR: 87 (08-18-20 @ 13:42) (84 - 88)  BP: 156/69 (08-18-20 @ 13:42) (133/67 - 156/69)  RR: 18 (08-18-20 @ 13:42) (18 - 22)  SpO2: 97% (08-18-20 @ 13:42) (95% - 100%)    PAST MEDICAL & SURGICAL HISTORY:  Malignant neoplasm of bronchus and lung, unspecified site: Lung cancer  History of surgery to major organs, presenting hazards to health: removal of R-sided adenocarcinoma, negative lymphnodes      FAMILY HISTORY:  No pertinent family history: No significant family history      SOCIAL HISTORY:    MEDICATIONS  (STANDING):  atorvastatin 40 milliGRAM(s) Oral at bedtime    MEDICATIONS  (PRN):    Allergies    Bactrim (Unknown)  Cleocin HCl (Unknown)  Flagyl (Unknown)  Honey (Unknown)  iodine (Anaphylaxis)  IV Contrast (Anaphylaxis)  Levaquin (Other; Rash)  penicillin (Unknown)  Zithromax (Unknown)    Intolerances      Vital Signs Last 24 Hrs  T(C): 36.6 (18 Aug 2020 13:42), Max: 36.6 (18 Aug 2020 13:42)  T(F): 97.8 (18 Aug 2020 13:42), Max: 97.8 (18 Aug 2020 13:42)  HR: 87 (18 Aug 2020 13:42) (84 - 88)  BP: 156/69 (18 Aug 2020 13:42) (133/67 - 156/69)  BP(mean): --  RR: 18 (18 Aug 2020 13:42) (18 - 22)  SpO2: 97% (18 Aug 2020 13:42) (95% - 100%)    Physical exam:  Neurologic:  -Mental status: Awake, alert, oriented to person, place, and time. Speech is fluent with intact naming, repetition, and comprehension, no dysarthria. Recent and remote memory intact. Follows commands. Attention/concentration intact. Fund of knowledge appropriate.  -Cranial nerves:   II: Visual fields are full to confrontation.  III, IV, VI: Extraocular movements are intact without nystagmus. Pupils equally round and reactive to light  V:  Facial sensation V1-V3 equal and intact   VII: Face is symmetric with normal eye closure and smile  Motor: Normal bulk and tone. No pronator drift. Strength bilateral upper extremity 5/5, bilateral lower extremities 5/5.  Sensation: Intact to light touch bilaterally. No neglect or extinction on double simultaneous testing.  Coordination: No dysmetria on finger-to-nose   Reflexes: Downgoing toes bilaterally     NIHSS: 0    Fingerstick Blood Glucose: CAPILLARY BLOOD GLUCOSE      POCT Blood Glucose.: 126 mg/dL (18 Aug 2020 11:22)    LABS:                        15.3   10.93 )-----------( 267      ( 18 Aug 2020 11:34 )             47.2     08-18    136  |  100  |  15  ----------------------------<  112<H>  see note   |  26  |  0.87    Ca    9.0      18 Aug 2020 13:03    TPro  7.2  /  Alb  3.7  /  TBili  0.4  /  DBili  x   /  AST  see note  /  ALT  see note  /  AlkPhos  see note  08-18    PT/INR - ( 18 Aug 2020 11:34 )   PT: 11.1 sec;   INR: 0.92          PTT - ( 18 Aug 2020 11:34 )  PTT:19.7 sec  CARDIAC MARKERS ( 18 Aug 2020 11:34 )  x     / <0.01 ng/mL / x     / x     / x              RADIOLOGY & ADDITIONAL STUDIES:  < from: CT Brain Stroke Protocol (08.18.20 @ 11:44) >  1. No intracranial hemorrhage or acute transcortical infarct.  2. Chronic microangiopathic disease and age-appropriate volume loss.      -----------------------------------------------------------------------------------------------------------------  IV-tPA (Y/N):   no  Reason IV-tPA not given: no rapidly improving neuro deficits

## 2020-08-19 DIAGNOSIS — R42 DIZZINESS AND GIDDINESS: ICD-10-CM

## 2020-08-19 DIAGNOSIS — C34.90 MALIGNANT NEOPLASM OF UNSPECIFIED PART OF UNSPECIFIED BRONCHUS OR LUNG: ICD-10-CM

## 2020-08-19 LAB
ALBUMIN SERPL ELPH-MCNC: 3.3 G/DL — SIGNIFICANT CHANGE UP (ref 3.3–5)
ALP SERPL-CCNC: 92 U/L — SIGNIFICANT CHANGE UP (ref 40–120)
ALT FLD-CCNC: 18 U/L — SIGNIFICANT CHANGE UP (ref 10–45)
ANION GAP SERPL CALC-SCNC: 8 MMOL/L — SIGNIFICANT CHANGE UP (ref 5–17)
APTT BLD: 32.5 SEC — SIGNIFICANT CHANGE UP (ref 27.5–35.5)
AST SERPL-CCNC: 21 U/L — SIGNIFICANT CHANGE UP (ref 10–40)
BILIRUB SERPL-MCNC: 0.2 MG/DL — SIGNIFICANT CHANGE UP (ref 0.2–1.2)
BUN SERPL-MCNC: 22 MG/DL — SIGNIFICANT CHANGE UP (ref 7–23)
CALCIUM SERPL-MCNC: 8.6 MG/DL — SIGNIFICANT CHANGE UP (ref 8.4–10.5)
CHLORIDE SERPL-SCNC: 105 MMOL/L — SIGNIFICANT CHANGE UP (ref 96–108)
CO2 SERPL-SCNC: 28 MMOL/L — SIGNIFICANT CHANGE UP (ref 22–31)
CREAT SERPL-MCNC: 1.29 MG/DL — SIGNIFICANT CHANGE UP (ref 0.5–1.3)
GLUCOSE SERPL-MCNC: 83 MG/DL — SIGNIFICANT CHANGE UP (ref 70–99)
HCT VFR BLD CALC: 39.3 % — SIGNIFICANT CHANGE UP (ref 34.5–45)
HGB BLD-MCNC: 12.4 G/DL — SIGNIFICANT CHANGE UP (ref 11.5–15.5)
INR BLD: 0.99 — SIGNIFICANT CHANGE UP (ref 0.88–1.16)
MAGNESIUM SERPL-MCNC: 2.2 MG/DL — SIGNIFICANT CHANGE UP (ref 1.6–2.6)
MCHC RBC-ENTMCNC: 30.9 PG — SIGNIFICANT CHANGE UP (ref 27–34)
MCHC RBC-ENTMCNC: 31.6 GM/DL — LOW (ref 32–36)
MCV RBC AUTO: 98 FL — SIGNIFICANT CHANGE UP (ref 80–100)
NRBC # BLD: 0 /100 WBCS — SIGNIFICANT CHANGE UP (ref 0–0)
PHOSPHATE SERPL-MCNC: 3.6 MG/DL — SIGNIFICANT CHANGE UP (ref 2.5–4.5)
PLATELET # BLD AUTO: 203 K/UL — SIGNIFICANT CHANGE UP (ref 150–400)
POTASSIUM SERPL-MCNC: 4.3 MMOL/L — SIGNIFICANT CHANGE UP (ref 3.5–5.3)
POTASSIUM SERPL-SCNC: 4.3 MMOL/L — SIGNIFICANT CHANGE UP (ref 3.5–5.3)
PROT SERPL-MCNC: 5.9 G/DL — LOW (ref 6–8.3)
PROTHROM AB SERPL-ACNC: 11.9 SEC — SIGNIFICANT CHANGE UP (ref 10.6–13.6)
RBC # BLD: 4.01 M/UL — SIGNIFICANT CHANGE UP (ref 3.8–5.2)
RBC # FLD: 16.5 % — HIGH (ref 10.3–14.5)
SODIUM SERPL-SCNC: 141 MMOL/L — SIGNIFICANT CHANGE UP (ref 135–145)
WBC # BLD: 6.98 K/UL — SIGNIFICANT CHANGE UP (ref 3.8–10.5)
WBC # FLD AUTO: 6.98 K/UL — SIGNIFICANT CHANGE UP (ref 3.8–10.5)

## 2020-08-19 PROCEDURE — 99223 1ST HOSP IP/OBS HIGH 75: CPT

## 2020-08-19 PROCEDURE — 99223 1ST HOSP IP/OBS HIGH 75: CPT | Mod: GC

## 2020-08-19 RX ORDER — SENNA PLUS 8.6 MG/1
2 TABLET ORAL AT BEDTIME
Refills: 0 | Status: DISCONTINUED | OUTPATIENT
Start: 2020-08-19 | End: 2020-08-22

## 2020-08-19 RX ADMIN — ATORVASTATIN CALCIUM 40 MILLIGRAM(S): 80 TABLET, FILM COATED ORAL at 21:54

## 2020-08-19 RX ADMIN — ENOXAPARIN SODIUM 30 MILLIGRAM(S): 100 INJECTION SUBCUTANEOUS at 21:53

## 2020-08-19 RX ADMIN — Medication 5 MILLIGRAM(S): at 21:54

## 2020-08-19 RX ADMIN — SENNA PLUS 2 TABLET(S): 8.6 TABLET ORAL at 21:54

## 2020-08-19 RX ADMIN — Medication 81 MILLIGRAM(S): at 06:54

## 2020-08-19 NOTE — CONSULT NOTE ADULT - SUBJECTIVE AND OBJECTIVE BOX
PULMONARY SERVICE INITIAL CONSULT NOTE__INCOMPLETE   Pulm Specific History:   86F PMH lung adenocarcinoma (RLL s/p VATS resection in 2013, ANANYA XRT in 2016) and RML lesion with radiaton in 2017. In Nov 2019 Pt was admitted to the MICU after elective bronchoscopy c/b hypertensive emergency and flash pulmonary edema 2/2 severe MR and flail posterior leaflet. BAL and endobronchial lymph node biopsy were positive for adenocarcinoma from her RUL biopsy , LN station negative.       Patient is a 87y old  Female who presents with a chief complaint of right leg weakness and fall (19 Aug 2020 10:54)      HPI:  87y Female with PMHx of lung adenocarcinoma s/p radiation who presents with right leg weakness associated with fall. Patient lives at home alone and was trying to stand on a bench to get something at home when her right leg muscle "gave out" on her and she fell back on her lower back and head, denies LOC. She presented to [facility] and was sitting at triage when she had a moment talking to the RN where she stared off and was unresponsive for 2-3 seconds and then fell to the side. ED RN was able to catch patient and put patient on stretcher, patient's BP was 133/67 and patient regained consciousness did not remember event. Stroke code was called, NIHSS 0. HCT showed no signs of acute infarct or hemorrhage. Unable to pursue CTA as patient with anaphylaxis to contrast allergy. Patient denies acute onset of numbness, tingling, slurred speech, blurry vision, double vision, dizziness, headache.     Last known well time/Time of onset of symptoms: 8/18/2020 1134 (18 Aug 2020 14:07)      PAST MEDICAL & SURGICAL HISTORY:  Malignant neoplasm of bronchus and lung, unspecified site: Lung cancer  History of surgery to major organs, presenting hazards to health: removal of R-sided adenocarcinoma, negative lymphnodes      FAMILY HISTORY:  No pertinent family history: No significant family history      SOCIAL HISTORY:  Smoking Status: [ ] Current, [ ] Former, [ ] Never  Pack Years:    MEDICATIONS:  Pulmonary:    Antimicrobials:    Anticoagulants:  aspirin enteric coated 81 milliGRAM(s) Oral every 24 hours  enoxaparin Injectable 30 milliGRAM(s) SubCutaneous every 24 hours    Onc:    GI/:  bisacodyl 5 milliGRAM(s) Oral at bedtime  senna 2 Tablet(s) Oral at bedtime    Endocrine:  atorvastatin 40 milliGRAM(s) Oral at bedtime    Cardiac:    Other Medications:  atorvastatin 40 milliGRAM(s) Oral at bedtime      Allergies    Bactrim (Unknown)  Cleocin HCl (Unknown)  Flagyl (Unknown)  Honey (Unknown)  iodine (Anaphylaxis)  IV Contrast (Anaphylaxis)  Levaquin (Other; Rash)  penicillin (Unknown)  Zithromax (Unknown)    Intolerances        Vital Signs Last 24 Hrs  T(C): 36.7 (19 Aug 2020 09:30), Max: 36.8 (18 Aug 2020 22:49)  T(F): 98 (19 Aug 2020 09:30), Max: 98.3 (18 Aug 2020 22:49)  HR: 96 (19 Aug 2020 09:29) (80 - 108)  BP: 124/69 (19 Aug 2020 09:29) (106/56 - 156/69)  BP(mean): 90 (19 Aug 2020 09:29) (75 - 90)  RR: 18 (19 Aug 2020 08:51) (18 - 22)  SpO2: 93% (19 Aug 2020 09:29) (93% - 100%)        LABS:      CBC Full  -  ( 19 Aug 2020 06:40 )  WBC Count : 6.98 K/uL  RBC Count : 4.01 M/uL  Hemoglobin : 12.4 g/dL  Hematocrit : 39.3 %  Platelet Count - Automated : 203 K/uL  Mean Cell Volume : 98.0 fl  Mean Cell Hemoglobin : 30.9 pg  Mean Cell Hemoglobin Concentration : 31.6 gm/dL  Auto Neutrophil # : x  Auto Lymphocyte # : x  Auto Monocyte # : x  Auto Eosinophil # : x  Auto Basophil # : x  Auto Neutrophil % : x  Auto Lymphocyte % : x  Auto Monocyte % : x  Auto Eosinophil % : x  Auto Basophil % : x    08-19    141  |  105  |  22  ----------------------------<  83  4.3   |  28  |  1.29    Ca    8.6      19 Aug 2020 06:40  Phos  3.6     08-19  Mg     2.2     08-19    TPro  5.9<L>  /  Alb  3.3  /  TBili  0.2  /  DBili  x   /  AST  21  /  ALT  18  /  AlkPhos  92  08-19    PT/INR - ( 19 Aug 2020 06:40 )   PT: 11.9 sec;   INR: 0.99          PTT - ( 19 Aug 2020 06:40 )  PTT:32.5 sec                  RADIOLOGY & ADDITIONAL STUDIES (The following images were personally reviewed): PULMONARY SERVICE INITIAL CONSULT NOTE__INCOMPLETE   Pulm Specific History:   86F PMH distant smoker, lung adenocarcinoma (RLL s/p VATS resection in 2013, ANANYA XRT in 2016) and RML lesion with radiaton in 2017. In Nov 2019 Pt was admitted to the MICU after elective bronchoscopy c/b hypertensive emergency and flash pulmonary edema 2/2 severe MR and flail posterior leaflet. BAL and endobronchial lymph node biopsy were positive for adenocarcinoma from her RUL biopsy , LN station negative. Since d/c Pt had undergone 5 sessions of XRT. Was seen by Dr. Addison in April for worsening bilateral LE edema and thoracentesis of right pleural effusion. Since tap, Pt reports LE swelling has resolved. Breathing status has improved (able to walk her 3 block baseline, no orthopnea). Endorses 10lb weight loss over past year, chronic cough of small volume clear, yellow and gray sputum.       Patient is a 87y old  Female who presents with a chief complaint of right leg weakness and fall (19 Aug 2020 10:54)      HPI:  87y Female with PMHx of lung adenocarcinoma s/p radiation who presents with right leg weakness associated with fall. Patient lives at home alone and was trying to stand on a bench to get something at home when her right leg muscle "gave out" on her and she fell back on her lower back and head, denies LOC. She presented to [facility] and was sitting at triage when she had a moment talking to the RN where she stared off and was unresponsive for 2-3 seconds and then fell to the side. ED RN was able to catch patient and put patient on stretcher, patient's BP was 133/67 and patient regained consciousness did not remember event. Stroke code was called, NIHSS 0. HCT showed no signs of acute infarct or hemorrhage. Unable to pursue CTA as patient with anaphylaxis to contrast allergy. Patient denies acute onset of numbness, tingling, slurred speech, blurry vision, double vision, dizziness, headache.     Last known well time/Time of onset of symptoms: 8/18/2020 1134 (18 Aug 2020 14:07)      PAST MEDICAL & SURGICAL HISTORY:  Malignant neoplasm of bronchus and lung, unspecified site: Lung cancer  History of surgery to major organs, presenting hazards to health: removal of R-sided adenocarcinoma, negative lymphnodes      FAMILY HISTORY:  No pertinent family history: No significant family history      SOCIAL HISTORY:  Smoking Status: [ ] Current, [ ] Former, [ ] Never  Pack Years:    MEDICATIONS:  Pulmonary:    Antimicrobials:    Anticoagulants:  aspirin enteric coated 81 milliGRAM(s) Oral every 24 hours  enoxaparin Injectable 30 milliGRAM(s) SubCutaneous every 24 hours    Onc:    GI/:  bisacodyl 5 milliGRAM(s) Oral at bedtime  senna 2 Tablet(s) Oral at bedtime    Endocrine:  atorvastatin 40 milliGRAM(s) Oral at bedtime    Cardiac:    Other Medications:  atorvastatin 40 milliGRAM(s) Oral at bedtime      Allergies    Bactrim (Unknown)  Cleocin HCl (Unknown)  Flagyl (Unknown)  Honey (Unknown)  iodine (Anaphylaxis)  IV Contrast (Anaphylaxis)  Levaquin (Other; Rash)  penicillin (Unknown)  Zithromax (Unknown)    Intolerances        Vital Signs Last 24 Hrs  T(C): 36.7 (19 Aug 2020 09:30), Max: 36.8 (18 Aug 2020 22:49)  T(F): 98 (19 Aug 2020 09:30), Max: 98.3 (18 Aug 2020 22:49)  HR: 96 (19 Aug 2020 09:29) (80 - 108)  BP: 124/69 (19 Aug 2020 09:29) (106/56 - 156/69)  BP(mean): 90 (19 Aug 2020 09:29) (75 - 90)  RR: 18 (19 Aug 2020 08:51) (18 - 22)  SpO2: 93% (19 Aug 2020 09:29) (93% - 100%)        .  VITAL SIGNS:  T(C): 36.6 (08-19-20 @ 14:00), Max: 36.8 (08-18-20 @ 22:49)  T(F): 97.8 (08-19-20 @ 14:00), Max: 98.3 (08-18-20 @ 22:49)  HR: 82 (08-19-20 @ 11:48) (80 - 108)  BP: 140/61 (08-19-20 @ 11:48) (106/56 - 140/61)  BP(mean): 88 (08-19-20 @ 11:48) (75 - 90)  RR: 18 (08-19-20 @ 11:48) (18 - 20)  SpO2: 99% (08-19-20 @ 11:48) (93% - 99%)  Wt(kg): --    PHYSICAL EXAM:    Constitutional: thin elderly woman sittign comfortably in bed   Head: NC/AT  Eyes: PERRL, EOMI, anicteric sclera  ENT: no nasal discharge; uvula midline, no oropharyngeal erythema or exudates; MMM  Neck: supple; no JVD or thyromegaly  Respiratory: CTA B/L; no W/R/R, no retractions  Cardiac: +S1/S2; RRR; no M/R/G; PMI non-displaced  Gastrointestinal: soft, NT/ND; no rebound or guarding; +BSx4  Back: spine midline, no bony tenderness or step-offs; no CVAT B/L  Extremities: WWP, no clubbing or cyanosis; no peripheral edema  Musculoskeletal: NROM x4; no joint swelling, tenderness or erythema  Vascular: 2+ radial, femoral, DP/PT pulses B/L  Dermatologic: skin warm, dry and intact; no rashes, wounds, or scars  Lymphatic: no submandibular or cervical LAD  Neurologic: AAOx3; CNII-XII grossly intact; no focal deficits  Psychiatric: affect and characteristics of appearance, verbalizations, behaviors are appropriat  LABS:      CBC Full  -  ( 19 Aug 2020 06:40 )  WBC Count : 6.98 K/uL  RBC Count : 4.01 M/uL  Hemoglobin : 12.4 g/dL  Hematocrit : 39.3 %  Platelet Count - Automated : 203 K/uL  Mean Cell Volume : 98.0 fl  Mean Cell Hemoglobin : 30.9 pg  Mean Cell Hemoglobin Concentration : 31.6 gm/dL  Auto Neutrophil # : x  Auto Lymphocyte # : x  Auto Monocyte # : x  Auto Eosinophil # : x  Auto Basophil # : x  Auto Neutrophil % : x  Auto Lymphocyte % : x  Auto Monocyte % : x  Auto Eosinophil % : x  Auto Basophil % : x    08-19    141  |  105  |  22  ----------------------------<  83  4.3   |  28  |  1.29    Ca    8.6      19 Aug 2020 06:40  Phos  3.6     08-19  Mg     2.2     08-19    TPro  5.9<L>  /  Alb  3.3  /  TBili  0.2  /  DBili  x   /  AST  21  /  ALT  18  /  AlkPhos  92  08-19    PT/INR - ( 19 Aug 2020 06:40 )   PT: 11.9 sec;   INR: 0.99          PTT - ( 19 Aug 2020 06:40 )  PTT:32.5 sec                  RADIOLOGY & ADDITIONAL STUDIES (The following images were personally reviewed): PULMONARY SERVICE INITIAL CONSULT NOTE  Pulm Specific History:   86F PMH distant smoker, lung adenocarcinoma (RLL s/p VATS resection in 2013, ANANYA XRT in 2016) and RML lesion with radiaton in 2017. In Nov 2019 Pt was admitted to the MICU after elective bronchoscopy c/b hypertensive emergency and flash pulmonary edema 2/2 severe MR and flail posterior leaflet. BAL and endobronchial lymph node biopsy were positive for adenocarcinoma from her RUL biopsy , LN station negative. Since d/c Pt had undergone 5 sessions of XRT. Was seen by Dr. Addison in April for worsening bilateral LE edema and thoracentesis of right pleural effusion. Since tap, Pt reports LE swelling has resolved. Breathing status has improved (able to walk her 3 block baseline, no orthopnea). Endorses 10lb weight loss over past year, chronic cough of small volume clear, yellow and gray sputum.       Patient is a 87y old  Female who presents with a chief complaint of right leg weakness and fall (19 Aug 2020 10:54)      HPI:  87y Female with PMHx of lung adenocarcinoma s/p radiation who presents with right leg weakness associated with fall. Patient lives at home alone and was trying to stand on a bench to get something at home when her right leg muscle "gave out" on her and she fell back on her lower back and head, denies LOC. She presented to [facility] and was sitting at triage when she had a moment talking to the RN where she stared off and was unresponsive for 2-3 seconds and then fell to the side. ED RN was able to catch patient and put patient on stretcher, patient's BP was 133/67 and patient regained consciousness did not remember event. Stroke code was called, NIHSS 0. HCT showed no signs of acute infarct or hemorrhage. Unable to pursue CTA as patient with anaphylaxis to contrast allergy. Patient denies acute onset of numbness, tingling, slurred speech, blurry vision, double vision, dizziness, headache.     Last known well time/Time of onset of symptoms: 8/18/2020 1134 (18 Aug 2020 14:07)      PAST MEDICAL & SURGICAL HISTORY:  Malignant neoplasm of bronchus and lung, unspecified site: Lung cancer  History of surgery to major organs, presenting hazards to health: removal of R-sided adenocarcinoma, negative lymphnodes      FAMILY HISTORY:  No pertinent family history: No significant family history      SOCIAL HISTORY:  Smoking Status: [ ] Current, [ ] Former, [ ] Never  Pack Years:    MEDICATIONS:  Pulmonary:    Antimicrobials:    Anticoagulants:  aspirin enteric coated 81 milliGRAM(s) Oral every 24 hours  enoxaparin Injectable 30 milliGRAM(s) SubCutaneous every 24 hours    Onc:    GI/:  bisacodyl 5 milliGRAM(s) Oral at bedtime  senna 2 Tablet(s) Oral at bedtime    Endocrine:  atorvastatin 40 milliGRAM(s) Oral at bedtime    Cardiac:    Other Medications:  atorvastatin 40 milliGRAM(s) Oral at bedtime      Allergies    Bactrim (Unknown)  Cleocin HCl (Unknown)  Flagyl (Unknown)  Honey (Unknown)  iodine (Anaphylaxis)  IV Contrast (Anaphylaxis)  Levaquin (Other; Rash)  penicillin (Unknown)  Zithromax (Unknown)    Intolerances        Vital Signs Last 24 Hrs  T(C): 36.7 (19 Aug 2020 09:30), Max: 36.8 (18 Aug 2020 22:49)  T(F): 98 (19 Aug 2020 09:30), Max: 98.3 (18 Aug 2020 22:49)  HR: 96 (19 Aug 2020 09:29) (80 - 108)  BP: 124/69 (19 Aug 2020 09:29) (106/56 - 156/69)  BP(mean): 90 (19 Aug 2020 09:29) (75 - 90)  RR: 18 (19 Aug 2020 08:51) (18 - 22)  SpO2: 93% (19 Aug 2020 09:29) (93% - 100%)        .  VITAL SIGNS:  T(C): 36.6 (08-19-20 @ 14:00), Max: 36.8 (08-18-20 @ 22:49)  T(F): 97.8 (08-19-20 @ 14:00), Max: 98.3 (08-18-20 @ 22:49)  HR: 82 (08-19-20 @ 11:48) (80 - 108)  BP: 140/61 (08-19-20 @ 11:48) (106/56 - 140/61)  BP(mean): 88 (08-19-20 @ 11:48) (75 - 90)  RR: 18 (08-19-20 @ 11:48) (18 - 20)  SpO2: 99% (08-19-20 @ 11:48) (93% - 99%)  Wt(kg): --    PHYSICAL EXAM:    Constitutional: thin elderly woman sittign comfortably in bed   Head: NC/AT  Eyes: PERRL, EOMI, anicteric sclera  ENT: no nasal discharge; uvula midline, no oropharyngeal erythema or exudates; MMM  Neck: supple; no JVD or thyromegaly  Respiratory: CTA B/L; no W/R/R, no retractions  Cardiac: +S1/S2; RRR; no M/R/G; PMI non-displaced  Gastrointestinal: soft, NT/ND; no rebound or guarding; +BSx4  Back: spine midline, no bony tenderness or step-offs; no CVAT B/L  Extremities: WWP, no clubbing or cyanosis; no peripheral edema  Musculoskeletal: NROM x4; no joint swelling, tenderness or erythema  Vascular: 2+ radial, femoral, DP/PT pulses B/L  Dermatologic: skin warm, dry and intact; no rashes, wounds, or scars  Lymphatic: no submandibular or cervical LAD  Neurologic: AAOx3; CNII-XII grossly intact; no focal deficits  Psychiatric: affect and characteristics of appearance, verbalizations, behaviors are appropriat  LABS:      CBC Full  -  ( 19 Aug 2020 06:40 )  WBC Count : 6.98 K/uL  RBC Count : 4.01 M/uL  Hemoglobin : 12.4 g/dL  Hematocrit : 39.3 %  Platelet Count - Automated : 203 K/uL  Mean Cell Volume : 98.0 fl  Mean Cell Hemoglobin : 30.9 pg  Mean Cell Hemoglobin Concentration : 31.6 gm/dL  Auto Neutrophil # : x  Auto Lymphocyte # : x  Auto Monocyte # : x  Auto Eosinophil # : x  Auto Basophil # : x  Auto Neutrophil % : x  Auto Lymphocyte % : x  Auto Monocyte % : x  Auto Eosinophil % : x  Auto Basophil % : x    08-19    141  |  105  |  22  ----------------------------<  83  4.3   |  28  |  1.29    Ca    8.6      19 Aug 2020 06:40  Phos  3.6     08-19  Mg     2.2     08-19    TPro  5.9<L>  /  Alb  3.3  /  TBili  0.2  /  DBili  x   /  AST  21  /  ALT  18  /  AlkPhos  92  08-19    PT/INR - ( 19 Aug 2020 06:40 )   PT: 11.9 sec;   INR: 0.99          PTT - ( 19 Aug 2020 06:40 )  PTT:32.5 sec                  RADIOLOGY & ADDITIONAL STUDIES (The following images were personally reviewed):

## 2020-08-19 NOTE — PHYSICAL THERAPY INITIAL EVALUATION ADULT - ADDITIONAL COMMENTS
Pt lives alone in an elevator apartment, no stairs to enter. Ambulates without assistive device. Pt reports hx of falls. Pt defers use of assistive device, pt has a lot of rolling walkers at home.

## 2020-08-19 NOTE — CONSULT NOTE ADULT - ASSESSMENT
87F PMH lung adenocarcinoma s/p resection/XRT, severe MR presents for LLE weakness admitted for stroke w/u. Pulm service consulted for continuity of care. 87F PMH lung adenocarcinoma s/p resection/XRT, severe MR presents for RLE weakness admitted for stroke w/u. Pulm service consulted for continuity of care. 87F PMH lung adenocarcinoma s/p resection/XRT, severe MR presents for RLE weakness admitted for stroke w/u. Pulm service consulted for continuity of care.     #Acute Hypoxia Respiratory Failure  -Pt now desatting with ambulation, hypoxic lying flat to SpO2 88% on RA. POCUS with stable size right pleural effusion compared to previous, no consolidation or s/s of PNA  -Unclear etiology of hypoxia, recommend CT-PE to eval for PE   -Plan discussed with Primary Team

## 2020-08-19 NOTE — PROGRESS NOTE ADULT - SUBJECTIVE AND OBJECTIVE BOX
INTERVAL HPI/OVERNIGHT EVENTS:  As per night team, no overnight events. Patient seen and examined at bedside. Patient denies fever, chills, dizziness, weakness, HA, CP, SOB, N/V/D/C, changes in bowel movements, LE swelling. 12pt ROS otherwise negative.    VITALS  Vital Signs Last 24 Hrs  T(C): 36.6 (19 Aug 2020 14:00), Max: 36.8 (18 Aug 2020 22:49)  T(F): 97.8 (19 Aug 2020 14:00), Max: 98.3 (18 Aug 2020 22:49)  HR: 82 (19 Aug 2020 11:48) (80 - 108)  BP: 140/61 (19 Aug 2020 11:48) (106/56 - 140/61)  BP(mean): 88 (19 Aug 2020 11:48) (75 - 90)  RR: 18 (19 Aug 2020 11:48) (18 - 20)  SpO2: 99% (19 Aug 2020 11:48) (93% - 99%)    CAPILLARY BLOOD GLUCOSE      PHYSICAL EXAM  General: WDWN, NAD, comfortable, pleasant, anxious, agitated, Ill, Frail, Cachectic, Resp distress  HEENT: NC/AT, PERRL/ EOMI, no scleral icterus, MMM, good dentition  Neck: Supple; no JVD  Respiratory: CTA B/L, no wheezes/crackles   Cardiovascular: Regular rhythm/rate; +S1 +S2  Gastrointestinal: Soft, NTND, normoactive BS, no rebound, no guarding, no suprapubic tenderness  Extremities: warm, well perfused, no cyanosis, no clubbing, no edema, pulses equal  Skin: Normal temperature, warm, dry  Neurologic:  -Mental status: Awake, alert, oriented to person, place, and time. Speech is fluent with intact naming, repetition, and comprehension, no dysarthria. Recent and remote memory intact. Follows commands. Attention/concentration intact. Fund of knowledge appropriate.  -Cranial nerves:   II: Visual fields are full to confrontation.  III, IV, VI: Extraocular movements are intact without nystagmus. Pupils PERRL  V:  Facial sensation V1-V3 equal and intact bilaterally  VII: Face is symmetric with normal eye closure and smile, no facial droop  VIII: Hearing is bilaterally intact to finger rub  IX, X: Uvula is midline and soft palate rises symmetrically  XI: Head turning and shoulder shrug are intact.  XII: Tongue protrudes midline  Motor: Normal bulk and tone. No pronator drift. Strength bilateral upper extremity 5/5, bilateral lower extremities 5/5.  Rapid alternating movements intact and symmetric  Sensation: Intact to light touch, proprioception, and pinprick bilaterally. Intact pain and temperature sense. No neglect.   Coordination: No dysmetria on finger-to-nose.  Reflexes: Downgoing toes bilaterally   Gait: Narrow gait and steady      MEDICATIONS  (STANDING):  aspirin enteric coated 81 milliGRAM(s) Oral every 24 hours  atorvastatin 40 milliGRAM(s) Oral at bedtime  bisacodyl 5 milliGRAM(s) Oral at bedtime  enoxaparin Injectable 30 milliGRAM(s) SubCutaneous every 24 hours  senna 2 Tablet(s) Oral at bedtime    MEDICATIONS  (PRN):      Bactrim (Unknown)  Cleocin HCl (Unknown)  Flagyl (Unknown)  Honey (Unknown)  iodine (Anaphylaxis)  IV Contrast (Anaphylaxis)  Levaquin (Other; Rash)  penicillin (Unknown)  Zithromax (Unknown)      LABS                        12.4   6.98  )-----------( 203      ( 19 Aug 2020 06:40 )             39.3     08-19    141  |  105  |  22  ----------------------------<  83  4.3   |  28  |  1.29    Ca    8.6      19 Aug 2020 06:40  Phos  3.6     08-19  Mg     2.2     08-19    TPro  5.9<L>  /  Alb  3.3  /  TBili  0.2  /  DBili  x   /  AST  21  /  ALT  18  /  AlkPhos  92  08-19    PT/INR - ( 19 Aug 2020 06:40 )   PT: 11.9 sec;   INR: 0.99          PTT - ( 19 Aug 2020 06:40 )  PTT:32.5 sec    CARDIAC MARKERS ( 18 Aug 2020 11:34 )  x     / <0.01 ng/mL / x     / x     / x            RADIOLOGY & ADDITIONAL TESTS: Reviewed INTERVAL HPI/OVERNIGHT EVENTS:  As per night team, no overnight events. Patient seen and examined at bedside. Patient complaining of soreness in sacral region and attributes it to her fall from yesterday. Patient currently without any complaints. She denies blurry vision, double vision, HA, weakness, fever, chills, CP, SOB, N/V/D/C, changes in bowel movements, LE swelling, numbness, tingling.     VITALS  Vital Signs Last 24 Hrs  T(C): 36.6 (19 Aug 2020 14:00), Max: 36.8 (18 Aug 2020 22:49)  T(F): 97.8 (19 Aug 2020 14:00), Max: 98.3 (18 Aug 2020 22:49)  HR: 82 (19 Aug 2020 11:48) (80 - 108)  BP: 140/61 (19 Aug 2020 11:48) (106/56 - 140/61)  BP(mean): 88 (19 Aug 2020 11:48) (75 - 90)  RR: 18 (19 Aug 2020 11:48) (18 - 20)  SpO2: 99% (19 Aug 2020 11:48) (93% - 99%)    CAPILLARY BLOOD GLUCOSE      PHYSICAL EXAM  General: Elderly female, WDWN, NAD sitting comfortably in bed on NC  HEENT: NC/AT, no scleral icterus, MMM  Neck: Supple; no JVD  Respiratory: CTA B/L, no wheezes/crackles   Cardiovascular: Regular rhythm/rate; +S1 +S2  Gastrointestinal: Soft, NTND, normoactive BS, no rebound, no guarding, no suprapubic tenderness  Extremities: WWP; no cyanosis, no clubbing, no edema, pulses equal  Skin: Normal temperature, warm, dry  Neurologic:  -Mental status: Awake, alert, oriented to person, place, and time. Speech is fluent with intact naming, repetition, and comprehension, no dysarthria. Recent and remote memory intact. Follows commands. Attention/concentration intact. Fund of knowledge appropriate.  -Cranial nerves:   II: Visual fields are full to confrontation.  III, IV, VI: Extraocular movements are intact without nystagmus. Pupils PERRL  V:  Facial sensation V1-V3 equal and intact bilaterally  VII: Face is symmetric with normal eye closure and smile, no facial droop  VIII: Hearing is bilaterally intact to finger rub  IX, X: Uvula is midline and soft palate rises symmetrically  XI: Head turning and shoulder shrug are intact.  XII: Tongue protrudes midline  Motor: Normal bulk and tone. Strength bilateral upper extremity 5/5, bilateral lower extremities 5/5.  Rapid alternating movements intact and symmetric  Sensation: Intact to light touch, proprioception, and pinprick bilaterally. Intact pain and temperature sense. No neglect.   Coordination: No dysmetria on finger-to-nose.  Reflexes: Downgoing toes bilaterally     MEDICATIONS  (STANDING):  aspirin enteric coated 81 milliGRAM(s) Oral every 24 hours  atorvastatin 40 milliGRAM(s) Oral at bedtime  bisacodyl 5 milliGRAM(s) Oral at bedtime  enoxaparin Injectable 30 milliGRAM(s) SubCutaneous every 24 hours  senna 2 Tablet(s) Oral at bedtime    MEDICATIONS  (PRN):      Bactrim (Unknown)  Cleocin HCl (Unknown)  Flagyl (Unknown)  Honey (Unknown)  iodine (Anaphylaxis)  IV Contrast (Anaphylaxis)  Levaquin (Other; Rash)  penicillin (Unknown)  Zithromax (Unknown)      LABS                        12.4   6.98  )-----------( 203      ( 19 Aug 2020 06:40 )             39.3     08-19    141  |  105  |  22  ----------------------------<  83  4.3   |  28  |  1.29    Ca    8.6      19 Aug 2020 06:40  Phos  3.6     08-19  Mg     2.2     08-19    TPro  5.9<L>  /  Alb  3.3  /  TBili  0.2  /  DBili  x   /  AST  21  /  ALT  18  /  AlkPhos  92  08-19    PT/INR - ( 19 Aug 2020 06:40 )   PT: 11.9 sec;   INR: 0.99          PTT - ( 19 Aug 2020 06:40 )  PTT:32.5 sec    CARDIAC MARKERS ( 18 Aug 2020 11:34 )  x     / <0.01 ng/mL / x     / x     / x            RADIOLOGY & ADDITIONAL TESTS: Reviewed

## 2020-08-19 NOTE — OCCUPATIONAL THERAPY INITIAL EVALUATION ADULT - GENERAL OBSERVATIONS, REHAB EVAL
Spoke with CHIDI Kendrick prior to session. Pt received semi-supine in bed in NAD, +tele, +heplock, +bed alarm. Spoke with CHIDI Kendrick prior to session. Pt received semi-supine in bed in NAD, +2LNC, +tele, +heplock, +bed alarm.

## 2020-08-19 NOTE — PHYSICAL THERAPY INITIAL EVALUATION ADULT - PLANNED THERAPY INTERVENTIONS, PT EVAL
postural re-education/strengthening/bed mobility training/balance training/gait training/stretching/manual therapy techniques/neuromuscular re-education/transfer training

## 2020-08-19 NOTE — PROGRESS NOTE ADULT - PROBLEM SELECTOR PLAN 4
DVT PPx: Lovenox 40mg sub Q qhs  GI PPx: Diet    Dispo: 7La/Telemetry F: none  E: replete K<4, Mg<2  DVT PPx: Lovenox 40mg sub Q qhs  GI PPx: Diet    Dispo: 7La/Telemetry F: none  E: replete K<4, Mg<2  DVT PPx: Lovenox 30mg sub Q qhs  GI PPx: Diet    Dispo: 7La/Telemetry

## 2020-08-19 NOTE — PHYSICAL THERAPY INITIAL EVALUATION ADULT - GENERAL OBSERVATIONS, REHAB EVAL
Pt received supine with HOB elevated in NAD all lines in tact, call norton in reach, RN Mireille cleared for PT Evaluation.

## 2020-08-19 NOTE — CONSULT NOTE ADULT - SUBJECTIVE AND OBJECTIVE BOX
MEDICAL COMANAGEMENT INITIAL NOTE    HPI:  87y Female with PMHx of lung adenocarcinoma s/p radiation presented with right leg weakness associated with fall. Patient lives at home alone and was trying to stand on a bench to get something at home when her right leg "gave out" on her and she fell back on her lower back and head, denies LOC. She presented to [facility] and was sitting at triage when she had a moment talking to the RN where she stared off and was unresponsive for 2-3 seconds and then fell to the side. ED RN was able to catch patient and put patient on stretcher, patient's BP was 133/67 and patient regained consciousness did not remember event. Stroke code was called, NIHSS 0. HCT showed no signs of acute infarct or hemorrhage. Unable to pursue CTA as patient with anaphylaxis to contrast allergy. Patient denied acute onset of numbness, tingling, slurred speech, blurry vision, double vision, dizziness, headache.     ADDITIONAL MEDICINE HPI:    REVIEW OF SYSTEMS:   Otherwise negative except as specified in HPI    PAST MEDICAL HISTORY:     PAST SURGICAL HISTORY:    FAMILY HISTORY:    SOCIAL HISTORY:  Tobacco use:  EtOH use:  Illicit drug use:    MEDICATIONS:  MEDICATIONS  (STANDING):  aspirin enteric coated 81 milliGRAM(s) Oral every 24 hours  atorvastatin 40 milliGRAM(s) Oral at bedtime  enoxaparin Injectable 30 milliGRAM(s) SubCutaneous every 24 hours    MEDICATIONS  (PRN):      ALLERGIES:  Allergies    Bactrim (Unknown)  Cleocin HCl (Unknown)  Flagyl (Unknown)  Honey (Unknown)  iodine (Anaphylaxis)  IV Contrast (Anaphylaxis)  Levaquin (Other; Rash)  penicillin (Unknown)  Zithromax (Unknown)    Intolerances        VITAL SIGNS:  Vital Signs Last 24 Hrs  T(C): 36.7 (19 Aug 2020 09:30), Max: 36.8 (18 Aug 2020 22:49)  T(F): 98 (19 Aug 2020 09:30), Max: 98.3 (18 Aug 2020 22:49)  HR: 96 (19 Aug 2020 09:29) (80 - 108)  BP: 124/69 (19 Aug 2020 09:29) (106/56 - 156/69)  BP(mean): 90 (19 Aug 2020 09:29) (75 - 90)  RR: 18 (19 Aug 2020 08:51) (18 - 22)  SpO2: 93% (19 Aug 2020 09:29) (93% - 100%)      PHYSICAL EXAM:  Constitutional: WDWN resting comfortably in bed; NAD  Head: NC/AT  Eyes: PERRL, EOMI, anicteric sclera  ENT: no nasal discharge; uvula midline, no oropharyngeal erythema or exudates; MMM  Neck: supple; no JVD or thyromegaly  Respiratory: CTA B/L; no W/R/R, no retractions  Cardiac: +S1/S2; RRR; no M/R/G; PMI non-displaced  Gastrointestinal: abdomen soft, NT/ND; no rebound or guarding; +BSx4  Genitourinary: normal external genitalia  Back: spine midline, no bony tenderness or step-offs; no CVAT B/L  Extremities: WWP, no clubbing or cyanosis; no peripheral edema  Musculoskeletal: NROM x4; no joint swelling, tenderness or erythema  Vascular: 2+ radial, femoral, DP/PT pulses B/L  Dermatologic: skin warm, dry and intact; no rashes, wounds, or scars  Lymphatic: no submandibular or cervical LAD  Neurologic: AAOx3; CNII-XII grossly intact; no focal deficits  Psychiatric: affect and characteristics of appearance, verbalizations, behaviors are appropriate    LABS:                        12.4   6.98  )-----------( 203      ( 19 Aug 2020 06:40 )             39.3     08-19    141  |  105  |  22  ----------------------------<  83  4.3   |  28  |  1.29    Ca    8.6      19 Aug 2020 06:40  Phos  3.6     08-19  Mg     2.2     08-19    TPro  5.9<L>  /  Alb  3.3  /  TBili  0.2  /  DBili  x   /  AST  21  /  ALT  18  /  AlkPhos  92  08-19    PT/INR - ( 19 Aug 2020 06:40 )   PT: 11.9 sec;   INR: 0.99          PTT - ( 19 Aug 2020 06:40 )  PTT:32.5 sec    CARDIAC MARKERS ( 18 Aug 2020 11:34 )  x     / <0.01 ng/mL / x     / x     / x          CAPILLARY BLOOD GLUCOSE      POCT Blood Glucose.: 126 mg/dL (18 Aug 2020 11:22)          RADIOLOGY & ADDITIONAL TESTS:     < from: CT Brain Stroke Protocol (08.18.20 @ 11:44) >  FINDINGS: The CT examination demonstrates the ventricles, cisternal spaces, and cortical sulci to be within normal limits for the patient's age. There is no midline shift or extra axial fluid collections.  Patchy areas of diminished attenuation within the periventricular and subcortical white matter lucency are noted, with chronic microangiopathic ischemic disease. There is a lacunar infarct in the right putamen. There is nointracranial hemorrhage or acute transcortical infarct.    The bony windows demonstrates no fractures. The visualized paranasal sinuses are within normal limits. The mastoid air cells are well aerated.    IMPRESSION:    1. No intracranial hemorrhage or acute transcortical infarct.  2. Chronic microangiopathic disease and age-appropriate volume loss.    < end of copied text >    < from: CT Pelvis Bony Only No Cont (08.18.20 @ 13:28) >  Evaluation of the bony pelvis demonstrates osteopenia. No fracture. No dislocation. Degenerative change of the lower lumbar spine. Mild anterolisthesis of L4 on L5. Evaluation of the internal pelvic organs demonstrate extensive aortoiliac calcification. Extensive sigmoid diverticulosis. Fecal loading of stool throughout the large bowel.          IMPRESSION:    Negative for fracture.    < end of copied text > MEDICAL COMANAGEMENT INITIAL NOTE    HPI:  87y Female with PMHx of lung adenocarcinoma (RLL s/p VATS resection in 2013, ANANYA XRT in 2016, and RML lesion with radiation in 2017) presented with right leg weakness associated with fall. Patient lives at home alone and was trying to stand on a bench to get something at home when her right leg "gave out" on her and she fell back on her lower back and head, denies LOC. She presented to [facility] and was sitting at triage when she had a moment talking to the RN where she stared off and was unresponsive for 2-3 seconds and then fell to the side. ED RN was able to catch patient and put patient on stretcher, patient's BP was 133/67 and patient regained consciousness did not remember event. Stroke code was called, NIHSS 0. HCT showed no signs of acute infarct or hemorrhage. Unable to pursue CTA as patient with anaphylaxis to contrast allergy. Patient denied acute onset of numbness, tingling, slurred speech, blurry vision, double vision, dizziness, headache.     Currently, pt endorsing mild sensation of dizziness earlier this morning in addition to some aching in her neck and back. She denies fever, chills, chest pain, SOB, abdominal pain, n/v/d/c.    REVIEW OF SYSTEMS:   Otherwise negative except as specified in HPI    PAST MEDICAL HISTORY:  Lung cancer as above     PAST SURGICAL HISTORY:  RLL VATS resection 2013    FAMILY HISTORY:  Non-contributory    SOCIAL HISTORY:  Tobacco use: Former smoker, quit 20 years ago, 5 pack-year history  EtOH use: Denies  Illicit drug use: Denies    MEDICATIONS:  MEDICATIONS  (STANDING):  aspirin enteric coated 81 milliGRAM(s) Oral every 24 hours  atorvastatin 40 milliGRAM(s) Oral at bedtime  enoxaparin Injectable 30 milliGRAM(s) SubCutaneous every 24 hours    MEDICATIONS  (PRN):      ALLERGIES:  Allergies    Bactrim (Unknown)  Cleocin HCl (Unknown)  Flagyl (Unknown)  Honey (Unknown)  iodine (Anaphylaxis)  IV Contrast (Anaphylaxis)  Levaquin (Other; Rash)  penicillin (Unknown)  Zithromax (Unknown)    Intolerances        VITAL SIGNS:  Vital Signs Last 24 Hrs  T(C): 36.7 (19 Aug 2020 09:30), Max: 36.8 (18 Aug 2020 22:49)  T(F): 98 (19 Aug 2020 09:30), Max: 98.3 (18 Aug 2020 22:49)  HR: 96 (19 Aug 2020 09:29) (80 - 108)  BP: 124/69 (19 Aug 2020 09:29) (106/56 - 156/69)  BP(mean): 90 (19 Aug 2020 09:29) (75 - 90)  RR: 18 (19 Aug 2020 08:51) (18 - 22)  SpO2: 93% (19 Aug 2020 09:29) (93% - 100%)      PHYSICAL EXAM:  Constitutional: WDWN resting comfortably in bed; NAD  Head: NC/AT  Eyes: PERRL, EOMI, anicteric sclera  ENT: no nasal discharge; uvula midline, no oropharyngeal erythema or exudates; MMM  Neck: supple; no JVD or thyromegaly  Respiratory: CTA B/L; no W/R/R, no retractions  Cardiac: +S1/S2; RRR; no M/R/G; PMI non-displaced  Gastrointestinal: abdomen soft, NT/ND; no rebound or guarding; +BSx4  Genitourinary: normal external genitalia  Back: spine midline, no bony tenderness or step-offs; no CVAT B/L  Extremities: WWP, no clubbing or cyanosis; no peripheral edema  Musculoskeletal: NROM x4; no joint swelling, tenderness or erythema  Vascular: 2+ radial, femoral, DP/PT pulses B/L  Dermatologic: skin warm, dry and intact; no rashes, wounds, or scars  Lymphatic: no submandibular or cervical LAD  Neurologic: AAOx3; CNII-XII grossly intact; no focal deficits  Psychiatric: affect and characteristics of appearance, verbalizations, behaviors are appropriate    LABS:                        12.4   6.98  )-----------( 203      ( 19 Aug 2020 06:40 )             39.3     08-19    141  |  105  |  22  ----------------------------<  83  4.3   |  28  |  1.29    Ca    8.6      19 Aug 2020 06:40  Phos  3.6     08-19  Mg     2.2     08-19    TPro  5.9<L>  /  Alb  3.3  /  TBili  0.2  /  DBili  x   /  AST  21  /  ALT  18  /  AlkPhos  92  08-19    PT/INR - ( 19 Aug 2020 06:40 )   PT: 11.9 sec;   INR: 0.99          PTT - ( 19 Aug 2020 06:40 )  PTT:32.5 sec    CARDIAC MARKERS ( 18 Aug 2020 11:34 )  x     / <0.01 ng/mL / x     / x     / x          CAPILLARY BLOOD GLUCOSE      POCT Blood Glucose.: 126 mg/dL (18 Aug 2020 11:22)          RADIOLOGY & ADDITIONAL TESTS:     < from: CT Brain Stroke Protocol (08.18.20 @ 11:44) >  FINDINGS: The CT examination demonstrates the ventricles, cisternal spaces, and cortical sulci to be within normal limits for the patient's age. There is no midline shift or extra axial fluid collections.  Patchy areas of diminished attenuation within the periventricular and subcortical white matter lucency are noted, with chronic microangiopathic ischemic disease. There is a lacunar infarct in the right putamen. There is nointracranial hemorrhage or acute transcortical infarct.    The bony windows demonstrates no fractures. The visualized paranasal sinuses are within normal limits. The mastoid air cells are well aerated.    IMPRESSION:    1. No intracranial hemorrhage or acute transcortical infarct.  2. Chronic microangiopathic disease and age-appropriate volume loss.    < end of copied text >    < from: CT Pelvis Bony Only No Cont (08.18.20 @ 13:28) >  Evaluation of the bony pelvis demonstrates osteopenia. No fracture. No dislocation. Degenerative change of the lower lumbar spine. Mild anterolisthesis of L4 on L5. Evaluation of the internal pelvic organs demonstrate extensive aortoiliac calcification. Extensive sigmoid diverticulosis. Fecal loading of stool throughout the large bowel.          IMPRESSION:    Negative for fracture.    < end of copied text > MEDICAL COMANAGEMENT INITIAL NOTE    HPI:  87y Female with PMHx of lung adenocarcinoma (RLL s/p VATS resection in 2013, ANANYA XRT in 2016, and RML lesion with radiation in 2017) presented with right leg weakness associated with fall. Patient lives at home alone and was trying to stand on a bench to get something at home when her right leg "gave out" on her and she fell back on her lower back and head, denies LOC. She presented to [facility] and was sitting at triage when she had a moment talking to the RN where she stared off and was unresponsive for 2-3 seconds and then fell to the side. ED RN was able to catch patient and put patient on stretcher, patient's BP was 133/67 and patient regained consciousness did not remember event. Stroke code was called, NIHSS 0. HCT showed no signs of acute infarct or hemorrhage. Unable to pursue CTA as patient with anaphylaxis to contrast allergy. Patient denied acute onset of numbness, tingling, slurred speech, blurry vision, double vision, dizziness, headache.     Currently, pt endorsing mild sensation of dizziness earlier this morning in addition to some aching in her neck and back. She denies fever, chills, chest pain, SOB, abdominal pain, n/v/d/c.    REVIEW OF SYSTEMS:   Otherwise negative except as specified in HPI    PAST MEDICAL HISTORY:  Lung cancer as above     PAST SURGICAL HISTORY:  RLL VATS resection 2013    FAMILY HISTORY:  Non-contributory    SOCIAL HISTORY:  Tobacco use: Former smoker, quit 20 years ago, 5 pack-year history  EtOH use: Denies  Illicit drug use: Denies    MEDICATIONS:  MEDICATIONS  (STANDING):  aspirin enteric coated 81 milliGRAM(s) Oral every 24 hours  atorvastatin 40 milliGRAM(s) Oral at bedtime  enoxaparin Injectable 30 milliGRAM(s) SubCutaneous every 24 hours    MEDICATIONS  (PRN):      ALLERGIES:  Allergies    Bactrim (Unknown)  Cleocin HCl (Unknown)  Flagyl (Unknown)  Honey (Unknown)  iodine (Anaphylaxis)  IV Contrast (Anaphylaxis)  Levaquin (Other; Rash)  penicillin (Unknown)  Zithromax (Unknown)    Intolerances        VITAL SIGNS:  Vital Signs Last 24 Hrs  T(C): 36.7 (19 Aug 2020 09:30), Max: 36.8 (18 Aug 2020 22:49)  T(F): 98 (19 Aug 2020 09:30), Max: 98.3 (18 Aug 2020 22:49)  HR: 96 (19 Aug 2020 09:29) (80 - 108)  BP: 124/69 (19 Aug 2020 09:29) (106/56 - 156/69)  BP(mean): 90 (19 Aug 2020 09:29) (75 - 90)  RR: 18 (19 Aug 2020 08:51) (18 - 22)  SpO2: 93% (19 Aug 2020 09:29) (93% - 100%)      PHYSICAL EXAM:  Constitutional: Elderly female, comfortable, NAD  ENT: no nasal discharge; uvula midline, no oropharyngeal erythema or exudates; MMM  Respiratory: CTA B/L; no W/R/R, no retractions  Cardiac: +S1/S2; RRR; no M/R/G; PMI non-displaced  Gastrointestinal: abdomen soft, NT/ND; no rebound or guarding; +BSx4  Extremities: WWP, no clubbing or cyanosis; no peripheral edema  Musculoskeletal: NROM x4; no joint swelling, tenderness or erythema  Dermatologic: skin warm, dry and intact; no rashes, wounds, or scars  Neurologic: AAOx3; CNII-XII grossly intact; no focal deficits  Psychiatric: affect and characteristics of appearance, verbalizations, behaviors are appropriate    LABS:                        12.4   6.98  )-----------( 203      ( 19 Aug 2020 06:40 )             39.3     08-19    141  |  105  |  22  ----------------------------<  83  4.3   |  28  |  1.29    Ca    8.6      19 Aug 2020 06:40  Phos  3.6     08-19  Mg     2.2     08-19    TPro  5.9<L>  /  Alb  3.3  /  TBili  0.2  /  DBili  x   /  AST  21  /  ALT  18  /  AlkPhos  92  08-19    PT/INR - ( 19 Aug 2020 06:40 )   PT: 11.9 sec;   INR: 0.99          PTT - ( 19 Aug 2020 06:40 )  PTT:32.5 sec    CARDIAC MARKERS ( 18 Aug 2020 11:34 )  x     / <0.01 ng/mL / x     / x     / x          CAPILLARY BLOOD GLUCOSE      POCT Blood Glucose.: 126 mg/dL (18 Aug 2020 11:22)          RADIOLOGY & ADDITIONAL TESTS:     < from: CT Brain Stroke Protocol (08.18.20 @ 11:44) >  FINDINGS: The CT examination demonstrates the ventricles, cisternal spaces, and cortical sulci to be within normal limits for the patient's age. There is no midline shift or extra axial fluid collections.  Patchy areas of diminished attenuation within the periventricular and subcortical white matter lucency are noted, with chronic microangiopathic ischemic disease. There is a lacunar infarct in the right putamen. There is nointracranial hemorrhage or acute transcortical infarct.    The bony windows demonstrates no fractures. The visualized paranasal sinuses are within normal limits. The mastoid air cells are well aerated.    IMPRESSION:    1. No intracranial hemorrhage or acute transcortical infarct.  2. Chronic microangiopathic disease and age-appropriate volume loss.    < end of copied text >    < from: CT Pelvis Bony Only No Cont (08.18.20 @ 13:28) >  Evaluation of the bony pelvis demonstrates osteopenia. No fracture. No dislocation. Degenerative change of the lower lumbar spine. Mild anterolisthesis of L4 on L5. Evaluation of the internal pelvic organs demonstrate extensive aortoiliac calcification. Extensive sigmoid diverticulosis. Fecal loading of stool throughout the large bowel.          IMPRESSION:    Negative for fracture.    < end of copied text >

## 2020-08-19 NOTE — CONSULT NOTE ADULT - ASSESSMENT
per Neurology    87y Female with PMHx of lung adenocarcinoma s/p radiation who presents with right leg weakness associated with fall and subsequent LOC on ED. Stroke code was called, NIHSS 0. HCT showed no signs of acute infarct or hemorrhage. Patient likely with vasovagal in ED however given patient's right leg muscle "giving out" it is possible patient had a stroke or TIA, further workup needed.       Problem/Plan - 1:  ·  Problem: R/O CVA (cerebral vascular accident).  Plan: Patient presented with right leg weakness and fall from chair. In ED, patient with witnessed episode of staring and syncopal episode. Stroke code was called, NIHSS 0.  - CT Head: No intracranial hemorrhage or acute transcortical infarct. Chronic microangiopathic disease and age-appropriate volume loss.  - A1c: 6.0%    Plan:  - Goal SBP<180  - f/u lipid panel, TSH, ESR  - f/u MR Angio Head and Neck Non Contrast    Secondary Stroke Prevention Medication  - Start aspirin 81mg daily  - Start atorvastatin 40mg PO daily- cholesterol and stroke prevention   - Start lovenox SQ and SCDs for DVT prophylaxis.    Problem/Plan - 2:  ·  Problem: Fall from furniture.  Plan: - CT Head: No intracranial hemorrhage or acute transcortical infarct.  - CT Pelvis: No fracture  - f/u PT/OT recs.    Problem/Plan - 3:  ·  Problem: Nutrition, metabolism, and development symptoms.  Plan: F: None  E: Replete PRN for Mg<2 and K<4  N: DASH/TLC.     Problem/Plan - 4:  ·  Problem: Prophylactic measure.  Plan: DVT PPx: Lovenox 40mg sub Q qhs  GI PPx: Diet    Dispo: 7La/Telemetry.

## 2020-08-19 NOTE — CONSULT NOTE ADULT - PROBLEM SELECTOR RECOMMENDATION 2
Pt with history of lung adenocarcinoma (RLL s/p VATS resection 2013, LIL XRT 2016, RML lesion with radiation in 2017). Pt follows with pulm. Earlier this month had diagnostic thoracentesis for possible recurrence of adenocarcinoma.  - pulm following, appreciate recs Pt with history of lung adenocarcinoma (RLL s/p VATS resection 2013, LIL XRT 2016, RML lesion with radiation in 2017). Pt follows with pulm. Earlier this month had diagnostic thoracentesis for possible recurrence of adenocarcinoma after PET showed 2 new FDG-avid subcentimeter nodules in left subpleural region.  - pulm following, appreciate recs

## 2020-08-19 NOTE — OCCUPATIONAL THERAPY INITIAL EVALUATION ADULT - PERTINENT HX OF CURRENT PROBLEM, REHAB EVAL
87y Female with PMHx of lung adenocarcinoma s/p radiation who presents with right leg weakness associated with fall. Patient lives at home alone and was trying to stand on a bench to get something at home when her right leg muscle "gave out" on her and she fell back on her lower back and head, denies LOC.

## 2020-08-19 NOTE — CONSULT NOTE ADULT - SUBJECTIVE AND OBJECTIVE BOX
Patient is a 87y old  Female who presents with a chief complaint of right leg weakness and fall (18 Aug 2020 14:07)       HPI:  87y Female with PMHx of lung adenocarcinoma s/p radiation who presents with right leg weakness associated with fall. Patient lives at home alone and was trying to stand on a bench to get something at home when her right leg muscle "gave out" on her and she fell back on her lower back and head, denies LOC. She presented to [facility] and was sitting at triage when she had a moment talking to the RN where she stared off and was unresponsive for 2-3 seconds and then fell to the side. ED RN was able to catch patient and put patient on stretcher, patient's BP was 133/67 and patient regained consciousness did not remember event. Stroke code was called, NIHSS 0. HCT showed no signs of acute infarct or hemorrhage. Unable to pursue CTA as patient with anaphylaxis to contrast allergy. Patient denies acute onset of numbness, tingling, slurred speech, blurry vision, double vision, dizziness, headache.     Last known well time/Time of onset of symptoms: 8/18/2020 1134 (18 Aug 2020 14:07)      PAST MEDICAL & SURGICAL HISTORY:  Malignant neoplasm of bronchus and lung, unspecified site: Lung cancer  History of surgery to major organs, presenting hazards to health: removal of R-sided adenocarcinoma, negative lymphnodes      MEDICATIONS  (STANDING):  aspirin enteric coated 81 milliGRAM(s) Oral every 24 hours  atorvastatin 40 milliGRAM(s) Oral at bedtime  enoxaparin Injectable 30 milliGRAM(s) SubCutaneous every 24 hours    MEDICATIONS  (PRN):    FAMILY HISTORY:  No pertinent family history: No significant family history      CBC Full  -  ( 19 Aug 2020 06:40 )  WBC Count : 6.98 K/uL  RBC Count : 4.01 M/uL  Hemoglobin : 12.4 g/dL  Hematocrit : 39.3 %  Platelet Count - Automated : 203 K/uL  Mean Cell Volume : 98.0 fl  Mean Cell Hemoglobin : 30.9 pg  Mean Cell Hemoglobin Concentration : 31.6 gm/dL  Auto Neutrophil # : x  Auto Lymphocyte # : x  Auto Monocyte # : x  Auto Eosinophil # : x  Auto Basophil # : x  Auto Neutrophil % : x  Auto Lymphocyte % : x  Auto Monocyte % : x  Auto Eosinophil % : x  Auto Basophil % : x      08-19    141  |  105  |  22  ----------------------------<  83  4.3   |  28  |  1.29    Ca    8.6      19 Aug 2020 06:40  Phos  3.6     08-19  Mg     2.2     08-19    TPro  5.9<L>  /  Alb  3.3  /  TBili  0.2  /  DBili  x   /  AST  21  /  ALT  18  /  AlkPhos  92  08-19            Radiology:    < from: CT Brain Stroke Protocol (08.18.20 @ 11:44) >  EXAM:  CT BRAIN STROKE PROTOCOL                          PROCEDURE DATE:  08/18/2020          INTERPRETATION:  PROCEDURE: CT brain without intravenous contrast    INDICATION: Syncope, fall, dizziness. History of lung cancer. Contrast allergy.    TECHNIQUE: Multiple axial images were obtained at 5 mm intervals from the skull base to the vertex. Sagittal and coronal reformatted images were obtained from the axial data set. The images were reviewed in brain and bone windows. RAPID AI was used for preliminary interpretation in hemorrhage detection    COMPARISON: None    FINDINGS: The CT examination demonstrates the ventricles, cisternal spaces, and cortical sulci to be within normal limits for the patient's age. There is no midline shift or extra axial fluid collections.  Patchy areas of diminished attenuation within the periventricular and subcortical white matter lucency are noted, with chronic microangiopathic ischemic disease. There is a lacunar infarct in the right putamen. There is nointracranial hemorrhage or acute transcortical infarct.    The bony windows demonstrates no fractures. The visualized paranasal sinuses are within normal limits. The mastoid air cells are well aerated.    IMPRESSION:    1. No intracranial hemorrhage or acute transcortical infarct.  2. Chronic microangiopathic disease and age-appropriate volume loss.        < from: CT Pelvis Bony Only No Cont (08.18.20 @ 13:28) >  EXAM:  CT PELVIS BONY ONLY                          PROCEDURE DATE:  08/18/2020          INTERPRETATION:  CLINICAL INDICATION: 87-year-old with history of fall.          FINDINGS: CT of the bony pelvis was performed in the axial plane. Reconstructions were performed in the sagittal and coronal planes. No prior studies are available for comparison.    Evaluation of the bony pelvis demonstrates osteopenia. No fracture. No dislocation. Degenerative change of the lower lumbar spine. Mild anterolisthesis of L4 on L5. Evaluation of the internal pelvic organs demonstrate extensive aortoiliac calcification. Extensive sigmoid diverticulosis. Fecal loading of stool throughout the large bowel.          IMPRESSION:    Negative for fracture.                  Vital Signs Last 24 Hrs  T(C): 36.4 (19 Aug 2020 06:00), Max: 36.8 (18 Aug 2020 22:49)  T(F): 97.6 (19 Aug 2020 06:00), Max: 98.3 (18 Aug 2020 22:49)  HR: 80 (19 Aug 2020 04:00) (80 - 108)  BP: 106/56 (19 Aug 2020 04:00) (106/56 - 156/69)  BP(mean): 75 (19 Aug 2020 04:00) (75 - 75)  RR: 18 (19 Aug 2020 04:00) (18 - 22)  SpO2: 98% (19 Aug 2020 04:00) (95% - 100%)    REVIEW OF SYSTEMS: per HPI, s/p fall      Physical Exam:  88 yo  woman lying in semi Quintero's position, no acute complaints    Head: normocephalic, atraumatic    Eyes: PERRLA, EOMI, no nystagmus, sclera anicteric    ENT: nasal discharge, uvula midline, no oropharyngeal erythema/exudate    Neck: supple, negative JVD, negative carotid bruits, no thyromegaly    Chest: CTA bilaterally, neg wheeze/ rhonchi/ rales/ crackles/ egophany    Cardiovascular: regular rate and rhythm, neg murmurs/rubs/gallops    Abdomen: soft, non distended, non tender to palpation in all 4 quadrants, negative rebound/guarding, normal bowel sounds    Extremities: WWP, neg cyanosis/clubbing/edema, negative calf tenderness to palpation, negative Pearl's sign    Musculoskeletal:      :     Neurologic Exam:    Alert and oriented to person, place, date/year, speech fluent w/o dysarthria, recent and remote memory intact, repetition intact, comprehension intact    Cranial Nerves:     II:                       pupils equal, round and reactive to light, visual fields intact   III/ IV/VI:            extraocular movements intact, neg nystagmus, neg ptosis  V:                       facial sensation intact, V1-3 normal  VII:                     face symmetric, no droop, normal eye closure and smile  VIII:                    hearing intact to finger rub bilaterally  IX/ X:                 soft palate rise symmetrical  XI:                      head turning, shoulder shrug normal  XII:                     tongue midline    Motor Exam:    Upper Extremities:     Right:    : 5/5              wrist extensors/ flexors: 5/5              biceps:  5/5              triceps: 5/5              deltoid:  5/5              pronator drift: neg    Left:     :  5/5             wrist extensors/ flexors:  5/5             biceps:  5/5             triceps:  5/5             deltoid:  5/5             pronator drift: neg      Lower Extremities:    Right:    dorsiflexors:  5/5               plantar flexors:  5/5               quadriceps:  5/5               hamstrings:  5/5               hip flexors:  4/5    Left :    dorsiflexors:  5/5              plantar flexors: 5/5              quadriceps:  5/5              hamstrings:  5/5              hip flexors:  4/5               Sensation: Intact to light touch bilaterally. No neglect or extinction on double simultaneous testing.                       DTR:            = biceps/     triceps/     brachioradialis                      = patella/   medial hamstring/ankle                      neg clonus                      neg Babinski                        Finger to Nose:  wnl    Heel to Shin:  wnl    Rapid Alternating movements:  wnl    Joint Position Sense:  intact    Romberg:  not tested    Tandem Walking:  not tested    Gait:  not tested        PM&R Impression:    1) s/p fall/ r/o stroke  2) no focal weakness    Plan:    1) Physical therapy focusing on therapeutic exercises, bed mobility/transfer out of bed evaluation, progressive ambulation with assistive devices prn.    2) Anticipated Disposition Plan/Recs:    pending functional progress

## 2020-08-19 NOTE — PROGRESS NOTE ADULT - PROBLEM SELECTOR PLAN 2
- CT Head: No intracranial hemorrhage or acute transcortical infarct.  - CT Pelvis: No fracture  - f/u PT/OT recs - CT Head: No intracranial hemorrhage or acute transcortical infarct.  - CT Pelvis: No fracture  - PT reccs: home w/ home PT  - OT reccs: home w/ home OT

## 2020-08-19 NOTE — PHYSICAL THERAPY INITIAL EVALUATION ADULT - IMPAIRMENTS FOUND, PT EVAL
aerobic capacity/endurance/ROM/muscle strength/posture/gait, locomotion, and balance/gross motor/poor safety awareness

## 2020-08-19 NOTE — PROGRESS NOTE ADULT - ASSESSMENT
Ms. Woods is an 88 y/o Female with PMHx of lung adenocarcinoma s/p radiation who presents with right leg weakness associated with fall and subsequent LOC on ED. Stroke code was called, NIHSS 0. HCT showed no signs of acute infarct or hemorrhage. Patient likely with vasovagal in ED however due to right leg muscle "giving out" need to rule out stroke or TIA.

## 2020-08-19 NOTE — CONSULT NOTE ADULT - ATTENDING COMMENTS
Adenocarcinoma with right side pleural effusion s/p thoracentesis 2 weeks back. Patient was admitted after fall and at present patient is in acute hypoxic respiratory failure. Bedside lung ultrasound showed minimal right side pleural effusion. Pleural effusion has not increased significantly after thoracentesis and hence, not the etiology for hypoxia at rest. Plan for CTA PE study to r/o pulmonary embolism. Rest as above.

## 2020-08-19 NOTE — CONSULT NOTE ADULT - ASSESSMENT
87y Female with PMHx of lung adenocarcinoma s/p radiation who presents with right leg weakness associated with fall and subsequent LOC on ED. Stroke code was called, NIHSS 0. HCT showed no signs of acute infarct or hemorrhage. Concern for syncope vs TIA. Medicine comanagement following.
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
## 2020-08-19 NOTE — PROGRESS NOTE ADULT - PROBLEM SELECTOR PLAN 1
Patient presented with right leg weakness and fall from chair. In ED, patient with witnessed episode of staring and syncopal episode. Stroke code was called, NIHSS 0.  - CT Head: No intracranial hemorrhage or acute transcortical infarct. Chronic microangiopathic disease and age-appropriate volume loss.  - A1c: 6.0%    Plan:  - Goal SBP<180  - f/u lipid panel, TSH, ESR  - f/u MR Angio Head and Neck Non Contrast    Secondary Stroke Prevention Medication  - Start aspirin 81mg daily  - Start atorvastatin 40mg PO daily- cholesterol and stroke prevention   - Start lovenox SQ and SCDs for DVT prophylaxis Patient presented with right leg weakness and fall from chair. In ED, patient with witnessed episode of staring and syncopal episode. Stroke code was called, NIHSS 0.  - CT Head: No ICH or acute transcortical infarct. Chronic microangiopathic disease and age-appropriate volume loss.  - A1c: 6.0%  - Goal SBP<180  - lipid: chol 216, , HDL 80,   - TSH 2.68 wnl   - f/u MR Angio Head and Neck Non Contrast    Secondary Stroke Prevention Medication  - c/w aspirin 81mg daily  - c/w atorvastatin 40mg PO daily- cholesterol and stroke prevention   - no need for plavix at this time  - Start lovenox SQ and SCDs for DVT prophylaxis

## 2020-08-19 NOTE — PHYSICAL THERAPY INITIAL EVALUATION ADULT - CRITERIA FOR SKILLED THERAPEUTIC INTERVENTIONS
functional limitations in following categories/therapy frequency/anticipated equipment needs at discharge/rehab potential/predicted duration of therapy intervention/risk reduction/prevention/impairments found

## 2020-08-20 DIAGNOSIS — J96.91 RESPIRATORY FAILURE, UNSPECIFIED WITH HYPOXIA: ICD-10-CM

## 2020-08-20 DIAGNOSIS — E43 UNSPECIFIED SEVERE PROTEIN-CALORIE MALNUTRITION: ICD-10-CM

## 2020-08-20 DIAGNOSIS — R09.02 HYPOXEMIA: ICD-10-CM

## 2020-08-20 LAB
ALBUMIN SERPL ELPH-MCNC: 3.3 G/DL — SIGNIFICANT CHANGE UP (ref 3.3–5)
ALP SERPL-CCNC: 92 U/L — SIGNIFICANT CHANGE UP (ref 40–120)
ALT FLD-CCNC: 18 U/L — SIGNIFICANT CHANGE UP (ref 10–45)
ANION GAP SERPL CALC-SCNC: 10 MMOL/L — SIGNIFICANT CHANGE UP (ref 5–17)
AST SERPL-CCNC: 22 U/L — SIGNIFICANT CHANGE UP (ref 10–40)
BILIRUB SERPL-MCNC: 0.4 MG/DL — SIGNIFICANT CHANGE UP (ref 0.2–1.2)
BUN SERPL-MCNC: 25 MG/DL — HIGH (ref 7–23)
CALCIUM SERPL-MCNC: 8.7 MG/DL — SIGNIFICANT CHANGE UP (ref 8.4–10.5)
CHLORIDE SERPL-SCNC: 101 MMOL/L — SIGNIFICANT CHANGE UP (ref 96–108)
CO2 SERPL-SCNC: 27 MMOL/L — SIGNIFICANT CHANGE UP (ref 22–31)
CREAT SERPL-MCNC: 0.84 MG/DL — SIGNIFICANT CHANGE UP (ref 0.5–1.3)
GLUCOSE SERPL-MCNC: 91 MG/DL — SIGNIFICANT CHANGE UP (ref 70–99)
HCT VFR BLD CALC: 39.6 % — SIGNIFICANT CHANGE UP (ref 34.5–45)
HGB BLD-MCNC: 12.7 G/DL — SIGNIFICANT CHANGE UP (ref 11.5–15.5)
MAGNESIUM SERPL-MCNC: 1.9 MG/DL — SIGNIFICANT CHANGE UP (ref 1.6–2.6)
MCHC RBC-ENTMCNC: 31.6 PG — SIGNIFICANT CHANGE UP (ref 27–34)
MCHC RBC-ENTMCNC: 32.1 GM/DL — SIGNIFICANT CHANGE UP (ref 32–36)
MCV RBC AUTO: 98.5 FL — SIGNIFICANT CHANGE UP (ref 80–100)
NRBC # BLD: 0 /100 WBCS — SIGNIFICANT CHANGE UP (ref 0–0)
PHOSPHATE SERPL-MCNC: 2.9 MG/DL — SIGNIFICANT CHANGE UP (ref 2.5–4.5)
PLATELET # BLD AUTO: 197 K/UL — SIGNIFICANT CHANGE UP (ref 150–400)
POTASSIUM SERPL-MCNC: 4.3 MMOL/L — SIGNIFICANT CHANGE UP (ref 3.5–5.3)
POTASSIUM SERPL-SCNC: 4.3 MMOL/L — SIGNIFICANT CHANGE UP (ref 3.5–5.3)
PROT SERPL-MCNC: 6.3 G/DL — SIGNIFICANT CHANGE UP (ref 6–8.3)
RBC # BLD: 4.02 M/UL — SIGNIFICANT CHANGE UP (ref 3.8–5.2)
RBC # FLD: 16.3 % — HIGH (ref 10.3–14.5)
SODIUM SERPL-SCNC: 138 MMOL/L — SIGNIFICANT CHANGE UP (ref 135–145)
WBC # BLD: 7.69 K/UL — SIGNIFICANT CHANGE UP (ref 3.8–10.5)
WBC # FLD AUTO: 7.69 K/UL — SIGNIFICANT CHANGE UP (ref 3.8–10.5)

## 2020-08-20 PROCEDURE — 99233 SBSQ HOSP IP/OBS HIGH 50: CPT | Mod: GC

## 2020-08-20 PROCEDURE — 70547 MR ANGIOGRAPHY NECK W/O DYE: CPT | Mod: 26

## 2020-08-20 PROCEDURE — 70544 MR ANGIOGRAPHY HEAD W/O DYE: CPT | Mod: 26

## 2020-08-20 RX ORDER — DIPHENHYDRAMINE HCL 50 MG
50 CAPSULE ORAL ONCE
Refills: 0 | Status: COMPLETED | OUTPATIENT
Start: 2020-08-21 | End: 2020-08-21

## 2020-08-20 RX ADMIN — Medication 81 MILLIGRAM(S): at 06:26

## 2020-08-20 RX ADMIN — Medication 5 MILLIGRAM(S): at 22:46

## 2020-08-20 RX ADMIN — ATORVASTATIN CALCIUM 40 MILLIGRAM(S): 80 TABLET, FILM COATED ORAL at 23:00

## 2020-08-20 RX ADMIN — ENOXAPARIN SODIUM 30 MILLIGRAM(S): 100 INJECTION SUBCUTANEOUS at 22:44

## 2020-08-20 RX ADMIN — Medication 50 MILLIGRAM(S): at 22:48

## 2020-08-20 RX ADMIN — SENNA PLUS 2 TABLET(S): 8.6 TABLET ORAL at 22:47

## 2020-08-20 RX ADMIN — Medication 50 MILLIGRAM(S): at 17:11

## 2020-08-20 NOTE — PROGRESS NOTE ADULT - PROBLEM SELECTOR PLAN 2
Pt with history of lung adenocarcinoma (RLL s/p VATS resection 2013, LIL XRT 2016, RML lesion with radiation in 2017). Pt follows with pulm. Earlier this month had diagnostic thoracentesis for possible recurrence of adenocarcinoma after PET showed 2 new FDG-avid subcentimeter nodules in left subpleural region.  - pulm following, appreciate recs

## 2020-08-20 NOTE — PROGRESS NOTE ADULT - ASSESSMENT
87F PMH lung adenocarcinoma s/p resection/XRT, severe MR presents for RLE weakness admitted for stroke w/u. Pulm service consulted for continuity of care, now found to be hypoxic lying flat on RA, desatting with ambulation--not at basemine     #Acute Hypoxia Respiratory Failure  -Pt now desatting with ambulation, hypoxic lying flat to SpO2 88% on RA. POCUS with stable size right pleural effusion compared to previous, no consolidation or s/s of PNA/COPD  -Unclear etiology of hypoxia, recommend CT-PE to eval for PE   -Recommend OOBTC, incentive spirometry, early PT, aspiration precautions (pt denies coughing/difficulties with eating)    -Plan discussed with Primary Team

## 2020-08-20 NOTE — CHART NOTE - NSCHARTNOTEFT_GEN_A_CORE
Upon Nutritional Assessment by the Registered Dietitian your patient was determined to meet criteria / has evidence of the following diagnosis/diagnoses:          [ ]  Mild Protein Calorie Malnutrition        [ ]  Moderate Protein Calorie Malnutrition        [X ] Severe Protein Calorie Malnutrition        [ ] Unspecified Protein Calorie Malnutrition        [ X ] Underweight / BMI <19        [ ] Morbid Obesity / BMI > 40      Findings as based on:  •  Comprehensive nutrition assessment and consultation    Per physical assessment: Muscle Wasting- Temporal [ X  ]  Clavicle/Pectoral [ X  ]  Shoulder/Deltoid [ X  ]  Scapula [   ]  Interosseous [ X  ]  Quadriceps [   ]  Gastrocnemius [ X  ]; Fat Wasting- Orbital [   ]  Buccal [  X ]  Triceps [ X  ]  Rib [ X  ]--> Suspect severe protein-calorie malnutrition 2/2 to physical assessment      Treatment:    The following diet has been recommended:    Continue with current diet order and encourage PO intake  Recommend addition of multivitamin w/minerals       PROVIDER Section:     By signing this assessment you are acknowledging and agree with the diagnosis/diagnoses assigned by the Registered Dietitian    Comments:

## 2020-08-20 NOTE — PROGRESS NOTE ADULT - PROBLEM SELECTOR PLAN 2
- CT Head: No intracranial hemorrhage or acute transcortical infarct.  - CT Pelvis: No fracture  - PT reccs: home w/ home PT  - OT reccs: home w/ home OT

## 2020-08-20 NOTE — PROGRESS NOTE ADULT - PROBLEM SELECTOR PLAN 1
Patient presented with right leg weakness and fall from chair. In ED, patient with witnessed episode of staring and syncopal episode. Stroke code was called, NIHSS 0.  - f/u MR Angio Head and Neck Non Contrast  - CT Head: No ICH or acute transcortical infarct. Chronic microangiopathic disease and age-appropriate volume loss.  - A1c: 6.0%  - Goal SBP<180  - lipid: chol 216, , HDL 80,   - TSH 2.68 wnl     Secondary Stroke Prevention Medication  - c/w aspirin 81mg daily  - c/w atorvastatin 40mg PO daily- cholesterol and stroke prevention   - no need for plavix at this time  - lovenox SQ and SCDs for DVT prophylaxis

## 2020-08-20 NOTE — PROGRESS NOTE ADULT - SUBJECTIVE AND OBJECTIVE BOX
INTERVAL HPI/OVERNIGHT EVENTS:  As per night team, no overnight events. Patient seen and examined at bedside. Patient denies fever, chills, dizziness, weakness, HA, CP, SOB, N/V/D/C, changes in bowel movements, LE swelling. 12pt ROS otherwise negative.    VITALS  Vital Signs Last 24 Hrs  T(C): 36.9 (20 Aug 2020 13:46), Max: 36.9 (20 Aug 2020 01:00)  T(F): 98.4 (20 Aug 2020 13:46), Max: 98.5 (20 Aug 2020 01:00)  HR: 92 (20 Aug 2020 16:20) (82 - 98)  BP: 126/63 (20 Aug 2020 16:20) (126/63 - 143/68)  BP(mean): 86 (20 Aug 2020 16:20) (85 - 98)  RR: 18 (20 Aug 2020 16:20) (18 - 18)  SpO2: 94% (20 Aug 2020 16:20) (94% - 97%)    CAPILLARY BLOOD GLUCOSE      PHYSICAL EXAM  General: Elderly female, WDWN, NAD on 2L NC  HEENT: NC/AT, no scleral icterus, MMM  Respiratory: CTA B/L, no wheezes/crackles   Cardiovascular: Regular rhythm/rate; +S1 +S2  Gastrointestinal: Soft, NTND, normoactive BS, no rebound, no guarding  Extremities: WWP; no cyanosis, no clubbing, no edema  Skin: Normal temperature, warm, dry  Neurologic:  -Mental status: Awake, alert, oriented to person, place, and time. Speech is fluent with intact naming, repetition, and comprehension, no dysarthria. Recent and remote memory intact. Follows commands. Attention/concentration intact.   -Cranial nerves:   II: Visual fields are full to confrontation.  III, IV, VI: Extraocular movements are intact without nystagmus. Pupils PERRL  V:  Facial sensation V1-V3 equal and intact bilaterally  VII: Face is symmetric with normal eye closure and smile, no facial droop  VIII: Hearing is bilaterally intact to finger rub  IX, X: Uvula is midline and soft palate rises symmetrically  XI: Head turning and shoulder shrug are intact.  XII: Tongue protrudes midline  Motor: Normal bulk and tone. Strength bilateral upper extremity 5/5, bilateral lower extremities 5/5.  Sensation: Intact to light touch and proprioception bilaterally. Intact pain and temperature sense. No neglect.   Coordination: No dysmetria on finger-to-nose.    MEDICATIONS  (STANDING):  aspirin enteric coated 81 milliGRAM(s) Oral every 24 hours  atorvastatin 40 milliGRAM(s) Oral at bedtime  bisacodyl 5 milliGRAM(s) Oral at bedtime  enoxaparin Injectable 30 milliGRAM(s) SubCutaneous every 24 hours  predniSONE   Tablet 50 milliGRAM(s) Oral once  predniSONE   Tablet 50 milliGRAM(s) Oral once  senna 2 Tablet(s) Oral at bedtime    MEDICATIONS  (PRN):      Bactrim (Unknown)  Cleocin HCl (Unknown)  Flagyl (Unknown)  Honey (Unknown)  iodine (Anaphylaxis)  IV Contrast (Anaphylaxis)  Levaquin (Other; Rash)  penicillin (Unknown)  Zithromax (Unknown)      LABS                        12.7   7.69  )-----------( 197      ( 20 Aug 2020 05:38 )             39.6     08-20    138  |  101  |  25<H>  ----------------------------<  91  4.3   |  27  |  0.84    Ca    8.7      20 Aug 2020 05:38  Phos  2.9     08-20  Mg     1.9     08-20    TPro  6.3  /  Alb  3.3  /  TBili  0.4  /  DBili  x   /  AST  22  /  ALT  18  /  AlkPhos  92  08-20    PT/INR - ( 19 Aug 2020 06:40 )   PT: 11.9 sec;   INR: 0.99          PTT - ( 19 Aug 2020 06:40 )  PTT:32.5 sec      RADIOLOGY & ADDITIONAL TESTS: Reviewed

## 2020-08-20 NOTE — DIETITIAN INITIAL EVALUATION ADULT. - ENERGY NEEDS
Height 58"; ABW 40kg; IBW 43kg; 93%IBW  BMI 18.8  ActualBW used for calculations as pt between % of IBW. Needs estimated for age and adjusted for suspected malnutrition. Fluids per team 2/2 tenuous respiratory status

## 2020-08-20 NOTE — DIETITIAN INITIAL EVALUATION ADULT. - OTHER INFO
88 yo/female with PMHx lung adenocarcinoma (RLL s/p VATS resection in 2013, ANANYA XRT in 2016, and RML lesion with radiation in 2017), admitted w/RLE weakness a/w fall at home. Stroke code called- CTH negative, R/O TIA. Pt also found to be hypoxic while laying flat; c/f PE. Pt seen in room, awake, alert, very pleasant, breathing comfortably on NC. She endorsed eating very well at home, including yogurt, fruit, fish, bread, cheese, vegetables, and cottage cheese. Allergy to honey confirmed. She reported ~50% intake of breakfast (yogurt, coffee, fruit). No N/V/C/D reported at this time. She denied difficulty chewing or swallowing but noted w/poor dentition. Skin intact pressure-wise. She denied pain. NFPE significant for severe protein-calorie malnutrition, and pt does report weight loss when she had the flu (unsure timing, maybe a year ago). She declined ONS as she would prefer to have regular food. Encouraged PO intake. Will continue to follow per RD protocol.

## 2020-08-20 NOTE — PROGRESS NOTE ADULT - SUBJECTIVE AND OBJECTIVE BOX
O/N and interval events: None    Currently, pt endorsing mild sensation of dizziness earlier this morning in addition to some aching in her neck and back. She denies fever, chills, chest pain, SOB, abdominal pain, n/v/d/c.    REVIEW OF SYSTEMS:   Otherwise negative except as specified in HPI    PAST MEDICAL HISTORY:  Lung cancer as above     PAST SURGICAL HISTORY:  RLL VATS resection 2013    FAMILY HISTORY:  Non-contributory    SOCIAL HISTORY:  Tobacco use: Former smoker, quit 20 years ago, 5 pack-year history  EtOH use: Denies  Illicit drug use: Denies    MEDICATIONS  (STANDING):  aspirin enteric coated 81 milliGRAM(s) Oral every 24 hours  atorvastatin 40 milliGRAM(s) Oral at bedtime  bisacodyl 5 milliGRAM(s) Oral at bedtime  enoxaparin Injectable 30 milliGRAM(s) SubCutaneous every 24 hours  predniSONE   Tablet 50 milliGRAM(s) Oral once  predniSONE   Tablet 50 milliGRAM(s) Oral once  senna 2 Tablet(s) Oral at bedtime    MEDICATIONS  (PRN):        ALLERGIES:  Allergies    Bactrim (Unknown)  Cleocin HCl (Unknown)  Flagyl (Unknown)  Honey (Unknown)  iodine (Anaphylaxis)  IV Contrast (Anaphylaxis)  Levaquin (Other; Rash)  penicillin (Unknown)  Zithromax (Unknown)    Intolerances    Vital Signs Last 24 Hrs  T(C): 36.9 (20 Aug 2020 13:46), Max: 36.9 (20 Aug 2020 01:00)  T(F): 98.4 (20 Aug 2020 13:46), Max: 98.5 (20 Aug 2020 01:00)  HR: 92 (20 Aug 2020 16:20) (82 - 98)  BP: 126/63 (20 Aug 2020 16:20) (126/63 - 143/68)  BP(mean): 86 (20 Aug 2020 16:20) (85 - 98)  RR: 18 (20 Aug 2020 16:20) (18 - 18)  SpO2: 94% (20 Aug 2020 16:20) (94% - 97%)    PHYSICAL EXAM:  Constitutional: Elderly female, comfortable, NAD  ENT: no nasal discharge; uvula midline, no oropharyngeal erythema or exudates; MMM  Respiratory: CTA B/L; no W/R/R, no retractions  Cardiac: +S1/S2; RRR; no M/R/G; PMI non-displaced  Gastrointestinal: abdomen soft, NT/ND; no rebound or guarding; +BSx4  Extremities: WWP, no clubbing or cyanosis; no peripheral edema  Musculoskeletal: NROM x4; no joint swelling, tenderness or erythema  Dermatologic: skin warm, dry and intact; no rashes, wounds, or scars  Neurologic: AAOx3; CNII-XII grossly intact; no focal deficits  Psychiatric: affect and characteristics of appearance, verbalizations, behaviors are appropriate    LABS:                          12.7   7.69  )-----------( 197      ( 20 Aug 2020 05:38 )             39.6   08-20    138  |  101  |  25<H>  ----------------------------<  91  4.3   |  27  |  0.84    Ca    8.7      20 Aug 2020 05:38  Phos  2.9     08-20  Mg     1.9     08-20    TPro  6.3  /  Alb  3.3  /  TBili  0.4  /  DBili  x   /  AST  22  /  ALT  18  /  AlkPhos  92  08-20        RADIOLOGY & ADDITIONAL TESTS:     < from: CT Brain Stroke Protocol (08.18.20 @ 11:44) >  FINDINGS: The CT examination demonstrates the ventricles, cisternal spaces, and cortical sulci to be within normal limits for the patient's age. There is no midline shift or extra axial fluid collections.  Patchy areas of diminished attenuation within the periventricular and subcortical white matter lucency are noted, with chronic microangiopathic ischemic disease. There is a lacunar infarct in the right putamen. There is nointracranial hemorrhage or acute transcortical infarct.    The bony windows demonstrates no fractures. The visualized paranasal sinuses are within normal limits. The mastoid air cells are well aerated.    IMPRESSION:    1. No intracranial hemorrhage or acute transcortical infarct.  2. Chronic microangiopathic disease and age-appropriate volume loss.    < end of copied text >    < from: CT Pelvis Bony Only No Cont (08.18.20 @ 13:28) >  Evaluation of the bony pelvis demonstrates osteopenia. No fracture. No dislocation. Degenerative change of the lower lumbar spine. Mild anterolisthesis of L4 on L5. Evaluation of the internal pelvic organs demonstrate extensive aortoiliac calcification. Extensive sigmoid diverticulosis. Fecal loading of stool throughout the large bowel.          IMPRESSION:    Negative for fracture.    < end of copied text >

## 2020-08-20 NOTE — DIETITIAN INITIAL EVALUATION ADULT. - NAME AND PHONE
Samantha Gitlin, RD, CDN, [facility], [phone] or available on [place]

## 2020-08-20 NOTE — PROGRESS NOTE ADULT - ASSESSMENT
per Internal Medicine    88 y/o Female with PMHx of lung adenocarcinoma s/p radiation who presents with right leg weakness associated with fall and subsequent LOC on ED. Stroke code was called, NIHSS 0. HCT showed no signs of acute infarct or hemorrhage. Patient likely with vasovagal in ED however due to right leg muscle "giving out" need to rule out stroke or TIA.     COVID-19 Negative Lab Result:  · COVID-19 Negative Lab Result  COVID-19 ruled out      Problem/Plan - 1:  ·  Problem: R/O CVA (cerebral vascular accident).  Plan: Patient presented with right leg weakness and fall from chair. In ED, patient with witnessed episode of staring and syncopal episode. Stroke code was called, NIHSS 0.  - CT Head: No ICH or acute transcortical infarct. Chronic microangiopathic disease and age-appropriate volume loss.  - A1c: 6.0%  - Goal SBP<180  - lipid: chol 216, , HDL 80,   - TSH 2.68 wnl   - f/u MR Angio Head and Neck Non Contrast    Secondary Stroke Prevention Medication  - c/w aspirin 81mg daily  - c/w atorvastatin 40mg PO daily- cholesterol and stroke prevention   - no need for plavix at this time  - Start lovenox SQ and SCDs for DVT prophylaxis.     Problem/Plan - 2:  ·  Problem: Fall from furniture.  Plan: - CT Head: No intracranial hemorrhage or acute transcortical infarct.  - CT Pelvis: No fracture  - PT reccs: home w/ home PT  - OT reccs: home w/ home OT.     Problem/Plan - 3:  ·  Problem: Nutrition, metabolism, and development symptoms.  Plan: F: None  E: Replete PRN for Mg<2 and K<4  N: DASH/TLC.     Problem/Plan - 4:  ·  Problem: Prophylactic measure.  Plan: F: none  E: replete K<4, Mg<2  DVT PPx: Lovenox 30mg sub Q qhs  GI PPx: Diet    Dispo: 7La/Telemetry.

## 2020-08-20 NOTE — PROGRESS NOTE ADULT - SUBJECTIVE AND OBJECTIVE BOX
Interval Events:  Patient seen and examined at bedside. RINA o/n. No questions or complaints. Pt denies cough, fevers, SoB, n/v/d/c, myalgia. Has not been out of bed.    MEDICATIONS:  Pulmonary:    Antimicrobials:    Anticoagulants:  aspirin enteric coated 81 milliGRAM(s) Oral every 24 hours  enoxaparin Injectable 30 milliGRAM(s) SubCutaneous every 24 hours    Cardiac:    Endocrine:  atorvastatin 40 milliGRAM(s) Oral at bedtime    Allergies    Bactrim (Unknown)  Cleocin HCl (Unknown)  Flagyl (Unknown)  Honey (Unknown)  iodine (Anaphylaxis)  IV Contrast (Anaphylaxis)  Levaquin (Other; Rash)  penicillin (Unknown)  Zithromax (Unknown)    Intolerances        Vital Signs Last 24 Hrs  T(C): 36.3 (20 Aug 2020 05:01), Max: 36.9 (20 Aug 2020 01:00)  T(F): 97.4 (20 Aug 2020 05:01), Max: 98.5 (20 Aug 2020 01:00)  HR: 88 (20 Aug 2020 08:35) (82 - 98)  BP: 129/64 (20 Aug 2020 08:35) (117/66 - 155/67)  BP(mean): 89 (20 Aug 2020 08:35) (85 - 97)  RR: 18 (20 Aug 2020 08:35) (16 - 18)  SpO2: 96% (20 Aug 2020 08:35) (93% - 99%)    .  VITAL SIGNS:  T(C): 36.3 (08-20-20 @ 05:01), Max: 36.9 (08-20-20 @ 01:00)  T(F): 97.4 (08-20-20 @ 05:01), Max: 98.5 (08-20-20 @ 01:00)  HR: 88 (08-20-20 @ 08:35) (82 - 98)  BP: 129/64 (08-20-20 @ 08:35) (117/66 - 155/67)  BP(mean): 89 (08-20-20 @ 08:35) (85 - 97)  RR: 18 (08-20-20 @ 08:35) (16 - 18)  SpO2: 96% (08-20-20 @ 08:35) (93% - 99%)  Wt(kg): --    PHYSICAL EXAM:    Constitutional: WDWN resting comfortably in bed; NAD  Head: NC/AT  Eyes: PERRL, EOMI, anicteric sclera  ENT: no nasal discharge; uvula midline, no oropharyngeal erythema or exudates; MMM  Neck: supple; no JVD or thyromegaly  Respiratory: CTA B/L; no W/R/R, no retractions  Cardiac: +S1/S2; RRR; no M/R/G; PMI non-displaced  Gastrointestinal: soft, NT/ND; no rebound or guarding; +BSx4  Extremities: WWP, no clubbing or cyanosis; no peripheral edema  Musculoskeletal: NROM x4; no joint swelling, tenderness or erythema  Vascular: 2+ radial, femoral, DP/PT pulses B/L  Dermatologic: skin warm, dry and intact; no rashes, wounds, or scars  Lymphatic: no submandibular or cervical LAD  Neurologic: AAOx3; CNII-XII grossly intact; no focal deficits  Psychiatric: affect and characteristics of appearance, verbalizations, behaviors are appropriate    LABS:      CBC Full  -  ( 20 Aug 2020 05:38 )  WBC Count : 7.69 K/uL  RBC Count : 4.02 M/uL  Hemoglobin : 12.7 g/dL  Hematocrit : 39.6 %  Platelet Count - Automated : 197 K/uL  Mean Cell Volume : 98.5 fl  Mean Cell Hemoglobin : 31.6 pg  Mean Cell Hemoglobin Concentration : 32.1 gm/dL  Auto Neutrophil # : x  Auto Lymphocyte # : x  Auto Monocyte # : x  Auto Eosinophil # : x  Auto Basophil # : x  Auto Neutrophil % : x  Auto Lymphocyte % : x  Auto Monocyte % : x  Auto Eosinophil % : x  Auto Basophil % : x    08-20    138  |  101  |  25<H>  ----------------------------<  91  4.3   |  27  |  0.84    Ca    8.7      20 Aug 2020 05:38  Phos  2.9     08-20  Mg     1.9     08-20    TPro  6.3  /  Alb  3.3  /  TBili  0.4  /  DBili  x   /  AST  22  /  ALT  18  /  AlkPhos  92  08-20    PT/INR - ( 19 Aug 2020 06:40 )   PT: 11.9 sec;   INR: 0.99          PTT - ( 19 Aug 2020 06:40 )  PTT:32.5 sec                  RADIOLOGY & ADDITIONAL STUDIES (The following images were personally reviewed):

## 2020-08-20 NOTE — PROGRESS NOTE ADULT - ASSESSMENT
Ms. Woods is an 86 y/o Female with PMHx of lung adenocarcinoma s/p radiation who presents with right leg weakness associated with fall and subsequent LOC on ED. Stroke code was called, NIHSS 0. HCT showed no signs of acute infarct or hemorrhage. Patient likely with vasovagal in ED however due to right leg muscle "giving out" need to rule out stroke or TIA.

## 2020-08-20 NOTE — DIETITIAN INITIAL EVALUATION ADULT. - PROBLEM SELECTOR PLAN 1
Patient presented with right leg weakness and fall from chair. In ED, patient with witnessed episode of staring and syncopal episode. Stroke code was called, NIHSS 0.  - CT Head: No intracranial hemorrhage or acute transcortical infarct. Chronic microangiopathic disease and age-appropriate volume loss.  - A1c: 6.0%    Plan:  - Goal SBP<180  - f/u lipid panel, TSH, ESR  - f/u MR Angio Head and Neck Non Contrast    Secondary Stroke Prevention Medication  - Start aspirin 81mg daily  - Start atorvastatin 40mg PO daily- cholesterol and stroke prevention   - Start lovenox SQ and SCDs for DVT prophylaxis

## 2020-08-20 NOTE — PROGRESS NOTE ADULT - PROBLEM SELECTOR PLAN 5
F: none  E: replete K<4, Mg<2  DVT PPx: Lovenox 30mg sub Q qhs  GI PPx: Diet    Dispo: 7La/Telemetry

## 2020-08-20 NOTE — DIETITIAN INITIAL EVALUATION ADULT. - ADD RECOMMEND
1. Encourage PO intake 2. Recommend MVI w/minerals 3. Monitor lytes and replete prn. b 1. Encourage PO intake 2. Recommend MVI w/minerals 3. Monitor lytes and replete prn. 4. Pain and bowel regimens per team

## 2020-08-20 NOTE — PROGRESS NOTE ADULT - PROBLEM SELECTOR PLAN 3
Pt desatted while ambulating and hypoxic lying flat (SpO2 88% on RA).   -pulmonary performed bedside POCUS which stable R pleural effusion  Pt now desatting with ambulation, hypoxic lying flat to SpO2 88% on RA. POCUS with stable size right pleural effusion compared to previous, no consolidation or s/s of PNA/COPD  -CT Angio Chest to r/o PE scheduled 8/21  -Due to reported contrast allergy, will be pre-medicated with benadryl and prednisone

## 2020-08-20 NOTE — DIETITIAN INITIAL EVALUATION ADULT. - PROBLEM SELECTOR PLAN 2
- CT Head: No intracranial hemorrhage or acute transcortical infarct.  - CT Pelvis: No fracture  - f/u PT/OT recs

## 2020-08-20 NOTE — PROGRESS NOTE ADULT - SUBJECTIVE AND OBJECTIVE BOX
Physical Medicine and Rehabilitation Progress Note:    Patient is a 87y old  Female who presents with a chief complaint of right leg weakness and fall (20 Aug 2020 08:58)      HPI:  87y Female with PMHx of lung adenocarcinoma s/p radiation who presents with right leg weakness associated with fall. Patient lives at home alone and was trying to stand on a bench to get something at home when her right leg muscle "gave out" on her and she fell back on her lower back and head, denies LOC. She presented to [facility] and was sitting at triage when she had a moment talking to the RN where she stared off and was unresponsive for 2-3 seconds and then fell to the side. ED RN was able to catch patient and put patient on stretcher, patient's BP was 133/67 and patient regained consciousness did not remember event. Stroke code was called, NIHSS 0. HCT showed no signs of acute infarct or hemorrhage. Unable to pursue CTA as patient with anaphylaxis to contrast allergy. Patient denies acute onset of numbness, tingling, slurred speech, blurry vision, double vision, dizziness, headache.     Last known well time/Time of onset of symptoms: 8/18/2020 1134 (18 Aug 2020 14:07)                            12.7   7.69  )-----------( 197      ( 20 Aug 2020 05:38 )             39.6       08-20    138  |  101  |  25<H>  ----------------------------<  91  4.3   |  27  |  0.84    Ca    8.7      20 Aug 2020 05:38  Phos  2.9     08-20  Mg     1.9     08-20    TPro  6.3  /  Alb  3.3  /  TBili  0.4  /  DBili  x   /  AST  22  /  ALT  18  /  AlkPhos  92  08-20    Vital Signs Last 24 Hrs  T(C): 36.9 (20 Aug 2020 13:46), Max: 36.9 (20 Aug 2020 01:00)  T(F): 98.4 (20 Aug 2020 13:46), Max: 98.5 (20 Aug 2020 01:00)  HR: 90 (20 Aug 2020 11:59) (82 - 98)  BP: 134/67 (20 Aug 2020 11:59) (129/64 - 155/67)  BP(mean): 95 (20 Aug 2020 11:59) (85 - 97)  RR: 18 (20 Aug 2020 11:59) (16 - 18)  SpO2: 97% (20 Aug 2020 11:59) (94% - 97%)    MEDICATIONS  (STANDING):  aspirin enteric coated 81 milliGRAM(s) Oral every 24 hours  atorvastatin 40 milliGRAM(s) Oral at bedtime  bisacodyl 5 milliGRAM(s) Oral at bedtime  enoxaparin Injectable 30 milliGRAM(s) SubCutaneous every 24 hours  senna 2 Tablet(s) Oral at bedtime    MEDICATIONS  (PRN):    Currently Undergoing Physical/ Occupational Therapy at bedside.    Functional Status Assessment:         Therapeutic Interventions      Bed Mobility  Bed Mobility Training Sit-to-Supine: contact guard;  1 person assist  Bed Mobility Training Limitations: decreased strength;  impaired balance    Sit-Stand Transfer Training  Transfer Training Sit-to-Stand Transfer: contact guard;  1 person assist;  weight-bearing as tolerated  Transfer Training Stand-to-Sit Transfer: contact guard;  1 person assist;  weight-bearing as tolerated  Sit-to-Stand Transfer Training Transfer Safety Analysis: impaired balance;  decreased strength    Gait Training  Gait Training: contact guard;  1 person assist;  weight-bearing as tolerated   200 feet  Gait Analysis: 2-point gait   decreased strength;  impaired balance;  200 feet    Therapeutic Exercise  Therapeutic Exercise Detail: Pt participated in ankle pumps and seated marching           PM&R Impression: as above    Current Disposition Plan Recommendations:    d/c home, home physical therapy, refusing home care services

## 2020-08-21 ENCOUNTER — TRANSCRIPTION ENCOUNTER (OUTPATIENT)
Age: 85
End: 2020-08-21

## 2020-08-21 DIAGNOSIS — N17.9 ACUTE KIDNEY FAILURE, UNSPECIFIED: ICD-10-CM

## 2020-08-21 DIAGNOSIS — J84.9 INTERSTITIAL PULMONARY DISEASE, UNSPECIFIED: ICD-10-CM

## 2020-08-21 LAB
ALBUMIN SERPL ELPH-MCNC: 3.4 G/DL — SIGNIFICANT CHANGE UP (ref 3.3–5)
ALP SERPL-CCNC: 91 U/L — SIGNIFICANT CHANGE UP (ref 40–120)
ALT FLD-CCNC: 18 U/L — SIGNIFICANT CHANGE UP (ref 10–45)
ANION GAP SERPL CALC-SCNC: 11 MMOL/L — SIGNIFICANT CHANGE UP (ref 5–17)
AST SERPL-CCNC: 22 U/L — SIGNIFICANT CHANGE UP (ref 10–40)
BASE EXCESS BLDA CALC-SCNC: 0.4 MMOL/L — SIGNIFICANT CHANGE UP (ref -2–3)
BASE EXCESS BLDA CALC-SCNC: 0.8 MMOL/L — SIGNIFICANT CHANGE UP (ref -2–3)
BILIRUB SERPL-MCNC: 0.3 MG/DL — SIGNIFICANT CHANGE UP (ref 0.2–1.2)
BUN SERPL-MCNC: 38 MG/DL — HIGH (ref 7–23)
CALCIUM SERPL-MCNC: 8.8 MG/DL — SIGNIFICANT CHANGE UP (ref 8.4–10.5)
CHLORIDE SERPL-SCNC: 99 MMOL/L — SIGNIFICANT CHANGE UP (ref 96–108)
CO2 SERPL-SCNC: 25 MMOL/L — SIGNIFICANT CHANGE UP (ref 22–31)
CREAT SERPL-MCNC: 1.03 MG/DL — SIGNIFICANT CHANGE UP (ref 0.5–1.3)
GLUCOSE SERPL-MCNC: 185 MG/DL — HIGH (ref 70–99)
HCO3 BLDA-SCNC: 25 MMOL/L — SIGNIFICANT CHANGE UP (ref 21–28)
HCO3 BLDA-SCNC: 27 MMOL/L — SIGNIFICANT CHANGE UP (ref 21–28)
HCT VFR BLD CALC: 38.7 % — SIGNIFICANT CHANGE UP (ref 34.5–45)
HGB BLD-MCNC: 12.4 G/DL — SIGNIFICANT CHANGE UP (ref 11.5–15.5)
MAGNESIUM SERPL-MCNC: 1.9 MG/DL — SIGNIFICANT CHANGE UP (ref 1.6–2.6)
MCHC RBC-ENTMCNC: 31.2 PG — SIGNIFICANT CHANGE UP (ref 27–34)
MCHC RBC-ENTMCNC: 32 GM/DL — SIGNIFICANT CHANGE UP (ref 32–36)
MCV RBC AUTO: 97.2 FL — SIGNIFICANT CHANGE UP (ref 80–100)
NRBC # BLD: 0 /100 WBCS — SIGNIFICANT CHANGE UP (ref 0–0)
PCO2 BLDA: 43 MMHG — SIGNIFICANT CHANGE UP (ref 32–45)
PCO2 BLDA: 48 MMHG — HIGH (ref 32–45)
PH BLDA: 7.36 — SIGNIFICANT CHANGE UP (ref 7.35–7.45)
PH BLDA: 7.39 — SIGNIFICANT CHANGE UP (ref 7.35–7.45)
PHOSPHATE SERPL-MCNC: 3.1 MG/DL — SIGNIFICANT CHANGE UP (ref 2.5–4.5)
PLATELET # BLD AUTO: 214 K/UL — SIGNIFICANT CHANGE UP (ref 150–400)
PO2 BLDA: 38 MMHG — CRITICAL LOW (ref 83–108)
PO2 BLDA: 52 MMHG — CRITICAL LOW (ref 83–108)
POTASSIUM SERPL-MCNC: 4.4 MMOL/L — SIGNIFICANT CHANGE UP (ref 3.5–5.3)
POTASSIUM SERPL-SCNC: 4.4 MMOL/L — SIGNIFICANT CHANGE UP (ref 3.5–5.3)
PROT SERPL-MCNC: 6.4 G/DL — SIGNIFICANT CHANGE UP (ref 6–8.3)
RBC # BLD: 3.98 M/UL — SIGNIFICANT CHANGE UP (ref 3.8–5.2)
RBC # FLD: 15.9 % — HIGH (ref 10.3–14.5)
SAO2 % BLDA: 74 % — LOW (ref 95–100)
SAO2 % BLDA: 88 % — LOW (ref 95–100)
SODIUM SERPL-SCNC: 135 MMOL/L — SIGNIFICANT CHANGE UP (ref 135–145)
WBC # BLD: 6.35 K/UL — SIGNIFICANT CHANGE UP (ref 3.8–10.5)
WBC # FLD AUTO: 6.35 K/UL — SIGNIFICANT CHANGE UP (ref 3.8–10.5)

## 2020-08-21 PROCEDURE — 93306 TTE W/DOPPLER COMPLETE: CPT | Mod: 26

## 2020-08-21 PROCEDURE — 99233 SBSQ HOSP IP/OBS HIGH 50: CPT | Mod: GC

## 2020-08-21 PROCEDURE — 71275 CT ANGIOGRAPHY CHEST: CPT | Mod: 26

## 2020-08-21 RX ORDER — DIPHENHYDRAMINE HCL 50 MG
50 CAPSULE ORAL ONCE
Refills: 0 | Status: DISCONTINUED | OUTPATIENT
Start: 2020-08-21 | End: 2020-08-22

## 2020-08-21 RX ORDER — SODIUM CHLORIDE 9 MG/ML
250 INJECTION INTRAMUSCULAR; INTRAVENOUS; SUBCUTANEOUS ONCE
Refills: 0 | Status: DISCONTINUED | OUTPATIENT
Start: 2020-08-21 | End: 2020-08-22

## 2020-08-21 RX ADMIN — Medication 81 MILLIGRAM(S): at 06:24

## 2020-08-21 RX ADMIN — Medication 5 MILLIGRAM(S): at 21:34

## 2020-08-21 RX ADMIN — ENOXAPARIN SODIUM 30 MILLIGRAM(S): 100 INJECTION SUBCUTANEOUS at 21:34

## 2020-08-21 RX ADMIN — Medication 50 MILLIGRAM(S): at 05:22

## 2020-08-21 RX ADMIN — Medication 50 MILLIGRAM(S): at 05:21

## 2020-08-21 RX ADMIN — SENNA PLUS 2 TABLET(S): 8.6 TABLET ORAL at 21:34

## 2020-08-21 RX ADMIN — ATORVASTATIN CALCIUM 40 MILLIGRAM(S): 80 TABLET, FILM COATED ORAL at 21:34

## 2020-08-21 NOTE — PROGRESS NOTE ADULT - ATTENDING COMMENTS
Acute hypoxic respiratory failure with h/o adenocarcinoma with right side pleural effusion. Parenchymal scarring s/p radiation and minimal pleural effusion does not explain hypoxia. CTA for PE study is still pending. Patient has allergy to contrast. Plan for CTA PE study after protocol for allergy for contrast.
Acute hypoxic respiratory failure with h/o adenocarcinoma with right side pleural effusion. Parenchymal scarring s/p radiation and minimal pleural effusion does not explain hypoxia. CT chest for PE is negative. Her hypoxia is still persisting but patient is not tachypnea due to hypoxia. Possibility of right to left shunt can not be ruled out. Plan for ECHO with bubble.

## 2020-08-21 NOTE — PROGRESS NOTE ADULT - PROBLEM SELECTOR PLAN 1
Patient presented with right leg weakness and fall from chair. In ED, patient with witnessed episode of staring and syncopal episode. Stroke code was called, NIHSS 0.  -MR Angio Head and Neck Non Contrast: no acute intracranial findings, moderate chronic white matter disease, likely microangiopathic  - CT Head: No ICH or acute transcortical infarct. Chronic microangiopathic disease and age-appropriate volume loss.  - A1c: 6.0%  - Goal SBP<180  - lipid: chol 216, , HDL 80,   - TSH 2.68 wnl     Secondary Stroke Prevention Medication  - c/w aspirin 81mg daily  - c/w atorvastatin 40mg PO daily- cholesterol and stroke prevention   - no need for plavix at this time  - lovenox SQ and SCDs for DVT prophylaxis

## 2020-08-21 NOTE — DISCHARGE NOTE PROVIDER - NSDCMRMEDTOKEN_GEN_ALL_CORE_FT
acetaminophen 325 mg oral tablet: 2 tab(s) orally every 6 hours, As needed,  pain  bisacodyl 5 mg oral delayed release tablet: 1 tab(s) orally once a day (at bedtime).  Please continue until you have regular bowel movements.  docusate sodium 100 mg oral capsule: 1 cap(s) orally 3 times a day.  Please continue until you have regular bowel movements.  senna oral tablet: 2 tab(s) orally once a day (at bedtime).  Please continue until you have regular bowel movements. acetaminophen 325 mg oral tablet: 2 tab(s) orally every 6 hours, As needed,  pain  aspirin 81 mg oral delayed release tablet: 1 tab(s) orally every 24 hours  bisacodyl 5 mg oral delayed release tablet: 1 tab(s) orally once a day (at bedtime).  Please continue until you have regular bowel movements.  docusate sodium 100 mg oral capsule: 1 cap(s) orally 3 times a day.  Please continue until you have regular bowel movements.  Lipitor 20 mg oral tablet: 1 tab(s) orally once a day  senna oral tablet: 2 tab(s) orally once a day (at bedtime).  Please continue until you have regular bowel movements.

## 2020-08-21 NOTE — DISCHARGE NOTE PROVIDER - HOSPITAL COURSE
#Discharge: do not delete        Patient is __ yo M/F with past medical history of _____    Presented with _____, found to have _____    Problem List/Main Diagnoses (system-based):     Inpatient treatment course:     New medications:     Labs to be followed outpatient:     Exam to be followed outpatient: #Discharge: do not delete        88 y/o Female with PMHx of lung adenocarcinoma s/p radiation who presents with right leg weakness associated with fall and subsequent LOC on ED. Stroke code was called, NIHSS 0. HCT showed no signs of acute infarct or hemorrhage. Patient likely with vasovagal in ED however due to right leg muscle "giving out" need to rule out stroke or TIA.         Problem List/Inpatient treatment course:     # R/O CVA (cerebral vascular accident).      -Patient presented with right leg weakness and fall from chair. In ED, patient with witnessed episode of staring and syncopal episode. Stroke code was called, NIHSS 0.    -MR Angio Head and Neck Non Contrast: no acute intracranial findings, moderate chronic white matter disease, likely microangiopathic    - CT Head: No ICH or acute transcortical infarct. Chronic microangiopathic disease and age-appropriate volume loss.    - A1c: 6.0%    - Goal SBP<180    - lipid: chol 216, , HDL 80,     - TSH 2.68 wnl         Secondary Stroke Prevention Medication    - c/w aspirin 81mg daily    - c/w atorvastatin 20mg PO daily- cholesterol and stroke prevention     - no need for plavix at this time    - lovenox SQ and SCDs for DVT prophylaxis.         # Fall from furniture.      - CT Head: No intracranial hemorrhage or acute transcortical infarct.    - CT Pelvis: No fracture    - PT reccs: home w/ home PT    - OT reccs: home w/ home OT.         # Interstitial lung disease.     - Likely ILD associated with radiation from lung adenocarcinoma treatment    ABG O2 saturation 88%    Desaturated to 80's O2 sat walking without oxygen    - per pulmonary team will need home O2 set up    - CT PE negative.        # Respiratory failure, unspecified with hypoxia likely in setting of ILD associated with radiation use.     -Pt desatted while ambulating and hypoxic lying flat (SpO2 88% on RA).     -pulmonary performed bedside POCUS which stable R pleural effusion    -Pt now desatting with ambulation, hypoxic lying flat to SpO2 88% on RA. POCUS with stable size right pleural effusion compared to previous, no consolidation or s/s of PNA/COPD    -CT Angio Chest: no pulmonary embolism    -ABG on RA performed by pulm team: pH 7.39, pCO2 43, pO2 52, 88% sat    -Given ABG findings patient is hypoxic and may need home O2     - TTE echo with bubble to evaluate for shunt    - Pulm follow up after discharge with Dr. Arianne Nguyen        # Lung adenocarcinoma    - s/p radiation and pleural effusion studies with Dr. Addison    - f/u in 1 week with         # Severe protein calorie malnutrition    - nutrition consulted        # FREDI    - Encouraged PO intake and 250cc IV fluid        New medications: Aspirin 81mg    Labs to be followed outpatient: None    Exam to be followed outpatient: None #Discharge: do not delete        86 y/o Female with PMHx of lung adenocarcinoma s/p radiation who presents with right leg weakness associated with fall and subsequent LOC on ED. Stroke code was called, NIHSS 0. HCT showed no signs of acute infarct or hemorrhage. Patient likely with vasovagal in ED however due to right leg muscle "giving out" need to rule out stroke or TIA.         Problem List/Inpatient treatment course:     # R/O CVA (cerebral vascular accident).      - Patient presented with right leg weakness and fall from chair. In ED, patient with witnessed episode of staring and syncopal episode. Stroke code was called, NIHSS 0.    - MR Angio Head and Neck Non Contrast: no acute intracranial findings, moderate chronic white matter disease, likely microangiopathic    - CT Head: No ICH or acute transcortical infarct. Chronic microangiopathic disease and age-appropriate volume loss.    - A1c: 6.0%    - Goal SBP<180    - lipid: chol 216, , HDL 80,     - TSH 2.68 wnl         Secondary Stroke Prevention Medication    - c/w aspirin 81mg daily    - c/w atorvastatin 20mg PO daily- cholesterol and stroke prevention         # Fall from furniture.      - CT Head: No intracranial hemorrhage or acute transcortical infarct.    - CT Pelvis: No fracture    - PT reccs: home w/ home PT    - OT reccs: home w/ home OT.         # Interstitial lung disease.     - Likely ILD associated with radiation from lung adenocarcinoma treatment    ABG O2 saturation 88%    Desaturated to 80's O2 sat walking without oxygen    - per pulmonary team will need home O2 set up    - CT PE negative.        # Respiratory failure, unspecified with hypoxia likely in setting of ILD associated with radiation use.     - Pt desatted while ambulating and hypoxic lying flat (SpO2 88% on RA).     - pulmonary performed bedside POCUS which stable R pleural effusion    - Pt now desatting with ambulation, hypoxic lying flat to SpO2 88% on RA. POCUS with stable size right pleural effusion compared to previous, no consolidation or s/s of PNA/COPD    - CT Angio Chest: no pulmonary embolism    - ABG on RA performed by pulm team: pH 7.39, pCO2 43, pO2 52, 88% sat    - c/w home O2     - TTE echo: with PFO, but no intervention per cardiology    - Pulm follow up after discharge with Dr. Arianne Nguyen        # Patent foramen ovale    - No acute management per cardiology, likely incidental finding        # Mitral Regurgitation    - Cardiology recommending to consider starting low dose lasix 20mg daily        # Lung adenocarcinoma    - s/p radiation and pleural effusion studies with Dr. Addison    - f/u in 1 week with         # Severe protein calorie malnutrition    - nutrition consulted        New medications: Aspirin 81mg, atorvastatin 20mg qhs    Labs to be followed outpatient: None    Exam to be followed outpatient: None

## 2020-08-21 NOTE — PROGRESS NOTE ADULT - PROBLEM SELECTOR PLAN 5
Cr. and BUN elevated from baseline, in the setting of her getting IV contrast, would give IV hydration if no CI from a stroke perspective

## 2020-08-21 NOTE — DISCHARGE NOTE NURSING/CASE MANAGEMENT/SOCIAL WORK - PATIENT PORTAL LINK FT
You can access the FollowMyHealth Patient Portal offered by [facility] by registering at the following website: [email or web address]. By joining [employer]’s FollowMyHealth portal, you will also be able to view your health information using other applications (apps) compatible with our system.

## 2020-08-21 NOTE — PROGRESS NOTE ADULT - ASSESSMENT
87F PMH lung adenocarcinoma s/p resection/XRT, severe MR presents for RLE weakness admitted for stroke w/u. Pulm service consulted for continuity of care, now found to be hypoxic lying flat on RA, desatting with ambulation--not at baseline. Now with CT-PE showing no PE, chronic lung changes, pleural effusion stable compared to previous. Unclear etiology.     #Acute Hypoxia Respiratory Failure  -ABG on RA  -Will consider echo with bubble to eval for shunt  -May require planning for home O2   -Recommend OOBTC, incentive spirometry, early PT, aspiration precautions (pt denies coughing/difficulties with eating)    -Plan discussed with Primary Team 87F PMH lung adenocarcinoma s/p resection/XRT, severe MR presents for RLE weakness admitted for stroke w/u. Pulm service consulted for continuity of care, now found to be hypoxic lying flat on RA, desatting with ambulation--not at baseline. Now with CT-PE showing no PE, chronic lung changes, pleural effusion stable compared to previous. Unclear etiology.     #Acute Hypoxia Respiratory Failure  -ABG on RA  -Will consider echo with bubble to eval for shunt  -May require planning for home O2   -Recommend OOBTC, incentive spirometry, early PT, aspiration precautions (pt denies coughing/difficulties with eating)    -Plan discussed with Primary Team       # Recurrent right pleural effusion:   - exudative, lymphocytic predominant. minimal at this point.    # Chronic radiation scarring b/l: stable    # Paraseptal/ centrilobular emphysema: stable    Fellow Addendum:   -Patient's hypoxemia is new in onset and no changes in CT scan have been noted from past and hence hypoxemia is un-explanable based on CT scan. We requested ECHO with bubble study to evaluate for R to L shunt and it does show evidence of intracardiac shunt with bubbles on left side within 3 heart beats. We recommend to obtain a Cardiology consult for further evaluation of this. Please arrange for Home O2.     The patient was s/e/d with Dr Addison. 87F PMH lung adenocarcinoma s/p resection/XRT, severe MR presents for RLE weakness admitted for stroke w/u. Pulm service consulted for continuity of care, now found to be hypoxic lying flat on RA, desatting with ambulation--not at baseline. Now with CT-PE showing no PE, chronic lung changes, pleural effusion stable compared to previous. Unclear etiology.     #Acute Hypoxia Respiratory Failure  -ABG on RA  -Will consider echo with bubble to eval for shunt  -May require planning for home O2   -Recommend OOBTC, incentive spirometry, early PT, aspiration precautions (pt denies coughing/difficulties with eating)    -Plan discussed with Primary Team       # Recurrent right pleural effusion:   - exudative, lymphocytic predominant. minimal at this point.    # Chronic radiation scarring b/l: stable    # Paraseptal/ centrilobular emphysema: stable    Fellow Addendum:   -Patient's hypoxemia is new in onset and no changes in CT scan have been noted from past and hence hypoxemia is un-explanable based on CT scan. We requested ECHO with bubble study to evaluate for R to L shunt and it does show evidence of intracardiac shunt with bubbles on left side within 3 heart beats. We recommend to obtain a Cardiology consult for further evaluation of this. Please arrange for Home O2. OK to d/c from our standpoint after cardiac eval.     The patient was s/e/d with Dr Addison.

## 2020-08-21 NOTE — PROGRESS NOTE ADULT - PROBLEM SELECTOR PLAN 3
Pt desatted while ambulating and hypoxic lying flat (SpO2 88% on RA).   -pulmonary performed bedside POCUS which stable R pleural effusion  Pt now desatting with ambulation, hypoxic lying flat to SpO2 88% on RA. 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

## 2020-08-21 NOTE — PROGRESS NOTE ADULT - SUBJECTIVE AND OBJECTIVE BOX
INTERVAL HPI/OVERNIGHT EVENTS:  As per night team, patient tolerated pre-medicated prednisone and benadryl for CT PE exam. Patient seen and examined at bedside. Patient denies fever, chills, dizziness, weakness, HA, CP, SOB, N/V/D/C, changes in bowel movements, LE swelling. 12pt ROS otherwise negative.    VITALS  Vital Signs Last 24 Hrs  T(C): 36.7 (21 Aug 2020 13:53), Max: 36.7 (20 Aug 2020 22:01)  T(F): 98 (21 Aug 2020 13:53), Max: 98.1 (20 Aug 2020 22:01)  HR: 96 (21 Aug 2020 12:49) (70 - 99)  BP: 131/62 (21 Aug 2020 12:49) (117/60 - 155/67)  BP(mean): 88 (21 Aug 2020 12:49) (86 - 97)  RR: 18 (21 Aug 2020 12:49) (18 - 18)  SpO2: 97% (21 Aug 2020 12:49) (94% - 97%)    CAPILLARY BLOOD GLUCOSE      PHYSICAL EXAM  General: Elderly female, WDWN, NAD on 2L NC  HEENT: NC/AT, no scleral icterus, MMM  Respiratory: CTA B/L, no wheezes/crackles   Cardiovascular: Regular rhythm/rate; +S1 +S2  Gastrointestinal: Soft, NTND, normoactive BS, no rebound, no guarding  Extremities: WWP; no cyanosis, no clubbing, no edema  Skin: Normal temperature, warm, dry  Neurologic:  -Mental status: Awake, alert, oriented to person, place, and time. Speech is fluent with intact naming, repetition, and comprehension, no dysarthria. Recent and remote memory intact. Follows commands. Attention/concentration intact.   -Cranial nerves:   II: Visual fields are full to confrontation.  III, IV, VI: Extraocular movements are intact without nystagmus. Pupils PERRL  V:  Facial sensation V1-V3 equal and intact bilaterally  VII: Face is symmetric with normal eye closure and smile, no facial droop  VIII: Hearing is bilaterally intact to finger rub  IX, X: Uvula is midline and soft palate rises symmetrically  XI: Head turning and shoulder shrug are intact.  XII: Tongue protrudes midline  Motor: Normal bulk and tone. Strength bilateral upper extremity 5/5, bilateral lower extremities 5/5.  Sensation: Intact to light touch and proprioception bilaterally. Intact pain and temperature sense. No neglect.   Coordination: No dysmetria on finger-to-nose.    MEDICATIONS  (STANDING):  aspirin enteric coated 81 milliGRAM(s) Oral every 24 hours  atorvastatin 40 milliGRAM(s) Oral at bedtime  bisacodyl 5 milliGRAM(s) Oral at bedtime  diphenhydrAMINE   Injectable 50 milliGRAM(s) IV Push once  enoxaparin Injectable 30 milliGRAM(s) SubCutaneous every 24 hours  senna 2 Tablet(s) Oral at bedtime    MEDICATIONS  (PRN):      Bactrim (Unknown)  Cleocin HCl (Unknown)  Flagyl (Unknown)  Honey (Unknown)  iodine (Anaphylaxis)  IV Contrast (Anaphylaxis)  Levaquin (Other; Rash)  penicillin (Unknown)  Zithromax (Unknown)      LABS                        12.4   6.35  )-----------( 214      ( 21 Aug 2020 06:24 )             38.7     08-21    135  |  99  |  38<H>  ----------------------------<  185<H>  4.4   |  25  |  1.03    Ca    8.8      21 Aug 2020 06:24  Phos  3.1     08-21  Mg     1.9     08-21    TPro  6.4  /  Alb  3.4  /  TBili  0.3  /  DBili  x   /  AST  22  /  ALT  18  /  AlkPhos  91  08-21              RADIOLOGY & ADDITIONAL TESTS: Reviewed

## 2020-08-21 NOTE — DISCHARGE NOTE PROVIDER - CARE PROVIDER_API CALL
Arianne Nguyen)  Pulmonary Disease  [address] [place] [address]  Phone: [phone]  Fax: [phone]  Scheduled Appointment: 08/27/2020 11:00 AM

## 2020-08-21 NOTE — DISCHARGE NOTE PROVIDER - CARE PROVIDERS DIRECT ADDRESSES
[email or web address]

## 2020-08-21 NOTE — PROGRESS NOTE ADULT - SUBJECTIVE AND OBJECTIVE BOX
O/N and interval events: None    Pt has no acute complaints. ROS Is negative    REVIEW OF SYSTEMS:   Otherwise negative except as specified in HPI    PAST MEDICAL HISTORY:  Lung cancer as above     PAST SURGICAL HISTORY:  RLL VATS resection 2013    FAMILY HISTORY:  Non-contributory    SOCIAL HISTORY:  Tobacco use: Former smoker, quit 20 years ago, 5 pack-year history  EtOH use: Denies  Illicit drug use: Denies    MEDICATIONS  (STANDING):  aspirin enteric coated 81 milliGRAM(s) Oral every 24 hours  atorvastatin 40 milliGRAM(s) Oral at bedtime  bisacodyl 5 milliGRAM(s) Oral at bedtime  diphenhydrAMINE   Injectable 50 milliGRAM(s) IV Push once  enoxaparin Injectable 30 milliGRAM(s) SubCutaneous every 24 hours  senna 2 Tablet(s) Oral at bedtime    MEDICATIONS  (PRN):          ALLERGIES:  Allergies    Bactrim (Unknown)  Cleocin HCl (Unknown)  Flagyl (Unknown)  Honey (Unknown)  iodine (Anaphylaxis)  IV Contrast (Anaphylaxis)  Levaquin (Other; Rash)  penicillin (Unknown)  Zithromax (Unknown)    Intolerances    Vital Signs Last 24 Hrs  T(C): 36.7 (21 Aug 2020 13:53), Max: 36.7 (20 Aug 2020 22:01)  T(F): 98 (21 Aug 2020 13:53), Max: 98.1 (20 Aug 2020 22:01)  HR: 96 (21 Aug 2020 12:49) (70 - 99)  BP: 131/62 (21 Aug 2020 12:49) (117/60 - 155/67)  BP(mean): 88 (21 Aug 2020 12:49) (86 - 97)  RR: 18 (21 Aug 2020 12:49) (18 - 18)  SpO2: 97% (21 Aug 2020 12:49) (94% - 97%)    PHYSICAL EXAM:  Constitutional: Elderly female, comfortable, NAD  ENT: no nasal discharge; uvula midline, no oropharyngeal erythema or exudates; MMM  Respiratory: CTA B/L; no W/R/R, no retractions  Cardiac: +S1/S2; RRR; no M/R/G; PMI non-displaced  Gastrointestinal: abdomen soft, NT/ND; no rebound or guarding; +BSx4  Extremities: WWP, no clubbing or cyanosis; no peripheral edema  Musculoskeletal: NROM x4; no joint swelling, tenderness or erythema  Dermatologic: skin warm, dry and intact; no rashes, wounds, or scars  Neurologic: AAOx3; CNII-XII grossly intact; no focal deficits  Psychiatric: affect and characteristics of appearance, verbalizations, behaviors are appropriate    LABS:                                     12.4   6.35  )-----------( 214      ( 21 Aug 2020 06:24 )             38.7   08-21    135  |  99  |  38<H>  ----------------------------<  185<H>  4.4   |  25  |  1.03    Ca    8.8      21 Aug 2020 06:24  Phos  3.1     08-21  Mg     1.9     08-21    TPro  6.4  /  Alb  3.4  /  TBili  0.3  /  DBili  x   /  AST  22  /  ALT  18  /  AlkPhos  91  08-21          RADIOLOGY & ADDITIONAL TESTS:     < from: CT Brain Stroke Protocol (08.18.20 @ 11:44) >  FINDINGS: The CT examination demonstrates the ventricles, cisternal spaces, and cortical sulci to be within normal limits for the patient's age. There is no midline shift or extra axial fluid collections.  Patchy areas of diminished attenuation within the periventricular and subcortical white matter lucency are noted, with chronic microangiopathic ischemic disease. There is a lacunar infarct in the right putamen. There is nointracranial hemorrhage or acute transcortical infarct.    The bony windows demonstrates no fractures. The visualized paranasal sinuses are within normal limits. The mastoid air cells are well aerated.    IMPRESSION:    1. No intracranial hemorrhage or acute transcortical infarct.  2. Chronic microangiopathic disease and age-appropriate volume loss.    < end of copied text >    < from: CT Pelvis Bony Only No Cont (08.18.20 @ 13:28) >  Evaluation of the bony pelvis demonstrates osteopenia. No fracture. No dislocation. Degenerative change of the lower lumbar spine. Mild anterolisthesis of L4 on L5. Evaluation of the internal pelvic organs demonstrate extensive aortoiliac calcification. Extensive sigmoid diverticulosis. Fecal loading of stool throughout the large bowel.          IMPRESSION:    Negative for fracture.    < end of copied text >

## 2020-08-21 NOTE — DISCHARGE NOTE NURSING/CASE MANAGEMENT/SOCIAL WORK - NSDCPEWEB_GEN_ALL_CORE
[place] website --- [email or web address]/[facility] website --- [email or web address]

## 2020-08-21 NOTE — DISCHARGE NOTE NURSING/CASE MANAGEMENT/SOCIAL WORK - NSDCPEEMAIL_GEN_ALL_CORE
[facility] for Tobacco [facility] email [email or web address]

## 2020-08-21 NOTE — DISCHARGE NOTE NURSING/CASE MANAGEMENT/SOCIAL WORK - NSDCDMETYPESERV_GEN_ALL_CORE_FT
New Home Oxygen - Portable Oxygen Concentrator Delivered to Hospital at Bedside, Call Number Above Saturday 08/22 to Arrange Home Delivery of Backup Stationary Concentrator

## 2020-08-21 NOTE — DISCHARGE NOTE PROVIDER - NSDCCPCAREPLAN_GEN_ALL_CORE_FT
PRINCIPAL DISCHARGE DIAGNOSIS  Diagnosis: Weakness  Assessment and Plan of Treatment: You came to the hospital after you had a fall at home. The weakness in your leg was concerning for a stroke. You had multiple imaging studies of your head which were negative for stroke. You should continue to take aspirin and atorvastatin once a day and follow up with your primary care provider within 2 weeks of discharge from the hospital.      SECONDARY DISCHARGE DIAGNOSES  Diagnosis: Hypoxia  Assessment and Plan of Treatment: You were noted to have low oxygen levels in the hospital when you were walking and in bed without oxygen. This is likely related to your history of lung cancer and radiation treatment for the cancer. You should continue to use the oxygen at home. Please follow up with the pulmonary team with Dr. Ngueyn who works with Dr. Addison on 8/27/20 at 11AM. Please seek immediate medical attention if you feel like you cannot breath and the oxygen does not help.    Diagnosis: Severe protein-calorie malnutrition  Assessment and Plan of Treatment: Please continue eating balanced meals. You can add ensure shakes as necessary. PRINCIPAL DISCHARGE DIAGNOSIS  Diagnosis: Weakness  Assessment and Plan of Treatment: You came to the hospital after you had a fall at home. The weakness in your leg was concerning for a stroke. You had multiple imaging studies of your head which were negative for stroke. You should continue to take aspirin and atorvastatin once a day and follow up with your primary care provider within 2 weeks of discharge from the hospital.      SECONDARY DISCHARGE DIAGNOSES  Diagnosis: Hypoxia  Assessment and Plan of Treatment: You were noted to have low oxygen levels in the hospital when you were walking and in bed without oxygen. This is likely related to your history of lung cancer and radiation treatment for the cancer. You should continue to use the oxygen at home. Please follow up with the pulmonary team with Dr. Nguyen who works with Dr. Addison on 8/27/20 at 11AM. Please seek immediate medical attention if you feel like you cannot breath and the oxygen does not help.    Diagnosis: Mitral regurgitation  Assessment and Plan of Treatment: You were noted to have mitral regurgitation on your echocardiogram of your heart. You should follow up with your primary doctor and Dr. Nguyen within 2 weeks of discharge. They will continue to monitor you and manage your medications.    Diagnosis: Severe protein-calorie malnutrition  Assessment and Plan of Treatment: Please continue eating balanced meals. You can add ensure shakes as necessary.

## 2020-08-21 NOTE — PROGRESS NOTE ADULT - SUBJECTIVE AND OBJECTIVE BOX
INTERVAL HPI/OVERNIGHT EVENTS:  Patient was seen and examined at bedside. As per nurse and patient, no o/n events, patient resting comfortably. No complaints at this time. Patient denies: fever, chills, dizziness, weakness, HA, Changes in vision, CP, palpitations, SOB, cough, N/V/D/C, dysuria, changes in bowel movements, LE edema. ROS otherwise negative.    VITAL SIGNS:  T(F): 97.6 (08-21-20 @ 04:58)  HR: 70 (08-21-20 @ 08:44)  BP: 144/67 (08-21-20 @ 08:44)  RR: 18 (08-21-20 @ 08:44)  SpO2: 97% (08-21-20 @ 08:44)  Wt(kg): --    PHYSICAL EXAM:    Constitutional: WDWN, NAD  HEENT: PERRL, EOMI, sclera non-icteric, neck supple, trachea midline, no masses, no JVD, MMM, good dentition  Respiratory: CTA b/l, good air entry b/l, no wheezing, no rhonchi, no rales, without accessory muscle use and no intercostal retractions  Cardiovascular: RRR, normal S1S2, no M/R/G  Gastrointestinal: soft, NTND, no masses palpable, BS normal  Extremities: Warm, well perfused, pulses equal bilateral upper and lower extremities, no edema, no clubbing  Neurological: AAOx3, CN Grossly intact  Skin: Normal temperature, warm, dry    MEDICATIONS  (STANDING):  aspirin enteric coated 81 milliGRAM(s) Oral every 24 hours  atorvastatin 40 milliGRAM(s) Oral at bedtime  bisacodyl 5 milliGRAM(s) Oral at bedtime  diphenhydrAMINE   Injectable 50 milliGRAM(s) IV Push once  enoxaparin Injectable 30 milliGRAM(s) SubCutaneous every 24 hours  senna 2 Tablet(s) Oral at bedtime    MEDICATIONS  (PRN):      Allergies    Bactrim (Unknown)  Cleocin HCl (Unknown)  Flagyl (Unknown)  Honey (Unknown)  iodine (Anaphylaxis)  IV Contrast (Anaphylaxis)  Levaquin (Other; Rash)  penicillin (Unknown)  Zithromax (Unknown)    Intolerances        LABS:                        12.4   6.35  )-----------( 214      ( 21 Aug 2020 06:24 )             38.7     08-21    135  |  99  |  38<H>  ----------------------------<  185<H>  4.4   |  25  |  1.03    Ca    8.8      21 Aug 2020 06:24  Phos  3.1     08-21  Mg     1.9     08-21    TPro  6.4  /  Alb  3.4  /  TBili  0.3  /  DBili  x   /  AST  22  /  ALT  18  /  AlkPhos  91  08-21          RADIOLOGY & ADDITIONAL TESTS:  Reviewed

## 2020-08-21 NOTE — PROGRESS NOTE ADULT - PROBLEM SELECTOR PLAN 4
F: None  E: Replete PRN for Mg<2 and K<4  N: DASH/TLC Pt desatted while ambulating and hypoxic lying flat (SpO2 88% on RA).   -pulmonary performed bedside POCUS which stable R pleural effusion  Pt now desatting with ambulation, hypoxic lying flat to SpO2 88% on RA. POCUS with stable size right pleural effusion compared to previous, no consolidation or s/s of PNA/COPD  -CT Angio Chest: no pulmonary embolism  -ABG on RA performed by pulm team: pH 7.39, pCO2 43, pO2 52, 88% sat  -Given ABG findings patient is hypoxic and may need home O2   -f/u TTE echo with bubble to evaluate for shunt  -appreciate pulmonary team recs

## 2020-08-21 NOTE — PROGRESS NOTE ADULT - PROBLEM SELECTOR PLAN 5
F: none  E: replete K<4, Mg<2  DVT PPx: Lovenox 30mg sub Q qhs  GI PPx: Diet    Dispo: 7La/Telemetry F: None  E: Replete PRN for Mg<2 and K<4  N: DASH/TLC

## 2020-08-22 VITALS — TEMPERATURE: 98 F

## 2020-08-22 LAB
ANION GAP SERPL CALC-SCNC: 10 MMOL/L — SIGNIFICANT CHANGE UP (ref 5–17)
BUN SERPL-MCNC: 36 MG/DL — HIGH (ref 7–23)
CALCIUM SERPL-MCNC: 8.9 MG/DL — SIGNIFICANT CHANGE UP (ref 8.4–10.5)
CHLORIDE SERPL-SCNC: 104 MMOL/L — SIGNIFICANT CHANGE UP (ref 96–108)
CO2 SERPL-SCNC: 28 MMOL/L — SIGNIFICANT CHANGE UP (ref 22–31)
CREAT SERPL-MCNC: 0.84 MG/DL — SIGNIFICANT CHANGE UP (ref 0.5–1.3)
GLUCOSE SERPL-MCNC: 89 MG/DL — SIGNIFICANT CHANGE UP (ref 70–99)
HCT VFR BLD CALC: 39.8 % — SIGNIFICANT CHANGE UP (ref 34.5–45)
HGB BLD-MCNC: 12.5 G/DL — SIGNIFICANT CHANGE UP (ref 11.5–15.5)
MAGNESIUM SERPL-MCNC: 2 MG/DL — SIGNIFICANT CHANGE UP (ref 1.6–2.6)
MCHC RBC-ENTMCNC: 31 PG — SIGNIFICANT CHANGE UP (ref 27–34)
MCHC RBC-ENTMCNC: 31.4 GM/DL — LOW (ref 32–36)
MCV RBC AUTO: 98.8 FL — SIGNIFICANT CHANGE UP (ref 80–100)
NRBC # BLD: 0 /100 WBCS — SIGNIFICANT CHANGE UP (ref 0–0)
PHOSPHATE SERPL-MCNC: 2.6 MG/DL — SIGNIFICANT CHANGE UP (ref 2.5–4.5)
PLATELET # BLD AUTO: 231 K/UL — SIGNIFICANT CHANGE UP (ref 150–400)
POTASSIUM SERPL-MCNC: 4 MMOL/L — SIGNIFICANT CHANGE UP (ref 3.5–5.3)
POTASSIUM SERPL-SCNC: 4 MMOL/L — SIGNIFICANT CHANGE UP (ref 3.5–5.3)
RBC # BLD: 4.03 M/UL — SIGNIFICANT CHANGE UP (ref 3.8–5.2)
RBC # FLD: 16.4 % — HIGH (ref 10.3–14.5)
SODIUM SERPL-SCNC: 142 MMOL/L — SIGNIFICANT CHANGE UP (ref 135–145)
WBC # BLD: 11.55 K/UL — HIGH (ref 3.8–10.5)
WBC # FLD AUTO: 11.55 K/UL — HIGH (ref 3.8–10.5)

## 2020-08-22 PROCEDURE — 97535 SELF CARE MNGMENT TRAINING: CPT

## 2020-08-22 PROCEDURE — 71275 CT ANGIOGRAPHY CHEST: CPT

## 2020-08-22 PROCEDURE — 84484 ASSAY OF TROPONIN QUANT: CPT

## 2020-08-22 PROCEDURE — 97161 PT EVAL LOW COMPLEX 20 MIN: CPT

## 2020-08-22 PROCEDURE — 97116 GAIT TRAINING THERAPY: CPT

## 2020-08-22 PROCEDURE — 85027 COMPLETE CBC AUTOMATED: CPT

## 2020-08-22 PROCEDURE — U0003: CPT

## 2020-08-22 PROCEDURE — 99233 SBSQ HOSP IP/OBS HIGH 50: CPT

## 2020-08-22 PROCEDURE — 70450 CT HEAD/BRAIN W/O DYE: CPT

## 2020-08-22 PROCEDURE — 80048 BASIC METABOLIC PNL TOTAL CA: CPT

## 2020-08-22 PROCEDURE — 82803 BLOOD GASES ANY COMBINATION: CPT

## 2020-08-22 PROCEDURE — 83605 ASSAY OF LACTIC ACID: CPT

## 2020-08-22 PROCEDURE — 85652 RBC SED RATE AUTOMATED: CPT

## 2020-08-22 PROCEDURE — 80053 COMPREHEN METABOLIC PANEL: CPT

## 2020-08-22 PROCEDURE — 85610 PROTHROMBIN TIME: CPT

## 2020-08-22 PROCEDURE — 85025 COMPLETE CBC W/AUTO DIFF WBC: CPT

## 2020-08-22 PROCEDURE — 82962 GLUCOSE BLOOD TEST: CPT

## 2020-08-22 PROCEDURE — 71045 X-RAY EXAM CHEST 1 VIEW: CPT

## 2020-08-22 PROCEDURE — 70547 MR ANGIOGRAPHY NECK W/O DYE: CPT

## 2020-08-22 PROCEDURE — 97530 THERAPEUTIC ACTIVITIES: CPT

## 2020-08-22 PROCEDURE — 99291 CRITICAL CARE FIRST HOUR: CPT

## 2020-08-22 PROCEDURE — 84443 ASSAY THYROID STIM HORMONE: CPT

## 2020-08-22 PROCEDURE — 99239 HOSP IP/OBS DSCHRG MGMT >30: CPT

## 2020-08-22 PROCEDURE — 83735 ASSAY OF MAGNESIUM: CPT

## 2020-08-22 PROCEDURE — 85730 THROMBOPLASTIN TIME PARTIAL: CPT

## 2020-08-22 PROCEDURE — 72192 CT PELVIS W/O DYE: CPT

## 2020-08-22 PROCEDURE — 80061 LIPID PANEL: CPT

## 2020-08-22 PROCEDURE — 70544 MR ANGIOGRAPHY HEAD W/O DYE: CPT

## 2020-08-22 PROCEDURE — 83036 HEMOGLOBIN GLYCOSYLATED A1C: CPT

## 2020-08-22 PROCEDURE — 84100 ASSAY OF PHOSPHORUS: CPT

## 2020-08-22 PROCEDURE — 93306 TTE W/DOPPLER COMPLETE: CPT

## 2020-08-22 PROCEDURE — 93005 ELECTROCARDIOGRAM TRACING: CPT

## 2020-08-22 PROCEDURE — 36415 COLL VENOUS BLD VENIPUNCTURE: CPT

## 2020-08-22 RX ORDER — ATORVASTATIN CALCIUM 80 MG/1
1 TABLET, FILM COATED ORAL
Qty: 30 | Refills: 1
Start: 2020-08-22

## 2020-08-22 RX ORDER — ASPIRIN/CALCIUM CARB/MAGNESIUM 324 MG
1 TABLET ORAL
Qty: 0 | Refills: 0 | DISCHARGE
Start: 2020-08-22

## 2020-08-22 RX ADMIN — Medication 81 MILLIGRAM(S): at 06:04

## 2020-08-22 NOTE — PROGRESS NOTE ADULT - ASSESSMENT
Ms. Woods is an 88 y/o Female with PMHx of lung adenocarcinoma s/p radiation who presents with right leg weakness associated with fall and subsequent LOC on ED. Stroke code was called, NIHSS 0. HCT showed no signs of acute infarct or hemorrhage. MR Angio H&N no acute intracranial findings Patient likely with vasovagal in ED however due to right leg muscle "giving out" need to rule out stroke or TIA. Ms. Woods is an 88 y/o Female with PMHx of lung adenocarcinoma s/p radiation who presents with right leg weakness associated with fall and subsequent LOC on ED. Stroke code was called, NIHSS 0. HCT showed no signs of acute infarct or hemorrhage, MR Angio H&N no acute intracranial findings, Echo revealed R>L intracardiac shunt, desaturating on room air to 88%. Patient likely with vasovagal in ED however due to right leg muscle "giving out" admitted to telemetry to r/o stroke or TIA.

## 2020-08-22 NOTE — PROGRESS NOTE ADULT - SUBJECTIVE AND OBJECTIVE BOX
INTERVAL EVENTS:    Pt seen/examined earlier this AM at bedside    MEDICATIONS  (STANDING):  aspirin enteric coated 81 milliGRAM(s) Oral every 24 hours  atorvastatin 40 milliGRAM(s) Oral at bedtime  bisacodyl 5 milliGRAM(s) Oral at bedtime  diphenhydrAMINE   Injectable 50 milliGRAM(s) IV Push once  enoxaparin Injectable 30 milliGRAM(s) SubCutaneous every 24 hours  senna 2 Tablet(s) Oral at bedtime  sodium chloride 0.9% Bolus 250 milliLiter(s) IV Bolus once    MEDICATIONS  (PRN):      Vital Signs Last 24 Hrs  T(C): 36.9 (22 Aug 2020 13:52), Max: 36.9 (22 Aug 2020 13:52)  T(F): 98.4 (22 Aug 2020 13:52), Max: 98.4 (22 Aug 2020 13:52)  HR: 80 (22 Aug 2020 08:17) (78 - 88)  BP: 142/71 (22 Aug 2020 08:17) (134/82 - 142/71)  BP(mean): 100 (22 Aug 2020 08:17) (92 - 102)  RR: 17 (22 Aug 2020 08:17) (16 - 17)  SpO2: 98% (22 Aug 2020 08:17) (94% - 98%)     PHYSICAL EXAM:  GEN: Awake, alert. NAD.   HEENT: NCAT, PERRL, EOMI. Mucosa moist. No JVD.  RESP: CTA b/l  CV: RRR. Normal S1/S2. No m/r/g.  ABD: Soft. NT/ND. BS+  EXT: Warm. No edema, clubbing, or cyanosis.   NEURO: AAOx3. No focal deficits.     LABS:                        12.5   11.55 )-----------( 231      ( 22 Aug 2020 06:41 )             39.8     08-22    142  |  104  |  36<H>  ----------------------------<  89  4.0   |  28  |  0.84    Ca    8.9      22 Aug 2020 06:41  Phos  2.6     08-22  Mg     2.0     08-22    TPro  6.4  /  Alb  3.4  /  TBili  0.3  /  DBili  x   /  AST  22  /  ALT  18  /  AlkPhos  91  08-21            I&O's Summary    21 Aug 2020 07:01  -  22 Aug 2020 07:00  --------------------------------------------------------  IN: 1200 mL / OUT: 800 mL / NET: 400 mL      BNP  RADIOLOGY & ADDITIONAL STUDIES:    TELEMETRY:    EKG: INTERVAL EVENTS:    Pt seen/examined earlier this AM at bedside, no LIPSCOMB o/n    MEDICATIONS  (STANDING):  aspirin enteric coated 81 milliGRAM(s) Oral every 24 hours  atorvastatin 40 milliGRAM(s) Oral at bedtime  bisacodyl 5 milliGRAM(s) Oral at bedtime  diphenhydrAMINE   Injectable 50 milliGRAM(s) IV Push once  enoxaparin Injectable 30 milliGRAM(s) SubCutaneous every 24 hours  senna 2 Tablet(s) Oral at bedtime  sodium chloride 0.9% Bolus 250 milliLiter(s) IV Bolus once    MEDICATIONS  (PRN):      Vital Signs Last 24 Hrs  T(C): 36.9 (22 Aug 2020 13:52), Max: 36.9 (22 Aug 2020 13:52)  T(F): 98.4 (22 Aug 2020 13:52), Max: 98.4 (22 Aug 2020 13:52)  HR: 80 (22 Aug 2020 08:17) (78 - 88)  BP: 142/71 (22 Aug 2020 08:17) (134/82 - 142/71)  BP(mean): 100 (22 Aug 2020 08:17) (92 - 102)  RR: 17 (22 Aug 2020 08:17) (16 - 17)  SpO2: 98% (22 Aug 2020 08:17) (94% - 98%)     PHYSICAL EXAM:  GEN: Awake, alert. NAD.   HEENT: NCAT, PERRL, EOMI. Mucosa moist. No JVD.  RESP: CTA b/l  CV: RRR. Normal S1/S2. No m/r/g.  ABD: Soft. NT/ND. BS+  EXT: Warm. No edema, clubbing, or cyanosis.   NEURO: AAOx3. No focal deficits.     LABS:                        12.5   11.55 )-----------( 231      ( 22 Aug 2020 06:41 )             39.8     08-22    142  |  104  |  36<H>  ----------------------------<  89  4.0   |  28  |  0.84    Ca    8.9      22 Aug 2020 06:41  Phos  2.6     08-22  Mg     2.0     08-22    TPro  6.4  /  Alb  3.4  /  TBili  0.3  /  DBili  x   /  AST  22  /  ALT  18  /  AlkPhos  91  08-21            I&O's Summary    21 Aug 2020 07:01  -  22 Aug 2020 07:00  --------------------------------------------------------  IN: 1200 mL / OUT: 800 mL / NET: 400 mL      BNP  RADIOLOGY & ADDITIONAL STUDIES:    TELEMETRY:    EKG:

## 2020-08-22 NOTE — PROGRESS NOTE ADULT - PROBLEM SELECTOR PROBLEM 1
Dizziness
R/O CVA (cerebral vascular accident)

## 2020-08-22 NOTE — PROGRESS NOTE ADULT - PROBLEM SELECTOR PLAN 4
Pt desatted while ambulating and hypoxic lying flat (SpO2 88% on RA).   -pulmonary performed bedside POCUS which stable R pleural effusion  Pt now desatting with ambulation, hypoxic lying flat to SpO2 88% on RA. POCUS with stable size right pleural effusion compared to previous, no consolidation or s/s of PNA/COPD  -CT Angio Chest: no pulmonary embolism  -ABG on RA performed by pulm team: pH 7.39, pCO2 43, pO2 52, 88% sat  -Given ABG findings patient is hypoxic and may need home O2   -f/u TTE echo with bubble to evaluate for shunt  -appreciate pulmonary team recs Pt desatted while ambulating and hypoxic lying flat (SpO2 88% on RA).   -pulmonary performed bedside POCUS which stable R pleural effusion  Pt now desatting with ambulation, hypoxic lying flat to SpO2 88% on RA. POCUS with stable size right pleural effusion compared to previous, no consolidation or s/s of PNA/COPD  -CT Angio Chest: no pulmonary embolism  -ABG on RA performed by pulm team: pH 7.39, pCO2 43, pO2 52, 88% sat  -Given ABG findings patient is hypoxic and may need home O2   -Echo revealed right to left intracardic shunt. Cardiology consulted for any recommendations. Likely dispo with home O2 if no interventions needed. Pt desatted while ambulating and hypoxic lying flat (SpO2 88% on RA).   -pulmonary performed bedside POCUS which stable R pleural effusion  Pt now desatting with ambulation, hypoxic lying flat to SpO2 88% on RA. POCUS with stable size right pleural effusion compared to previous, no consolidation or s/s of PNA/COPD  -CT Angio Chest: no pulmonary embolism  -ABG on RA performed by pulm team: pH 7.39, pCO2 43, pO2 52, 88% sat  -Given ABG findings patient is hypoxic and may need home O2   -Echo revealed right to left intracardic shunt. Cardiology consulted, said likely not true R->L shunt due to reduced LA size. Can start on Lasix 20 mg PO daily and follow-up with PCP.  -dispo with home O2

## 2020-08-22 NOTE — PROGRESS NOTE ADULT - SUBJECTIVE AND OBJECTIVE BOX
INTERVAL HPI/OVERNIGHT EVENTS:  As per night team, no overnight events. Echo from yesterday revealed right to left intracardic shunt. Patient seen and examined at bedside. Patient denies fever, chills, dizziness, weakness, HA, CP, SOB, N/V/D/C, changes in bowel movements, LE swelling. 12pt ROS otherwise negative.    VITALS  Vital Signs Last 24 Hrs  T(C): 36.6 (22 Aug 2020 09:13), Max: 36.7 (21 Aug 2020 13:53)  T(F): 97.8 (22 Aug 2020 09:13), Max: 98 (21 Aug 2020 13:53)  HR: 80 (22 Aug 2020 08:17) (78 - 98)  BP: 142/71 (22 Aug 2020 08:17) (131/62 - 153/66)  BP(mean): 100 (22 Aug 2020 08:17) (87 - 102)  RR: 17 (22 Aug 2020 08:17) (16 - 20)  SpO2: 98% (22 Aug 2020 08:17) (94% - 99%)    CAPILLARY BLOOD GLUCOSE      PHYSICAL EXAM  General: Elderly female, WDWN, NAD on 2L NC  HEENT: NC/AT, no scleral icterus, MMM  Respiratory: CTA B/L, no wheezes/crackles   Cardiovascular: Regular rhythm/rate; +S1 +S2  Gastrointestinal: Soft, NTND, +BSx4, no rebound, no guarding  Extremities: WWP; no cyanosis, no clubbing, no edema  Skin: Normal temperature, warm, dry  Neurologic:  -Mental status: Awake, alert, oriented to person, place, and time. Speech is fluent with intact naming, repetition, and comprehension, no dysarthria. Recent and remote memory intact. Follows commands. Attention/concentration intact.   -Cranial nerves:   II: Visual fields are full to confrontation.  III, IV, VI: Extraocular movements are intact without nystagmus. Pupils PERRL  V:  Facial sensation V1-V3 equal and intact bilaterally  VII: Face is symmetric with normal eye closure and smile, no facial droop  VIII: Hearing is bilaterally intact to finger rub  IX, X: Uvula is midline and soft palate rises symmetrically  XI: Head turning and shoulder shrug are intact.  XII: Tongue protrudes midline  Motor: Normal bulk and tone. Strength bilateral upper extremity 5/5, bilateral lower extremities 5/5.  Sensation: Intact to light touch and proprioception bilaterally. Intact pain and temperature sense. No neglect.   Coordination: No dysmetria on finger-to-nose    MEDICATIONS  (STANDING):  aspirin enteric coated 81 milliGRAM(s) Oral every 24 hours  atorvastatin 40 milliGRAM(s) Oral at bedtime  bisacodyl 5 milliGRAM(s) Oral at bedtime  diphenhydrAMINE   Injectable 50 milliGRAM(s) IV Push once  enoxaparin Injectable 30 milliGRAM(s) SubCutaneous every 24 hours  senna 2 Tablet(s) Oral at bedtime  sodium chloride 0.9% Bolus 250 milliLiter(s) IV Bolus once    MEDICATIONS  (PRN):      Bactrim (Unknown)  Cleocin HCl (Unknown)  Flagyl (Unknown)  Honey (Unknown)  iodine (Anaphylaxis)  IV Contrast (Anaphylaxis)  Levaquin (Other; Rash)  penicillin (Unknown)  Zithromax (Unknown)      LABS                        12.5   11.55 )-----------( 231      ( 22 Aug 2020 06:41 )             39.8     08-22    142  |  104  |  36<H>  ----------------------------<  89  4.0   |  28  |  0.84    Ca    8.9      22 Aug 2020 06:41  Phos  2.6     08-22  Mg     2.0     08-22    TPro  6.4  /  Alb  3.4  /  TBili  0.3  /  DBili  x   /  AST  22  /  ALT  18  /  AlkPhos  91  08-21              RADIOLOGY & ADDITIONAL TESTS: Reviewed

## 2020-08-22 NOTE — PROGRESS NOTE ADULT - PROBLEM SELECTOR PLAN 6
F: none  E: replete K<4, Mg<2  DVT PPx: Lovenox 30mg sub Q qhs  GI PPx: Diet    Dispo: 7La/Telemetry
F: none  E: replete K<4, Mg<2  DVT PPx: Lovenox 30mg sub Q qhs  GI PPx: Diet    Dispo: 7La/Telemetry

## 2020-08-22 NOTE — PROGRESS NOTE ADULT - PROBLEM SELECTOR PROBLEM 2
Adenocarcinoma of lung, unspecified laterality
Fall from furniture

## 2020-08-22 NOTE — PROGRESS NOTE ADULT - PROVIDER SPECIALTY LIST ADULT
Hospitalist
Neurology
Pulmonology
Pulmonology
Rehab Medicine

## 2020-08-22 NOTE — PROGRESS NOTE ADULT - PROBLEM SELECTOR PLAN 3
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

## 2020-08-22 NOTE — CONSULT NOTE ADULT - SUBJECTIVE AND OBJECTIVE BOX
HPI:  87y Female with PMHx of lung adenocarcinoma s/p radiation, ILD who presents with right leg weakness associated with fall. she underwent w/u for CVA which was negative. she had a TTE which showed normal BiV function, moderate MR and mild pHTN with a positive bubble study c/w small PFO. she has been hypoxic requiring supplemental oxygen, SpO2 improve with supplemental O2. she underwent a CT-PE protocol which was negative, right pleural effusion. She has intermittent dyspnea with exertion and primary team noted that she desaturates to high 80s while walking.  cardiology was consults for management of her PFO.    ROS: A 10-point review of systems was otherwise negative.    PAST MEDICAL & SURGICAL HISTORY:  Malignant neoplasm of bronchus and lung, unspecified site: Lung cancer  History of surgery to major organs, presenting hazards to health: removal of R-sided adenocarcinoma, negative lymphnodes    SOCIAL HISTORY:  FAMILY HISTORY:  No pertinent family history: No significant family history    ALLERGIES: 	  Bactrim (Unknown)  Cleocin HCl (Unknown)  Flagyl (Unknown)  Honey (Unknown)  iodine (Anaphylaxis)  IV Contrast (Anaphylaxis)  Levaquin (Other; Rash)  penicillin (Unknown)  Zithromax (Unknown)          MEDICATIONS:  aspirin enteric coated 81 milliGRAM(s) Oral every 24 hours  atorvastatin 40 milliGRAM(s) Oral at bedtime  bisacodyl 5 milliGRAM(s) Oral at bedtime  diphenhydrAMINE   Injectable 50 milliGRAM(s) IV Push once  enoxaparin Injectable 30 milliGRAM(s) SubCutaneous every 24 hours  senna 2 Tablet(s) Oral at bedtime  sodium chloride 0.9% Bolus 250 milliLiter(s) IV Bolus once      PHYSICAL EXAM:  T(C): 36.9 (08-22-20 @ 13:52), Max: 36.9 (08-22-20 @ 13:52)  HR: 80 (08-22-20 @ 08:17) (78 - 98)  BP: 142/71 (08-22-20 @ 08:17) (134/82 - 153/66)  RR: 17 (08-22-20 @ 08:17) (16 - 20)  SpO2: 98% (08-22-20 @ 08:17) (94% - 98%)  Wt(kg): --    GEN: Awake, comfortable. NAD.   HEENT: NCAT, PERRL, EOMI. Mucosa moist. No JVD.   RESP: CTA b/l  CV: RRR, normal s1/s2. No m/r/g.  ABD: Soft, NTND. BS+  EXT: Warm. No edema, clubbing, or cyanosis.   NEURO: AAOx3. No focal deficits.    I&O's Summary    21 Aug 2020 07:01  -  22 Aug 2020 07:00  --------------------------------------------------------  IN: 1200 mL / OUT: 800 mL / NET: 400 mL        LABS:	 	                        12.5   11.55 )-----------( 231      ( 22 Aug 2020 06:41 )             39.8     08-22    142  |  104  |  36<H>  ----------------------------<  89  4.0   |  28  |  0.84    Ca    8.9      22 Aug 2020 06:41  Phos  2.6     08-22  Mg     2.0     08-22    TPro  6.4  /  Alb  3.4  /  TBili  0.3  /  DBili  x   /  AST  22  /  ALT  18  /  AlkPhos  91  08-21    TELEMETRY: no events	    ECG:  NSR  RADIOLOGY: < from: CT Angio Chest PE Protocol w/ IV Cont (08.21.20 @ 06:31) >  1.  No pulmonary embolism.  2.  Since July 30, 2020, slightly decreased moderate loculated right pleural effusion.  3.  Unchanged trace right apical lesion and multifocal areas of consolidation in right lung apex, along suture line in right lower lung, and left perihilar region, possibly post radiation changes. Recommend continued surveillance.  4.  Unchanged nodular subpleural opacities in left lower lung, indeterminate.  5.  Unchanged severe wedge compression T7     ECHO: < from: Echocardiogram w/ Bubble and Doppler (08.21.20 @ 15:05) >   1. The mitral valve is mildly thickened. There is mitral valve prolapse of the posterior leaflet. Mitral annular calcification noted. The mean transvalvular gradient is 3.00 mmHg at a heart rate of 96 bpm. There is moderate mitral regurgitation. Severity might be underestimated due to eccentricity. Regurgitation jet is posteriorly directed with posterior leaflet prolapse - consider CARLTON for better evaluation of mitral regurgitation etiology and severity.   2. There is mild tricuspid regurgitation. Pulmonary artery systolic pressure (estimated using the tricuspid regurgitant gradient and an estimate of right atrial pressure) is 35 mmHg.   3. Normal left and right ventricular size and systolic function.   4. Trivial pericardial effusion.   5. Injection of agitated saline via a peripheral vein reveals bubbles in the left heart within 3 heart beats, most consistent with a patent foramen ovale.

## 2020-08-22 NOTE — PROGRESS NOTE ADULT - PROBLEM SELECTOR PLAN 1
Patient presented with right leg weakness and fall from chair. In ED, patient with witnessed episode of staring and syncopal episode. Stroke code was called, NIHSS 0.  -MR Angio Head and Neck Non Contrast: no acute intracranial findings, moderate chronic white matter disease, likely microangiopathic  - CT Head: No ICH or acute transcortical infarct. Chronic microangiopathic disease and age-appropriate volume loss.  - A1c: 6.0%  - Goal SBP<180  - lipid: chol 216, , HDL 80,   - TSH 2.68 wnl     Secondary Stroke Prevention Medication  - c/w aspirin 81mg daily  - c/w atorvastatin 40mg PO daily- cholesterol and stroke prevention   - no need for plavix at this time  - lovenox SQ and SCDs for DVT prophylaxis Patient presented with right leg weakness and fall from chair. In ED, patient with witnessed episode of staring and syncopal episode. Stroke code was called, NIHSS 0.  -MR Angio Head and Neck w/o contrast: no acute intracranial findings, moderate chronic white matter disease, likely microangiopathic  - CT Head: No ICH or acute transcortical infarct. Chronic microangiopathic disease and age-appropriate volume loss.  - A1c: 6.0%  - Goal SBP<180  - lipid: chol 216, , HDL 80,   - TSH 2.68 wnl     Secondary Stroke Prevention Medication  - c/w aspirin 81mg daily  - c/w atorvastatin 40mg PO daily- cholesterol and stroke prevention   - no need for plavix at this time  - lovenox SQ and SCDs for DVT prophylaxis
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
## 2020-08-22 NOTE — CONSULT NOTE ADULT - ASSESSMENT
Assessment/Plan: 87y Female with PMHx of lung adenocarcinoma s/p radiation, ILD who presents with right leg weakness associated with fall. she underwent w/u for CVA which was negative. she had a TTE which showed normal BiV function, moderate MR and mild pHTN with a positive bubble study c/w small PFO. her PFO is not hemodynamically significant and likely respresents an incidental finding. her hypoxia is more c/w a primary pulmonary problem, radiation induced fibrotic changes vs pleural effusion or a combination thereof.     #PFO - not hemodynamically significant  - no acute intervention indicated  - consider further evaluation of MR with CARLTON as outpatient  - mild pHTN likely WHO class 3      Juan Hoffman MD   Cardiology Fellow    d/w Dr. Amaya

## 2020-08-22 NOTE — PROGRESS NOTE ADULT - PROBLEM SELECTOR PLAN 2
Pt with history of lung adenocarcinoma (RLL s/p VATS resection 2013, LIL XRT 2016, RML lesion with radiation in 2017). Pt follows with pulm. Earlier this month had diagnostic thoracentesis for possible recurrence of adenocarcinoma after PET showed 2 new FDG-avid subcentimeter nodules in left subpleural region.  - outpt pulm f/u

## 2020-08-22 NOTE — PROGRESS NOTE ADULT - REASON FOR ADMISSION
right leg weakness and fall

## 2020-08-26 DIAGNOSIS — R55 SYNCOPE AND COLLAPSE: ICD-10-CM

## 2020-08-26 DIAGNOSIS — J84.9 INTERSTITIAL PULMONARY DISEASE, UNSPECIFIED: ICD-10-CM

## 2020-08-26 DIAGNOSIS — I27.20 PULMONARY HYPERTENSION, UNSPECIFIED: ICD-10-CM

## 2020-08-26 DIAGNOSIS — J70.1 CHRONIC AND OTHER PULMONARY MANIFESTATIONS DUE TO RADIATION: ICD-10-CM

## 2020-08-26 DIAGNOSIS — Z88.8 ALLERGY STATUS TO OTHER DRUGS, MEDICAMENTS AND BIOLOGICAL SUBSTANCES: ICD-10-CM
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
## 2020-08-26 DIAGNOSIS — J43.2 CENTRILOBULAR EMPHYSEMA: ICD-10-CM

## 2020-08-26 DIAGNOSIS — J96.01 ACUTE RESPIRATORY FAILURE WITH HYPOXIA: ICD-10-CM

## 2020-08-26 DIAGNOSIS — E43 UNSPECIFIED SEVERE PROTEIN-CALORIE MALNUTRITION: ICD-10-CM

## 2020-08-26 DIAGNOSIS — C34.90 MALIGNANT NEOPLASM OF UNSPECIFIED PART OF UNSPECIFIED BRONCHUS OR LUNG: ICD-10-CM

## 2020-08-26 DIAGNOSIS — Z91.018 ALLERGY TO OTHER FOODS: ICD-10-CM

## 2020-08-26 DIAGNOSIS — I34.0 NONRHEUMATIC MITRAL (VALVE) INSUFFICIENCY: ICD-10-CM

## 2020-08-26 DIAGNOSIS — Z88.1 ALLERGY STATUS TO OTHER ANTIBIOTIC AGENTS STATUS: ICD-10-CM

## 2020-08-26 DIAGNOSIS — R29.898 OTHER SYMPTOMS AND SIGNS INVOLVING THE MUSCULOSKELETAL SYSTEM: ICD-10-CM
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
## 2020-08-26 DIAGNOSIS — I67.89 OTHER CEREBROVASCULAR DISEASE: ICD-10-CM

## 2020-08-26 DIAGNOSIS — Z91.041 RADIOGRAPHIC DYE ALLERGY STATUS: ICD-10-CM

## 2020-08-26 DIAGNOSIS — Z88.0 ALLERGY STATUS TO PENICILLIN: ICD-10-CM

## 2020-08-26 DIAGNOSIS — J90 PLEURAL EFFUSION, NOT ELSEWHERE CLASSIFIED: ICD-10-CM

## 2020-08-26 DIAGNOSIS — Q21.1 ATRIAL SEPTAL DEFECT: ICD-10-CM

## 2020-08-27 ENCOUNTER — APPOINTMENT (OUTPATIENT)
Dept: PULMONOLOGY | Facility: CLINIC | Age: 85
End: 2020-08-27
Payer: MEDICARE

## 2020-08-27 ENCOUNTER — EMERGENCY (EMERGENCY)
Facility: HOSPITAL | Age: 85
LOS: 1 days | Discharge: HOME | End: 2020-08-27
Attending: EMERGENCY MEDICINE | Admitting: EMERGENCY MEDICINE
Payer: MEDICARE

## 2020-08-27 VITALS
SYSTOLIC BLOOD PRESSURE: 110 MMHG | HEART RATE: 99 BPM | TEMPERATURE: 98.7 F | RESPIRATION RATE: 12 BRPM | BODY MASS INDEX: 16.88 KG/M2 | DIASTOLIC BLOOD PRESSURE: 70 MMHG | WEIGHT: 86 LBS | HEIGHT: 60 IN | OXYGEN SATURATION: 91 %

## 2020-08-27 VITALS
DIASTOLIC BLOOD PRESSURE: 78 MMHG | RESPIRATION RATE: 18 BRPM | HEART RATE: 88 BPM | SYSTOLIC BLOOD PRESSURE: 159 MMHG | OXYGEN SATURATION: 93 % | TEMPERATURE: 98 F

## 2020-08-27 VITALS
TEMPERATURE: 97 F | HEIGHT: 57.5 IN | WEIGHT: 100.09 LBS | DIASTOLIC BLOOD PRESSURE: 70 MMHG | SYSTOLIC BLOOD PRESSURE: 125 MMHG | OXYGEN SATURATION: 90 % | RESPIRATION RATE: 18 BRPM | HEART RATE: 96 BPM

## 2020-08-27 DIAGNOSIS — J90 PLEURAL EFFUSION, NOT ELSEWHERE CLASSIFIED: ICD-10-CM

## 2020-08-27 PROCEDURE — 36415 COLL VENOUS BLD VENIPUNCTURE: CPT

## 2020-08-27 PROCEDURE — 81003 URINALYSIS AUTO W/O SCOPE: CPT

## 2020-08-27 PROCEDURE — 85025 COMPLETE CBC W/AUTO DIFF WBC: CPT

## 2020-08-27 PROCEDURE — 51702 INSERT TEMP BLADDER CATH: CPT

## 2020-08-27 PROCEDURE — 87086 URINE CULTURE/COLONY COUNT: CPT

## 2020-08-27 PROCEDURE — 99284 EMERGENCY DEPT VISIT MOD MDM: CPT

## 2020-08-27 PROCEDURE — 99283 EMERGENCY DEPT VISIT LOW MDM: CPT | Mod: 25

## 2020-08-27 PROCEDURE — 99214 OFFICE O/P EST MOD 30 MIN: CPT

## 2020-08-27 PROCEDURE — 80053 COMPREHEN METABOLIC PANEL: CPT

## 2020-08-27 PROCEDURE — 85730 THROMBOPLASTIN TIME PARTIAL: CPT

## 2020-08-27 PROCEDURE — 85610 PROTHROMBIN TIME: CPT

## 2020-08-27 NOTE — PHYSICAL EXAM
[No Acute Distress] : no acute distress [Well Nourished] : well nourished [Normal Oropharynx] : normal oropharynx [Normal Appearance] : normal appearance [No Neck Mass] : no neck mass [Normal Rate/Rhythm] : normal rate/rhythm [No Resp Distress] : no resp distress [No Murmurs] : no murmurs [Benign] : benign [Not Tender] : not tender [No Clubbing] : no clubbing [Gait - Sufficient For Exercise Testing] : gait sufficient for exercise testing [Normal Gait] : normal gait [Normal Color/ Pigmentation] : normal color/ pigmentation [No Edema] : no edema [No Focal Deficits] : no focal deficits [No Rash] : no rash [Oriented x3] : oriented x3 [No Motor Deficits] : no motor deficits [Normal Affect] : normal affect [TextBox_68] : Decrease breath sounds on right side [TextBox_80] : decrease right side breath sounds and left base breath sounds

## 2020-08-27 NOTE — REVIEW OF SYSTEMS
[Dyspnea] : dyspnea [Edema] : edema [Constipation] : constipation [Negative] : Neurologic [Poor Appetite] : poor appetite [Food Intolerance] : food intolerance [Fever] : no fever [Dry Eyes] : no dry eyes [Cough] : no cough [Nasal Congestion] : no nasal congestion [Postnasal Drip] : no postnasal drip [Sputum] : no sputum [Chest Tightness] : no chest tightness [Pleuritic Pain] : no pleuritic pain [Wheezing] : no wheezing [Chest Discomfort] : no chest discomfort [Hay Fever] : no hay fever [Orthopnea] : no orthopnea [TextBox_44] : bilateral lower extremity swelling [TextBox_69] : feels her abdomen is distended  [Nasal Discharge] : no nasal discharge [TextBox_83] : infrequent urination, urinary retention past 24 hrs [TextBox_151] : rest of ROS negative

## 2020-08-27 NOTE — ED PROVIDER NOTE - OBJECTIVE STATEMENT
87 year old female with history of lung CA presents to ED from pulmonologist's office secondary to concern for urinary retention in setting of recent constipation.  Patient with bladder fullness, though denies abdominal discomfort, fever, chills, chest pain, shortness of breath, urinary symptoms, peripheral edema, or any additional acute complaints or concerns at this time.  Patient notes she had a bowel movement this morning.

## 2020-08-27 NOTE — ED PROVIDER NOTE - NSFOLLOWUPINSTRUCTIONS_ED_ALL_ED_FT
Please follow up with your primary physician in 1-2 days for re evaluation.  Please return to ER immediately should your symptoms worsen or if you have any concern prior to this recommended follow up.    Acute Urinary Retention in Women    WHAT YOU NEED TO KNOW:    Acute urinary retention (AUR) is when your bladder is full, but you cannot urinate. This condition happens suddenly, gets worse quickly, and lasts a short time.     DISCHARGE INSTRUCTIONS:    Medicines:     Antibiotics help treat or prevent a bacterial infection.      Take your medicine as directed. Contact your healthcare provider if you think your medicine is not helping or if you have side effects. Tell him if you are allergic to any medicine. Keep a list of the medicines, vitamins, and herbs you take. Include the amounts, and when and why you take them. Bring the list or the pill bottles to follow-up visits. Carry your medicine list with you in case of an emergency.    Mcduffie catheter care: You may need a Mcduffie catheter while you are at home. Healthcare providers will give you a smaller leg bag to collect urine. Keep the bag below your waist. This will prevent urine from flowing back into your bladder and causing an infection or other problems. Also, keep the tube free of kinks so the urine will drain properly. Do not pull on the catheter. This can cause pain and bleeding, and may cause the catheter to come out. Ask your healthcare provider for more information on Mcduffie catheter care.    Follow up with your healthcare provider as directed: Write down your questions so you remember to ask them during your visits.     Contact your healthcare provider if:     You have a fever.      You have pain when you urinate.      You see blood in your urine.      You have problems with your catheter.      You have questions or concerns about your condition or care.    Return to the emergency department if:     You have severe abdominal pain.      You are breathing faster than usual.      Your heartbeat is faster than usual.      Your face, hands, feet, or ankles are swollen.          © Copyright [employer] 2020       back to top                      © Copyright [employer] 2020

## 2020-08-27 NOTE — ED ADULT NURSE NOTE - CHIEF COMPLAINT QUOTE
Patient presents complaining of urinary retention.  Has multiple pleural effusions.  Denies SOB at this time.  Has Lung Ca, Call Dr Alvarez [phone]

## 2020-08-27 NOTE — PROCEDURE
[FreeTextEntry1] : POCUS:\par small right pleff, no consolidation\par no left pleff, a line predominant\par \par Distended bladder, mildly distended right collective system, no hydro bl.\par No acites

## 2020-08-27 NOTE — ED ADULT TRIAGE NOTE - CHIEF COMPLAINT QUOTE
Patient presents complaining of urinary retention.  Has multiple pleural effusions.  Denies SOB at this time.  Has Lung Ca, Call Dr Alvarez [phone]

## 2020-08-27 NOTE — REVIEW OF SYSTEMS
[Dyspnea] : dyspnea [Fever] : no fever [Dry Eyes] : no dry eyes [Nasal Congestion] : no nasal congestion [Postnasal Drip] : no postnasal drip [Cough] : no cough [Chest Tightness] : no chest tightness [Sputum] : no sputum [Pleuritic Pain] : no pleuritic pain [Chest Discomfort] : no chest discomfort [Orthopnea] : no orthopnea [Hay Fever] : no hay fever [Nasal Discharge] : no nasal discharge [TextBox_151] : rest of ROS negative

## 2020-08-27 NOTE — ED PROVIDER NOTE - CARE PROVIDER_API CALL
Ameya Khan  UROLOGY  [address] [place] [address], [address] [place] [address]  Phone: [phone]  Fax: [phone]  Follow Up Time:

## 2020-08-27 NOTE — REASON FOR VISIT
[Follow-Up - From Hospitalization] : a follow-up visit after a recent hospitalization [Lung Cancer] : lung cancer [TextBox_44] : Pleural effusion

## 2020-08-27 NOTE — ED PROVIDER NOTE - NS ED ROS FT
Constitutional: No fever or chills.   Eyes: No pain, blurry vision, or discharge.  ENMT: No hearing changes, pain, discharge or infections. No neck pain or stiffness.  Cardiac: No chest pain, SOB or edema. No chest pain with exertion.  Respiratory: No cough or respiratory distress. No hemoptysis. No history of asthma or RAD.  GI: No nausea, vomiting, diarrhea or abdominal pain.  : + Urinary retention.  No dysuria, frequency or burning.  MS: No myalgia, muscle weakness, joint pain or back pain.  Neuro: No headache or weakness. No LOC.  Skin: No skin rash.   Endocrine: No history of thyroid disease or diabetes.  Except as documented in the HPI, all other systems are negative.

## 2020-08-27 NOTE — HISTORY OF PRESENT ILLNESS
[TextBox_4] : 87 year old female nonsmoker with PMHx of lung adenocarcinoma (likely multiple primaries) s/p radiation therapy (patient has refused systemic therapy & biopsies in the past) referred to our team for a navigational bronchoscopy with biopsy of enlarging right apical density which is hypermetabolic on recent surveillance CT & PET from August 2019.\par \par The lung adenocarcinoma was originally diagnosed in 2013 s/p right VATS robotic assisted right sub-lobar resection & lymph node dissection. Patient received radiation with Dr. Bailey for LEFT upper lobe lesion which had increased in size, consistent with new primary lung cancer, in October 2014. PET done April 2015 revealed stability of ground glass mildly FDG-avid opacity in RIGHT lung apex. Patient refused biopsy at that time and instead had a PET CT in September 2017 which demonstrated spiculated lesion in RIGHT middle lobe with increased FDG activity. Refused biopsy again and instead followed with radiation oncology. PET March 2017 revealed increase in size and FDG-avidity in RML lesion with new hypermetabolic lesion in LEFT lingula. Referred for Navigation bronchoscopy but refused at that time; instead received radiation from 6/6/17-6/14/17 for stage 1A NSCLC. Of note, also received SBRT to left lower lobe. Most recent PET from August 2019 revealed hypermetabolic activity in opacity in RUL which has increased in size, density & FDG activity since 2017, as well as mildly hypermetabolic opacities in the RIGHT lower lobe and lingula (likely consistent with radiation changes) which have also increased in size since prior PET in 2017. \par \par Today, patient reports feeling well overall. Denies shortness of breath at rest but does feel SOB after walking 3 blocks. Does not climb stairs. Denies fever, chills, unintentional weight loss or night sweats. Denies chest pain/tightness, palpitations or lower extremity edema. Patient recalls that she did have a productive cough with yellow sputum 2 weeks ago which has since resolved. \par \par 11/13/19:\par Patient missed earlier appointment after repeat CT chest and MRI brain. No brain metastasis on MRI. CT chest showed persistent RUL opacity (which was PET avid in past). Patient has denied for biopsy in past but she discussed with office of Dr. Godfrey, who also suggested her for EBUS with TBNA with TBLB. \par \par 8/7/20:\par Patient presents today for follow up visit and to review findings from recent chest imaging. Of note, patient underwent navigational bronchoscopy 11/22/19 with Dr. Nguyen. FNA was positive for lung adenocarcinoma in RUL, lymph nodes negative. After bronchoscopy, patient went into pulmonary edema (hx of valvular disease, HFpEF) and ended up in MICU.She later developed CAP in Jan and was treated with course of Cefpodoxime. \par \par Ms. Woods has completed a course of SBRT to RUL tumor from Jan-Feb 2020 with Dr. Cortez (though, the course was temporarily interrupted because of her pneumonia and ongoing cough at the time). \par \par Repeat CT at [facility] 6/5/20 revealed potentially new 2.5 x 1.0 cm posterior RUL opacity & relative stability of right apical opacity since 10/2019  (we are in the process of obtaining the CD from [facility] to upload to our PACS). Subsequent PET-CT 7/30/20 revealed 2 new FDG-avid subcentimeter nodules in left subpleural region as well as larger pleural effusion \par \par Today, patient is denying any new complaint. When, I asked her if she feels more SOB than usual then she said that sometimes she feels SOB. Denying cough, fever or chest pain.\par \par 8/11/20:\par Patient came today for thoracentesis. Patient was c/o SOB on minimal exertion.

## 2020-08-27 NOTE — DISCUSSION/SUMMARY
[FreeTextEntry1] : 87 year old female with PMHx of lung adenocarcinoma s/p VATS and sub-lobar resection in 2013 of RLL, and multiple subsequent lung cancers treated with RT.\par \par Patient is relatively asymptomatic from a respiratory perspective. She does have complaints of constipation and infrequent urination as well as LE swelling. We will refer Ms. Woods to a PCP (Dr. Erick Meneses) for further management (bowel regimen, diuretics).\par POCUS revealed urinary retention of approximately 500-750ml. We attempted to see if Ms. Woods can urinate on her own before leaving today, but she was unable to. Ms. Woods was sent to ER for need for catheterization, possible urology evaluation. Will follow up with her. \par \par Repeat CT at [facility] 6/5/20 revealed potentially new 2.5 x 1.0 cm posterior RUL opacity and PET-CT 7/30/20 revealed 2 new FDG-avid subcentimeter nodules in left subpleural region as well as larger pleural effusion. Right pleural effusion did not reaccumulate. She is pending evaluation for possible immunotherapy by Dr. Graff. If right pleff reaccumulates will re-test, possibly perform pleural biopsy. If this is MPE may need IPC or pleurodesis if this is expandable lung.\par

## 2020-08-27 NOTE — ED PROVIDER NOTE - PATIENT PORTAL LINK FT
You can access the FollowMyHealth Patient Portal offered by [facility] by registering at the following website: [email or web address]. By joining [employer]’s FollowMyHealth portal, you will also be able to view your health information using other applications (apps) compatible with our system.

## 2020-08-27 NOTE — ED ADULT NURSE NOTE - OBJECTIVE STATEMENT
c.o urinary retention and abd distention with sob for several days. states she was constipated for several days but had a normal BM today after taking different laxatives. pt denies any abd pain, nausea/vomiting, chest pain, cough, fever/chills.

## 2020-08-27 NOTE — HISTORY OF PRESENT ILLNESS
[TextBox_4] : 87 year old female nonsmoker with PMHx of lung adenocarcinoma (likely multiple primaries) s/p radiation therapy (patient has refused systemic therapy & biopsies in the past) .\par \par PMHX:\par •         Initial diagnosis in 2013 via right RLL VATs robotic assisted right sub-lobar resection (2 nodules in one wedge), adeno KRAS-wild type, PDL1 not known\par •         in 2014 new lesion found in the ANANYA, was biopsied, supposedly adeno, NGS results not known, treated with (5000 cGy over 5 fractions, completed October 2014)\par •         2017 another lesion found in the RML on imaging, no biopsy, empirically treated with radiation (4800 Gy over 4 fractions from 6/6/17- 6/14/17 for a [id number], stage IA NSCLC). She was thought to be too frail for any surgery and refused biopsy.\par •         CT chest 8/15/19 revealed right apical opacity measuring 1.7 x 3.6 x 5.0 cm; both increasing in size from 2017, PET 8/27/19 revealed hypermetabolic opacity in RUL which had increased in density and intensity,  Brain MRI for  questionable behavior Sept 2019 was negative. She had navigational bronchoscopy in 11/2019; FNA positive for lung adenocarcinoma in RUL (NGS sent: all mutations negative, PDL1<1%) lymph nodes negative. Of note, went into pulmonary edema (valvular disease, HFpEF) and ended up in MICU after bronchoscopy then had CAP.  Treated with SBRT 3000 cGy out of a planned 5000 cGy to the RUL tumor with Dr. Cortez (previously saw Dr. Godfrey) from Jan-Feb 2020. Did not complete course because of pna.\par •         Repeat CT done 6/5/20 notable for new 2.5 x 1.0 cm posterior RUL opacity and right-sided pleural effusion increasing in size.  PET CT 7/30/20 revealed 2 new FDG-avid sub-centimeter nodules in left sub-pleural region and large right pleural effusion. Fluid from right pleural effusion negative for malignancy.\par \par 8/27/20:\par Patient presents today for follow up visit s/p recent hospitalization at [facility] from 8/18/20-8/20/20 for right leg weakness and fall, presumed TIA. MR angio head and neck- no acute cranial findings and CT head- no ICH or acute infarct. No fractures noted on CT pelvis and patient was sent home with home PT and OT. In addition, she was referred to Dr. Kwan to discuss initiation of possible immunotherapy (although PD-L1 expression<1%). \par \par Today, patient has complaints of swelling in her ankles as well as feeling constipated for the past few days; although did have BM this morning which was normal. She feels her abdomen is swollen and she is not urinating enough- states when she does urinate it's only a little drop and she's gone two days without urinating in past week. She states this problem started after she left the hospital. She admits to not drinking much water because she feels too swollen in her stomach and legs. Has lost approximately 10 lbs since March. She is not eating much because of her abdominal distention. \par \par Ms. Woods denies any new or worsening shortness of breath, chest pain, coughing or fevers. Does have some nasal congestion. \par \par \par

## 2020-08-27 NOTE — ED ADULT NURSE REASSESSMENT NOTE - NS ED NURSE REASSESS COMMENT FT1
pt refused to leave ED with chacon and requested chacon to be removed. MD Templeton notified patient to return to ED if she is unable to see the Urologist in 2 days. pt verbalized understanding of returning to ED if symptoms return.

## 2020-08-27 NOTE — DISCUSSION/SUMMARY
[FreeTextEntry1] : 87 year old female with PMHx of lung adenocarcinoma s/p VATS and sub-lobar resection in 2013 of RLL, originally diagnosed in 2013, as well as multiple lung cancers. Of note, patient is s/p radiation therapy to LLL and RML lesion as she has refused biopsy & systemic therapy, in 2014 & 2017, respectively. Patient had enlarging opacity in right apex which was FDG positive demonstrated on PET from August 2019, concerning for malignancy (recurrence vs. new primary).\par Patient underwent navigational bronchoscopy 11/22/19 with Dr. Nguyen. FNA was positive for lung adenocarcinoma in RUL, lymph nodes negative.\par She completed a course of SBRT to RUL tumor from Jan-Feb 2020.\par \par Repeat CT at R 6/5/20 revealed potentially new 2.5 x 1.0 cm posterior RUL opacity and PET-CT 7/30/20 revealed 2 new FDG-avid subcentimeter nodules in left subpleural region as well as larger pleural effusion.\par \par \par Thoracentesis done today\par Patient to be presented at [facility] 8/13/20

## 2020-08-27 NOTE — ED PROVIDER NOTE - PHYSICAL EXAMINATION
VITAL SIGNS: I have reviewed nursing notes and confirm.  CONSTITUTIONAL: Well-developed; well-nourished; in no acute distress.   SKIN:  warm and dry, no acute rash.   HEAD:  normocephalic, atraumatic.  EYES: PERRL.  EOM intact; conjunctiva and sclera clear.  ENT: No nasal discharge; airway clear.   NECK: Supple; non tender.  CARD: S1, S2 normal; no murmurs, gallops, or rubs. Regular rate and rhythm.   RESP:  Clear to auscultation b/l, no wheezes, rales or rhonchi.  ABD: Normal bowel sounds; soft; + palpable bladder; non-tender; no guarding/rebound.  EXT: Normal ROM. No clubbing, cyanosis or edema. 2+ pulses to b/l ue/le.  NEURO: Alert, oriented, grossly unremarkable.  5/5 strength x 4 extremities against gravity and external force.  No drift x 4 extremities.  Sensation intact and symmetric x 4 extremities.  No facial asymmetry.    PSYCH: Cooperative, mood and affect appropriate.

## 2020-08-27 NOTE — PROCEDURE
[FreeTextEntry1] : Procedure Note\par Procedure: Right sided ultrasound guided Thoracentesis\par Indication:  Right side Pleural effusion\par \par Ultrasound guidance was used to locate and nancy a fluid pocket. A time-out was completed, verifying correct patient, procedure, site and positioning. Patient was positioned, prepped, and draped in the usual sterile fashion. 10 ml of 1% Lidocaine was used to anesthetize the area. A standard thoracentesis kit needle was introduced into the pleural space and 1100 ml clear yellow/cloudy yellow/bloody fluid was removed and sent for cell counts, cytology, gram stain, bacterial culture, protein, and LDH levels. Needle was removed, and a chest x-ray was ordered to assess for pneumothorax.\par Complications: None\par Blood loss: None \par \par \par

## 2020-08-27 NOTE — PHYSICAL EXAM
[Well Nourished] : well nourished [Normal Oropharynx] : normal oropharynx [No Acute Distress] : no acute distress [Normal Appearance] : normal appearance [No Neck Mass] : no neck mass [Normal Rate/Rhythm] : normal rate/rhythm [No Murmurs] : no murmurs [Not Tender] : not tender [Benign] : benign [No Resp Distress] : no resp distress [Gait - Sufficient For Exercise Testing] : gait sufficient for exercise testing [Normal Gait] : normal gait [No Clubbing] : no clubbing [Normal Color/ Pigmentation] : normal color/ pigmentation [No Rash] : no rash [No Focal Deficits] : no focal deficits [Oriented x3] : oriented x3 [No Motor Deficits] : no motor deficits [Normal Affect] : normal affect [Well Groomed] : well groomed [Soft] : soft [2+ Pitting] : 2+ pitting [TextBox_80] : decrease right side breath sounds  [TextBox_68] : Decrease breath sounds on right side [TextBox_89] : abdomen distended

## 2020-08-27 NOTE — ED PROVIDER NOTE - CLINICAL SUMMARY MEDICAL DECISION MAKING FREE TEXT BOX
Patient in ED w concern for urinary retention in pulmonologist's office today.  Patient sent to ED for catheterization and further evaluation.  Patient notes BM this morning.  No abdominal pain, benign physical exam with palpable bladder initially prior to catheterization.  Chacon placed and plan was for discharge with outpatient urology follow up in 2 days for re eval/likely chacon removal, however on re evaluation - patient now refusing to be discharged home with chacon in place.  UA without evidence of infection.  Will plan for discharge home with prompt PCP follow up for re evaluation.  Patient is advised to follow up with their PCP in 1-2 days without fail.  Patient instructed to return to ED immediately should their symptoms worsen or if there is any concern prior to the recommended PCP follow up.  Patient is aware of plan and verbalizes their understanding.  Will discharge home at this time.

## 2020-08-31 DIAGNOSIS — R33.9 RETENTION OF URINE, UNSPECIFIED: ICD-10-CM

## 2020-08-31 DIAGNOSIS — Z88.0 ALLERGY STATUS TO PENICILLIN: ICD-10-CM

## 2020-08-31 DIAGNOSIS — Z91.018 ALLERGY TO OTHER FOODS: ICD-10-CM

## 2020-08-31 DIAGNOSIS — Z88.1 ALLERGY STATUS TO OTHER ANTIBIOTIC AGENTS STATUS: ICD-10-CM

## 2020-09-02 ENCOUNTER — APPOINTMENT (OUTPATIENT)
Dept: UROLOGY | Facility: CLINIC | Age: 85
End: 2020-09-02
Payer: MEDICARE

## 2020-09-02 VITALS — DIASTOLIC BLOOD PRESSURE: 75 MMHG | HEART RATE: 98 BPM | SYSTOLIC BLOOD PRESSURE: 112 MMHG | TEMPERATURE: 98.5 F

## 2020-09-02 PROCEDURE — 99203 OFFICE O/P NEW LOW 30 MIN: CPT | Mod: 25

## 2020-09-02 PROCEDURE — 51702 INSERT TEMP BLADDER CATH: CPT

## 2020-09-03 NOTE — ASSESSMENT
[FreeTextEntry1] : 88yo female with urinary retention, likely secondary to constipation \par Mcduffie catheter placed\par Reviewed bowel regimen\par Return in 2 days for voiding trial

## 2020-09-03 NOTE — PHYSICAL EXAM
[General Appearance - Well Nourished] : well nourished [General Appearance - Well Developed] : well developed [Well Groomed] : well groomed [Normal Appearance] : normal appearance [General Appearance - In No Acute Distress] : no acute distress [Respiration, Rhythm And Depth] : normal respiratory rhythm and effort [Exaggerated Use Of Accessory Muscles For Inspiration] : no accessory muscle use [FreeTextEntry1] : distended lower abdomen [Normal Station and Gait] : the gait and station were normal for the patient's age [] : no rash [No Focal Deficits] : no focal deficits [Oriented To Time, Place, And Person] : oriented to person, place, and time [Affect] : the affect was normal [Mood] : the mood was normal [Not Anxious] : not anxious

## 2020-09-03 NOTE — REVIEW OF SYSTEMS
[Lower Ext Edema] : lower extremity edema [see HPI] : see HPI [Negative] : Heme/Lymph [Fever] : no fever [Chills] : no chills

## 2020-09-03 NOTE — HISTORY OF PRESENT ILLNESS
[Urinary Retention] : urinary retention [Urinary Frequency] : urinary frequency [Weak Stream] : weak stream [Straining] : straining [None] : None [FreeTextEntry1] : 86yo female referred by ER for urinary retention. C/o difficulty urinating. Had Mcduffie placed in ER which was then removed and she was discharged home after voiding. Creatinine, UA, culture normal. History significant for lung cancer, likely multiple primaries, treated with radiation alone, refused systemic therapy or surgery. Recently hospitalized with LE edema and difficulty walking. Also noted to have severe constipation. Taking laxative and Senna.  [Dysuria] : no dysuria [Hematuria - Gross] : no gross hematuria [Abdominal Pain] : no abdominal pain [Flank Pain] : no flank pain

## 2020-09-04 ENCOUNTER — APPOINTMENT (OUTPATIENT)
Dept: UROLOGY | Facility: CLINIC | Age: 85
End: 2020-09-04
Payer: MEDICARE

## 2020-09-04 VITALS — DIASTOLIC BLOOD PRESSURE: 83 MMHG | HEART RATE: 96 BPM | SYSTOLIC BLOOD PRESSURE: 147 MMHG | TEMPERATURE: 98 F

## 2020-09-04 DIAGNOSIS — R33.9 RETENTION OF URINE, UNSPECIFIED: ICD-10-CM

## 2020-09-04 DIAGNOSIS — R39.16 STRAINING TO VOID: ICD-10-CM

## 2020-09-04 LAB — BACTERIA UR CULT: NORMAL

## 2020-09-04 PROCEDURE — 99214 OFFICE O/P EST MOD 30 MIN: CPT | Mod: 25

## 2020-09-04 PROCEDURE — 76705 ECHO EXAM OF ABDOMEN: CPT

## 2020-09-04 NOTE — ASSESSMENT
[FreeTextEntry1] : urinary retention with incomplete emptying chronically, failed trial\par Current  mL, after 3 hrs of waiting, drinking and walking\par Reinsert catheter 14 Fr until next week\par F/U next week Tuesday for another TOV trial\par Recommend: Renal U/S to r/o [place] \par see Dr. Corbett or Dr. Ellsworth for UDS, other tests\par \par

## 2020-09-04 NOTE — HISTORY OF PRESENT ILLNESS
[FreeTextEntry1] : follow up and TOV trial as she was asked to return in 2 days.\par \par 86yo female with urinary retention, likely secondary to constipation, which is now better, BOM x 2\par Mcduffie catheter placed, draining \par \par Per noted, she went to ER for urinary retention. C/o difficulty urinating. Had Mcduffie placed in ER which was then removed and she was discharged home after voiding. Creatinine, UA, culture normal. History significant for lung cancer, likely multiple primaries, treated with radiation alone, refused systemic therapy or surgery. Recently hospitalized with LE edema and difficulty walking. Also before noted to have severe constipation. Taking laxative and Senna. \par \par Recent sCR 0.8 08/20. UA Ucx negative\par

## 2020-09-08 ENCOUNTER — APPOINTMENT (OUTPATIENT)
Dept: UROLOGY | Facility: CLINIC | Age: 85
End: 2020-09-08
Payer: MEDICARE

## 2020-09-08 VITALS — TEMPERATURE: 98.2 F

## 2020-09-08 PROCEDURE — 99213 OFFICE O/P EST LOW 20 MIN: CPT | Mod: 25

## 2020-09-08 PROCEDURE — 51798 US URINE CAPACITY MEASURE: CPT

## 2020-09-08 NOTE — HISTORY OF PRESENT ILLNESS
[FreeTextEntry1] : This is a 87 year old female with history of lung Ca treated with radiation.  Was recently seen at [facility] for urinary retention and SOB.  Catheter inserted by Dr. England at recent visit due to retention likely secondary to constipation.\par Presents today for a trial void\par \par Feeling well, denies any hematuria, flank pain\par States she is moving her bowels more regularly.\par

## 2020-09-08 NOTE — ASSESSMENT
[FreeTextEntry1] : This is an 87 year old female who presents today for trial void\par -220 cc Sterile water instilled into bladder, catheter removed intact without difficulty\par -patient voided PVR was 66 cc\par -return precautions reviewed with patient \par -recommend follow up with PCP given B/L LE edema\par \par F/U with Dr. England in 1 week

## 2020-09-08 NOTE — PHYSICAL EXAM
[General Appearance - Well Developed] : well developed [General Appearance - Well Nourished] : well nourished [Normal Appearance] : normal appearance [Well Groomed] : well groomed [General Appearance - In No Acute Distress] : no acute distress [Abdomen Soft] : soft [Abdomen Tenderness] : non-tender [Costovertebral Angle Tenderness] : no ~M costovertebral angle tenderness [Oriented To Time, Place, And Person] : oriented to person, place, and time [Affect] : the affect was normal [Mood] : the mood was normal [Not Anxious] : not anxious [Normal Station and Gait] : the gait and station were normal for the patient's age [No Focal Deficits] : no focal deficits [FreeTextEntry1] : +4 B/L LE edema

## 2020-09-08 NOTE — ADDENDUM
[FreeTextEntry1] : At 4:45 pm patient walked into office stating she has not urinated since initial void after catheter removal at 11:30 am.\par Bladder scan revealed 74 cc in bladder, patient voiced concern as she has had 2 cups of water and some ice cream and only had " a few drops" of liquid come out.\par Patient was straight catheterized and 80 cc clear yellow urine drained from bladder.\par -discussed precautions that would warrant return to ER\par -follow up in am for PVR

## 2020-09-09 ENCOUNTER — APPOINTMENT (OUTPATIENT)
Dept: UROLOGY | Facility: CLINIC | Age: 85
End: 2020-09-09
Payer: MEDICARE

## 2020-09-09 DIAGNOSIS — C34.90 MALIGNANT NEOPLASM OF UNSPECIFIED PART OF UNSPECIFIED BRONCHUS OR LUNG: ICD-10-CM

## 2020-09-09 PROCEDURE — 99213 OFFICE O/P EST LOW 20 MIN: CPT | Mod: 25

## 2020-09-09 PROCEDURE — 51798 US URINE CAPACITY MEASURE: CPT

## 2020-09-09 NOTE — HISTORY OF PRESENT ILLNESS
[FreeTextEntry1] : This is a 87 year old female with history of lung Ca treated with radiation. Was recently seen at [facility] for urinary retention and SOB. Catheter inserted by Dr. England at recent visit due to retention likely secondary to constipation.\par \par \par Catheter removed yesterday.  She reports voiding 3 times overnight after catheter removal\par +BM today\par

## 2020-09-09 NOTE — ASSESSMENT
[FreeTextEntry1] : 88yo female with urinary retention, likely secondary to constipation \par Passed TOV yesterday\par No evidence of acute retention today\par Given contact info for new PCP\par F/u 1 month

## 2020-09-09 NOTE — PHYSICAL EXAM
[General Appearance - Well Developed] : well developed [General Appearance - Well Nourished] : well nourished [Normal Appearance] : normal appearance [Well Groomed] : well groomed [Abdomen Soft] : soft [General Appearance - In No Acute Distress] : no acute distress [Abdomen Tenderness] : non-tender [FreeTextEntry1] : PVR = 110cc [Costovertebral Angle Tenderness] : no ~M costovertebral angle tenderness

## 2020-09-11 ENCOUNTER — NON-APPOINTMENT (OUTPATIENT)
Age: 85
End: 2020-09-11

## 2020-09-16 LAB
B PERT IGG+IGM PNL SER: NORMAL
BACTERIA FLD CULT: NORMAL
COLOR FLD: YELLOW
COMMENT: NORMAL
CREAT FLD-MCNC: 0.84 MG/DL
FLUID INTAKE SUBSTANCE CLASS: NORMAL
GLUCOSE FLD-MCNC: 96 MG/DL
LDH FLD-CCNC: 265 U/L
LYMPHOCYTES # FLD MANUAL: 35 %
MESOTHL CELL NFR FLD: 22 %
MONOS+MACROS NFR FLD MANUAL: 32 %
NEUTS SEG # FLD MANUAL: 11 %
PROT FLD-MCNC: 4.2 G/DL
RBC # FLD MANUAL: 0 /UL
TOTAL CELLS COUNTED FLD: 947 /UL
TRIGL SPEC-SCNC: 41 MG/DL
TUBE TYPE: NORMAL
URATE AMN-MCNC: NORMAL

## 2020-10-07 ENCOUNTER — APPOINTMENT (OUTPATIENT)
Dept: UROLOGY | Facility: CLINIC | Age: 85
End: 2020-10-07
Payer: MEDICARE

## 2020-10-07 VITALS — OXYGEN SATURATION: 98 % | HEART RATE: 82 BPM | SYSTOLIC BLOOD PRESSURE: 130 MMHG | DIASTOLIC BLOOD PRESSURE: 72 MMHG

## 2020-10-07 DIAGNOSIS — R33.9 RETENTION OF URINE, UNSPECIFIED: ICD-10-CM

## 2020-10-07 DIAGNOSIS — K59.00 CONSTIPATION, UNSPECIFIED: ICD-10-CM

## 2020-10-07 DIAGNOSIS — R60.0 LOCALIZED EDEMA: ICD-10-CM

## 2020-10-07 PROCEDURE — 51798 US URINE CAPACITY MEASURE: CPT

## 2020-10-07 PROCEDURE — 99213 OFFICE O/P EST LOW 20 MIN: CPT | Mod: 25

## 2020-10-08 NOTE — ASSESSMENT
[FreeTextEntry1] : 86yo female with urinary retention, likely secondary to constipation \par No evidence of acute retention today\par Given contact info for new PCP\par F/u 3 months

## 2020-10-08 NOTE — HISTORY OF PRESENT ILLNESS
[FreeTextEntry1] : This is a 87 year old female with history of lung Ca treated with radiation. Was recently seen at [facility] for urinary retention and SOB. Catheter inserted by Dr. England at recent visit due to retention likely secondary to constipation.\par \par 10/07/20 Here for f/u. Has not seen PCP or cardiology for LE edema. Voiding OK, constipation improved. \par  [Edema] : ~T edema was present [None] : None

## 2020-10-08 NOTE — PHYSICAL EXAM
[General Appearance - Well Developed] : well developed [General Appearance - Well Nourished] : well nourished [Normal Appearance] : normal appearance [Well Groomed] : well groomed [General Appearance - In No Acute Distress] : no acute distress [Abdomen Soft] : soft [Abdomen Tenderness] : non-tender [Costovertebral Angle Tenderness] : no ~M costovertebral angle tenderness [FreeTextEntry1] : bilateral pitting edema [] : no respiratory distress [Respiration, Rhythm And Depth] : normal respiratory rhythm and effort [Exaggerated Use Of Accessory Muscles For Inspiration] : no accessory muscle use

## 2020-10-09 ENCOUNTER — APPOINTMENT (OUTPATIENT)
Dept: INTERNAL MEDICINE | Facility: CLINIC | Age: 85
End: 2020-10-09

## 2020-10-10 ENCOUNTER — INPATIENT (INPATIENT)
Facility: HOSPITAL | Age: 85
LOS: 3 days | Discharge: HOME | DRG: 181 | End: 2020-10-14
Attending: STUDENT IN AN ORGANIZED HEALTH CARE EDUCATION/TRAINING PROGRAM
Payer: MEDICARE

## 2020-10-10 VITALS
DIASTOLIC BLOOD PRESSURE: 98 MMHG | WEIGHT: 89.95 LBS | HEIGHT: 57.5 IN | HEART RATE: 98 BPM | RESPIRATION RATE: 18 BRPM | TEMPERATURE: 97 F | SYSTOLIC BLOOD PRESSURE: 181 MMHG | OXYGEN SATURATION: 100 %

## 2020-10-10 DIAGNOSIS — C34.90 MALIGNANT NEOPLASM OF UNSPECIFIED PART OF UNSPECIFIED BRONCHUS OR LUNG: ICD-10-CM

## 2020-10-10 DIAGNOSIS — R74.8 ABNORMAL LEVELS OF OTHER SERUM ENZYMES: ICD-10-CM

## 2020-10-10 DIAGNOSIS — D72.829 ELEVATED WHITE BLOOD CELL COUNT, UNSPECIFIED: ICD-10-CM

## 2020-10-10 DIAGNOSIS — E87.0 HYPEROSMOLALITY AND HYPERNATREMIA: ICD-10-CM

## 2020-10-10 DIAGNOSIS — R63.8 OTHER SYMPTOMS AND SIGNS CONCERNING FOOD AND FLUID INTAKE: ICD-10-CM

## 2020-10-10 DIAGNOSIS — J98.11 ATELECTASIS: ICD-10-CM

## 2020-10-10 DIAGNOSIS — I34.0 NONRHEUMATIC MITRAL (VALVE) INSUFFICIENCY: ICD-10-CM

## 2020-10-10 DIAGNOSIS — J90 PLEURAL EFFUSION, NOT ELSEWHERE CLASSIFIED: ICD-10-CM

## 2020-10-10 LAB
ALBUMIN SERPL ELPH-MCNC: 3.7 G/DL — SIGNIFICANT CHANGE UP (ref 3.3–5)
ALBUMIN SERPL ELPH-MCNC: 3.7 G/DL — SIGNIFICANT CHANGE UP (ref 3.3–5)
ALP SERPL-CCNC: 187 U/L — HIGH (ref 40–120)
ALP SERPL-CCNC: 188 U/L — HIGH (ref 40–120)
ALT FLD-CCNC: 37 U/L — SIGNIFICANT CHANGE UP (ref 10–45)
ALT FLD-CCNC: 39 U/L — SIGNIFICANT CHANGE UP (ref 10–45)
ANION GAP SERPL CALC-SCNC: 11 MMOL/L — SIGNIFICANT CHANGE UP (ref 5–17)
ANION GAP SERPL CALC-SCNC: 12 MMOL/L — SIGNIFICANT CHANGE UP (ref 5–17)
APPEARANCE UR: CLEAR — SIGNIFICANT CHANGE UP
APTT BLD: 25.4 SEC — LOW (ref 27.5–35.5)
AST SERPL-CCNC: 41 U/L — HIGH (ref 10–40)
AST SERPL-CCNC: 41 U/L — HIGH (ref 10–40)
BASE EXCESS BLDV CALC-SCNC: 5.6 MMOL/L — SIGNIFICANT CHANGE UP
BASOPHILS # BLD AUTO: 0.05 K/UL — SIGNIFICANT CHANGE UP (ref 0–0.2)
BASOPHILS NFR BLD AUTO: 0.4 % — SIGNIFICANT CHANGE UP (ref 0–2)
BILIRUB SERPL-MCNC: 0.3 MG/DL — SIGNIFICANT CHANGE UP (ref 0.2–1.2)
BILIRUB SERPL-MCNC: 0.4 MG/DL — SIGNIFICANT CHANGE UP (ref 0.2–1.2)
BILIRUB UR-MCNC: NEGATIVE — SIGNIFICANT CHANGE UP
BUN SERPL-MCNC: 15 MG/DL — SIGNIFICANT CHANGE UP (ref 7–23)
BUN SERPL-MCNC: 15 MG/DL — SIGNIFICANT CHANGE UP (ref 7–23)
CALCIUM SERPL-MCNC: 8.9 MG/DL — SIGNIFICANT CHANGE UP (ref 8.4–10.5)
CALCIUM SERPL-MCNC: 9.3 MG/DL — SIGNIFICANT CHANGE UP (ref 8.4–10.5)
CHLORIDE SERPL-SCNC: 106 MMOL/L — SIGNIFICANT CHANGE UP (ref 96–108)
CHLORIDE SERPL-SCNC: 107 MMOL/L — SIGNIFICANT CHANGE UP (ref 96–108)
CO2 SERPL-SCNC: 29 MMOL/L — SIGNIFICANT CHANGE UP (ref 22–31)
CO2 SERPL-SCNC: 30 MMOL/L — SIGNIFICANT CHANGE UP (ref 22–31)
COLOR SPEC: YELLOW — SIGNIFICANT CHANGE UP
CREAT SERPL-MCNC: 0.76 MG/DL — SIGNIFICANT CHANGE UP (ref 0.5–1.3)
CREAT SERPL-MCNC: 0.77 MG/DL — SIGNIFICANT CHANGE UP (ref 0.5–1.3)
DIFF PNL FLD: NEGATIVE — SIGNIFICANT CHANGE UP
EOSINOPHIL # BLD AUTO: 0.18 K/UL — SIGNIFICANT CHANGE UP (ref 0–0.5)
EOSINOPHIL NFR BLD AUTO: 1.5 % — SIGNIFICANT CHANGE UP (ref 0–6)
GAS PNL BLDV: SIGNIFICANT CHANGE UP
GGT SERPL-CCNC: 113 U/L — HIGH (ref 8–40)
GLUCOSE SERPL-MCNC: 122 MG/DL — HIGH (ref 70–99)
GLUCOSE SERPL-MCNC: 130 MG/DL — HIGH (ref 70–99)
GLUCOSE UR QL: NEGATIVE — SIGNIFICANT CHANGE UP
HCO3 BLDV-SCNC: 34 MMOL/L — HIGH (ref 20–27)
HCT VFR BLD CALC: 43 % — SIGNIFICANT CHANGE UP (ref 34.5–45)
HGB BLD-MCNC: 13.6 G/DL — SIGNIFICANT CHANGE UP (ref 11.5–15.5)
IMM GRANULOCYTES NFR BLD AUTO: 0.7 % — SIGNIFICANT CHANGE UP (ref 0–1.5)
INR BLD: 0.94 — SIGNIFICANT CHANGE UP (ref 0.88–1.16)
KETONES UR-MCNC: NEGATIVE — SIGNIFICANT CHANGE UP
LACTATE SERPL-SCNC: 1.4 MMOL/L — SIGNIFICANT CHANGE UP (ref 0.5–2)
LEUKOCYTE ESTERASE UR-ACNC: NEGATIVE — SIGNIFICANT CHANGE UP
LYMPHOCYTES # BLD AUTO: 1.86 K/UL — SIGNIFICANT CHANGE UP (ref 1–3.3)
LYMPHOCYTES # BLD AUTO: 15 % — SIGNIFICANT CHANGE UP (ref 13–44)
MAGNESIUM SERPL-MCNC: 1.9 MG/DL — SIGNIFICANT CHANGE UP (ref 1.6–2.6)
MCHC RBC-ENTMCNC: 31.6 GM/DL — LOW (ref 32–36)
MCHC RBC-ENTMCNC: 32.2 PG — SIGNIFICANT CHANGE UP (ref 27–34)
MCV RBC AUTO: 101.9 FL — HIGH (ref 80–100)
MONOCYTES # BLD AUTO: 1.3 K/UL — HIGH (ref 0–0.9)
MONOCYTES NFR BLD AUTO: 10.5 % — SIGNIFICANT CHANGE UP (ref 2–14)
NEUTROPHILS # BLD AUTO: 8.88 K/UL — HIGH (ref 1.8–7.4)
NEUTROPHILS NFR BLD AUTO: 71.9 % — SIGNIFICANT CHANGE UP (ref 43–77)
NITRITE UR-MCNC: NEGATIVE — SIGNIFICANT CHANGE UP
NRBC # BLD: 0 /100 WBCS — SIGNIFICANT CHANGE UP (ref 0–0)
NT-PROBNP SERPL-SCNC: 608 PG/ML — HIGH (ref 0–300)
OSMOLALITY UR: 407 MOSM/KG — SIGNIFICANT CHANGE UP (ref 300–900)
PCO2 BLDV: 71 MMHG — HIGH (ref 41–51)
PCO2 BLDV: SIGNIFICANT CHANGE UP MMHG (ref 41–51)
PH BLDV: 7.3 — LOW (ref 7.32–7.43)
PH BLDV: SIGNIFICANT CHANGE UP (ref 7.32–7.43)
PH UR: 6 — SIGNIFICANT CHANGE UP (ref 5–8)
PLATELET # BLD AUTO: 289 K/UL — SIGNIFICANT CHANGE UP (ref 150–400)
PO2 BLDV: 24 MMHG — SIGNIFICANT CHANGE UP
PO2 BLDV: SIGNIFICANT CHANGE UP MMHG
POTASSIUM SERPL-MCNC: 4.1 MMOL/L — SIGNIFICANT CHANGE UP (ref 3.5–5.3)
POTASSIUM SERPL-MCNC: 4.4 MMOL/L — SIGNIFICANT CHANGE UP (ref 3.5–5.3)
POTASSIUM SERPL-SCNC: 4.1 MMOL/L — SIGNIFICANT CHANGE UP (ref 3.5–5.3)
POTASSIUM SERPL-SCNC: 4.4 MMOL/L — SIGNIFICANT CHANGE UP (ref 3.5–5.3)
PROT SERPL-MCNC: 6.5 G/DL — SIGNIFICANT CHANGE UP (ref 6–8.3)
PROT SERPL-MCNC: 6.8 G/DL — SIGNIFICANT CHANGE UP (ref 6–8.3)
PROT UR-MCNC: NEGATIVE MG/DL — SIGNIFICANT CHANGE UP
PROTHROM AB SERPL-ACNC: 11.3 SEC — SIGNIFICANT CHANGE UP (ref 10.6–13.6)
RBC # BLD: 4.22 M/UL — SIGNIFICANT CHANGE UP (ref 3.8–5.2)
RBC # FLD: 17.2 % — HIGH (ref 10.3–14.5)
SAO2 % BLDV: 25 % — SIGNIFICANT CHANGE UP
SAO2 % BLDV: SIGNIFICANT CHANGE UP %
SARS-COV-2 RNA SPEC QL NAA+PROBE: SIGNIFICANT CHANGE UP
SODIUM SERPL-SCNC: 147 MMOL/L — HIGH (ref 135–145)
SODIUM SERPL-SCNC: 148 MMOL/L — HIGH (ref 135–145)
SODIUM UR-SCNC: 151 MMOL/L — SIGNIFICANT CHANGE UP
SP GR SPEC: 1.01 — SIGNIFICANT CHANGE UP (ref 1–1.03)
TROPONIN T SERPL-MCNC: <0.01 NG/ML — SIGNIFICANT CHANGE UP (ref 0–0.01)
UROBILINOGEN FLD QL: 0.2 E.U./DL — SIGNIFICANT CHANGE UP
WBC # BLD: 12.36 K/UL — HIGH (ref 3.8–10.5)
WBC # FLD AUTO: 12.36 K/UL — HIGH (ref 3.8–10.5)

## 2020-10-10 PROCEDURE — 93010 ELECTROCARDIOGRAM REPORT: CPT

## 2020-10-10 PROCEDURE — 93970 EXTREMITY STUDY: CPT | Mod: 26

## 2020-10-10 PROCEDURE — 99285 EMERGENCY DEPT VISIT HI MDM: CPT | Mod: CS

## 2020-10-10 PROCEDURE — 99223 1ST HOSP IP/OBS HIGH 75: CPT | Mod: GC

## 2020-10-10 PROCEDURE — 71275 CT ANGIOGRAPHY CHEST: CPT | Mod: 26

## 2020-10-10 PROCEDURE — 71045 X-RAY EXAM CHEST 1 VIEW: CPT | Mod: 26

## 2020-10-10 RX ORDER — ACETAMINOPHEN 500 MG
650 TABLET ORAL ONCE
Refills: 0 | Status: COMPLETED | OUTPATIENT
Start: 2020-10-10 | End: 2020-10-10

## 2020-10-10 RX ORDER — FUROSEMIDE 40 MG
20 TABLET ORAL ONCE
Refills: 0 | Status: COMPLETED | OUTPATIENT
Start: 2020-10-10 | End: 2020-10-10

## 2020-10-10 RX ORDER — DIPHENHYDRAMINE HCL 50 MG
25 CAPSULE ORAL ONCE
Refills: 0 | Status: COMPLETED | OUTPATIENT
Start: 2020-10-10 | End: 2020-10-10

## 2020-10-10 RX ORDER — FUROSEMIDE 40 MG
10 TABLET ORAL ONCE
Refills: 0 | Status: COMPLETED | OUTPATIENT
Start: 2020-10-10 | End: 2020-10-10

## 2020-10-10 RX ORDER — ASPIRIN/CALCIUM CARB/MAGNESIUM 324 MG
81 TABLET ORAL DAILY
Refills: 0 | Status: DISCONTINUED | OUTPATIENT
Start: 2020-10-11 | End: 2020-10-14

## 2020-10-10 RX ORDER — ATORVASTATIN CALCIUM 80 MG/1
20 TABLET, FILM COATED ORAL AT BEDTIME
Refills: 0 | Status: DISCONTINUED | OUTPATIENT
Start: 2020-10-10 | End: 2020-10-10

## 2020-10-10 RX ADMIN — Medication 650 MILLIGRAM(S): at 06:39

## 2020-10-10 RX ADMIN — Medication 40 MILLIGRAM(S): at 06:40

## 2020-10-10 RX ADMIN — Medication 20 MILLIGRAM(S): at 15:00

## 2020-10-10 RX ADMIN — Medication 10 MILLIGRAM(S): at 07:39

## 2020-10-10 RX ADMIN — Medication 40 MILLIGRAM(S): at 10:26

## 2020-10-10 RX ADMIN — Medication 25 MILLIGRAM(S): at 09:27

## 2020-10-10 RX ADMIN — Medication 650 MILLIGRAM(S): at 07:10

## 2020-10-10 NOTE — ED PROVIDER NOTE - CARE PLAN
Principal Discharge DX:	SOB (shortness of breath)  Secondary Diagnosis:	Edema, unspecified type  Secondary Diagnosis:	Back pain, unspecified back location, unspecified back pain laterality, unspecified chronicity

## 2020-10-10 NOTE — ED PROVIDER NOTE - CLINICAL SUMMARY MEDICAL DECISION MAKING FREE TEXT BOX
SOB, b/l lateral torso and upper back pain worse on left, speaking in full sentences, no acute resp distress, worsening LE swelling.  echo in august with normal LV function.     -check labs, ekg  -cxr  -tylenol

## 2020-10-10 NOTE — H&P ADULT - PROBLEM SELECTOR PLAN 6
F: Avoid IVF  E: Replete PRN  N: Regular  Code full  DVT prophy lovenox (holding for thora, start 10/11)  D RMF - Elevated WBC may be reactive in setting of pleural effusion. No other s/s infection. No URI symptoms, COVID negative, no UTI symptoms, UA negative, CXR with pleural effusion, CT chest without evidence of consolidation, no abdominal pain.  - Monitor  - Expect slight rise over upcoming few days as patient received 2x doses of steroids in ED today - Elevated WBC may be reactive in setting of pleural effusion. No other s/s infection. No URI symptoms, COVID negative, no UTI symptoms, CXR with pleural effusion, CT chest without evidence of consolidation, no abdominal pain.  - f/u UA and urine culture  - Expect slight rise over upcoming few days as patient received 2x doses of steroids in ED today

## 2020-10-10 NOTE — ED ADULT NURSE NOTE - OBJECTIVE STATEMENT
Patient to the Ed with complaint of worsening SOB beginning yesterday associated with L lateral thoracic pain reported that she is unable to take deep breath, patient has  hx of lung cancer.

## 2020-10-10 NOTE — H&P ADULT - HISTORY OF PRESENT ILLNESS
INcomplete note***** This is an 87F with PMHx of lung adenocarcinoma s/p resection/XRT, severe MR, HFpEF, and recent admission 8/2020 for fall and R leg weakness who now presents for several days of pleuritic chest pain. Patient complains of 1 day of chest pain, mostly on the L, sharp, radiating to R, worse with movement, deep breaths, and coughing, better with rest. She denies any associated shortness of breath, lightheadedness or dizziness, fevers, or chills. She did endorse LE swelling that has been x1 week, associated with pain in legs on touch for few days. On ROS, patient endorses urinary frequency (once every hour, small amounts), without dysuria or malodor.    In ED: T97, HR 98, /98, RR 18 sating 100% on 3L NC and 98% on RA. Patient received 10mg IVP lasix, tylenol 650mg PO, benadryl 25mg, solumedrol 40mg x2. LE dopplers negative for clots. CT PE negative for PE. This is an 87F with PMHx of lung adenocarcinoma s/p resection/XRT, severe MR, and recent admission 8/2020 for fall and R leg weakness who now presents for several days of pleuritic chest pain. Patient complains of 1 day of chest pain, mostly on the L, sharp, radiating to R, worse with movement, deep breaths, and coughing, better with rest. She denies any associated shortness of breath, lightheadedness or dizziness, fevers, or chills. She did endorse LE swelling that has been x1 week, associated with pain in legs on touch for few days. On ROS, patient endorses urinary frequency (once every hour, small amounts), without dysuria or malodor.    In ED: T97, HR 98, /98, RR 18 sating 100% on 3L NC and 98% on RA. Patient received 10mg IVP lasix, tylenol 650mg PO, benadryl 25mg, solumedrol 40mg x2. LE dopplers negative for clots. CT PE negative for PE.

## 2020-10-10 NOTE — ED PROVIDER NOTE - OBJECTIVE STATEMENT
87F hx lung adenocarcinoma (s/p radiation, VATS), c/o left torso pain. pt states worsening since last night.  worse with coughing.  no fevers. no vomiting. c/o SOB.  pt also c/o worsening LE swelling over past week.  pt states has never happened before. however per prior note pt with LE edema in past and pleural effusion.

## 2020-10-10 NOTE — H&P ADULT - NSHPPHYSICALEXAM_GEN_ALL_CORE
.  VITAL SIGNS:  T(C): 36.7 (10-10-20 @ 12:56), Max: 36.7 (10-10-20 @ 12:56)  T(F): 98 (10-10-20 @ 12:56), Max: 98 (10-10-20 @ 12:56)  HR: 78 (10-10-20 @ 12:56) (78 - 98)  BP: 138/71 (10-10-20 @ 12:56) (138/71 - 181/98)  BP(mean): --  RR: 18 (10-10-20 @ 12:56) (18 - 18)  SpO2: 96% (10-10-20 @ 12:56) (96% - 100%)  Wt(kg): --    PHYSICAL EXAM:  Constitutional: WDWN resting comfortably in bed; NAD  Head: NC/AT  Eyes: PERRL, EOMI, clear conjunctiva  ENT: no nasal discharge; uvula midline, no oropharyngeal erythema or exudates; MMM  Neck: supple; no JVD or thyromegaly  Respiratory: Absent breath sounds on R lower lobe; otherwise CTAB  Cardiac: +S1/S2; regular rate, 3/6 systolic murmur best heard at apex and radiating to axilla  Gastrointestinal: soft, NT/ND; no rebound or guarding; +BSx4  Back: spine midline; no CVAT B/L  Extremities: WWP, no clubbing or cyanosis; 3+ pitting edema to knees bilaterally  Musculoskeletal: Full ROM x4; no joint swelling, tenderness or erythema  Vascular: 2+ radial pulses; 1+ pedal pulses  Dermatologic: skin warm, dry and intact; no rashes, wounds, or scars  Neurologic: AAOx3; CNII-XII grossly intact; 5/5 UE and LE bilaterally  Psychiatric: affect and characteristics of appearance, verbalizations, behaviors are appropriate

## 2020-10-10 NOTE — ED PROVIDER NOTE - SECONDARY DIAGNOSIS.
Edema, unspecified type Back pain, unspecified back location, unspecified back pain laterality, unspecified chronicity

## 2020-10-10 NOTE — H&P ADULT - ASSESSMENT
This is an 87F with PMHx of lung adenocarcinoma s/p resection/XRT, severe MR, HFpEF, and recent admission 8/2020 for fall and R leg weakness who now presents for several days of pleuritic chest pain found to have R sided pleural effusion. This is an 87F with PMHx of lung adenocarcinoma s/p resection/XRT, severe MR, and recent admission 8/2020 for fall and R leg weakness who now presents for several days of pleuritic chest pain found to have R sided pleural effusion.

## 2020-10-10 NOTE — H&P ADULT - NSHPREVIEWOFSYSTEMS_GEN_ALL_CORE
Gen: no weight loss, no fevers/chills  HEENT: no headaches, syncope, no changes in hearing, no dysphagia  CV: +pleuritic chest pain, no palpitations  Pulm: no shortness of breath, no cough  GI: No abdominal pain, no diarrhea, no blood in stool  : No dysuria, no frequency  Neuro: No numbness/tingling, no headaches  MSK: no muscle pain  Heme: o easy bruising  Psych: no history of psych disorders, no depression

## 2020-10-10 NOTE — ED PROVIDER NOTE - SHIFT CHANGE DETAILS
Received SO from Dr. Davila, pending labs, CTA chest to r/o PE, duplex LE, add lasix, rpt bp slightly improved. Pt otherwise resting comfortably in NAD. Anticipate admission

## 2020-10-10 NOTE — H&P ADULT - NSHPLABSRESULTS_GEN_ALL_CORE
.  LABS:                         13.6   12.36 )-----------( 289      ( 10 Oct 2020 06:26 )             43.0     10-10    148<H>  |  107  |  15  ----------------------------<  122<H>  4.1   |  30  |  0.76    Ca    9.3      10 Oct 2020 07:00  Mg     1.9     10-10    TPro  6.5  /  Alb  3.7  /  TBili  0.4  /  DBili  x   /  AST  41<H>  /  ALT  39  /  AlkPhos  187<H>  10-10    PT/INR - ( 10 Oct 2020 06:26 )   PT: 11.3 sec;   INR: 0.94          PTT - ( 10 Oct 2020 06:26 )  PTT:25.4 sec    CARDIAC MARKERS ( 10 Oct 2020 06:28 )  x     / <0.01 ng/mL / x     / x     / x          Serum Pro-Brain Natriuretic Peptide: 608 pg/mL (10-10 @ 06:28)    Lactate, Blood: 1.4 mmol/L (10-10 @ 06:26)      RADIOLOGY, EKG & ADDITIONAL TESTS: Reviewed.   < from: CT Angio Chest PE Protocol w/ IV Cont (10.10.20 @ 11:02) >      IMPRESSION:    No pulmonary embolism.    Large right pleural effusion, increased since prior study, possibly malignant in nature.    Increased consolidation and volume loss in the right middle lobe.    Persistent consolidations in the right lung apex, likely neoplastic in nature.    < end of copied text >

## 2020-10-10 NOTE — ED PROVIDER NOTE - DATE/TIME 1
Tried to call patients daughter back ~ advised that we have put her mother in to see Dr. Kim for 12:00 tomorrow.      Advised to call if that does not work for them.     10-Oct-2020 06:41

## 2020-10-10 NOTE — H&P ADULT - PROBLEM SELECTOR PLAN 7
F: Avoid IVF  E: Replete PRN  N: Regular  Code full  DVT prophy lovenox (holding for thora, start 10/11)  D RMF

## 2020-10-10 NOTE — H&P ADULT - PROBLEM SELECTOR PLAN 5
- Elevated WBC may be reactive in setting of pleural effusion. No other s/s infection. No URI symptoms, COVID negative, no UTI symptoms, UA negative, CXR with pleural effusion, CT chest without evidence of consolidation, no abdominal pain.  - Monitor  - Expect slight rise over upcoming few days as patient received 2x doses of steroids in ED today - Alk phos elevated, new since 8/2020  - Also with elevated AST  - GGT elevated suggestive of hepatic or biliary cause  - F/u RUQ US - Alk phos elevated, new since 8/2020  - Also with elevated AST  - GGT elevated suggestive of hepatic or biliary cause  - F/u RUQ US  - Hold home statin in setting of transaminitis and alk phos elevation

## 2020-10-10 NOTE — ED PROVIDER NOTE - PROGRESS NOTE DETAILS
iv contrast part of pt's allergy list - pt states she doesn't know if she's allergic.  per prior admission pt able to receive CT PE protocol.  will pretreat pt with solu-medrol and benadryl US negative for DVT. CTA chest negative for PE. Discussed with pulm regarding pleural effusion. Plan for F admission.

## 2020-10-10 NOTE — H&P ADULT - PROBLEM SELECTOR PLAN 1
- Patient presenting with pleuritic chest pain found to have R sided large effusion on CXR and on CT chest. CT PE negative for PE. LE dopplers negative for DVT.   - Pulm following, for thoracentesis in AM 10/11  - F/u fluid analysis from pleural effusion. May be in setting of underlying severe mitral regurgitation and fluid overload vs underlying history of malignancy as below, on labs there is no evidence of acute infectious process to explain the effusion - Patient presenting with pleuritic chest pain found to have R sided large effusion on CXR and on CT chest. CT PE negative for PE. LE dopplers negative for DVT.   - Pulm following, for thoracentesis in AM 10/11  - F/u fluid analysis from pleural effusion. May be in setting of underlying history of malignancy as below vs underlying severe mitral regurgitation and fluid overload (this is less likely as patient has unilateral effusion), on labs there is no evidence of acute infectious process to explain the effusion

## 2020-10-10 NOTE — ED ADULT NURSE NOTE - NSIMPLEMENTINTERV_GEN_ALL_ED
Implemented All Universal Safety Interventions:  [place] to call system. Call bell, personal items and telephone within reach. Instruct patient to call for assistance. Room bathroom lighting operational. Non-slip footwear when patient is off stretcher. Physically safe environment: no spills, clutter or unnecessary equipment. Stretcher in lowest position, wheels locked, appropriate side rails in place.

## 2020-10-10 NOTE — CONSULT NOTE ADULT - ATTENDING COMMENTS
88 yo nonsmoker frail female with history of multiple lung ca admitted with dyspnea and LE edema.  Initial diagnosis of early stage lung ca, resected.  New lung adeno diagnosed in 2014, treated with SBRT. In 2017 another lesion was found in the RML on imaging, no biopsy, though to be new  primary ca, empirically treated with radiation. In 2019 right apical opacity  positive for another primary lung adenocarcinoma in RUL (NGS sent: all mutations negative, PDL1<1%) lymph nodes negative. Patient went post biopsy  procedure into pulmonary edema (valvular disease, HFpEF) and was intubated in MICU followed by hospitalization for HAP. RUL lesion was treated with SBRT 3000 cGy out of a planned 5000 cGy, did not complete course because of another pna.   PET CT 7/30/20 revealed 2 new FDG-avid sub-centimeter nodules in left sub-pleural region and large right pleural effusion, which was Ly predominant but negative for malignancy (   Patient did nt   I think she has metastatic disease. She is not very into doing another biopsy of those small nodule in her left chest. Would you consider to treat her as metastatic disease? I don’t think she will be able to handle chemo, there seems to be no targetable mutation. Although PDL1is <1%, would you still consider pembrolizumab? Do you have to have biopsy confirmed metastatic disease? 86 yo nonsmoker frail female with history of multiple lung ca admitted with dyspnea and LE edema.  In 2013 had initial diagnosis of early stage lung ca, resected.  New lung adeno diagnosed in 2014, treated with SBRT. In 2017 another lesion was found in the RML on imaging, no biopsy, though to be new  primary ca, empirically treated with radiation. In 2019 right apical opacity  positive for another primary lung adenocarcinoma in RUL (NGS sent: all mutations negative, PDL1<1%) lymph nodes negative by bronchoscopy. Patient went post biopsy into pulmonary edema (valvular disease, HFpEF) and was intubated in MICU followed by hospitalization for HAP. RUL lesion was treated with SBRT 3000 cGy out of a planned 5000 cGy, did not complete course because of another pna.   PET CT 7/30/20 revealed 2 new FDG-avid sub-centimeter nodules in left sub-pleural region and large right pleural effusion, which was Ly predominant but negative for malignancy ( 8/11/20)  Patient did not reaccumulate until now. Since August she has been seen few time for follow up, had onc visit for consideration of systemic treatment, liquid biopsy results pending. Had urinary retention due to constipation and untreated fluid retention (LE edema).    She reports worsening dyspnea on exertion and persistent LE edema. On exam she is cachectic with no subcutaneous fat. Right pleff is likely MPE. One time drainage versus  IPC/pleuroscopy pleurodesis discussed with patient. Would proceed with thoracentesis to relief dyspnea. I am not sure if her lung is fully expandable as she has not been treated since disease progression. Based on ability of her lung to expand, speed of re-accumulation will decide on next step. Will d/w radiology chest CT, she report pain on left side, looking for possibility of ca bone involvement.

## 2020-10-10 NOTE — H&P ADULT - PROBLEM SELECTOR PLAN 4
- Suspected in setting of dehydration as patient's mucus membranes appear dry on exam. However, as patient with MR, will not give fluids and will encourage PO intake to correct this  - Monitor BUN/Cr and Na

## 2020-10-10 NOTE — H&P ADULT - PROBLEM SELECTOR PLAN 2
- Patient has severe mitral regurg, on echo 8/2020 with evidence of MR. No evidence of heart failure on echo  - Avoid IV fluids, will encourage PO hydration for hypernatremia as below  - Lasix 10IV in ED, will give an additional 20mg IV now to avoid overload  - Strict I/O - Patient has severe mitral regurg, on echo 8/2020 with evidence of MR. No evidence of heart failure on echo  - Avoid IV fluids, will encourage PO hydration for hypernatremia as below  - Lasix 10IV in ED, will give an additional 20mg IV now to avoid overload  - Strict I/O  - Can evaluate for need for PRN lasix

## 2020-10-10 NOTE — H&P ADULT - ATTENDING COMMENTS
Patient seen and examined at bedside. Agree with the history, physical exam, assessment, and plan as outlined above with the following addendum. Briefly patient is a   86 yo nonsmoker frail, TIA, severe MR, with history of multiple lung ca admitted with pleuritic chest pain, dyspnea and LE edema. CT showed pleural effusion    Pleural effusion- high suspicion for malignancy in setting of lung cancer. PE ruled out. Not likely to be infectious- wbc elevated, no fevers  - Pulm following, for thoracentesis in AM 10/11. F/u fluid analysis from pleural effusion    Lung cancer  s/p VATS resection in 2013, ANANYA XRT in 2016, and RMF lesion s/p XRT in 2017  - PET showing 2 new FDG-avid subcentimeter nodules in L subpleural region  Follow up fluid cytology from [facility], appreciate pulm recs    Mitral Regurgitation  Given new onsent of lower ext edema, will repeat echo, also check dopplers  Will use caution and keep patient on dryer side- Received IV Lasix as recommended by pulm     Hypernatremia  -check urine Na and osm. Possible intra vascular depletion in setting of third spacing    Elevated alk phos  GGT elevated, check Abd US    H/o TIA  C/w aspirin

## 2020-10-10 NOTE — H&P ADULT - PROBLEM SELECTOR PLAN 3
- History of adenocarcinoma of RLL s/p VATS resection in 2013, ANANYA XRT in 2016, and RMF lesion s/p XRT in 2017  - Most recent diagnostic thoracentesis 8/2020  - PET showing 2 new FDG-avid subcentimeter nodules in L subpleural region  - F/u pulm recs  - F/u thoracentesis fluid cytology

## 2020-10-10 NOTE — CONSULT NOTE ADULT - SUBJECTIVE AND OBJECTIVE BOX
Provider at bedside       Jakob Waller RN  08/30/20 6610 ICU CONSULT NOTE    CHIEF COMPLAINT: Patient is a 87y old  Female who presents with a chief complaint of Pleuritic chest pain (10 Oct 2020 14:15)      HPI:  This is an 87F, nonsmoker, with PMHx of severe MR, and recent admission 2020 for fall and R leg weakness and multiple lung Ca who now presents with worsening BROWN and LE edema. In  she had initial diagnosis of early stage lung ca, resected.  New lung adeno diagnosed in , treated with SBRT. In  another lesion was found in the RML on imaging, no biopsy, though to be new  primary ca, empirically treated with radiation. In 2019 right apical opacity  positive for another primary lung adenocarcinoma in RUL (NGS sent: all mutations negative, PDL1<1%) lymph nodes negative by bronchoscopy. Patient went post biopsy into pulmonary edema (valvular disease, HFpEF) and was intubated in MICU followed by hospitalization for HAP. RUL lesion was treated with SBRT 3000 cGy out of a planned 5000 cGy, did not complete course because of another pna. PET CT 20 revealed 2 new FDG-avid sub-centimeter nodules in left sub-pleural region and large right pleural effusion, which was Ly predominant but negative for malignancy ( 20). Patient now complaining of progressively worsening BROWN and LE edema w/ reduction in exertional tolerance. Pulmonary consulted for SOB and worsening right sided pleural effusion.   In ED: T97, HR 98, /98, RR 18 sating 100% on 3L NC and 98% on RA. Patient received 10mg IVP lasix, tylenol 650mg PO, benadryl 25mg, solumedrol 40mg x2. LE dopplers negative for clots. CT PE negative for PE.  (10 Oct 2020 12:56)      PAST MEDICAL & SURGICAL HISTORY:  Malignant neoplasm of bronchus and lung, unspecified site  Lung cancer    History of surgery to major organs, presenting hazards to health  removal of R-sided adenocarcinoma, negative lymphnodes        REVIEW OF SYSTEMS: negative except as stated in HPI.    MEDICATIONS  (STANDING):    MEDICATIONS  (PRN):      Allergies    Bactrim (Unknown)  Cleocin HCl (Unknown)  Flagyl (Unknown)  Honey (Unknown)  iodine (Anaphylaxis)  IV Contrast (Anaphylaxis)  Levaquin (Other; Rash)  penicillin (Unknown)  Zithromax (Unknown)    Intolerances    FAMILY HISTORY:  No pertinent family history  No significant family history    SOCIAL HISTORY:   Never smoker, no alcohol or recreational drug use.     Vital Signs Last 24 Hrs  T(C): 36.7 (10 Oct 2020 21:27), Max: 36.7 (10 Oct 2020 12:56)  T(F): 98.1 (10 Oct 2020 21:27), Max: 98.1 (10 Oct 2020 21:27)  HR: 100 (10 Oct 2020 21:27) (78 - 100)  BP: 126/73 (10 Oct 2020 21:27) (126/73 - 181/98)  BP(mean): --  RR: 18 (10 Oct 2020 21:27) (16 - 18)  SpO2: 99% (10 Oct 2020 21:) (96% - 100%)    PHYSICAL EXAM:  GEN: alert, NAD, comfortable in bed  HEENT: PERRL, moist MM, no pharyngeal erythema or exudate  CV: RRR, S1/S2, 3/6 systolic murmur, no JVD  RESP: Decreased breath sounds over right lung field, no wheezes/rales  ABD: soft, BS+, nontender, nondistended, no guarding/rebound  EXTREMITIES: WWP, 2+ pitting edema in b/l LE   SKIN: warm, dry, intact, no rashes  NEURO: A&Ox3, no focal deficits  PSYC: normal mood/affect    LABS: reviewed.    CAPILLARY BLOOD GLUCOSE                        13.6   12.36 )-----------( 289      ( 10 Oct 2020 06:26 )             43.0     10-10    148<H>  |  107  |  15  ----------------------------<  122<H>  4.1   |  30  |  0.76    Ca    9.3      10 Oct 2020 07:00  Mg     1.9     10-10    TPro  6.5  /  Alb  3.7  /  TBili  0.4  /  DBili  x   /  AST  41<H>  /  ALT  39  /  AlkPhos  187<H>  10-10  PT/INR - ( 10 Oct 2020 06:26 )   PT: 11.3 sec;   INR: 0.94     PTT - ( 10 Oct 2020 06:26 )  PTT:25.4 sec  LIVER FUNCTIONS - ( 10 Oct 2020 07:00 )  Alb: 3.7 g/dL / Pro: 6.5 g/dL / ALK PHOS: 187 U/L / ALT: 39 U/L / AST: 41 U/L / GGT: 113 U/L       Urinalysis Basic - ( 10 Oct 2020 18:22 )  Color: Yellow / Appearance: Clear / S.015 / pH: x  Gluc: x / Ketone: NEGATIVE  / Bili: Negative / Urobili: 0.2 E.U./dL   Blood: x / Protein: NEGATIVE mg/dL / Nitrite: NEGATIVE   Leuk Esterase: NEGATIVE / RBC: x / WBC x   Sq Epi: x / Non Sq Epi: x / Bacteria: x      CARDIAC MARKERS ( 10 Oct 2020 06:28 )  x     / <0.01 ng/mL / x     / x     / x        RADIOLOGY AND ADDITIONAL TESTS: reviewed.  < from: CT Angio Chest PE Protocol w/ IV Cont (10.10.20 @ 11:02) >  IMPRESSION:    No pulmonary embolism.    Large right pleural effusion, increased since prior study, possibly malignant in nature.    Increased consolidation and volume loss in the right middle lobe.    Persistent consolidations in the right lung apex, likely neoplastic in nature.    < end of copied text >

## 2020-10-11 LAB
ALBUMIN FLD-MCNC: 2.6 G/DL — SIGNIFICANT CHANGE UP
ALBUMIN SERPL ELPH-MCNC: 3.4 G/DL — SIGNIFICANT CHANGE UP (ref 3.3–5)
ALP SERPL-CCNC: 164 U/L — HIGH (ref 40–120)
ALT FLD-CCNC: 28 U/L — SIGNIFICANT CHANGE UP (ref 10–45)
ANION GAP SERPL CALC-SCNC: 8 MMOL/L — SIGNIFICANT CHANGE UP (ref 5–17)
ANISOCYTOSIS BLD QL: SLIGHT — SIGNIFICANT CHANGE UP
APTT BLD: 31.6 SEC — SIGNIFICANT CHANGE UP (ref 27.5–35.5)
AST SERPL-CCNC: 27 U/L — SIGNIFICANT CHANGE UP (ref 10–40)
B PERT IGG+IGM PNL SER: SIGNIFICANT CHANGE UP
BASOPHILS # BLD AUTO: 0.11 K/UL — SIGNIFICANT CHANGE UP (ref 0–0.2)
BASOPHILS NFR BLD AUTO: 0.9 % — SIGNIFICANT CHANGE UP (ref 0–2)
BILIRUB SERPL-MCNC: 0.3 MG/DL — SIGNIFICANT CHANGE UP (ref 0.2–1.2)
BUN SERPL-MCNC: 20 MG/DL — SIGNIFICANT CHANGE UP (ref 7–23)
CALCIUM SERPL-MCNC: 8.6 MG/DL — SIGNIFICANT CHANGE UP (ref 8.4–10.5)
CHLORIDE SERPL-SCNC: 104 MMOL/L — SIGNIFICANT CHANGE UP (ref 96–108)
CO2 SERPL-SCNC: 34 MMOL/L — HIGH (ref 22–31)
COLOR FLD: YELLOW — SIGNIFICANT CHANGE UP
COMMENT - FLUIDS: SIGNIFICANT CHANGE UP
CREAT FLD-MCNC: 0.85 MG/DL — SIGNIFICANT CHANGE UP
CREAT SERPL-MCNC: 0.83 MG/DL — SIGNIFICANT CHANGE UP (ref 0.5–1.3)
EOSINOPHIL # BLD AUTO: 0.11 K/UL — SIGNIFICANT CHANGE UP (ref 0–0.5)
EOSINOPHIL NFR BLD AUTO: 0.9 % — SIGNIFICANT CHANGE UP (ref 0–6)
FLUID INTAKE SUBSTANCE CLASS: SIGNIFICANT CHANGE UP
FLUID SEGMENTED GRANULOCYTES: 7 % — SIGNIFICANT CHANGE UP
GIANT PLATELETS BLD QL SMEAR: PRESENT — SIGNIFICANT CHANGE UP
GLUCOSE FLD-MCNC: 107 MG/DL — SIGNIFICANT CHANGE UP
GLUCOSE SERPL-MCNC: 91 MG/DL — SIGNIFICANT CHANGE UP (ref 70–99)
GRAM STN FLD: SIGNIFICANT CHANGE UP
HCT VFR BLD CALC: 42.3 % — SIGNIFICANT CHANGE UP (ref 34.5–45)
HGB BLD-MCNC: 13.3 G/DL — SIGNIFICANT CHANGE UP (ref 11.5–15.5)
INR BLD: 0.94 — SIGNIFICANT CHANGE UP (ref 0.88–1.16)
LDH SERPL L TO P-CCNC: 268 U/L — SIGNIFICANT CHANGE UP
LDH SERPL L TO P-CCNC: 360 U/L — HIGH (ref 50–242)
LYMPHOCYTES # BLD AUTO: 1.27 K/UL — SIGNIFICANT CHANGE UP (ref 1–3.3)
LYMPHOCYTES # BLD AUTO: 10.6 % — LOW (ref 13–44)
LYMPHOCYTES # FLD: 3 % — SIGNIFICANT CHANGE UP
MACROCYTES BLD QL: SLIGHT — SIGNIFICANT CHANGE UP
MAGNESIUM SERPL-MCNC: 2 MG/DL — SIGNIFICANT CHANGE UP (ref 1.6–2.6)
MANUAL SMEAR VERIFICATION: SIGNIFICANT CHANGE UP
MCHC RBC-ENTMCNC: 31.4 GM/DL — LOW (ref 32–36)
MCHC RBC-ENTMCNC: 32.2 PG — SIGNIFICANT CHANGE UP (ref 27–34)
MCV RBC AUTO: 102.4 FL — HIGH (ref 80–100)
MESOTHL CELL # FLD: 43 % — SIGNIFICANT CHANGE UP
MONOCYTES # BLD AUTO: 1.16 K/UL — HIGH (ref 0–0.9)
MONOCYTES NFR BLD AUTO: 9.7 % — SIGNIFICANT CHANGE UP (ref 2–14)
MONOS+MACROS # FLD: 47 % — SIGNIFICANT CHANGE UP
NEUTROPHILS # BLD AUTO: 9.12 K/UL — HIGH (ref 1.8–7.4)
NEUTROPHILS NFR BLD AUTO: 75.2 % — SIGNIFICANT CHANGE UP (ref 43–77)
NEUTS BAND # BLD: 0.9 % — SIGNIFICANT CHANGE UP (ref 0–8)
OVALOCYTES BLD QL SMEAR: SLIGHT — SIGNIFICANT CHANGE UP
PH, PLEURAL FLUID: 7.42 — SIGNIFICANT CHANGE UP
PHOSPHATE SERPL-MCNC: 3.6 MG/DL — SIGNIFICANT CHANGE UP (ref 2.5–4.5)
PLAT MORPH BLD: NORMAL — SIGNIFICANT CHANGE UP
PLATELET # BLD AUTO: 258 K/UL — SIGNIFICANT CHANGE UP (ref 150–400)
POTASSIUM SERPL-MCNC: 4.3 MMOL/L — SIGNIFICANT CHANGE UP (ref 3.5–5.3)
POTASSIUM SERPL-SCNC: 4.3 MMOL/L — SIGNIFICANT CHANGE UP (ref 3.5–5.3)
PROT FLD-MCNC: 3.7 G/DL — SIGNIFICANT CHANGE UP
PROT SERPL-MCNC: 6.3 G/DL — SIGNIFICANT CHANGE UP (ref 6–8.3)
PROTHROM AB SERPL-ACNC: 11.3 SEC — SIGNIFICANT CHANGE UP (ref 10.6–13.6)
RBC # BLD: 4.13 M/UL — SIGNIFICANT CHANGE UP (ref 3.8–5.2)
RBC # FLD: 16.9 % — HIGH (ref 10.3–14.5)
RBC BLD AUTO: ABNORMAL
RCV VOL RI: 0 /UL — SIGNIFICANT CHANGE UP (ref 0–0)
SODIUM SERPL-SCNC: 146 MMOL/L — HIGH (ref 135–145)
SPECIMEN SOURCE FLD: SIGNIFICANT CHANGE UP
SPECIMEN SOURCE: SIGNIFICANT CHANGE UP
TOTAL NUCLEATED CELL COUNT, BODY FLUID: 205 /UL — SIGNIFICANT CHANGE UP
TUBE TYPE: SIGNIFICANT CHANGE UP
VARIANT LYMPHS # BLD: 1.8 % — SIGNIFICANT CHANGE UP (ref 0–6)
WBC # BLD: 11.98 K/UL — HIGH (ref 3.8–10.5)
WBC # FLD AUTO: 11.98 K/UL — HIGH (ref 3.8–10.5)

## 2020-10-11 PROCEDURE — 99232 SBSQ HOSP IP/OBS MODERATE 35: CPT | Mod: GC,25

## 2020-10-11 PROCEDURE — 32555 ASPIRATE PLEURA W/ IMAGING: CPT | Mod: GC

## 2020-10-11 PROCEDURE — 99233 SBSQ HOSP IP/OBS HIGH 50: CPT | Mod: GC

## 2020-10-11 RX ORDER — FUROSEMIDE 40 MG
40 TABLET ORAL DAILY
Refills: 0 | Status: DISCONTINUED | OUTPATIENT
Start: 2020-10-11 | End: 2020-10-12

## 2020-10-11 RX ORDER — ACETAMINOPHEN 500 MG
650 TABLET ORAL EVERY 6 HOURS
Refills: 0 | Status: DISCONTINUED | OUTPATIENT
Start: 2020-10-11 | End: 2020-10-14

## 2020-10-11 RX ORDER — ENOXAPARIN SODIUM 100 MG/ML
40 INJECTION SUBCUTANEOUS EVERY 12 HOURS
Refills: 0 | Status: DISCONTINUED | OUTPATIENT
Start: 2020-10-11 | End: 2020-10-11

## 2020-10-11 RX ORDER — ENOXAPARIN SODIUM 100 MG/ML
30 INJECTION SUBCUTANEOUS DAILY
Refills: 0 | Status: DISCONTINUED | OUTPATIENT
Start: 2020-10-11 | End: 2020-10-14

## 2020-10-11 RX ADMIN — ENOXAPARIN SODIUM 30 MILLIGRAM(S): 100 INJECTION SUBCUTANEOUS at 18:12

## 2020-10-11 RX ADMIN — Medication 40 MILLIGRAM(S): at 12:51

## 2020-10-11 RX ADMIN — Medication 81 MILLIGRAM(S): at 12:51

## 2020-10-11 NOTE — PROGRESS NOTE ADULT - PROBLEM SELECTOR PLAN 2
- Patient has severe mitral regurg, on echo 8/2020 with evidence of MR. No evidence of heart failure on echo  - Avoid IV fluids, will encourage PO hydration  - restarted lasix per pulm

## 2020-10-11 NOTE — PROGRESS NOTE ADULT - SUBJECTIVE AND OBJECTIVE BOX
Pt seen and examined at bedside.      Allergies    Bactrim (Unknown)  Cleocin HCl (Unknown)  Flagyl (Unknown)  Honey (Unknown)  iodine (Anaphylaxis)  IV Contrast (Anaphylaxis)  Levaquin (Other; Rash)  penicillin (Unknown)  Zithromax (Unknown)    Intolerances      MEDICATIONS:  MEDICATIONS  (STANDING):  aspirin enteric coated 81 milliGRAM(s) Oral daily  furosemide    Tablet 40 milliGRAM(s) Oral daily    MEDICATIONS  (PRN):    Vital Signs Last 24 Hrs  T(C): 36.3 (11 Oct 2020 08:56), Max: 36.7 (10 Oct 2020 12:56)  T(F): 97.4 (11 Oct 2020 08:56), Max: 98.1 (10 Oct 2020 21:27)  HR: 83 (11 Oct 2020 08:56) (78 - 100)  BP: 141/73 (11 Oct 2020 08:56) (126/73 - 152/98)  BP(mean): --  RR: 18 (11 Oct 2020 08:56) (16 - 18)  SpO2: 100% (11 Oct 2020 08:56) (96% - 100%)    10-10 @ 07:01  -  10-11 @ 07:00  --------------------------------------------------------  IN: 0 mL / OUT: 550 mL / NET: -550 mL      PHYSICAL EXAM:    General: Well developed; well nourished; in no acute distress, sitting up in bed, getting thoracentesis   HEENT: MMM, conjunctiva and sclera clear  Neck: Supple  CV: RRR. Normal S1/S2  Respiratory: CTAB. No respiratory distress. No intercostal retractions  Gastrointestinal: Soft non-tender non-distended. Normal bowel sounds. No hepatosplenomegaly. No rebound or guarding  Extremities: No clubbing, cyanosis, or edema  Skin: Warm and dry.   Neuro: A&O x 3.  Psych: Normal affect and mood    LABS:                        13.3   11.98 )-----------( 258      ( 11 Oct 2020 06:40 )             42.3     10-11    146<H>  |  104  |  20  ----------------------------<  91  4.3   |  34<H>  |  0.83    Ca    8.6      11 Oct 2020 06:40  Phos  3.6     10-11  Mg     2.0     10    TPro  6.3  /  Alb  3.4  /  TBili  0.3  /  DBili  x   /  AST  27  /  ALT  28  /  AlkPhos  164<H>  10-11    PT/INR - ( 11 Oct 2020 06:40 )   PT: 11.3 sec;   INR: 0.94          PTT - ( 11 Oct 2020 06:40 )  PTT:31.6 sec      Urinalysis Basic - ( 10 Oct 2020 18:22 )    Color: Yellow / Appearance: Clear / S.015 / pH: x  Gluc: x / Ketone: NEGATIVE  / Bili: Negative / Urobili: 0.2 E.U./dL   Blood: x / Protein: NEGATIVE mg/dL / Nitrite: NEGATIVE   Leuk Esterase: NEGATIVE / RBC: x / WBC x   Sq Epi: x / Non Sq Epi: x / Bacteria: x                RADIOLOGY & ADDITIONAL STUDIES: OVERNIGHT: bladder scans but did not straight cath    SUBJECTIVE: Pt seen and examined at bedside.  States her LE swelling is decreased.  States that she continues to have difficulty breathing but that this is unchanged from yesterday.  Feels like she's "suffocating".  States she has pain in her lower chest only while coughing.  Denies dyspnea.  Denies dysuria.  Doesn't have the urge to pee.  Denies fevers.      Allergies    Bactrim (Unknown)  Cleocin HCl (Unknown)  Flagyl (Unknown)  Honey (Unknown)  iodine (Anaphylaxis)  IV Contrast (Anaphylaxis)  Levaquin (Other; Rash)  penicillin (Unknown)  Zithromax (Unknown)    Intolerances      MEDICATIONS:  MEDICATIONS  (STANDING):  aspirin enteric coated 81 milliGRAM(s) Oral daily  furosemide    Tablet 40 milliGRAM(s) Oral daily    MEDICATIONS  (PRN):    Vital Signs Last 24 Hrs  T(C): 36.3 (11 Oct 2020 08:56), Max: 36.7 (10 Oct 2020 12:56)  T(F): 97.4 (11 Oct 2020 08:56), Max: 98.1 (10 Oct 2020 21:27)  HR: 83 (11 Oct 2020 08:56) (78 - 100)  BP: 141/73 (11 Oct 2020 08:56) (126/73 - 152/98)  BP(mean): --  RR: 18 (11 Oct 2020 08:56) (16 - 18)  SpO2: 100% (11 Oct 2020 08:56) (96% - 100%)    10-10 @ 07:01  -  10-11 @ 07:00  --------------------------------------------------------  IN: 0 mL / OUT: 550 mL / NET: -550 mL      PHYSICAL EXAM:    General: Well developed; well nourished; in no acute distress, sitting up in bed, getting thoracentesis   HEENT: dry mouth, conjunctiva and sclera clear, NC in place  Neck: Supple  CV: diastolic murmur prominent over mitral area. Normal S1/S2, no pain to palpation of chest  Respiratory: CTAB. No respiratory distress. No intercostal retractions  Gastrointestinal: Soft non-tender non-distended. Normal bowel sounds. No hepatosplenomegaly. No rebound or guarding  : bladder fullness  Extremities: No clubbing, cyanosis, pitting edema in b/l feet to calves  Skin: Warm and dry.   Neuro: A&O x 3.  Psych: Normal affect and mood    LABS:                        13.3   11.98 )-----------( 258      ( 11 Oct 2020 06:40 )             42.3     10-11    146<H>  |  104  |  20  ----------------------------<  91  4.3   |  34<H>  |  0.83    Ca    8.6      11 Oct 2020 06:40  Phos  3.6     10-11  Mg     2.0     10-11    TPro  6.3  /  Alb  3.4  /  TBili  0.3  /  DBili  x   /  AST  27  /  ALT  28  /  AlkPhos  164<H>  10-11    PT/INR - ( 11 Oct 2020 06:40 )   PT: 11.3 sec;   INR: 0.94          PTT - ( 11 Oct 2020 06:40 )  PTT:31.6 sec      Urinalysis Basic - ( 10 Oct 2020 18:22 )    Color: Yellow / Appearance: Clear / S.015 / pH: x  Gluc: x / Ketone: NEGATIVE  / Bili: Negative / Urobili: 0.2 E.U./dL   Blood: x / Protein: NEGATIVE mg/dL / Nitrite: NEGATIVE   Leuk Esterase: NEGATIVE / RBC: x / WBC x   Sq Epi: x / Non Sq Epi: x / Bacteria: x                RADIOLOGY & ADDITIONAL STUDIES:

## 2020-10-11 NOTE — PROGRESS NOTE ADULT - SUBJECTIVE AND OBJECTIVE BOX
PULMONARY CONSULT SERVICE FOLLOW-UP NOTE    INTERVAL HPI:  Reviewed chart and overnight events; patient seen and examined at bedside.    MEDICATIONS:  Pulmonary:    Antimicrobials:    Anticoagulants:  aspirin enteric coated 81 milliGRAM(s) Oral daily    Cardiac:      Allergies    Bactrim (Unknown)  Cleocin HCl (Unknown)  Flagyl (Unknown)  Honey (Unknown)  iodine (Anaphylaxis)  IV Contrast (Anaphylaxis)  Levaquin (Other; Rash)  penicillin (Unknown)  Zithromax (Unknown)    Intolerances        Vital Signs Last 24 Hrs  T(C): 36.3 (11 Oct 2020 08:56), Max: 36.7 (10 Oct 2020 12:56)  T(F): 97.4 (11 Oct 2020 08:56), Max: 98.1 (10 Oct 2020 21:27)  HR: 83 (11 Oct 2020 08:56) (78 - 100)  BP: 141/73 (11 Oct 2020 08:56) (126/73 - 152/98)  BP(mean): --  RR: 18 (11 Oct 2020 08:56) (16 - 18)  SpO2: 100% (11 Oct 2020 08:56) (96% - 100%)    10-10 @ 07:01  -  10-11 @ 07:00  --------------------------------------------------------  IN: 0 mL / OUT: 550 mL / NET: -550 mL          PHYSICAL EXAM:  Constitutional: WDWN  HEENT: NC/AT; PERRL, anicteric sclera; MMM  Neck: supple  Cardiovascular: +S1/S2, RRR  Respiratory: CTA B/L; no W/R/R  Gastrointestinal: soft, NT/ND  Extremities: WWP; no edema, clubbing or cyanosis  Vascular: 2+ radial pulses B/L  Neurological: awake and alert; LIPSCOMB    LABS:      CBC Full  -  ( 11 Oct 2020 06:40 )  WBC Count : 11.98 K/uL  RBC Count : 4.13 M/uL  Hemoglobin : 13.3 g/dL  Hematocrit : 42.3 %  Platelet Count - Automated : 258 K/uL  Mean Cell Volume : 102.4 fl  Mean Cell Hemoglobin : 32.2 pg  Mean Cell Hemoglobin Concentration : 31.4 gm/dL  Auto Neutrophil # : 9.12 K/uL  Auto Lymphocyte # : 1.27 K/uL  Auto Monocyte # : 1.16 K/uL  Auto Eosinophil # : 0.11 K/uL  Auto Basophil # : 0.11 K/uL  Auto Neutrophil % : 75.2 %  Auto Lymphocyte % : 10.6 %  Auto Monocyte % : 9.7 %  Auto Eosinophil % : 0.9 %  Auto Basophil % : 0.9 %    10-11    146<H>  |  104  |  20  ----------------------------<  91  4.3   |  34<H>  |  0.83    Ca    8.6      11 Oct 2020 06:40  Phos  3.6     10  Mg     2.0     10-11    TPro  6.3  /  Alb  3.4  /  TBili  0.3  /  DBili  x   /  AST  27  /  ALT  28  /  AlkPhos  164<H>  10    PT/INR - ( 11 Oct 2020 06:40 )   PT: 11.3 sec;   INR: 0.94          PTT - ( 11 Oct 2020 06:40 )  PTT:31.6 sec      Urinalysis Basic - ( 10 Oct 2020 18:22 )    Color: Yellow / Appearance: Clear / S.015 / pH: x  Gluc: x / Ketone: NEGATIVE  / Bili: Negative / Urobili: 0.2 E.U./dL   Blood: x / Protein: NEGATIVE mg/dL / Nitrite: NEGATIVE   Leuk Esterase: NEGATIVE / RBC: x / WBC x   Sq Epi: x / Non Sq Epi: x / Bacteria: x                RADIOLOGY & ADDITIONAL STUDIES: PULMONARY CONSULT SERVICE FOLLOW-UP NOTE    INTERVAL HPI:  Reviewed chart and overnight events; patient seen and examined at bedside. Patient continues to complain of SOB and left sided chest pain.    MEDICATIONS:  Pulmonary:    Antimicrobials:    Anticoagulants:  aspirin enteric coated 81 milliGRAM(s) Oral daily    Cardiac:      Allergies    Bactrim (Unknown)  Cleocin HCl (Unknown)  Flagyl (Unknown)  Honey (Unknown)  iodine (Anaphylaxis)  IV Contrast (Anaphylaxis)  Levaquin (Other; Rash)  penicillin (Unknown)  Zithromax (Unknown)    Intolerances    Vital Signs Last 24 Hrs  T(C): 36.3 (11 Oct 2020 08:56), Max: 36.7 (10 Oct 2020 12:56)  T(F): 97.4 (11 Oct 2020 08:56), Max: 98.1 (10 Oct 2020 21:27)  HR: 83 (11 Oct 2020 08:56) (78 - 100)  BP: 141/73 (11 Oct 2020 08:56) (126/73 - 152/98)  BP(mean): --  RR: 18 (11 Oct 2020 08:56) (16 - 18)  SpO2: 100% (11 Oct 2020 08:56) (96% - 100%)    10-10 @ 07:01  -  10-11 @ 07:00  --------------------------------------------------------  IN: 0 mL / OUT: 550 mL / NET: -550 mL    PHYSICAL EXAM:  Constitutional: Cachectic, NAD  HEENT: NC/AT; PERRL, anicteric sclera; MMM  Neck: supple  Cardiovascular: +S1/S2, 3/6 systolic murmur at apex  Respiratory: CTA B/L; no W/R/R  Gastrointestinal: soft, NT/ND  Extremities: WWP; 2+ pitting edema in b/l LE, no clubbing or cyanosis  Vascular: 2+ radial pulses B/L  Neurological: awake and alert; LIPSCOMB    LABS:      CBC Full  -  ( 11 Oct 2020 06:40 )  WBC Count : 11.98 K/uL  RBC Count : 4.13 M/uL  Hemoglobin : 13.3 g/dL  Hematocrit : 42.3 %  Platelet Count - Automated : 258 K/uL  Mean Cell Volume : 102.4 fl  Mean Cell Hemoglobin : 32.2 pg  Mean Cell Hemoglobin Concentration : 31.4 gm/dL  Auto Neutrophil # : 9.12 K/uL  Auto Lymphocyte # : 1.27 K/uL  Auto Monocyte # : 1.16 K/uL  Auto Eosinophil # : 0.11 K/uL  Auto Basophil # : 0.11 K/uL  Auto Neutrophil % : 75.2 %  Auto Lymphocyte % : 10.6 %  Auto Monocyte % : 9.7 %  Auto Eosinophil % : 0.9 %  Auto Basophil % : 0.9 %    10-11    146<H>  |  104  |  20  ----------------------------<  91  4.3   |  34<H>  |  0.83    Ca    8.6      11 Oct 2020 06:40  Phos  3.6     10  Mg     2.0     10-11    TPro  6.3  /  Alb  3.4  /  TBili  0.3  /  DBili  x   /  AST  27  /  ALT  28  /  AlkPhos  164<H>  10-11  PT/INR - ( 11 Oct 2020 06:40 )   PT: 11.3 sec;   INR: 0.94     PTT - ( 11 Oct 2020 06:40 )  PTT:31.6 sec    Urinalysis Basic - ( 10 Oct 2020 18:22 )  Color: Yellow / Appearance: Clear / S.015 / pH: x  Gluc: x / Ketone: NEGATIVE  / Bili: Negative / Urobili: 0.2 E.U./dL   Blood: x / Protein: NEGATIVE mg/dL / Nitrite: NEGATIVE   Leuk Esterase: NEGATIVE / RBC: x / WBC x   Sq Epi: x / Non Sq Epi: x / Bacteria: x    RADIOLOGY & ADDITIONAL STUDIES:

## 2020-10-11 NOTE — PROGRESS NOTE ADULT - ASSESSMENT
This is an 87F with PMHx of lung adenocarcinoma s/p resection/XRT, severe MR, and recent admission 8/2020 for fall and R leg weakness who now presents for several days of pleuritic chest pain found to have R sided pleural effusion.

## 2020-10-11 NOTE — PROGRESS NOTE ADULT - PROBLEM SELECTOR PLAN 1
- Patient presenting with pleuritic chest pain found to have R sided large effusion on CXR and on CT chest. CT PE negative for PE. LE dopplers negative for DVT.   - f/u Pulm recs: restart lasix 40 mg QD  - thoracentesis performed today-1320 cc removed  - F/u fluid analysis from pleural effusion

## 2020-10-11 NOTE — PROCEDURE NOTE - ADDITIONAL PROCEDURE DETAILS
Thoracentesis performed with fluid set to gravity drain with near complete resolution of right pleural effusion. Patient tolerated procedure well w/o complications.

## 2020-10-11 NOTE — PROGRESS NOTE ADULT - ASSESSMENT
***Incomplete Note***    This is an 87F with PMHx of lung adenocarcinoma s/p resection/XRT, severe MR, and recent admission 8/2020 for fall and R leg weakness who now presents for several days of pleuritic chest pain found to have R sided pleural effusion.    #Right sided pleural effusion.   Patient w/ worsening right sided pleural effusion, s/p thoracentesis in OP on 8/11 w/ lymphocyte predominant fluid and negative for malignant cells. Suspect this may be 2/2 malignancy.   -Patient has slow reaccumulation of fluid and has little soft tissue so placement of pleurex will be difficult  -will preform thoracentesis tomorrow and repeat pleural effusion workup and cytology  -please hold AM lovenox    #Atelectasis  Patient p/w SOB likely 2/2 compressive atelectasis from pleural effusion, placed on 3L NC.   -will perform thoracentesis tomorrow    Pulm will continue to follow ***Incomplete Note***    This is an 87F with PMHx of lung adenocarcinoma s/p resection/XRT, severe MR, and recent admission 8/2020 for fall and R leg weakness who now presents for several days of pleuritic chest pain found to have R sided pleural effusion.    #Right sided pleural effusion.   Patient w/ worsening right sided pleural effusion, s/p thoracentesis in OP on 8/11 w/ lymphocyte predominant fluid and negative for malignant cells. Suspect this may be 2/2 malignancy.   -Patient has slow reaccumulation of fluid and has little soft tissue so placement of pleurex will be difficult  -will preform thoracentesis today and repeat pleural effusion workup and cytology    #Left chest pain  Unclear etiology, patent does not have evidence of left sided consolidation or bone metastasis on CT.  -recommend adequate pain control to prevent splinting    #Atelectasis  Patient p/w SOB likely 2/2 compressive atelectasis from pleural effusion, placed on 3L NC.   -will perform thoracentesis, since effusion is acute on chronic it is unclear if the lung is entrapped and may not completely re-expand    Pulm will continue to follow This is an 87F with PMHx of lung adenocarcinoma s/p resection/XRT, severe MR, and recent admission 8/2020 for fall and R leg weakness who now presents for several days of pleuritic chest pain found to have R sided pleural effusion.    #Right sided pleural effusion.   Patient w/ worsening right sided pleural effusion, s/p thoracentesis in OP on 8/11 w/ lymphocyte predominant fluid and negative for malignant cells. Suspect this may be 2/2 malignancy.   -Patient has slow reaccumulation of fluid and has little soft tissue so placement of pleurex will be difficult  -Thoracentesis performed today at bedside w/ removal of 1320cc of yellow fluid. Patient reported immediate symptomatic relief  -Please add on LDH to AM labs  -recommend starting lasix 40mg PO daily for now to help prevent fluid reoccumulation and treat LE edema  -encourage PO water intake as patient has free-water deficit with hypernatremia     #Left chest pain  Unclear etiology, patent does not have evidence of left sided consolidation or bone metastasis on CT.  -recommend tylenol and lidocaine patch for pain control to prevent splinting    #Atelectasis  Patient p/w SOB likely 2/2 compressive atelectasis from pleural effusion, placed on 3L NC.   -will perform thoracentesis, since effusion is acute on chronic it is unclear if the lung is entrapped and may not completely re-expand    Pulm will continue to follow

## 2020-10-11 NOTE — PROCEDURE NOTE - NSPROCDETAILS_GEN_ALL_CORE
Seldinger technique/location identified, draped/prepped, sterile technique used, needle inserted/introduced/connection to syringe/catheter inserted over needle

## 2020-10-12 ENCOUNTER — TRANSCRIPTION ENCOUNTER (OUTPATIENT)
Age: 85
End: 2020-10-12

## 2020-10-12 ENCOUNTER — RESULT REVIEW (OUTPATIENT)
Age: 85
End: 2020-10-12

## 2020-10-12 LAB
ALBUMIN SERPL ELPH-MCNC: 3 G/DL — LOW (ref 3.3–5)
ALP SERPL-CCNC: 134 U/L — HIGH (ref 40–120)
ALT FLD-CCNC: 25 U/L — SIGNIFICANT CHANGE UP (ref 10–45)
ANION GAP SERPL CALC-SCNC: 6 MMOL/L — SIGNIFICANT CHANGE UP (ref 5–17)
AST SERPL-CCNC: 28 U/L — SIGNIFICANT CHANGE UP (ref 10–40)
BASOPHILS # BLD AUTO: 0.05 K/UL — SIGNIFICANT CHANGE UP (ref 0–0.2)
BASOPHILS NFR BLD AUTO: 0.5 % — SIGNIFICANT CHANGE UP (ref 0–2)
BILIRUB SERPL-MCNC: 0.2 MG/DL — SIGNIFICANT CHANGE UP (ref 0.2–1.2)
BUN SERPL-MCNC: 28 MG/DL — HIGH (ref 7–23)
CALCIUM SERPL-MCNC: 8.8 MG/DL — SIGNIFICANT CHANGE UP (ref 8.4–10.5)
CHLORIDE SERPL-SCNC: 99 MMOL/L — SIGNIFICANT CHANGE UP (ref 96–108)
CHOLEST FLD-MCNC: 69 MG/DL — SIGNIFICANT CHANGE UP
CO2 SERPL-SCNC: 40 MMOL/L — HIGH (ref 22–31)
CREAT SERPL-MCNC: 1 MG/DL — SIGNIFICANT CHANGE UP (ref 0.5–1.3)
EOSINOPHIL # BLD AUTO: 0.24 K/UL — SIGNIFICANT CHANGE UP (ref 0–0.5)
EOSINOPHIL NFR BLD AUTO: 2.6 % — SIGNIFICANT CHANGE UP (ref 0–6)
GLUCOSE SERPL-MCNC: 86 MG/DL — SIGNIFICANT CHANGE UP (ref 70–99)
HCT VFR BLD CALC: 38.1 % — SIGNIFICANT CHANGE UP (ref 34.5–45)
HGB BLD-MCNC: 11.8 G/DL — SIGNIFICANT CHANGE UP (ref 11.5–15.5)
IMM GRANULOCYTES NFR BLD AUTO: 0.3 % — SIGNIFICANT CHANGE UP (ref 0–1.5)
LDH SERPL L TO P-CCNC: 238 U/L — SIGNIFICANT CHANGE UP (ref 50–242)
LYMPHOCYTES # BLD AUTO: 0.83 K/UL — LOW (ref 1–3.3)
LYMPHOCYTES # BLD AUTO: 9.1 % — LOW (ref 13–44)
MAGNESIUM SERPL-MCNC: 1.9 MG/DL — SIGNIFICANT CHANGE UP (ref 1.6–2.6)
MCHC RBC-ENTMCNC: 31 GM/DL — LOW (ref 32–36)
MCHC RBC-ENTMCNC: 32.2 PG — SIGNIFICANT CHANGE UP (ref 27–34)
MCV RBC AUTO: 104.1 FL — HIGH (ref 80–100)
MONOCYTES # BLD AUTO: 1.08 K/UL — HIGH (ref 0–0.9)
MONOCYTES NFR BLD AUTO: 11.9 % — SIGNIFICANT CHANGE UP (ref 2–14)
NEUTROPHILS # BLD AUTO: 6.88 K/UL — SIGNIFICANT CHANGE UP (ref 1.8–7.4)
NEUTROPHILS NFR BLD AUTO: 75.6 % — SIGNIFICANT CHANGE UP (ref 43–77)
NRBC # BLD: 0 /100 WBCS — SIGNIFICANT CHANGE UP (ref 0–0)
PHOSPHATE SERPL-MCNC: 2.6 MG/DL — SIGNIFICANT CHANGE UP (ref 2.5–4.5)
PLATELET # BLD AUTO: 243 K/UL — SIGNIFICANT CHANGE UP (ref 150–400)
POTASSIUM SERPL-MCNC: 4.1 MMOL/L — SIGNIFICANT CHANGE UP (ref 3.5–5.3)
POTASSIUM SERPL-SCNC: 4.1 MMOL/L — SIGNIFICANT CHANGE UP (ref 3.5–5.3)
PROT SERPL-MCNC: 5.2 G/DL — LOW (ref 6–8.3)
RBC # BLD: 3.66 M/UL — LOW (ref 3.8–5.2)
RBC # FLD: 16.4 % — HIGH (ref 10.3–14.5)
SODIUM SERPL-SCNC: 145 MMOL/L — SIGNIFICANT CHANGE UP (ref 135–145)
TRIGL FLD-MCNC: 15 MG/DL — SIGNIFICANT CHANGE UP
WBC # BLD: 9.11 K/UL — SIGNIFICANT CHANGE UP (ref 3.8–10.5)
WBC # FLD AUTO: 9.11 K/UL — SIGNIFICANT CHANGE UP (ref 3.8–10.5)

## 2020-10-12 PROCEDURE — 88341 IMHCHEM/IMCYTCHM EA ADD ANTB: CPT | Mod: 26

## 2020-10-12 PROCEDURE — 99233 SBSQ HOSP IP/OBS HIGH 50: CPT | Mod: GC

## 2020-10-12 PROCEDURE — 88342 IMHCHEM/IMCYTCHM 1ST ANTB: CPT | Mod: 26,59

## 2020-10-12 PROCEDURE — 88305 TISSUE EXAM BY PATHOLOGIST: CPT | Mod: 26

## 2020-10-12 PROCEDURE — 88112 CYTOPATH CELL ENHANCE TECH: CPT | Mod: 26

## 2020-10-12 PROCEDURE — 71045 X-RAY EXAM CHEST 1 VIEW: CPT | Mod: 26

## 2020-10-12 PROCEDURE — 88344 IMHCHEM/IMCYTCHM EA MLT ANTB: CPT | Mod: 26

## 2020-10-12 RX ORDER — MAGNESIUM SULFATE 500 MG/ML
1 VIAL (ML) INJECTION ONCE
Refills: 0 | Status: DISCONTINUED | OUTPATIENT
Start: 2020-10-12 | End: 2020-10-14

## 2020-10-12 RX ORDER — ATORVASTATIN CALCIUM 80 MG/1
20 TABLET, FILM COATED ORAL AT BEDTIME
Refills: 0 | Status: DISCONTINUED | OUTPATIENT
Start: 2020-10-12 | End: 2020-10-14

## 2020-10-12 RX ADMIN — Medication 40 MILLIGRAM(S): at 06:47

## 2020-10-12 RX ADMIN — ENOXAPARIN SODIUM 30 MILLIGRAM(S): 100 INJECTION SUBCUTANEOUS at 11:33

## 2020-10-12 RX ADMIN — Medication 81 MILLIGRAM(S): at 11:32

## 2020-10-12 RX ADMIN — ATORVASTATIN CALCIUM 20 MILLIGRAM(S): 80 TABLET, FILM COATED ORAL at 21:17

## 2020-10-12 NOTE — DISCHARGE NOTE PROVIDER - NSDCFUADDAPPT_GEN_ALL_CORE_FT
Please follow up with your Cardiology Provider, Dr. Aleksandar Harrison at [address] [place] [address] on 10/29/2020 at 2:00pm. Please bring insurance card, ID, and discharge paperwork to this appointment. Office of Dr. Aleksandar Harrison is requesting that you avoid high risk COVID areas prior to appointment.    Appointment was scheduled by Ms. GEORGE Ye, Referral Coordinator. Please follow up with your Pulmonary Provider, Dr. Jose G Addison at [address] [facility] [facility], [place] [address] on 12/19/2020 at 12:40pm.    Please follow up with your Cardiology Provider, Dr. Aleksandar Harrison at [address] [place] [address] on 10/29/2020 at 2:00pm. Office of Dr. Aleksandar Harrison is requesting that you avoid high risk COVID areas prior to appointment.     Please bring insurance card, ID, and discharge paperwork to your appointments.    Appointments were scheduled by Ms. GEORGE Ye, Referral Coordinator.

## 2020-10-12 NOTE — PROGRESS NOTE ADULT - SUBJECTIVE AND OBJECTIVE BOX
OVERNIGHT EVENTS: none    SUBJECTIVE / INTERVAL HPI: Patient seen and examined at bedside.     VITAL SIGNS:  Vital Signs Last 24 Hrs  T(C): 36.4 (12 Oct 2020 09:50), Max: 36.6 (11 Oct 2020 16:42)  T(F): 97.5 (12 Oct 2020 09:50), Max: 97.9 (11 Oct 2020 20:51)  HR: 89 (12 Oct 2020 09:50) (84 - 97)  BP: 113/68 (12 Oct 2020 09:50) (110/60 - 117/70)  BP(mean): --  RR: 16 (12 Oct 2020 09:50) (16 - 18)  SpO2: 97% (12 Oct 2020 09:50) (93% - 99%)    PHYSICAL EXAM:    General: WDWN  HEENT: NCAT; PERRL, anicteric sclera; MMM  Neck: supple, trachea midline  Cardiovascular: S1, S2 normal; RRR, no M/G/R  Respiratory: CTABL; no W/R/R  Gastrointestinal: soft, nontender, nondistended. bowel sounds present.  Skin: no ulcerations or visible rashes appreciated  Extremities: WWP; no edema, clubbing or cyanosis  Vascular: 2+ radial, DP/PT pulses B/L  Neurological: AAOx3; CN II-XII grossly intact; no focal deficits    MEDICATIONS:  MEDICATIONS  (STANDING):  aspirin enteric coated 81 milliGRAM(s) Oral daily  atorvastatin 20 milliGRAM(s) Oral at bedtime  enoxaparin Injectable 30 milliGRAM(s) SubCutaneous daily  magnesium sulfate  IVPB 1 Gram(s) IV Intermittent once    MEDICATIONS  (PRN):  acetaminophen   Tablet .. 650 milliGRAM(s) Oral every 6 hours PRN Mild Pain (1 - 3), Moderate Pain (4 - 6)      ALLERGIES:  Allergies    Bactrim (Unknown)  Cleocin HCl (Unknown)  Flagyl (Unknown)  Honey (Unknown)  iodine (Anaphylaxis)  IV Contrast (Anaphylaxis)  Levaquin (Other; Rash)  penicillin (Unknown)  Zithromax (Unknown)    Intolerances        LABS:                        11.8   9.11  )-----------( 243      ( 12 Oct 2020 06:25 )             38.1     10-12    145  |  99  |  28<H>  ----------------------------<  86  4.1   |  40<H>  |  1.00    Ca    8.8      12 Oct 2020 06:25  Phos  2.6     10-12  Mg     1.9     10-12    TPro  5.2<L>  /  Alb  3.0<L>  /  TBili  0.2  /  DBili  x   /  AST  28  /  ALT  25  /  AlkPhos  134<H>  10-12    PT/INR - ( 11 Oct 2020 06:40 )   PT: 11.3 sec;   INR: 0.94          PTT - ( 11 Oct 2020 06:40 )  PTT:31.6 sec  Urinalysis Basic - ( 10 Oct 2020 18:22 )    Color: Yellow / Appearance: Clear / S.015 / pH: x  Gluc: x / Ketone: NEGATIVE  / Bili: Negative / Urobili: 0.2 E.U./dL   Blood: x / Protein: NEGATIVE mg/dL / Nitrite: NEGATIVE   Leuk Esterase: NEGATIVE / RBC: x / WBC x   Sq Epi: x / Non Sq Epi: x / Bacteria: x      CAPILLARY BLOOD GLUCOSE          RADIOLOGY & ADDITIONAL TESTS: Reviewed. OVERNIGHT EVENTS: none.    SUBJECTIVE / INTERVAL HPI: Patient seen and examined at bedside.  Patient complaining of nose bleeding and congestion since last night (none, per nurse).  Wants to remove NC.  Denies sob.  Endorses left chest pain under breast bone/ris when she moves around.  Denies a cough, throat pain, ear pain.  Hasn't had BM but took a laxative yesterday and hoping to move around today.  No urinary complaints at this time.    VITAL SIGNS:  Vital Signs Last 24 Hrs  T(C): 36.4 (12 Oct 2020 09:50), Max: 36.6 (11 Oct 2020 16:42)  T(F): 97.5 (12 Oct 2020 09:50), Max: 97.9 (11 Oct 2020 20:51)  HR: 89 (12 Oct 2020 09:50) (84 - 97)  BP: 113/68 (12 Oct 2020 09:50) (110/60 - 117/70)  BP(mean): --  RR: 16 (12 Oct 2020 09:50) (16 - 18)  SpO2: 97% (12 Oct 2020 09:50) (93% - 99%)    PHYSICAL EXAM:    General: WDWN, NAD  HEENT: MMM, conjunctiva and sclera clear, NC in place, nose with dried blood and crusting inside.  blowing her nose with small amounts of blood on tissue  Neck: Supple  CV: diastolic murmur prominent over mitral area. Normal S1/S2.  Respiratory: CTAB. No respiratory distress. No intercostal retractions  Gastrointestinal: Soft non-tender non-distended. Normal bowel sounds. No hepatosplenomegaly. No rebound or guarding  Genitourinary: + prima fit  Extremities: No clubbing, cyanosis, pitting edema in b/l feet to calves  Skin: Warm and dry.   Neuro: A&O x 3.  Psych: Normal affect and mood    MEDICATIONS:  MEDICATIONS  (STANDING):  aspirin enteric coated 81 milliGRAM(s) Oral daily  atorvastatin 20 milliGRAM(s) Oral at bedtime  enoxaparin Injectable 30 milliGRAM(s) SubCutaneous daily  magnesium sulfate  IVPB 1 Gram(s) IV Intermittent once    MEDICATIONS  (PRN):  acetaminophen   Tablet .. 650 milliGRAM(s) Oral every 6 hours PRN Mild Pain (1 - 3), Moderate Pain (4 - 6)      ALLERGIES:  Allergies    Bactrim (Unknown)  Cleocin HCl (Unknown)  Flagyl (Unknown)  Honey (Unknown)  iodine (Anaphylaxis)  IV Contrast (Anaphylaxis)  Levaquin (Other; Rash)  penicillin (Unknown)  Zithromax (Unknown)    Intolerances        LABS:                        11.8   9.11  )-----------( 243      ( 12 Oct 2020 06:25 )             38.1     10-12    145  |  99  |  28<H>  ----------------------------<  86  4.1   |  40<H>  |  1.00    Ca    8.8      12 Oct 2020 06:25  Phos  2.6     10-12  Mg     1.9     10-12    TPro  5.2<L>  /  Alb  3.0<L>  /  TBili  0.2  /  DBili  x   /  AST  28  /  ALT  25  /  AlkPhos  134<H>  10-12    PT/INR - ( 11 Oct 2020 06:40 )   PT: 11.3 sec;   INR: 0.94          PTT - ( 11 Oct 2020 06:40 )  PTT:31.6 sec  Urinalysis Basic - ( 10 Oct 2020 18:22 )    Color: Yellow / Appearance: Clear / S.015 / pH: x  Gluc: x / Ketone: NEGATIVE  / Bili: Negative / Urobili: 0.2 E.U./dL   Blood: x / Protein: NEGATIVE mg/dL / Nitrite: NEGATIVE   Leuk Esterase: NEGATIVE / RBC: x / WBC x   Sq Epi: x / Non Sq Epi: x / Bacteria: x      CAPILLARY BLOOD GLUCOSE          RADIOLOGY & ADDITIONAL TESTS: Reviewed. OVERNIGHT EVENTS: none.    SUBJECTIVE / INTERVAL HPI: Patient seen and examined at bedside.  Patient complaining of nose bleeding and congestion since last night (none, per nurse).  Wants to remove NC.  Denies sob.  Endorses left chest pain under breast bone/ris when she moves around.  Denies a cough, throat pain, ear pain.  Hasn't had BM but took a laxative yesterday and hoping to move around today.  No urinary complaints at this time.    VITAL SIGNS:  Vital Signs Last 24 Hrs  T(C): 36.4 (12 Oct 2020 09:50), Max: 36.6 (11 Oct 2020 16:42)  T(F): 97.5 (12 Oct 2020 09:50), Max: 97.9 (11 Oct 2020 20:51)  HR: 89 (12 Oct 2020 09:50) (84 - 97)  BP: 113/68 (12 Oct 2020 09:50) (110/60 - 117/70)  BP(mean): --  RR: 16 (12 Oct 2020 09:50) (16 - 18)  SpO2: 97% (12 Oct 2020 09:50) (93% - 99%)    PHYSICAL EXAM:    General: WDWN, NAD  HEENT: MMM, conjunctiva and sclera clear, NC in place, nose with dried blood and crusting inside.  blowing her nose with small amounts of blood on tissue  Neck: Supple  CV: diastolic murmur prominent over mitral area. Normal S1/S2.  Respiratory: CTAB. No respiratory distress. No intercostal retractions  Gastrointestinal: Soft non-tender non-distended. Normal bowel sounds. No hepatosplenomegaly. No rebound or guarding  Genitourinary: + prima fit  Extremities: No clubbing, cyanosis, decreased edema in b/l feet to calves compared to yesterday  Skin: Warm and dry.   Neuro: A&O x 3.  Psych: Normal affect and mood    MEDICATIONS:  MEDICATIONS  (STANDING):  aspirin enteric coated 81 milliGRAM(s) Oral daily  atorvastatin 20 milliGRAM(s) Oral at bedtime  enoxaparin Injectable 30 milliGRAM(s) SubCutaneous daily  magnesium sulfate  IVPB 1 Gram(s) IV Intermittent once    MEDICATIONS  (PRN):  acetaminophen   Tablet .. 650 milliGRAM(s) Oral every 6 hours PRN Mild Pain (1 - 3), Moderate Pain (4 - 6)      ALLERGIES:  Allergies    Bactrim (Unknown)  Cleocin HCl (Unknown)  Flagyl (Unknown)  Honey (Unknown)  iodine (Anaphylaxis)  IV Contrast (Anaphylaxis)  Levaquin (Other; Rash)  penicillin (Unknown)  Zithromax (Unknown)    Intolerances        LABS:                        11.8   9.11  )-----------( 243      ( 12 Oct 2020 06:25 )             38.1     10-12    145  |  99  |  28<H>  ----------------------------<  86  4.1   |  40<H>  |  1.00    Ca    8.8      12 Oct 2020 06:25  Phos  2.6     10-12  Mg     1.9     10-12    TPro  5.2<L>  /  Alb  3.0<L>  /  TBili  0.2  /  DBili  x   /  AST  28  /  ALT  25  /  AlkPhos  134<H>  10-12    PT/INR - ( 11 Oct 2020 06:40 )   PT: 11.3 sec;   INR: 0.94          PTT - ( 11 Oct 2020 06:40 )  PTT:31.6 sec  Urinalysis Basic - ( 10 Oct 2020 18:22 )    Color: Yellow / Appearance: Clear / S.015 / pH: x  Gluc: x / Ketone: NEGATIVE  / Bili: Negative / Urobili: 0.2 E.U./dL   Blood: x / Protein: NEGATIVE mg/dL / Nitrite: NEGATIVE   Leuk Esterase: NEGATIVE / RBC: x / WBC x   Sq Epi: x / Non Sq Epi: x / Bacteria: x      CAPILLARY BLOOD GLUCOSE          RADIOLOGY & ADDITIONAL TESTS: Reviewed.

## 2020-10-12 NOTE — PROGRESS NOTE ADULT - PROBLEM SELECTOR PLAN 1
- Patient presenting with pleuritic chest pain found to have R sided large effusion on CXR and on CT chest. CT PE negative for PE. LE dopplers negative for DVT.   - thoracentesis performed today-1320 cc removed  - fluid analysis from pleural effusion: predominantly mesothelial and monocyte/macrophage

## 2020-10-12 NOTE — PROGRESS NOTE ADULT - SUBJECTIVE AND OBJECTIVE BOX
PULMONARY CONSULT SERVICE FOLLOW-UP NOTE  -------------------------------------------------------------------  INTERVAL HPI:    No acute overnight events.   Pt seen and examined at bedside this PM.     Reports feeling well. No acute complaints. Curious about the thoracentesis results   No fevers/chills, HA, dizziness, CP, SOB, cough, abd pain, N/V, bowel changes, ext swelling     CXR this AM with much improved R-pleural effusion     -------------------------------------------------------------------  MEDICATIONS:    Pulmonary:    Antimicrobials:    Anticoagulants:  aspirin enteric coated 81 milliGRAM(s) Oral daily  enoxaparin Injectable 30 milliGRAM(s) SubCutaneous daily    Cardiac:      Allergies    Bactrim (Unknown)  Cleocin HCl (Unknown)  Flagyl (Unknown)  Honey (Unknown)  iodine (Anaphylaxis)  IV Contrast (Anaphylaxis)  Levaquin (Other; Rash)  penicillin (Unknown)  Zithromax (Unknown)    Intolerances        Vital Signs Last 24 Hrs  T(C): 36.5 (12 Oct 2020 15:58), Max: 36.6 (11 Oct 2020 20:51)  T(F): 97.7 (12 Oct 2020 15:58), Max: 97.9 (11 Oct 2020 20:51)  HR: 91 (12 Oct 2020 15:58) (84 - 97)  BP: 91/51 (12 Oct 2020 15:58) (91/51 - 117/70)  BP(mean): --  RR: 18 (12 Oct 2020 15:58) (16 - 18)  SpO2: 92% (12 Oct 2020 15:58) (92% - 99%)    10-11 @ 07:01  -  10-12 @ 07:00  --------------------------------------------------------  IN: 0 mL / OUT: 600 mL / NET: -600 mL    10-12 @ 07:01  -  10-12 @ 17:08  --------------------------------------------------------  IN: 0 mL / OUT: 450 mL / NET: -450 mL          -------------------------------------------------------------------  PHYSICAL EXAM:    Constitutional: Cachectic, NAD  HEENT: NC/AT; PERRL, anicteric sclera; MMM  Neck: supple  Cardiovascular: +S1/S2, 3/6 systolic murmur at apex  Respiratory: CTA B/L; no W/R/R  Gastrointestinal: soft, NT/ND  Extremities: WWP; 2+ pitting edema in b/l LE, no clubbing or cyanosis  Vascular: 2+ radial pulses B/L  Neurological: awake and alert; LIPSCOMB    -------------------------------------------------------------------  LABS:        CBC Full  -  ( 12 Oct 2020 06:25 )  WBC Count : 9.11 K/uL  RBC Count : 3.66 M/uL  Hemoglobin : 11.8 g/dL  Hematocrit : 38.1 %  Platelet Count - Automated : 243 K/uL  Mean Cell Volume : 104.1 fl  Mean Cell Hemoglobin : 32.2 pg  Mean Cell Hemoglobin Concentration : 31.0 gm/dL  Auto Neutrophil # : 6.88 K/uL  Auto Lymphocyte # : 0.83 K/uL  Auto Monocyte # : 1.08 K/uL  Auto Eosinophil # : 0.24 K/uL  Auto Basophil # : 0.05 K/uL  Auto Neutrophil % : 75.6 %  Auto Lymphocyte % : 9.1 %  Auto Monocyte % : 11.9 %  Auto Eosinophil % : 2.6 %  Auto Basophil % : 0.5 %    10-12    145  |  99  |  28<H>  ----------------------------<  86  4.1   |  40<H>  |  1.00    Ca    8.8      12 Oct 2020 06:25  Phos  2.6     10-12  Mg     1.9     10-12    TPro  5.2<L>  /  Alb  3.0<L>  /  TBili  0.2  /  DBili  x   /  AST  28  /  ALT  25  /  AlkPhos  134<H>  10-12    PT/INR - ( 11 Oct 2020 06:40 )   PT: 11.3 sec;   INR: 0.94          PTT - ( 11 Oct 2020 06:40 )  PTT:31.6 sec      Urinalysis Basic - ( 10 Oct 2020 18:22 )    Color: Yellow / Appearance: Clear / S.015 / pH: x  Gluc: x / Ketone: NEGATIVE  / Bili: Negative / Urobili: 0.2 E.U./dL   Blood: x / Protein: NEGATIVE mg/dL / Nitrite: NEGATIVE   Leuk Esterase: NEGATIVE / RBC: x / WBC x   Sq Epi: x / Non Sq Epi: x / Bacteria: x                -------------------------------------------------------------------  RADIOLOGY & ADDITIONAL STUDIES:

## 2020-10-12 NOTE — PROVIDER CONTACT NOTE (CRITICAL VALUE NOTIFICATION) - TEST AND RESULT REPORTED:
The pleura fluid sent yesterday that was marked suspicious for malignancy is being changed to atypical cells. Being changed on comment section by lab.

## 2020-10-12 NOTE — DISCHARGE NOTE PROVIDER - CARE PROVIDER_API CALL
Jose G Addison  CRITICAL CARE MEDICINE  [address] [place] [address]  Phone: [phone]  Fax: [phone]  Follow Up Time: 1 week   Jose G Addison  CRITICAL CARE MEDICINE  [address] [place] [address]  Phone: [phone]  Fax: [phone]  Follow Up Time: 1 week    Aleksandar Harrison)  Cardiovascular Disease; Internal Medicine  Cardiology [facility], [address] [place] [address]  Phone: [phone]  Fax: [phone]  Follow Up Time: 1 week   Jose G Addison  CRITICAL CARE MEDICINE  [address] [place] [address]  Phone: [phone]  Fax: [phone]  Follow Up Time: 1 week    Aleksandar Harrison)  Cardiovascular Disease; Internal Medicine  Cardiology [facility], [address] [place] [address]  Phone: [phone]  Fax: [phone]  Scheduled Appointment: 10/29/2020 02:00 PM   Jose G Addison  CRITICAL CARE MEDICINE  [address] [place] [address]  Phone: [phone]  Fax: [phone]  Scheduled Appointment: 10/19/2020 12:40 PM    Aleksandar Harrison)  Cardiovascular Disease; Internal Medicine  Cardiology [facility], [address] [place] [address]  Phone: [phone]  Fax: [phone]  Scheduled Appointment: 10/29/2020 02:00 PM

## 2020-10-12 NOTE — DISCHARGE NOTE PROVIDER - NSDCFUSCHEDAPPT_GEN_ALL_CORE_FT
LAZARO GRANT ; 10/14/2020 ; NPP Med GenInt [address]  LAZARO GRANT ; 10/22/2020 ; NPP Intmed [address] LAZARO Jaquez ; 01/06/2021 ; NPP Urology [address] LAZARO GRANT ; 10/14/2020 ; NPP Med GenInt [address]   LAZARO GRANT ; 10/22/2020 ; NPP Intmed [address] LAZARO Ball ; 10/29/2020 ; NPP HeartVasc [address]  LAZARO GRANT ; 01/06/2021 ; NPP Urology [address] LAZARO GRANT ; 10/14/2020 ; NPP Med GenInt [address]   LAZARO GRANT ; 10/19/2020 ; NPP PulmMed [address]  LAZARO GRANT ; 10/22/2020 ; NPP Intmed [address] LAZARO Ball ; 10/29/2020 ; NPP HeartVasc [address]  LAZARO GRANT ; 01/06/2021 ; NPP Urology [address] LAZARO GRANT ; 10/14/2020 ; NPP Med GenInt [address]   LAZARO GRANT ; 10/19/2020 ; NPP PulmMed [address]  LAZARO GRANT ; 10/22/2020 ; NPP Intmed [address] LAZARO Ball ; 10/23/2020 ; NPP HeartVasc [address]  LAZARO GRANT ; 01/06/2021 ; NPP Urology [address]

## 2020-10-12 NOTE — DISCHARGE NOTE PROVIDER - HOSPITAL COURSE
#Discharge: do not delete    This is an 87F with PMHx of lung adenocarcinoma s/p resection/XRT, severe MR, and recent admission 8/2020 for fall and R leg weakness who now presents for several days of pleuritic chest pain found to have R sided pleural effusion s/p thoracentesis suspicious for malignancy.    Problem List/Main Diagnoses (system-based):   Problem/Plan - 1:  ·  Problem: Pleural effusion on right.  Plan: - Patient presenting with pleuritic chest pain found to have R sided large effusion on CXR and on CT chest. CT PE negative for PE. LE dopplers negative for DVT.   - thoracentesis performed 10/ cc removed  - fluid analysis from pleural effusion: predominantly mesothelial and monocyte/macrophage  - f/u fluid cytology  - daily CXR     Problem/Plan - 2:  ·  Problem: Mitral regurgitation.  Plan: - Patient has severe mitral regurg, on echo 8/2020 with evidence of MR. No evidence of heart failure on echo  - Avoid IV fluids, will encourage PO hydration  - continue to monitor fluid status.      Problem/Plan - 3:  ·  Problem: Adenocarcinoma of lung.  Plan: - History of adenocarcinoma of RLL s/p VATS resection in 2013, ANANYA RT in 2016, and RMF lesion s/p RT in 2017  - Most recent diagnostic thoracentesis 8/2020  - PET showing 2 new FDG-avid subcentimeter nodules in L subpleural region  - F/u pulm recs  - F/u thoracentesis fluid cytology     Problem/Plan - 4:  ·  Problem: Hypernatremia.  Plan: RESOLVED  - continue to trend BMP.      Problem/Plan - 5:  ·  Problem: Elevated alkaline phosphatase level.  Plan: Elevated on admission, now stabilized.  Likely 2/2 age and increased bone turnover.  -c/w lipitor  -continue to monitor.      Problem/Plan - 6:  Problem: Leukocytosis. Plan: - Elevated WBC may be reactive in setting of pleural effusion. No other s/s infection. No URI symptoms, COVID negative, no UTI symptoms, CXR with pleural effusion, CT chest without evidence of consolidation, no abdominal pain.  - downtrending; continue to trend CBC  - UCx NGTD.      Inpatient treatment course:  Patient was found to have R sided pleural effusion on CT Angio Chest.  Received thoracentesis that drained 1320 cc fluid with   New medications:   Labs to be followed outpatient:   Exam to be followed outpatient:    #Discharge: do not delete    This is an 87F with PMHx of lung adenocarcinoma s/p resection/XRT, severe MR, and recent admission 8/2020 for fall and R leg weakness who now presents for several days of pleuritic chest pain found to have R sided pleural effusion s/p thoracentesis suspicious for malignancy.    Problem List/Main Diagnoses (system-based):   Problem/Plan - 1:  ·  Problem: Pleural effusion on right.  Plan: - Patient presenting with pleuritic chest pain found to have R sided large effusion on CXR and on CT chest. CT PE negative for PE. LE dopplers negative for DVT.   - thoracentesis performed 10/ cc removed  - fluid analysis from pleural effusion: predominantly mesothelial and monocyte/macrophage  - f/u fluid cytology  - daily CXR     Problem/Plan - 2:  ·  Problem: Mitral regurgitation.  Plan: - Patient has severe mitral regurg, on echo 8/2020 with evidence of MR. No evidence of heart failure on echo  - Avoid IV fluids, will encourage PO hydration  - continue to monitor fluid status.      Problem/Plan - 3:  ·  Problem: Adenocarcinoma of lung.  Plan: - History of adenocarcinoma of RLL s/p VATS resection in 2013, ANANYA RT in 2016, and RMF lesion s/p RT in 2017  - Most recent diagnostic thoracentesis 8/2020  - PET showing 2 new FDG-avid subcentimeter nodules in L subpleural region  - F/u pulm recs  - F/u thoracentesis fluid cytology     Problem/Plan - 4:  ·  Problem: Hypernatremia.  Plan: RESOLVED  - continue to trend BMP.      Problem/Plan - 5:  ·  Problem: Elevated alkaline phosphatase level.  Plan: Elevated on admission, now stabilized.  Likely 2/2 age and increased bone turnover.  -c/w lipitor  -continue to monitor.      Problem/Plan - 6:  Problem: Leukocytosis. Plan: - Elevated WBC may be reactive in setting of pleural effusion. No other s/s infection. No URI symptoms, COVID negative, no UTI symptoms, CXR with pleural effusion, CT chest without evidence of consolidation, no abdominal pain.  - downtrending; continue to trend CBC  - UCx NGTD.      Inpatient treatment course:  Patient arrived to ED with sob and pleuritic chest pain and received X2 40 mg IV steroids .  CT Angio Chest was negative for PE but showed R sided pleural effusion.  Received thoracentesis that drained 1320 cc of exudative fluid with prominent mesothelial cells, suspicious for malignancy, although still awaiting cytology.  Course was complicated by fluid imbalance, as patient was hypernatremic yet also with LE edema.  To avoid pleural fluid reaccumulation, was given PO lasix per pulm.    New medications: None  Labs to be followed outpatient: pleural cytology  Exam to be followed outpatient: Pulmonary   #Discharge: do not delete    This is an 87F with PMHx of lung adenocarcinoma s/p resection/XRT, severe MR, and recent admission 8/2020 for fall and R leg weakness who now presents for several days of pleuritic chest pain found to have R sided pleural effusion s/p thoracentesis suspicious for malignancy.    Problem List/Main Diagnoses (system-based):   Problem/Plan - 1:  ·  Problem: Pleural effusion on right.  Plan: - Patient presenting with pleuritic chest pain found to have R sided large effusion on CXR and on CT chest. CT PE negative for PE. LE dopplers negative for DVT.   - thoracentesis performed 10/ cc removed  - fluid analysis from pleural effusion: predominantly mesothelial and monocyte/macrophage  - f/u fluid cytology  - daily CXR     Problem/Plan - 2:  ·  Problem: Mitral regurgitation.  Plan: - Patient has severe mitral regurg, on echo 8/2020 with evidence of MR. No evidence of heart failure on echo  - Avoid IV fluids, will encourage PO hydration  - continue to monitor fluid status.      Problem/Plan - 3:  ·  Problem: Adenocarcinoma of lung.  Plan: - History of adenocarcinoma of RLL s/p VATS resection in 2013, ANANYA RT in 2016, and RMF lesion s/p RT in 2017  - Most recent diagnostic thoracentesis 8/2020  - PET showing 2 new FDG-avid subcentimeter nodules in L subpleural region  - F/u pulm recs  - F/u thoracentesis fluid cytology     Problem/Plan - 4:  ·  Problem: Hypernatremia.  Plan: RESOLVED  - continue to trend BMP.      Problem/Plan - 5:  ·  Problem: Elevated alkaline phosphatase level.  Plan: Elevated on admission, now stabilized.  Likely 2/2 age and increased bone turnover.  -c/w lipitor  -continue to monitor.      Problem/Plan - 6:  Problem: Leukocytosis. Plan: - Elevated WBC may be reactive in setting of pleural effusion. No other s/s infection. No URI symptoms, COVID negative, no UTI symptoms, CXR with pleural effusion, CT chest without evidence of consolidation, no abdominal pain.  - downtrending; continue to trend CBC  - UCx NGTD.      Inpatient treatment course:  Patient arrived to ED with sob and pleuritic chest pain and received X2 40 mg IV steroids and IV lasix .  CT Angio Chest was negative for PE but showed R sided pleural effusion.  Received thoracentesis that drained 1320 cc of exudative fluid with prominent mesothelial cells, suspicious for malignancy, although still awaiting cytology.  Course was complicated by fluid imbalance, as patient was hypernatremic (fluid down), yet also with LE edema (fluid overload) i/s/o mitral regurg.  To avoid pleural fluid reaccumulation, was given PO lasix per pulm but stopped to avoid dehydration and PO food/water encouraged.    New medications: None  Labs to be followed outpatient: pleural cytology  Exam to be followed outpatient: Pulmonary   #Discharge: do not delete    This is an 87F with PMHx of lung adenocarcinoma s/p resection/XRT, severe MR, and recent admission 8/2020 for fall and R leg weakness who now presents for several days of pleuritic chest pain found to have R sided pleural effusion s/p thoracentesis suspicious for malignancy.    Problem List/Main Diagnoses (system-based):   1. Pleural effusion: Patient presenting with pleuritic chest pain found to have R sided large effusion on CXR and on CT chest. CT PE negative for PE. LE dopplers negative for DVT.   - thoracentesis performed 10/ cc removed  - fluid analysis from pleural effusion: predominantly mesothelial and monocyte/macrophage  - f/u fluid cytology    2. Mitral Regurg: Patient has severe mitral regurg, on echo 8/2020 with evidence of MR. No evidence of heart failure on echo    3. Adenocarcinoma of lung: History of adenocarcinoma of RLL s/p VATS resection in 2013, ANANYA RT in 2016 and RMF lesion s/p RT in 2017  -Most recent diagnostic thoracentesis 8/2020  -PET showing 2 new FDG-avid subcentimeter nodules in L subpleural region  -f/u fluid cytology from thoracentesis    ·  Problem: Hypernatremia.  Plan: RESOLVED  - continue to trend BMP.      Problem/Plan - 5:  ·  Problem: Elevated alkaline phosphatase level.  Plan: Elevated on admission, now stabilized.  Likely 2/2 age and increased bone turnover.  -c/w lipitor  -continue to monitor.      Problem/Plan - 6:  Problem: Leukocytosis. Plan: - Elevated WBC may be reactive in setting of pleural effusion. No other s/s infection. No URI symptoms, COVID negative, no UTI symptoms, CXR with pleural effusion, CT chest without evidence of consolidation, no abdominal pain.  - downtrending; continue to trend CBC  - UCx NGTD.      Inpatient treatment course:  Patient arrived to ED with sob and pleuritic chest pain and received X2 40 mg IV steroids and IV lasix .  CT Angio Chest was negative for PE but showed R sided pleural effusion.  Received thoracentesis that drained 1320 cc of exudative fluid with prominent mesothelial cells, suspicious for malignancy, although still awaiting cytology.  Course was complicated by fluid imbalance, as patient was hypernatremic (fluid down), yet also with LE edema (fluid overload) i/s/o mitral regurg.  To avoid pleural fluid reaccumulation, was given PO lasix per pulm but stopped to avoid dehydration and PO food/water encouraged.    New medications: None  Labs to be followed outpatient: pleural cytology  Exam to be followed outpatient: Pulmonary   #Discharge: do not delete    This is an 87F with PMHx of lung adenocarcinoma s/p resection/XRT, severe MR, and recent admission 8/2020 for fall and R leg weakness who now presents for several days of pleuritic chest pain found to have R sided pleural effusion s/p thoracentesis suspicious for malignancy.    Problem List/Main Diagnoses (system-based):   1. Pleural effusion: Patient presenting with pleuritic chest pain found to have R sided large effusion on CXR and on CT chest. CT PE negative for PE. LE dopplers negative for DVT.   - thoracentesis performed 10/ cc removed  - fluid analysis from pleural effusion: predominantly mesothelial and monocyte/macrophage  - f/u fluid cytology    2. Mitral Regurg: Patient has severe mitral regurg, on echo 8/2020 with evidence of MR. No evidence of heart failure on echo    3. Adenocarcinoma of lung: History of adenocarcinoma of RLL s/p VATS resection in 2013, ANANYA RT in 2016 and RMF lesion s/p RT in 2017  -Most recent diagnostic thoracentesis 8/2020  -PET showing 2 new FDG-avid subcentimeter nodules in L subpleural region  -f/u fluid cytology from thoracentesis    4. Hypernatremia: Was hypernatremic on admission, now resolved.    5. Alk phos elevation: likely secondary to bone turnover, c/w lipitor    6. Leukocytosis: pt had elevated white count in setting of pleural effusion. No URI symptoms, COVID negative, no UTI no PNA.    Inpatient treatment course:  Patient arrived to ED with sob and pleuritic chest pain and received X2 40 mg IV steroids and IV lasix .  CT Angio Chest was negative for PE but showed R sided pleural effusion.  Received thoracentesis that drained 1320 cc of exudative fluid with prominent mesothelial cells, suspicious for malignancy, although still awaiting cytology.  Course was complicated by fluid imbalance, as patient was hypernatremic (fluid down), yet also with LE edema (fluid overload) i/s/o mitral regurg.  To avoid pleural fluid reaccumulation, was given PO lasix per pulm but stopped to avoid dehydration and PO food/water encouraged.    New medications: None  Labs to be followed outpatient: pleural cytology  Exam to be followed outpatient: Pulmonary

## 2020-10-12 NOTE — PROGRESS NOTE ADULT - ASSESSMENT
This is an 87F with PMHx of lung adenocarcinoma s/p resection/XRT, severe MR, and recent admission 8/2020 for fall and R leg weakness who now presents for several days of pleuritic chest pain found to have R sided pleural effusion s/p thoracentesis.

## 2020-10-12 NOTE — CONSULT NOTE ADULT - SUBJECTIVE AND OBJECTIVE BOX
Patient is a 87y old  Female who presents with a chief complaint of Pleuritic chest pain (12 Oct 2020 05:37)       HPI:  This is an 87F with PMHx of lung adenocarcinoma s/p resection/XRT, severe MR, and recent admission 2020 for fall and R leg weakness who now presents for several days of pleuritic chest pain. Patient complains of 1 day of chest pain, mostly on the L, sharp, radiating to R, worse with movement, deep breaths, and coughing, better with rest. She denies any associated shortness of breath, lightheadedness or dizziness, fevers, or chills. She did endorse LE swelling that has been x1 week, associated with pain in legs on touch for few days. On ROS, patient endorses urinary frequency (once every hour, small amounts), without dysuria or malodor.    In ED: T97, HR 98, /98, RR 18 sating 100% on 3L NC and 98% on RA. Patient received 10mg IVP lasix, tylenol 650mg PO, benadryl 25mg, solumedrol 40mg x2. LE dopplers negative for clots. CT PE negative for PE.  (10 Oct 2020 12:56)      PAST MEDICAL & SURGICAL HISTORY:  Malignant neoplasm of bronchus and lung, unspecified site  Lung cancer    History of surgery to major organs, presenting hazards to health  removal of R-sided adenocarcinoma, negative lymphnodes        MEDICATIONS  (STANDING):  aspirin enteric coated 81 milliGRAM(s) Oral daily  atorvastatin 20 milliGRAM(s) Oral at bedtime  enoxaparin Injectable 30 milliGRAM(s) SubCutaneous daily  magnesium sulfate  IVPB 1 Gram(s) IV Intermittent once    MEDICATIONS  (PRN):  acetaminophen   Tablet .. 650 milliGRAM(s) Oral every 6 hours PRN Mild Pain (1 - 3), Moderate Pain (4 - 6)      FAMILY HISTORY:  No pertinent family history  No significant family history        CBC Full  -  ( 12 Oct 2020 06:25 )  WBC Count : 9.11 K/uL  RBC Count : 3.66 M/uL  Hemoglobin : 11.8 g/dL  Hematocrit : 38.1 %  Platelet Count - Automated : 243 K/uL  Mean Cell Volume : 104.1 fl  Mean Cell Hemoglobin : 32.2 pg  Mean Cell Hemoglobin Concentration : 31.0 gm/dL  Auto Neutrophil # : 6.88 K/uL  Auto Lymphocyte # : 0.83 K/uL  Auto Monocyte # : 1.08 K/uL  Auto Eosinophil # : 0.24 K/uL  Auto Basophil # : 0.05 K/uL  Auto Neutrophil % : 75.6 %  Auto Lymphocyte % : 9.1 %  Auto Monocyte % : 11.9 %  Auto Eosinophil % : 2.6 %  Auto Basophil % : 0.5 %      10-12    145  |  99  |  28<H>  ----------------------------<  86  4.1   |  40<H>  |  1.00    Ca    8.8      12 Oct 2020 06:25  Phos  2.6     10  Mg     1.9     10-12    TPro  5.2<L>  /  Alb  3.0<L>  /  TBili  0.2  /  DBili  x   /  AST  28  /  ALT  25  /  AlkPhos  134<H>  10      Urinalysis Basic - ( 10 Oct 2020 18:22 )    Color: Yellow / Appearance: Clear / S.015 / pH: x  Gluc: x / Ketone: NEGATIVE  / Bili: Negative / Urobili: 0.2 E.U./dL   Blood: x / Protein: NEGATIVE mg/dL / Nitrite: NEGATIVE   Leuk Esterase: NEGATIVE / RBC: x / WBC x   Sq Epi: x / Non Sq Epi: x / Bacteria: x          Radiology:    < from: Xray Chest 1 View- PORTABLE-Routine (Xray Chest 1 View- PORTABLE-Routine in AM.) (10.12.20 @ 06:21) >  EXAM:  XR CHEST PORTABLE ROUTINE 1V                          PROCEDURE DATE:  10/12/2020          INTERPRETATION:  Clinical history/reason for exam: Pleural effusion.    Single frontal view.    COMPARISON: October 10, 2020.    Findings/  impression: Heart size within normal limits, thoracic aortic and mitral annulus calcification. Right basilar opacity/pleural effusion, decreased. Right basilar irregular opacity. Right perihilar opacity, decreased. Right apical opacity, increased. Left lung opacities, stable. Stable bony structures.      < from: CT Angio Chest PE Protocol w/ IV Cont (10.10.20 @ 11:02) >  EXAM:  CT ANGIO CHEST PE PROTOCOL IC                          PROCEDURE DATE:  10/10/2020          INTERPRETATION:  CTA (CT angiography) of the CHEST dated 10/10/2020 11:02 AM    INDICATION: Shortness of breath. Assess for pulmonary embolism.    TECHNIQUE: CT angiography of the chest was performed during bolus injection of intravenous contrast.  Post-processing including the production of axial, coronal and sagittal multiplanar reformatted images and axial and coronal maximum intensity projections (MIPs) was performed.    Intravenous contrast: Optiray 350. 75 cc injected. 25 cc discarded.    PRIOR STUDY: CT angiogram 2020. PET/CT 2020.    FINDINGS: No pulmonary embolism is seen.    Normal heart size. No significant pericardial effusion. No thoracic aortic aneurysm. Stable precarinal lymph node measuring 1 cm in short axis.    Large right pleural effusion, increased since prior study. No left pleural effusion. Limited evaluation of the pulmonary parenchyma due to respiratory motion. Pulmonary emphysema and evidence of small airway disease. Postoperative changes in the right lower lobe with dependent atelectasis. Increased consolidation and atelectasis in the right middle lobe. Persistent consolidations in the right lung apex. Perihilar consolidation in the left lung is again noted. 6 mm groundglass nodule within the lingula without significant change.    Unchanged 0.7 cm hypodense hepatic lesion in the right hepatic lobe. Colonic diverticulosis.    Degenerative changes of the bones. Severe compression deformity of T7 vertebral body, similar to the prior study.      IMPRESSION:    No pulmonary embolism.    Large right pleural effusion, increased since prior study, possibly malignant in nature.    Increased consolidation and volume loss in the right middle lobe.    Persistent consolidations in the right lung apex, likely neoplastic in nature.              Vital Signs Last 24 Hrs  T(C): 36.4 (12 Oct 2020 09:50), Max: 36.6 (11 Oct 2020 16:42)  T(F): 97.5 (12 Oct 2020 09:50), Max: 97.9 (11 Oct 2020 20:51)  HR: 89 (12 Oct 2020 09:50) (84 - 97)  BP: 113/68 (12 Oct 2020 09:50) (110/60 - 117/70)  BP(mean): --  RR: 16 (12 Oct 2020 09:50) (16 - 18)  SpO2: 97% (12 Oct 2020 09:50) (93% - 99%)    REVIEW OF SYSTEMS: per HPI        Physical Exam: frail 86 yo  woman lying in bed, no acute complaints    Head: normocephalic, atraumatic    Eyes: PERRLA, EOMI, no nystagmus, sclera anicteric    ENT: nasal discharge, uvula midline, no oropharyngeal erythema/exudate    Neck: supple, negative JVD, negative carotid bruits, no thyromegaly    Chest: CTA bilaterally, neg wheeze/ rhonchi/ rales/ crackles/ egophany    Cardiovascular: regular rate and rhythm, II systolic murmur    Abdomen: soft, non distended, non tender to palpation in all 4 quadrants, negative rebound/guarding, normal bowel sounds    Extremities: 1-2+ LE edema    Musculoskeletal:      :     Neurologic Exam:    Alert and oriented to person, place, date/year, speech fluent w/o dysarthria,     Motor Exam:    Upper Extremities:     Right:  no focal weakness > 3+/5    Left:    no focal weakness > 3+/5      Lower Extremities:    Right:    no focal weakness > 3+/5    Left :    no focal weakness > 3+/5               Sensation: Intact to light touch bilaterally                     DTR:            = biceps/     triceps/     brachioradialis                      = patella/   medial hamstring/ankle                      neg clonus                      neg Babinski                        Gait:  not tested        PM&R Impression:    1) deconditioned  2) no focal weakness  3) pleural effusion, s/p thoracentesis    Plan:    1) Physical therapy focusing on therapeutic exercises, bed mobility/transfer out of bed evaluation, progressive ambulation with assistive devices prn.    2) Anticipated Disposition Plan/Recs:   pending functional progress                                 Patient is a 87y old  Female who presents with a chief complaint of Pleuritic chest pain (12 Oct 2020 05:37)       HPI:  This is an 87F with PMHx of lung adenocarcinoma s/p resection/XRT, severe MR, and recent admission 2020 for fall and R leg weakness who now presents for several days of pleuritic chest pain. Patient complains of 1 day of chest pain, mostly on the L, sharp, radiating to R, worse with movement, deep breaths, and coughing, better with rest. She denies any associated shortness of breath, lightheadedness or dizziness, fevers, or chills. She did endorse LE swelling that has been x1 week, associated with pain in legs on touch for few days. On ROS, patient endorses urinary frequency (once every hour, small amounts), without dysuria or malodor.    In ED: T97, HR 98, /98, RR 18 sating 100% on 3L NC and 98% on RA. Patient received 10mg IVP lasix, tylenol 650mg PO, benadryl 25mg, solumedrol 40mg x2. LE dopplers negative for clots. CT PE negative for PE.  (10 Oct 2020 12:56)      PAST MEDICAL & SURGICAL HISTORY:  Malignant neoplasm of bronchus and lung, unspecified site  Lung cancer    History of surgery to major organs, presenting hazards to health  removal of R-sided adenocarcinoma, negative lymphnodes        MEDICATIONS  (STANDING):  aspirin enteric coated 81 milliGRAM(s) Oral daily  atorvastatin 20 milliGRAM(s) Oral at bedtime  enoxaparin Injectable 30 milliGRAM(s) SubCutaneous daily  magnesium sulfate  IVPB 1 Gram(s) IV Intermittent once    MEDICATIONS  (PRN):  acetaminophen   Tablet .. 650 milliGRAM(s) Oral every 6 hours PRN Mild Pain (1 - 3), Moderate Pain (4 - 6)      FAMILY HISTORY:  No pertinent family history  No significant family history        CBC Full  -  ( 12 Oct 2020 06:25 )  WBC Count : 9.11 K/uL  RBC Count : 3.66 M/uL  Hemoglobin : 11.8 g/dL  Hematocrit : 38.1 %  Platelet Count - Automated : 243 K/uL  Mean Cell Volume : 104.1 fl  Mean Cell Hemoglobin : 32.2 pg  Mean Cell Hemoglobin Concentration : 31.0 gm/dL  Auto Neutrophil # : 6.88 K/uL  Auto Lymphocyte # : 0.83 K/uL  Auto Monocyte # : 1.08 K/uL  Auto Eosinophil # : 0.24 K/uL  Auto Basophil # : 0.05 K/uL  Auto Neutrophil % : 75.6 %  Auto Lymphocyte % : 9.1 %  Auto Monocyte % : 11.9 %  Auto Eosinophil % : 2.6 %  Auto Basophil % : 0.5 %      10-12    145  |  99  |  28<H>  ----------------------------<  86  4.1   |  40<H>  |  1.00    Ca    8.8      12 Oct 2020 06:25  Phos  2.6     10  Mg     1.9     10-12    TPro  5.2<L>  /  Alb  3.0<L>  /  TBili  0.2  /  DBili  x   /  AST  28  /  ALT  25  /  AlkPhos  134<H>  10      Urinalysis Basic - ( 10 Oct 2020 18:22 )    Color: Yellow / Appearance: Clear / S.015 / pH: x  Gluc: x / Ketone: NEGATIVE  / Bili: Negative / Urobili: 0.2 E.U./dL   Blood: x / Protein: NEGATIVE mg/dL / Nitrite: NEGATIVE   Leuk Esterase: NEGATIVE / RBC: x / WBC x   Sq Epi: x / Non Sq Epi: x / Bacteria: x          Radiology:    < from: Xray Chest 1 View- PORTABLE-Routine (Xray Chest 1 View- PORTABLE-Routine in AM.) (10.12.20 @ 06:21) >  EXAM:  XR CHEST PORTABLE ROUTINE 1V                          PROCEDURE DATE:  10/12/2020          INTERPRETATION:  Clinical history/reason for exam: Pleural effusion.    Single frontal view.    COMPARISON: October 10, 2020.    Findings/  impression: Heart size within normal limits, thoracic aortic and mitral annulus calcification. Right basilar opacity/pleural effusion, decreased. Right basilar irregular opacity. Right perihilar opacity, decreased. Right apical opacity, increased. Left lung opacities, stable. Stable bony structures.      < from: CT Angio Chest PE Protocol w/ IV Cont (10.10.20 @ 11:02) >  EXAM:  CT ANGIO CHEST PE PROTOCOL IC                          PROCEDURE DATE:  10/10/2020          INTERPRETATION:  CTA (CT angiography) of the CHEST dated 10/10/2020 11:02 AM    INDICATION: Shortness of breath. Assess for pulmonary embolism.    TECHNIQUE: CT angiography of the chest was performed during bolus injection of intravenous contrast.  Post-processing including the production of axial, coronal and sagittal multiplanar reformatted images and axial and coronal maximum intensity projections (MIPs) was performed.    Intravenous contrast: Optiray 350. 75 cc injected. 25 cc discarded.    PRIOR STUDY: CT angiogram 2020. PET/CT 2020.    FINDINGS: No pulmonary embolism is seen.    Normal heart size. No significant pericardial effusion. No thoracic aortic aneurysm. Stable precarinal lymph node measuring 1 cm in short axis.    Large right pleural effusion, increased since prior study. No left pleural effusion. Limited evaluation of the pulmonary parenchyma due to respiratory motion. Pulmonary emphysema and evidence of small airway disease. Postoperative changes in the right lower lobe with dependent atelectasis. Increased consolidation and atelectasis in the right middle lobe. Persistent consolidations in the right lung apex. Perihilar consolidation in the left lung is again noted. 6 mm groundglass nodule within the lingula without significant change.    Unchanged 0.7 cm hypodense hepatic lesion in the right hepatic lobe. Colonic diverticulosis.    Degenerative changes of the bones. Severe compression deformity of T7 vertebral body, similar to the prior study.      IMPRESSION:    No pulmonary embolism.    Large right pleural effusion, increased since prior study, possibly malignant in nature.    Increased consolidation and volume loss in the right middle lobe.    Persistent consolidations in the right lung apex, likely neoplastic in nature.              Vital Signs Last 24 Hrs  T(C): 36.4 (12 Oct 2020 09:50), Max: 36.6 (11 Oct 2020 16:42)  T(F): 97.5 (12 Oct 2020 09:50), Max: 97.9 (11 Oct 2020 20:51)  HR: 89 (12 Oct 2020 09:50) (84 - 97)  BP: 113/68 (12 Oct 2020 09:50) (110/60 - 117/70)  BP(mean): --  RR: 16 (12 Oct 2020 09:50) (16 - 18)  SpO2: 97% (12 Oct 2020 09:50) (93% - 99%)    REVIEW OF SYSTEMS: per HPI        Physical Exam: frail 88 yo  woman lying in bed, no acute complaints    Head: normocephalic, atraumatic    Eyes: PERRLA, EOMI, no nystagmus, sclera anicteric    ENT: nasal discharge, uvula midline, no oropharyngeal erythema/exudate    Neck: supple, negative JVD, negative carotid bruits, no thyromegaly    Chest: dec breath sounds at right base    Cardiovascular: regular rate and rhythm, II systolic murmur    Abdomen: soft, non distended, non tender to palpation in all 4 quadrants, negative rebound/guarding, normal bowel sounds    Extremities: 1-2+ LE edema    Musculoskeletal:      :     Neurologic Exam:    Alert and oriented to person, place, date/year, speech fluent w/o dysarthria,     Motor Exam:    Upper Extremities:     Right:  no focal weakness > 3+/5    Left:    no focal weakness > 3+/5      Lower Extremities:    Right:    no focal weakness > 3+/5    Left :    no focal weakness > 3+/5               Sensation: Intact to light touch bilaterally                     DTR:            = biceps/     triceps/     brachioradialis                      = patella/   medial hamstring/ankle                      neg clonus                      neg Babinski                        Gait:  not tested        PM&R Impression:    1) deconditioned  2) no focal weakness  3) pleural effusion, s/p thoracentesis    Plan:    1) Physical therapy focusing on therapeutic exercises, bed mobility/transfer out of bed evaluation, progressive ambulation with assistive devices prn.    2) Anticipated Disposition Plan/Recs:   pending functional progress

## 2020-10-12 NOTE — DISCHARGE NOTE PROVIDER - NSDCCPCAREPLAN_GEN_ALL_CORE_FT
PRINCIPAL DISCHARGE DIAGNOSIS  Diagnosis: Pleural effusion, right  Assessment and Plan of Treatment:       SECONDARY DISCHARGE DIAGNOSES  Diagnosis: Adenocarcinoma, lung  Assessment and Plan of Treatment:     Diagnosis: Mitral regurgitation  Assessment and Plan of Treatment:      PRINCIPAL DISCHARGE DIAGNOSIS  Diagnosis: Pleural effusion, right  Assessment and Plan of Treatment: Pleural effusion is a buildup of fluid around (but not inside) the lungs. Heart failure is the most common cause of this problem. Pleural effusion can also be caused by an infection, cancer, or other health problems. You had a thoracentesis (a procuedure) in order to remove the fluid that was around your lung. If you have symptoms such as shortness of breath or difficulty breathing, you should return to the emergency room.      SECONDARY DISCHARGE DIAGNOSES  Diagnosis: Adenocarcinoma, lung  Assessment and Plan of Treatment:     Diagnosis: Mitral regurgitation  Assessment and Plan of Treatment:

## 2020-10-12 NOTE — PROGRESS NOTE ADULT - ASSESSMENT
This is an 87F with PMHx of lung adenocarcinoma s/p resection/XRT, severe MR, and recent admission 8/2020 for fall and R leg weakness who now presents for several days of pleuritic chest pain found to have R sided pleural effusion.    #Right sided pleural effusion  Patient w/ worsening right sided pleural effusion, s/p thoracentesis in OP on 8/11 w/ lymphocyte predominant fluid and negative for malignant cells.   - Now s/p repeat thoracentesis 10/11 with atypical cells present, suspicious for malignancy  - Will follow-up final cytology results in outpatient setting   -Consider gentle diuresis with Lasix PO to help prevent fluid re-accumulation and treat LE edema  - Will re-evaluate need for PleurX vs chest-tube w/ pleurodesis in outpatient setting; no further inpatient pulmonary evaluation needed     #Left chest pain  Unclear etiology, patent does not have evidence of left sided consolidation or bone metastasis on CT.  -recommend tylenol and lidocaine patch for pain control to prevent splinting    #Atelectasis  Patient p/w SOB likely 2/2 compressive atelectasis from pleural effusion  - Encourage ambulation, incentive spirometry  - Will expect further improvement now that fluid removed       Pulm will sign-off; please call [phone] prior to D/C to set-up outpatient Pulmonary appointment with Dr. Nguyen within 2 weeks to re-evaluate pulmonary effusion

## 2020-10-12 NOTE — DISCHARGE NOTE PROVIDER - NSDCMRMEDTOKEN_GEN_ALL_CORE_FT
acetaminophen 325 mg oral tablet: 2 tab(s) orally every 6 hours, As needed,  pain  aspirin 81 mg oral delayed release tablet: 1 tab(s) orally every 24 hours  Lipitor 20 mg oral tablet: 1 tab(s) orally once a day

## 2020-10-12 NOTE — DISCHARGE NOTE PROVIDER - PROVIDER TOKENS
PROVIDER:[TOKEN:[[id number]],FOLLOWUP:[1 week]] PROVIDER:[[id number]],FOLLOWUP:[1 week]],PROVIDER:[[id number]],FOLLOWUP:[1 week]] PROVIDER:[[id number]],FOLLOWUP:[1 week]],PROVIDER:[[id number]],SCHEDULEDAPPT:[10/29/2020],SCHEDULEDAPPTTIME:[02:00 PM]] PROVIDER:[[id number]],SCHEDULEDAPPT:[10/19/2020],SCHEDULEDAPPTTIME:[12:40 PM]],PROVIDER:[[id number]],SCHEDULEDAPPT:[10/29/2020],SCHEDULEDAPPTTIME:[02:00 PM]]

## 2020-10-12 NOTE — PROGRESS NOTE ADULT - PROBLEM SELECTOR PLAN 2
- Patient has severe mitral regurg, on echo 8/2020 with evidence of MR. No evidence of heart failure on echo  - Avoid IV fluids, will encourage PO hydration  - dc'd lasix bc no longer fluid overloaded

## 2020-10-12 NOTE — CONSULT NOTE ADULT - ASSESSMENT
per Internal Medicine    87F with PMHx of lung adenocarcinoma s/p resection/XRT, severe MR, and recent admission 8/2020 for fall and R leg weakness who now presents for several days of pleuritic chest pain found to have R sided pleural effusion.      Problem/Plan - 1:  ·  Problem: Pleural effusion on right.  Plan: - Patient presenting with pleuritic chest pain found to have R sided large effusion on CXR and on CT chest. CT PE negative for PE. LE dopplers negative for DVT.   - f/u Pulm recs: restart lasix 40 mg QD  - thoracentesis performed 8/11/ cc removed  - F/u fluid analysis from pleural effusion.     Problem/Plan - 2:  ·  Problem: Mitral regurgitation.  Plan: - Patient has severe mitral regurg, on echo 8/2020 with evidence of MR. No evidence of heart failure on echo  - Avoid IV fluids, will encourage PO hydration  - restarted lasix per pulm.     Problem/Plan - 3:  ·  Problem: Adenocarcinoma of lung.  Plan: - History of adenocarcinoma of RLL s/p VATS resection in 2013, ANANYA XRT in 2016, and RMF lesion s/p XRT in 2017  - Most recent diagnostic thoracentesis 8/2020  - PET showing 2 new FDG-avid subcentimeter nodules in L subpleural region  - F/u pulm recs  - F/u thoracentesis fluid cytology.     Problem/Plan - 4:  ·  Problem: Hypernatremia.  Plan: - dowtrending.  continue to trend BMP  - Monitor BUN/Cr and Na.     Problem/Plan - 5:  ·  Problem: Elevated alkaline phosphatase level.  Plan: - Alk phos elevated, new since 8/2020  - Also with elevated AST  - GGT elevated suggestive of hepatic or biliary cause  - F/u RUQ US  - Hold home statin in setting of transaminitis and alk phos elevation.     Problem/Plan - 6:  Problem: Leukocytosis. Plan: - Elevated WBC may be reactive in setting of pleural effusion. No other s/s infection. No URI symptoms, COVID negative, no UTI symptoms, CXR with pleural effusion, CT chest without evidence of consolidation, no abdominal pain.  - downtrending; continue to trend CBC  - f/u UA and urine culture  - Expect slight rise over upcoming few days as patient received 2x doses of steroids in ED today.    Problem/Plan - 7:  ·  Problem: Nutrition, metabolism, and development symptoms.  Plan: F: Avoid IVF  E: Replete PRN  N: Regular  Code full  DVT prophy lovenox  D RMF.   
This is an 87F with PMHx of lung adenocarcinoma s/p resection/XRT, severe MR, and recent admission 8/2020 for fall and R leg weakness who now presents for several days of pleuritic chest pain found to have R sided pleural effusion.    #Right sided pleural effusion.   Patient w/ worsening right sided pleural effusion, s/p thoracentesis in OP on 8/11 w/ lymphocyte predominant fluid and negative for malignant cells. Suspect this may be 2/2 malignancy.   -Patient has slow reaccumulation of fluid and has little soft tissue so placement of pleurex will be difficult  -will preform thoracentesis tomorrow and repeat pleural effusion workup and cytology  -please hold AM lovenox    #Atelectasis  Patient p/w SOB likely 2/2 compressive atelectasis from pleural effusion, placed on 3L NC.   -will perform thoracentesis tomorrow    Pulm will continue to follow

## 2020-10-12 NOTE — PHYSICAL THERAPY INITIAL EVALUATION ADULT - PERTINENT HX OF CURRENT PROBLEM, REHAB EVAL
Pt. is an 87 y.o. female presenting with L sided pleuritic chest pain exacerbated by movement and coughing, denies SOB. Pt. was found to have R pleural effusion, now s/p drainage.

## 2020-10-13 LAB
ALBUMIN SERPL ELPH-MCNC: 3.1 G/DL — LOW (ref 3.3–5)
ALP SERPL-CCNC: 136 U/L — HIGH (ref 40–120)
ALT FLD-CCNC: 21 U/L — SIGNIFICANT CHANGE UP (ref 10–45)
ANION GAP SERPL CALC-SCNC: 8 MMOL/L — SIGNIFICANT CHANGE UP (ref 5–17)
AST SERPL-CCNC: 25 U/L — SIGNIFICANT CHANGE UP (ref 10–40)
BASOPHILS # BLD AUTO: 0.03 K/UL — SIGNIFICANT CHANGE UP (ref 0–0.2)
BASOPHILS NFR BLD AUTO: 0.3 % — SIGNIFICANT CHANGE UP (ref 0–2)
BILIRUB SERPL-MCNC: 0.3 MG/DL — SIGNIFICANT CHANGE UP (ref 0.2–1.2)
BUN SERPL-MCNC: 29 MG/DL — HIGH (ref 7–23)
CALCIUM SERPL-MCNC: 8.9 MG/DL — SIGNIFICANT CHANGE UP (ref 8.4–10.5)
CHLORIDE SERPL-SCNC: 97 MMOL/L — SIGNIFICANT CHANGE UP (ref 96–108)
CO2 SERPL-SCNC: 38 MMOL/L — HIGH (ref 22–31)
CREAT SERPL-MCNC: 0.77 MG/DL — SIGNIFICANT CHANGE UP (ref 0.5–1.3)
EOSINOPHIL # BLD AUTO: 0.14 K/UL — SIGNIFICANT CHANGE UP (ref 0–0.5)
EOSINOPHIL NFR BLD AUTO: 1.3 % — SIGNIFICANT CHANGE UP (ref 0–6)
FOLATE SERPL-MCNC: 11.5 NG/ML — SIGNIFICANT CHANGE UP
GLUCOSE SERPL-MCNC: 85 MG/DL — SIGNIFICANT CHANGE UP (ref 70–99)
HCT VFR BLD CALC: 41 % — SIGNIFICANT CHANGE UP (ref 34.5–45)
HGB BLD-MCNC: 12.9 G/DL — SIGNIFICANT CHANGE UP (ref 11.5–15.5)
IMM GRANULOCYTES NFR BLD AUTO: 0.4 % — SIGNIFICANT CHANGE UP (ref 0–1.5)
LYMPHOCYTES # BLD AUTO: 1.16 K/UL — SIGNIFICANT CHANGE UP (ref 1–3.3)
LYMPHOCYTES # BLD AUTO: 11 % — LOW (ref 13–44)
MAGNESIUM SERPL-MCNC: 2 MG/DL — SIGNIFICANT CHANGE UP (ref 1.6–2.6)
MCHC RBC-ENTMCNC: 31.5 GM/DL — LOW (ref 32–36)
MCHC RBC-ENTMCNC: 32 PG — SIGNIFICANT CHANGE UP (ref 27–34)
MCV RBC AUTO: 101.7 FL — HIGH (ref 80–100)
MONOCYTES # BLD AUTO: 1.07 K/UL — HIGH (ref 0–0.9)
MONOCYTES NFR BLD AUTO: 10.1 % — SIGNIFICANT CHANGE UP (ref 2–14)
NEUTROPHILS # BLD AUTO: 8.11 K/UL — HIGH (ref 1.8–7.4)
NEUTROPHILS NFR BLD AUTO: 76.9 % — SIGNIFICANT CHANGE UP (ref 43–77)
NRBC # BLD: 0 /100 WBCS — SIGNIFICANT CHANGE UP (ref 0–0)
PLATELET # BLD AUTO: 252 K/UL — SIGNIFICANT CHANGE UP (ref 150–400)
POTASSIUM SERPL-MCNC: 4 MMOL/L — SIGNIFICANT CHANGE UP (ref 3.5–5.3)
POTASSIUM SERPL-SCNC: 4 MMOL/L — SIGNIFICANT CHANGE UP (ref 3.5–5.3)
PROT SERPL-MCNC: 5.8 G/DL — LOW (ref 6–8.3)
RBC # BLD: 4.03 M/UL — SIGNIFICANT CHANGE UP (ref 3.8–5.2)
RBC # FLD: 16.2 % — HIGH (ref 10.3–14.5)
SODIUM SERPL-SCNC: 143 MMOL/L — SIGNIFICANT CHANGE UP (ref 135–145)
VIT B12 SERPL-MCNC: 370 PG/ML — SIGNIFICANT CHANGE UP (ref 232–1245)
WBC # BLD: 10.55 K/UL — HIGH (ref 3.8–10.5)
WBC # FLD AUTO: 10.55 K/UL — HIGH (ref 3.8–10.5)

## 2020-10-13 PROCEDURE — 71045 X-RAY EXAM CHEST 1 VIEW: CPT | Mod: 26

## 2020-10-13 PROCEDURE — 99233 SBSQ HOSP IP/OBS HIGH 50: CPT | Mod: GC

## 2020-10-13 RX ADMIN — Medication 81 MILLIGRAM(S): at 12:46

## 2020-10-13 RX ADMIN — ENOXAPARIN SODIUM 30 MILLIGRAM(S): 100 INJECTION SUBCUTANEOUS at 12:46

## 2020-10-13 RX ADMIN — ATORVASTATIN CALCIUM 20 MILLIGRAM(S): 80 TABLET, FILM COATED ORAL at 21:41

## 2020-10-13 NOTE — PROGRESS NOTE ADULT - ATTENDING COMMENTS
88 yo nonsmoker frail, TIA, severe MR, with history of multiple lung ca admitted with pleuritic chest pain, dyspnea and LE edema. CT showed pleural effusion    Pleural effusion- high suspicion for malignancy in setting of known history of lung cancer. PE ruled out. Not likely to be infectious- wbc elevated but downtrending, no fevers  - Plan for thoracentesis today. F/u fluid analysis from pleural effusion    Lung cancer  s/p VATS resection in 2013, ANANYA XRT in 2016, and RMF lesion s/p XRT in 2017  - PET showing 2 new FDG-avid subcentimeter nodules in L subpleural region  Follow up fluid cytology from [facility], appreciate pulm recs    Mitral Regurgitation  Given new onsent of lower ext edema, will repeat echo, also check dopplers  Will use caution and keep patient on dryer side- lasix 40 mg po daily, per pulm    Hypernatremia  - improving; possible intra vascular depletion in setting of third spacing    Elevated alk phos  GGT elevated, Abd US pending    H/o TIA  C/w aspirin
Patient seen and examined at bedside. 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 Was given lasix over the weekend and labs indicate possible contraction alkalosis w/ elevated bicarb, increase in BUN and Cr. Alternatively, she did have respiratory acidosis on VBG 2 days ago so may be hypercapneic. Appreciate pulm assistance regarding pleural effusion management (needs possible pleurodesis). Would hold lasix for now. Refusing CASPER, will likely need home O2 (per report was already on it). Would also recommend palliative consult pending course.
87 year old female with h/o lung adenocarcinoma with right side recurrent pleural effusion s/p thoracentesis during this visit (once few months back in clinic). Cell count is suggestive of atypical cells. Highly suspicious of malignancy as etiology. Repeat ultrasound today does not any significant increase in pleural effusion. Follow up with Dr. Nguyen in clinic next visit for further management.
88 yo nonsmoker with multiple primary lung ca over the last 7 years presenting with reaccumulation of right pleural effusion.    Plan for thoracentesis today. Reaccumulation occurred over the 2 month. If this happens again will proceed with pleurodesis rather than IPC as she does not appear to have trapped lung based on today tap.  Would continue diuresis, can be po. Sending fluid for analysis again, to determine if thi is MPE as suspected.   Lidoderm patch for left pleural pain probably related to left pleural pet positive nodule.
Patient with Left sided CP as well  Likely 2/2 nodules versus bone pain  Will switch tylenol to standing

## 2020-10-13 NOTE — PROGRESS NOTE ADULT - PROBLEM SELECTOR PLAN 2
- Patient has severe mitral regurg, on echo 8/2020 with evidence of MR. No evidence of heart failure on echo  - Avoid IV fluids, will encourage PO hydration  - continue to monitor fluid status - Patient has severe mitral regurg, on echo 8/2020 with evidence of MR. No evidence of heart failure on echo  - Avoid IV fluids, will encourage PO hydration  - continue to monitor fluid status    Addend: will continue to hold lasix as patient dry on exam, will need outpt follow up for eval of continuation of prn lasix

## 2020-10-13 NOTE — PROGRESS NOTE ADULT - PROBLEM SELECTOR PLAN 5
Elevated on admission, now stabilized.  Likely 2/2 age and increased bone turnover.  -c/w lipitor  -continue to monitor
- Alk phos elevated, new since 8/2020  - Also with elevated AST  - GGT elevated suggestive of hepatic or biliary cause  - F/u RUQ US  - Hold home statin in setting of transaminitis and alk phos elevation
Elevated on admission, but now downtrending.  -Restart lipitor  -continue to monitor

## 2020-10-13 NOTE — PROGRESS NOTE ADULT - PROBLEM SELECTOR PLAN 1
- Patient presenting with pleuritic chest pain found to have R sided large effusion on CXR and on CT chest. CT PE negative for PE. LE dopplers negative for DVT.   - thoracentesis performed 10/ cc removed  - fluid analysis from pleural effusion: predominantly mesothelial and monocyte/macrophage  - f/u cytology  - daily CXR - Patient presenting with pleuritic chest pain found to have R sided large effusion on CXR and on CT chest. CT PE negative for PE. LE dopplers negative for DVT.   - thoracentesis performed 10/ cc removed  - fluid analysis from pleural effusion: predominantly mesothelial and monocyte/macrophage  - f/u cytology  - daily CXR    Addend: Repeat CXR without reaccumulation of fluid yet, will need outpt pulm follow up

## 2020-10-13 NOTE — PROGRESS NOTE ADULT - ASSESSMENT
This is an 87F with PMHx of lung adenocarcinoma s/p resection/XRT, severe MR, and recent admission 8/2020 for fall and R leg weakness who now presents for several days of pleuritic chest pain found to have R sided pleural effusion s/p thoracentesis suspicious for malignancy.

## 2020-10-13 NOTE — PROGRESS NOTE ADULT - PROBLEM SELECTOR PLAN 6
- Elevated WBC may be reactive in setting of pleural effusion. No other s/s infection. No URI symptoms, COVID negative, no UTI symptoms, CXR with pleural effusion, CT chest without evidence of consolidation, no abdominal pain.  - downtrending; continue to trend CBC  - UCx NGTD
- Elevated WBC may be reactive in setting of pleural effusion. No other s/s infection. No URI symptoms, COVID negative, no UTI symptoms, CXR with pleural effusion, CT chest without evidence of consolidation, no abdominal pain.  - downtrending; continue to trend CBC  - f/u UA and urine culture  - Expect slight rise over upcoming few days as patient received 2x doses of steroids in ED today
RESOLVED  - Elevated WBC may be reactive in setting of pleural effusion. No other s/s infection. No URI symptoms, COVID negative, no UTI symptoms, CXR with pleural effusion, CT chest without evidence of consolidation, no abdominal pain.  - downtrending; continue to trend CBC  - f/u UA and urine culture  - Expect slight rise over upcoming few days as patient received 2x doses of steroids in ED today

## 2020-10-13 NOTE — PROGRESS NOTE ADULT - PROBLEM SELECTOR PROBLEM 1
Pleural effusion
Pleural effusion
Pleural effusion on right

## 2020-10-13 NOTE — PROGRESS NOTE ADULT - PROBLEM SELECTOR PLAN 3
- History of adenocarcinoma of RLL s/p VATS resection in 2013, ANANYA RT in 2016, and RMF lesion s/p RT in 2017  - Most recent diagnostic thoracentesis 8/2020  - PET showing 2 new FDG-avid subcentimeter nodules in L subpleural region  - F/u pulm recs  - F/u thoracentesis fluid cytology
- History of adenocarcinoma of RLL s/p VATS resection in 2013, ANANYA XRT in 2016, and RMF lesion s/p XRT in 2017  - Most recent diagnostic thoracentesis 8/2020  - PET showing 2 new FDG-avid subcentimeter nodules in L subpleural region  - F/u pulm recs  - F/u thoracentesis fluid cytology
- History of adenocarcinoma of RLL s/p VATS resection in 2013, ANANYA RT in 2016, and RMF lesion s/p RT in 2017  - Most recent diagnostic thoracentesis 8/2020  - PET showing 2 new FDG-avid subcentimeter nodules in L subpleural region  - F/u pulm recs  - F/u thoracentesis fluid cytology

## 2020-10-13 NOTE — PROGRESS NOTE ADULT - SUBJECTIVE AND OBJECTIVE BOX
Physical Medicine and Rehabilitation Progress Note:    Patient is a 87y old  Female who presents with a chief complaint of Pleuritic chest pain (13 Oct 2020 05:42)      HPI:  This is an 87F with PMHx of lung adenocarcinoma s/p resection/XRT, severe MR, and recent admission 8/2020 for fall and R leg weakness who now presents for several days of pleuritic chest pain. Patient complains of 1 day of chest pain, mostly on the L, sharp, radiating to R, worse with movement, deep breaths, and coughing, better with rest. She denies any associated shortness of breath, lightheadedness or dizziness, fevers, or chills. She did endorse LE swelling that has been x1 week, associated with pain in legs on touch for few days. On ROS, patient endorses urinary frequency (once every hour, small amounts), without dysuria or malodor.    In ED: T97, HR 98, /98, RR 18 sating 100% on 3L NC and 98% on RA. Patient received 10mg IVP lasix, tylenol 650mg PO, benadryl 25mg, solumedrol 40mg x2. LE dopplers negative for clots. CT PE negative for PE.  (10 Oct 2020 12:56)                            12.9   10.55 )-----------( 252      ( 13 Oct 2020 07:56 )             41.0       10-13    143  |  97  |  29<H>  ----------------------------<  85  4.0   |  38<H>  |  0.77    Ca    8.9      13 Oct 2020 07:56  Phos  2.6     10-12  Mg     2.0     10-13    TPro  5.8<L>  /  Alb  3.1<L>  /  TBili  0.3  /  DBili  x   /  AST  25  /  ALT  21  /  AlkPhos  136<H>  10-13    Vital Signs Last 24 Hrs  T(C): 36.4 (13 Oct 2020 11:44), Max: 36.7 (12 Oct 2020 20:37)  T(F): 97.5 (13 Oct 2020 11:44), Max: 98.1 (12 Oct 2020 20:37)  HR: 96 (13 Oct 2020 11:44) (78 - 97)  BP: 100/53 (13 Oct 2020 11:44) (91/51 - 118/75)  BP(mean): --  RR: 20 (13 Oct 2020 11:44) (18 - 20)  SpO2: 95% (13 Oct 2020 11:44) (92% - 98%)    MEDICATIONS  (STANDING):  aspirin enteric coated 81 milliGRAM(s) Oral daily  atorvastatin 20 milliGRAM(s) Oral at bedtime  enoxaparin Injectable 30 milliGRAM(s) SubCutaneous daily  magnesium sulfate  IVPB 1 Gram(s) IV Intermittent once    MEDICATIONS  (PRN):  acetaminophen   Tablet .. 650 milliGRAM(s) Oral every 6 hours PRN Mild Pain (1 - 3), Moderate Pain (4 - 6)    Currently Undergoing Physical/ Occupational Therapy at bedside.    Functional Status Assessment:   10/12/2020    Previous Level of Function:     · Ambulation Skills	independent  · Transfer Skills	independent  · ADL Skills	independent  · Additional Comments	Pt. reports occasionally using her late 's RW, reports no stairs to negotiate.    Cognitive Status Examination:   · Orientation	oriented to person, place, time and situation  · Level of Consciousness	alert  · Follows Commands and Answers Questions	100% of the time  · Personal Safety and Judgment	intact    Range of Motion Exam:   · Range of Motion Examination	bilateral upper extremity ROM was WFL (within functional limits); bilateral lower extremity ROM was WFL (within functional limits)    Manual Muscle Testing:   · Manual Muscle Testing Results	>3+/5 throughout by functional assessment against gravity.    Bed Mobility: Scooting/Bridging:     · Level of McKean	independent    Bed Mobility: Sit to Supine:     · Level of McKean	independent    Bed Mobility: Supine to Sit:     · Level of McKean	independent    Transfer: Sit to Stand:     · Level of McKean	contact guard  · Physical Assist/Nonphysical Assist	1 person assist; verbal cues  · Weight-Bearing Restrictions	weight-bearing as tolerated    Transfer: Stand to Sit:     · Level of McKean	supervision; contact guard  · Physical Assist/Nonphysical Assist	verbal cues; 1 person assist  · Weight-Bearing Restrictions	weight-bearing as tolerated    Sit/Stand Transfer Safety Analysis:     · Transfer Safety Concerns Noted	decreased weight-shifting ability  · Impairments Contributing to Impaired Transfers	impaired balance; pain    Gait Skills:     · Level of McKean	supervision; contact guard  · Physical Assist/Nonphysical Assist	verbal cues; 1 person assist  · Weight-Bearing Restrictions	weight-bearing as tolerated  · Assistive Device	holding onto O2 carrier.  · Gait Distance	75 feet    Gait Analysis:     · Gait Deviations Noted	decreased beth; decreased step length  · Impairments Contributing to Gait Deviations	impaired balance    Stair Negotiation:     · Level of McKean	TBA    Balance Skills Assessment:     · Sitting Balance: Static	good balance  · Sitting Balance: Dynamic	good balance  · Sit-to-Stand Balance	fair plus  · Standing Balance: Static	fair plus  · Standing Balance: Dynamic	fair balance  · Identified Impairments Contributing to Balance Disturbance	pain    Sensory Examination:   Sensory Examination:    Grossly Intact:   · Gross Sensory Examination	Grossly Intact; to light touch bilat LEs      Clinical Impressions:   · Criteria for Skilled Therapeutic Interventions	impairments found; functional limitations in following categories  · Impairments Found (describe specific impairments)	aerobic capacity/endurance; gait, locomotion, and balance  · Functional Limitations in Following Categories (describe specific limitations)	home management; community/leisure  · Rehab Potential	good, to achieve stated therapy goals  · Therapy Frequency	2-3x/week  · Predicted Duration of Therapy Intervention (days/wks)	Pt. would benefit from cont. PT follow up to improve gait, balance, endurance.  · Anticipated Equipment Needs at Discharge	rolling walker (5 inch wheels)        PM&R Impression: as above    Current Disposition Plan Recommendations:    d/c home, home physical therapy

## 2020-10-13 NOTE — PROGRESS NOTE ADULT - PROBLEM SELECTOR PLAN 7
F: Avoid IVF  E: Replete PRN  N: Regular  Code full  DVT campos DE LA TORRE RMF

## 2020-10-13 NOTE — PROGRESS NOTE ADULT - ASSESSMENT
per Internal Medicine    87F with PMHx of lung adenocarcinoma s/p resection/XRT, severe MR, and recent admission 8/2020 for fall and R leg weakness who now presents for several days of pleuritic chest pain found to have R sided pleural effusion s/p thoracentesis suspicious for malignancy.    Problem/Plan - 1:  ·  Problem: Pleural effusion on right.  Plan: - Patient presenting with pleuritic chest pain found to have R sided large effusion on CXR and on CT chest. CT PE negative for PE. LE dopplers negative for DVT.   - thoracentesis performed 10/ cc removed  - fluid analysis from pleural effusion: predominantly mesothelial and monocyte/macrophage  - f/u cytology  - daily CXR    Addend: Repeat CXR without reaccumulation of fluid yet, will need outpt pulm follow up.    Problem/Plan - 2:  ·  Problem: Mitral regurgitation.  Plan: - Patient has severe mitral regurg, on echo 8/2020 with evidence of MR. No evidence of heart failure on echo  - Avoid IV fluids, will encourage PO hydration  - continue to monitor fluid status    Addend: will continue to hold lasix as patient dry on exam, will need outpt follow up for eval of continuation of prn lasix.    Problem/Plan - 3:  ·  Problem: Adenocarcinoma of lung.  Plan: - History of adenocarcinoma of RLL s/p VATS resection in 2013, ANANYA RT in 2016, and RMF lesion s/p RT in 2017  - Most recent diagnostic thoracentesis 8/2020  - PET showing 2 new FDG-avid subcentimeter nodules in L subpleural region  - F/u pulm recs  - F/u thoracentesis fluid cytology.     Problem/Plan - 4:  ·  Problem: Hypernatremia.  Plan: RESOLVED  - continue to trend BMP.     Problem/Plan - 5:  ·  Problem: Elevated alkaline phosphatase level.  Plan: Elevated on admission, now stabilized.  Likely 2/2 age and increased bone turnover.  -c/w lipitor  -continue to monitor.     Problem/Plan - 6:  Problem: Leukocytosis. Plan: - Elevated WBC may be reactive in setting of pleural effusion. No other s/s infection. No URI symptoms, COVID negative, no UTI symptoms, CXR with pleural effusion, CT chest without evidence of consolidation, no abdominal pain.  - downtrending; continue to trend CBC  - UCx NGTD.    Problem/Plan - 7:  ·  Problem: Nutrition, metabolism, and development symptoms.  Plan: F: Avoid IVF  E: Replete PRN  N: Regular  Code full  DVT prophy lovenox  D RMF.

## 2020-10-13 NOTE — PROGRESS NOTE ADULT - PROBLEM SELECTOR PLAN 4
RESOLVED  - continue to trend BMP
- dowtrending.  continue to trend BMP  - Monitor BUN/Cr and Na
RESOLVED  - continue to trend BMP

## 2020-10-13 NOTE — PROGRESS NOTE ADULT - SUBJECTIVE AND OBJECTIVE BOX
OVERNIGHT EVENTS:    SUBJECTIVE / INTERVAL HPI: Patient seen and examined at bedside.     VITAL SIGNS:  Vital Signs Last 24 Hrs  T(C): 36.4 (13 Oct 2020 04:52), Max: 36.7 (12 Oct 2020 20:37)  T(F): 97.5 (13 Oct 2020 04:52), Max: 98.1 (12 Oct 2020 20:37)  HR: 78 (13 Oct 2020 04:52) (78 - 97)  BP: 118/66 (13 Oct 2020 04:52) (91/51 - 118/75)  BP(mean): --  RR: 20 (13 Oct 2020 04:52) (16 - 20)  SpO2: 92% (13 Oct 2020 04:52) (92% - 98%)    PHYSICAL EXAM:    General: WDWN  HEENT: NCAT; PERRL, anicteric sclera; MMM  Neck: supple, trachea midline  Cardiovascular: S1, S2 normal; RRR, no M/G/R  Respiratory: CTABL; no W/R/R  Gastrointestinal: soft, nontender, nondistended. bowel sounds present.  Skin: no ulcerations or visible rashes appreciated  Extremities: WWP; no edema, clubbing or cyanosis  Vascular: 2+ radial, DP/PT pulses B/L  Neurological: AAOx3; CN II-XII grossly intact; no focal deficits    MEDICATIONS:  MEDICATIONS  (STANDING):  aspirin enteric coated 81 milliGRAM(s) Oral daily  atorvastatin 20 milliGRAM(s) Oral at bedtime  enoxaparin Injectable 30 milliGRAM(s) SubCutaneous daily  magnesium sulfate  IVPB 1 Gram(s) IV Intermittent once    MEDICATIONS  (PRN):  acetaminophen   Tablet .. 650 milliGRAM(s) Oral every 6 hours PRN Mild Pain (1 - 3), Moderate Pain (4 - 6)      ALLERGIES:  Allergies    Bactrim (Unknown)  Cleocin HCl (Unknown)  Flagyl (Unknown)  Honey (Unknown)  iodine (Anaphylaxis)  IV Contrast (Anaphylaxis)  Levaquin (Other; Rash)  penicillin (Unknown)  Zithromax (Unknown)    Intolerances        LABS:                        11.8   9.11  )-----------( 243      ( 12 Oct 2020 06:25 )             38.1     10-12    145  |  99  |  28<H>  ----------------------------<  86  4.1   |  40<H>  |  1.00    Ca    8.8      12 Oct 2020 06:25  Phos  2.6     10-12  Mg     1.9     10-12    TPro  5.2<L>  /  Alb  3.0<L>  /  TBili  0.2  /  DBili  x   /  AST  28  /  ALT  25  /  AlkPhos  134<H>  10-12    PT/INR - ( 11 Oct 2020 06:40 )   PT: 11.3 sec;   INR: 0.94          PTT - ( 11 Oct 2020 06:40 )  PTT:31.6 sec    CAPILLARY BLOOD GLUCOSE          RADIOLOGY & ADDITIONAL TESTS: Reviewed. All other review of systems negative, except as noted in HPI OVERNIGHT EVENTS: added a humidifier for nose dryness and bleeding    SUBJECTIVE / INTERVAL HPI: Patient seen and examined at bedside.  Patient states she's tired and wasn't able to sleep well last night but unsure why.  Breathing is "the same".  CP still present while moving.  Leg swelling is down.  Nose still bleeding "bc of the oxygen".  Denies fevers.  Urinating in prima fit and bathroom.  Had a BM yesterday.    VITAL SIGNS:  Vital Signs Last 24 Hrs  T(C): 36.4 (13 Oct 2020 04:52), Max: 36.7 (12 Oct 2020 20:37)  T(F): 97.5 (13 Oct 2020 04:52), Max: 98.1 (12 Oct 2020 20:37)  HR: 78 (13 Oct 2020 04:52) (78 - 97)  BP: 118/66 (13 Oct 2020 04:52) (91/51 - 118/75)  BP(mean): --  RR: 20 (13 Oct 2020 04:52) (16 - 20)  SpO2: 92% (13 Oct 2020 04:52) (92% - 98%)    PHYSICAL EXAM:    General: WDWN, NAD, sleepy  HEENT: MMM, conjunctiva and sclera clear, NC in place, nose with dried blood and crusting inside.  Neck: Supple  CV: diastolic murmur prominent over mitral area. Normal S1/S2.  Respiratory: CTAB. No respiratory distress. No intercostal retractions  Gastrointestinal: Soft non-tender non-distended. Normal bowel sounds. No hepatosplenomegaly. No rebound or guarding  Genitourinary: no prima fit  Extremities: No clubbing, cyanosis, decreased edema in b/l feet to calves compared to yesterday  Skin: Warm and dry.   Neuro: A&O x 3.  Psych: Normal affect and mood      MEDICATIONS:  MEDICATIONS  (STANDING):  aspirin enteric coated 81 milliGRAM(s) Oral daily  atorvastatin 20 milliGRAM(s) Oral at bedtime  enoxaparin Injectable 30 milliGRAM(s) SubCutaneous daily  magnesium sulfate  IVPB 1 Gram(s) IV Intermittent once    MEDICATIONS  (PRN):  acetaminophen   Tablet .. 650 milliGRAM(s) Oral every 6 hours PRN Mild Pain (1 - 3), Moderate Pain (4 - 6)      ALLERGIES:  Allergies    Bactrim (Unknown)  Cleocin HCl (Unknown)  Flagyl (Unknown)  Honey (Unknown)  iodine (Anaphylaxis)  IV Contrast (Anaphylaxis)  Levaquin (Other; Rash)  penicillin (Unknown)  Zithromax (Unknown)    Intolerances        LABS:                        11.8   9.11  )-----------( 243      ( 12 Oct 2020 06:25 )             38.1     10-12    145  |  99  |  28<H>  ----------------------------<  86  4.1   |  40<H>  |  1.00    Ca    8.8      12 Oct 2020 06:25  Phos  2.6     10-12  Mg     1.9     10-12    TPro  5.2<L>  /  Alb  3.0<L>  /  TBili  0.2  /  DBili  x   /  AST  28  /  ALT  25  /  AlkPhos  134<H>  10-12    PT/INR - ( 11 Oct 2020 06:40 )   PT: 11.3 sec;   INR: 0.94          PTT - ( 11 Oct 2020 06:40 )  PTT:31.6 sec    CAPILLARY BLOOD GLUCOSE          RADIOLOGY & ADDITIONAL TESTS: Reviewed. HOSPITAL COURSE:  Patient arrived to ED with sob and pleuritic chest pain and received X2 40 mg IV steroids and IV lasix .  CT Angio Chest was negative for PE but showed R sided pleural effusion.  Received thoracentesis that drained 1320 cc of exudative fluid with prominent mesothelial cells, suspicious for malignancy, although still awaiting cytology.  Course was complicated by fluid imbalance, as patient was hypernatremic (fluid down), yet also with LE edema (fluid overload) i/s/o mitral regurg.  To avoid pleural fluid reaccumulation, was given PO lasix per pulm but stopped to avoid dehydration and PO food/water encouraged.    OVERNIGHT EVENTS: added a humidifier for nose dryness and bleeding    SUBJECTIVE / INTERVAL HPI: Patient seen and examined at bedside.  Patient states she's tired and wasn't able to sleep well last night but unsure why.  Breathing is "the same".  CP still present while moving.  Leg swelling is down.  Nose still bleeding "bc of the oxygen".  Denies fevers.  Urinating in prima fit and bathroom.  Had a BM yesterday.    VITAL SIGNS:  Vital Signs Last 24 Hrs  T(C): 36.4 (13 Oct 2020 04:52), Max: 36.7 (12 Oct 2020 20:37)  T(F): 97.5 (13 Oct 2020 04:52), Max: 98.1 (12 Oct 2020 20:37)  HR: 78 (13 Oct 2020 04:52) (78 - 97)  BP: 118/66 (13 Oct 2020 04:52) (91/51 - 118/75)  BP(mean): --  RR: 20 (13 Oct 2020 04:52) (16 - 20)  SpO2: 92% (13 Oct 2020 04:52) (92% - 98%)    PHYSICAL EXAM:    General: WDWN, NAD, sleepy  HEENT: MMM, conjunctiva and sclera clear, NC in place, nose with dried blood and crusting inside.  Neck: Supple  CV: diastolic murmur prominent over mitral area. Normal S1/S2.  Respiratory: CTAB. No respiratory distress. No intercostal retractions  Gastrointestinal: Soft non-tender non-distended. Normal bowel sounds. No hepatosplenomegaly. No rebound or guarding  Genitourinary: no prima fit  Extremities: No clubbing, cyanosis, decreased edema in b/l feet to calves compared to yesterday  Skin: Warm and dry.   Neuro: A&O x 3.  Psych: Normal affect and mood      MEDICATIONS:  MEDICATIONS  (STANDING):  aspirin enteric coated 81 milliGRAM(s) Oral daily  atorvastatin 20 milliGRAM(s) Oral at bedtime  enoxaparin Injectable 30 milliGRAM(s) SubCutaneous daily  magnesium sulfate  IVPB 1 Gram(s) IV Intermittent once    MEDICATIONS  (PRN):  acetaminophen   Tablet .. 650 milliGRAM(s) Oral every 6 hours PRN Mild Pain (1 - 3), Moderate Pain (4 - 6)      ALLERGIES:  Allergies    Bactrim (Unknown)  Cleocin HCl (Unknown)  Flagyl (Unknown)  Honey (Unknown)  iodine (Anaphylaxis)  IV Contrast (Anaphylaxis)  Levaquin (Other; Rash)  penicillin (Unknown)  Zithromax (Unknown)    Intolerances        LABS:                        11.8   9.11  )-----------( 243      ( 12 Oct 2020 06:25 )             38.1     10-12    145  |  99  |  28<H>  ----------------------------<  86  4.1   |  40<H>  |  1.00    Ca    8.8      12 Oct 2020 06:25  Phos  2.6     10-12  Mg     1.9     10-12    TPro  5.2<L>  /  Alb  3.0<L>  /  TBili  0.2  /  DBili  x   /  AST  28  /  ALT  25  /  AlkPhos  134<H>  10-12    PT/INR - ( 11 Oct 2020 06:40 )   PT: 11.3 sec;   INR: 0.94          PTT - ( 11 Oct 2020 06:40 )  PTT:31.6 sec    CAPILLARY BLOOD GLUCOSE          RADIOLOGY & ADDITIONAL TESTS: Reviewed.

## 2020-10-14 ENCOUNTER — TRANSCRIPTION ENCOUNTER (OUTPATIENT)
Age: 85
End: 2020-10-14

## 2020-10-14 ENCOUNTER — APPOINTMENT (OUTPATIENT)
Dept: INTERNAL MEDICINE | Facility: CLINIC | Age: 85
End: 2020-10-14

## 2020-10-14 VITALS
HEART RATE: 89 BPM | SYSTOLIC BLOOD PRESSURE: 107 MMHG | RESPIRATION RATE: 20 BRPM | OXYGEN SATURATION: 92 % | DIASTOLIC BLOOD PRESSURE: 66 MMHG | TEMPERATURE: 98 F

## 2020-10-14 LAB
ALBUMIN SERPL ELPH-MCNC: 3.2 G/DL — LOW (ref 3.3–5)
ALP SERPL-CCNC: 132 U/L — HIGH (ref 40–120)
ALT FLD-CCNC: 19 U/L — SIGNIFICANT CHANGE UP (ref 10–45)
ANION GAP SERPL CALC-SCNC: 8 MMOL/L — SIGNIFICANT CHANGE UP (ref 5–17)
AST SERPL-CCNC: 23 U/L — SIGNIFICANT CHANGE UP (ref 10–40)
BILIRUB SERPL-MCNC: 0.4 MG/DL — SIGNIFICANT CHANGE UP (ref 0.2–1.2)
BUN SERPL-MCNC: 20 MG/DL — SIGNIFICANT CHANGE UP (ref 7–23)
CALCIUM SERPL-MCNC: 8.9 MG/DL — SIGNIFICANT CHANGE UP (ref 8.4–10.5)
CHLORIDE SERPL-SCNC: 98 MMOL/L — SIGNIFICANT CHANGE UP (ref 96–108)
CO2 SERPL-SCNC: 36 MMOL/L — HIGH (ref 22–31)
CREAT SERPL-MCNC: 0.83 MG/DL — SIGNIFICANT CHANGE UP (ref 0.5–1.3)
GLUCOSE SERPL-MCNC: 92 MG/DL — SIGNIFICANT CHANGE UP (ref 70–99)
MAGNESIUM SERPL-MCNC: 2 MG/DL — SIGNIFICANT CHANGE UP (ref 1.6–2.6)
NON-GYNECOLOGICAL CYTOLOGY STUDY: SIGNIFICANT CHANGE UP
POTASSIUM SERPL-MCNC: 4.8 MMOL/L — SIGNIFICANT CHANGE UP (ref 3.5–5.3)
POTASSIUM SERPL-SCNC: 4.8 MMOL/L — SIGNIFICANT CHANGE UP (ref 3.5–5.3)
PROT SERPL-MCNC: 6.1 G/DL — SIGNIFICANT CHANGE UP (ref 6–8.3)
SODIUM SERPL-SCNC: 142 MMOL/L — SIGNIFICANT CHANGE UP (ref 135–145)

## 2020-10-14 PROCEDURE — 99239 HOSP IP/OBS DSCHRG MGMT >30: CPT

## 2020-10-14 RX ADMIN — Medication 81 MILLIGRAM(S): at 12:23

## 2020-10-14 RX ADMIN — Medication 650 MILLIGRAM(S): at 12:24

## 2020-10-14 RX ADMIN — ENOXAPARIN SODIUM 30 MILLIGRAM(S): 100 INJECTION SUBCUTANEOUS at 12:23

## 2020-10-14 NOTE — DISCHARGE NOTE NURSING/CASE MANAGEMENT/SOCIAL WORK - PATIENT PORTAL LINK FT
You can access the FollowMyHealth Patient Portal offered by [facility] by registering at the following website: [email or web address]. By joining [employer]’s FollowMyHealth portal, you will also be able to view your health information using other applications (apps) compatible with our system.

## 2020-10-14 NOTE — PROGRESS NOTE ADULT - SUBJECTIVE AND OBJECTIVE BOX
Physical Medicine and Rehabilitation Progress Note:    Patient is a 87y old  Female who presents with a chief complaint of Pleuritic chest pain (13 Oct 2020 14:08)      HPI:  This is an 87F with PMHx of lung adenocarcinoma s/p resection/XRT, severe MR, and recent admission 8/2020 for fall and R leg weakness who now presents for several days of pleuritic chest pain. Patient complains of 1 day of chest pain, mostly on the L, sharp, radiating to R, worse with movement, deep breaths, and coughing, better with rest. She denies any associated shortness of breath, lightheadedness or dizziness, fevers, or chills. She did endorse LE swelling that has been x1 week, associated with pain in legs on touch for few days. On ROS, patient endorses urinary frequency (once every hour, small amounts), without dysuria or malodor.    In ED: T97, HR 98, /98, RR 18 sating 100% on 3L NC and 98% on RA. Patient received 10mg IVP lasix, tylenol 650mg PO, benadryl 25mg, solumedrol 40mg x2. LE dopplers negative for clots. CT PE negative for PE.  (10 Oct 2020 12:56)                            12.9   10.55 )-----------( 252      ( 13 Oct 2020 07:56 )             41.0       10-14    142  |  98  |  20  ----------------------------<  92  4.8   |  36<H>  |  0.83    Ca    8.9      14 Oct 2020 06:07  Mg     2.0     10-14    TPro  6.1  /  Alb  3.2<L>  /  TBili  0.4  /  DBili  x   /  AST  23  /  ALT  19  /  AlkPhos  132<H>  10-14    Vital Signs Last 24 Hrs  T(C): 36.4 (14 Oct 2020 08:53), Max: 36.9 (13 Oct 2020 15:38)  T(F): 97.6 (14 Oct 2020 08:53), Max: 98.4 (13 Oct 2020 15:38)  HR: 89 (14 Oct 2020 08:53) (79 - 98)  BP: 107/66 (14 Oct 2020 08:53) (101/59 - 127/78)  BP(mean): --  RR: 20 (14 Oct 2020 08:53) (18 - 20)  SpO2: 92% (14 Oct 2020 08:53) (92% - 99%)    MEDICATIONS  (STANDING):  aspirin enteric coated 81 milliGRAM(s) Oral daily  atorvastatin 20 milliGRAM(s) Oral at bedtime  enoxaparin Injectable 30 milliGRAM(s) SubCutaneous daily  magnesium sulfate  IVPB 1 Gram(s) IV Intermittent once    MEDICATIONS  (PRN):  acetaminophen   Tablet .. 650 milliGRAM(s) Oral every 6 hours PRN Mild Pain (1 - 3), Moderate Pain (4 - 6)    Currently Undergoing Physical/ Occupational Therapy at bedside.    Functional Status Assessment:         Therapeutic Interventions      Bed Mobility  Bed Mobility Training Sit-to-Supine: supervision;  supervision  Bed Mobility Training Supine-to-Sit: supervision;  supervision  Bed Mobility Training Limitations: pain    Sit-Stand Transfer Training  Transfer Training Sit-to-Stand Transfer: supervision;  supervision;  full weight-bearing  Transfer Training Stand-to-Sit Transfer: supervision;  supervision;  full weight-bearing    Gait Training  Gait Training: supervision;  supervision;  full weight-bearing   100 feet  Gait Analysis: 2-point gait   decreased beth;  100 feet  Gait Number of Times:: x 1    Therapeutic Exercise  Therapeutic Exercise Detail: LAQs, seated marching, x 10           PM&R Impression: as above    Current Disposition Plan Recommendations:  d/c home, home physical therapy

## 2020-10-14 NOTE — PROGRESS NOTE ADULT - SUBJECTIVE AND OBJECTIVE BOX
NOTE IN PROGRESS INCOMPELETE    OVERNIGHT EVENTS:    SUBJECTIVE / INTERVAL HPI: Patient seen and examined at bedside. No fevers/chills, nausea, vomiting, diarrhea, abdominal pain, chest pain, shortness of breath.      VITAL SIGNS:  Vital Signs Last 24 Hrs  T(C): 36.6 (14 Oct 2020 05:24), Max: 36.9 (13 Oct 2020 15:38)  T(F): 97.8 (14 Oct 2020 05:24), Max: 98.4 (13 Oct 2020 15:38)  HR: 79 (14 Oct 2020 05:24) (79 - 98)  BP: 101/59 (14 Oct 2020 05:24) (100/53 - 127/78)  BP(mean): --  RR: 18 (14 Oct 2020 05:24) (18 - 20)  SpO2: 99% (14 Oct 2020 05:24) (95% - 99%)    PHYSICAL EXAM:    General: WDWN  HEENT: NC/AT; PERRL, anicteric sclera; MMM  Neck: supple  Cardiovascular: +S1/S2; RRR  Respiratory: CTA B/L; no W/R/R  Gastrointestinal: soft, NT/ND; +BSx4  Extremities: WWP; no edema, clubbing or cyanosis  Vascular: 2+ radial, DP/PT pulses B/L  Neurological: AAOx3; no focal deficits    MEDICATIONS:  MEDICATIONS  (STANDING):  aspirin enteric coated 81 milliGRAM(s) Oral daily  atorvastatin 20 milliGRAM(s) Oral at bedtime  enoxaparin Injectable 30 milliGRAM(s) SubCutaneous daily  magnesium sulfate  IVPB 1 Gram(s) IV Intermittent once    MEDICATIONS  (PRN):  acetaminophen   Tablet .. 650 milliGRAM(s) Oral every 6 hours PRN Mild Pain (1 - 3), Moderate Pain (4 - 6)      ALLERGIES:  Allergies    Bactrim (Unknown)  Cleocin HCl (Unknown)  Flagyl (Unknown)  Honey (Unknown)  iodine (Anaphylaxis)  IV Contrast (Anaphylaxis)  Levaquin (Other; Rash)  penicillin (Unknown)  Zithromax (Unknown)    Intolerances        LABS:                        12.9   10.55 )-----------( 252      ( 13 Oct 2020 07:56 )             41.0     10-13    143  |  97  |  29<H>  ----------------------------<  85  4.0   |  38<H>  |  0.77    Ca    8.9      13 Oct 2020 07:56  Mg     2.0     10-13    TPro  5.8<L>  /  Alb  3.1<L>  /  TBili  0.3  /  DBili  x   /  AST  25  /  ALT  21  /  AlkPhos  136<H>  10-13        CAPILLARY BLOOD GLUCOSE          RADIOLOGY & ADDITIONAL TESTS: Reviewed.

## 2020-10-14 NOTE — PROGRESS NOTE ADULT - ASSESSMENT
per Internal Medicine    87F with PMHx of lung adenocarcinoma s/p resection/XRT, severe MR, and recent admission 8/2020 for fall and R leg weakness who now presents for several days of pleuritic chest pain found to have R sided pleural effusion s/p thoracentesis suspicious for malignancy.    Problem/Plan - 1:  ·  Problem: Pleural effusion on right.  Plan: - Patient presenting with pleuritic chest pain found to have R sided large effusion on CXR and on CT chest. CT PE negative for PE. LE dopplers negative for DVT.   - thoracentesis performed 10/ cc removed  - fluid analysis from pleural effusion: predominantly mesothelial and monocyte/macrophage  - f/u cytology  - daily CXR    Addend: Repeat CXR without reaccumulation of fluid yet, will need outpt pulm follow up.     Problem/Plan - 2:  ·  Problem: Mitral regurgitation.  Plan: - Patient has severe mitral regurg, on echo 8/2020 with evidence of MR. No evidence of heart failure on echo  - Avoid IV fluids, will encourage PO hydration  - continue to monitor fluid status    Addend: will continue to hold lasix as patient dry on exam, will need outpt follow up for eval of continuation of prn lasix.     Problem/Plan - 3:  ·  Problem: Adenocarcinoma of lung.  Plan: - History of adenocarcinoma of RLL s/p VATS resection in 2013, ANANYA RT in 2016, and RMF lesion s/p RT in 2017  - Most recent diagnostic thoracentesis 8/2020  - PET showing 2 new FDG-avid subcentimeter nodules in L subpleural region  - F/u pulm recs  - F/u thoracentesis fluid cytology.     Problem/Plan - 4:  ·  Problem: Hypernatremia.  Plan: RESOLVED  - continue to trend BMP.     Problem/Plan - 5:  ·  Problem: Elevated alkaline phosphatase level.  Plan: Elevated on admission, now stabilized.  Likely 2/2 age and increased bone turnover.  -c/w lipitor  -continue to monitor.     Problem/Plan - 6:  Problem: Leukocytosis. Plan: - Elevated WBC may be reactive in setting of pleural effusion. No other s/s infection. No URI symptoms, COVID negative, no UTI symptoms, CXR with pleural effusion, CT chest without evidence of consolidation, no abdominal pain.  - downtrending; continue to trend CBC  - UCx NGTD.    Problem/Plan - 7:  ·  Problem: Nutrition, metabolism, and development symptoms.  Plan: F: Avoid IVF  E: Replete PRN  N: Regular  Code full  DVT campos DE LA TORRE RMF.

## 2020-10-14 NOTE — PROGRESS NOTE ADULT - REASON FOR ADMISSION
Pleuritic chest pain

## 2020-10-14 NOTE — DISCHARGE NOTE NURSING/CASE MANAGEMENT/SOCIAL WORK - NSDCFUADDAPPT_GEN_ALL_CORE_FT
Please follow up with your Pulmonary Provider, Dr. Jose G Addison at [address] [facility], [address] [facility], [place] [address] on 12/19/2020 at 12:40pm.    Please follow up with your Cardiology Provider, Dr. Aleksandar Harrison at [address] [place] [address] on 10/29/2020 at 2:00pm. Office of Dr. Aleksandar Harrison is requesting that you avoid high risk COVID areas prior to appointment.     Please bring insurance card, ID, and discharge paperwork to your appointments.    Appointments were scheduled by Ms. GEORGE Ye, Referral Coordinator.

## 2020-10-15 DIAGNOSIS — Z11.59 ENCOUNTER FOR SCREENING FOR OTHER VIRAL DISEASES: ICD-10-CM

## 2020-10-16 LAB
CULTURE RESULTS: NO GROWTH — SIGNIFICANT CHANGE UP
SPECIMEN SOURCE: SIGNIFICANT CHANGE UP

## 2020-10-19 ENCOUNTER — APPOINTMENT (OUTPATIENT)
Dept: PULMONOLOGY | Facility: CLINIC | Age: 85
End: 2020-10-19

## 2020-10-21 RX ORDER — GUAIFENESIN 100 MG/5ML
100 LIQUID ORAL EVERY 4 HOURS
Qty: 420 | Refills: 0 | Status: ACTIVE | COMMUNITY
Start: 2020-01-17 | End: 1900-01-01

## 2020-10-21 RX ORDER — FLUTICASONE PROPIONATE 50 UG/1
50 SPRAY, METERED NASAL TWICE DAILY
Qty: 1 | Refills: 1 | Status: ACTIVE | COMMUNITY
Start: 2020-10-21 | End: 1900-01-01

## 2020-10-22 ENCOUNTER — INPATIENT (INPATIENT)
Facility: HOSPITAL | Age: 85
LOS: 12 days | Discharge: NURSING, REHAB OR LONG-TERM CARE | DRG: 853 | End: 2020-11-04
Attending: HOSPITALIST | Admitting: HOSPITALIST
Payer: MEDICARE

## 2020-10-22 ENCOUNTER — NON-APPOINTMENT (OUTPATIENT)
Age: 85
End: 2020-10-22

## 2020-10-22 ENCOUNTER — APPOINTMENT (OUTPATIENT)
Dept: INTERNAL MEDICINE | Facility: CLINIC | Age: 85
End: 2020-10-22

## 2020-10-22 VITALS
SYSTOLIC BLOOD PRESSURE: 150 MMHG | DIASTOLIC BLOOD PRESSURE: 70 MMHG | TEMPERATURE: 98 F | HEIGHT: 57.5 IN | OXYGEN SATURATION: 99 % | RESPIRATION RATE: 40 BRPM | HEART RATE: 106 BPM

## 2020-10-22 DIAGNOSIS — R63.8 OTHER SYMPTOMS AND SIGNS CONCERNING FOOD AND FLUID INTAKE: ICD-10-CM

## 2020-10-22 DIAGNOSIS — M54.9 DORSALGIA, UNSPECIFIED: ICD-10-CM

## 2020-10-22 DIAGNOSIS — C34.90 MALIGNANT NEOPLASM OF UNSPECIFIED PART OF UNSPECIFIED BRONCHUS OR LUNG: ICD-10-CM

## 2020-10-22 DIAGNOSIS — J90 PLEURAL EFFUSION, NOT ELSEWHERE CLASSIFIED: ICD-10-CM

## 2020-10-22 DIAGNOSIS — J18.9 PNEUMONIA, UNSPECIFIED ORGANISM: ICD-10-CM

## 2020-10-22 DIAGNOSIS — A41.9 SEPSIS, UNSPECIFIED ORGANISM: ICD-10-CM

## 2020-10-22 DIAGNOSIS — J96.01 ACUTE RESPIRATORY FAILURE WITH HYPOXIA: ICD-10-CM

## 2020-10-22 DIAGNOSIS — I34.0 NONRHEUMATIC MITRAL (VALVE) INSUFFICIENCY: ICD-10-CM

## 2020-10-22 DIAGNOSIS — E78.5 HYPERLIPIDEMIA, UNSPECIFIED: ICD-10-CM

## 2020-10-22 LAB
ALBUMIN SERPL ELPH-MCNC: 2.9 G/DL — LOW (ref 3.3–5)
ALBUMIN SERPL ELPH-MCNC: 3 G/DL — LOW (ref 3.3–5)
ALP SERPL-CCNC: 128 U/L — HIGH (ref 40–120)
ALP SERPL-CCNC: SIGNIFICANT CHANGE UP U/L (ref 40–120)
ALT FLD-CCNC: 15 U/L — SIGNIFICANT CHANGE UP (ref 10–45)
ALT FLD-CCNC: SIGNIFICANT CHANGE UP U/L (ref 10–45)
ANION GAP SERPL CALC-SCNC: 10 MMOL/L — SIGNIFICANT CHANGE UP (ref 5–17)
ANION GAP SERPL CALC-SCNC: 12 MMOL/L — SIGNIFICANT CHANGE UP (ref 5–17)
APTT BLD: 29.3 SEC — SIGNIFICANT CHANGE UP (ref 27.5–35.5)
AST SERPL-CCNC: 19 U/L — SIGNIFICANT CHANGE UP (ref 10–40)
AST SERPL-CCNC: SIGNIFICANT CHANGE UP U/L (ref 10–40)
BASOPHILS # BLD AUTO: 0.02 K/UL — SIGNIFICANT CHANGE UP (ref 0–0.2)
BASOPHILS NFR BLD AUTO: 0.1 % — SIGNIFICANT CHANGE UP (ref 0–2)
BILIRUB SERPL-MCNC: 0.2 MG/DL — SIGNIFICANT CHANGE UP (ref 0.2–1.2)
BILIRUB SERPL-MCNC: <0.2 MG/DL — SIGNIFICANT CHANGE UP (ref 0.2–1.2)
BUN SERPL-MCNC: 17 MG/DL — SIGNIFICANT CHANGE UP (ref 7–23)
BUN SERPL-MCNC: 17 MG/DL — SIGNIFICANT CHANGE UP (ref 7–23)
CALCIUM SERPL-MCNC: 8.7 MG/DL — SIGNIFICANT CHANGE UP (ref 8.4–10.5)
CALCIUM SERPL-MCNC: 9.1 MG/DL — SIGNIFICANT CHANGE UP (ref 8.4–10.5)
CHLORIDE SERPL-SCNC: 101 MMOL/L — SIGNIFICANT CHANGE UP (ref 96–108)
CHLORIDE SERPL-SCNC: 105 MMOL/L — SIGNIFICANT CHANGE UP (ref 96–108)
CO2 SERPL-SCNC: 29 MMOL/L — SIGNIFICANT CHANGE UP (ref 22–31)
CO2 SERPL-SCNC: 31 MMOL/L — SIGNIFICANT CHANGE UP (ref 22–31)
CREAT SERPL-MCNC: 0.63 MG/DL — SIGNIFICANT CHANGE UP (ref 0.5–1.3)
CREAT SERPL-MCNC: 0.71 MG/DL — SIGNIFICANT CHANGE UP (ref 0.5–1.3)
EOSINOPHIL # BLD AUTO: 0.03 K/UL — SIGNIFICANT CHANGE UP (ref 0–0.5)
EOSINOPHIL NFR BLD AUTO: 0.2 % — SIGNIFICANT CHANGE UP (ref 0–6)
GAS PNL BLDV: SIGNIFICANT CHANGE UP
GLUCOSE SERPL-MCNC: 172 MG/DL — HIGH (ref 70–99)
GLUCOSE SERPL-MCNC: 85 MG/DL — SIGNIFICANT CHANGE UP (ref 70–99)
HCT VFR BLD CALC: 38.5 % — SIGNIFICANT CHANGE UP (ref 34.5–45)
HGB BLD-MCNC: 12.4 G/DL — SIGNIFICANT CHANGE UP (ref 11.5–15.5)
IMM GRANULOCYTES NFR BLD AUTO: 0.4 % — SIGNIFICANT CHANGE UP (ref 0–1.5)
INR BLD: 1.13 — SIGNIFICANT CHANGE UP (ref 0.88–1.16)
LACTATE SERPL-SCNC: 1.4 MMOL/L — SIGNIFICANT CHANGE UP (ref 0.5–2)
LACTATE SERPL-SCNC: 3 MMOL/L — HIGH (ref 0.5–2)
LYMPHOCYTES # BLD AUTO: 0.84 K/UL — LOW (ref 1–3.3)
LYMPHOCYTES # BLD AUTO: 5.4 % — LOW (ref 13–44)
MCHC RBC-ENTMCNC: 32 PG — SIGNIFICANT CHANGE UP (ref 27–34)
MCHC RBC-ENTMCNC: 32.2 GM/DL — SIGNIFICANT CHANGE UP (ref 32–36)
MCV RBC AUTO: 99.2 FL — SIGNIFICANT CHANGE UP (ref 80–100)
MONOCYTES # BLD AUTO: 1.24 K/UL — HIGH (ref 0–0.9)
MONOCYTES NFR BLD AUTO: 8 % — SIGNIFICANT CHANGE UP (ref 2–14)
NEUTROPHILS # BLD AUTO: 13.32 K/UL — HIGH (ref 1.8–7.4)
NEUTROPHILS NFR BLD AUTO: 85.9 % — HIGH (ref 43–77)
NRBC # BLD: 0 /100 WBCS — SIGNIFICANT CHANGE UP (ref 0–0)
NT-PROBNP SERPL-SCNC: 1034 PG/ML — HIGH (ref 0–300)
PLATELET # BLD AUTO: 417 K/UL — HIGH (ref 150–400)
POTASSIUM SERPL-MCNC: 4.6 MMOL/L — SIGNIFICANT CHANGE UP (ref 3.5–5.3)
POTASSIUM SERPL-MCNC: SIGNIFICANT CHANGE UP MMOL/L (ref 3.5–5.3)
POTASSIUM SERPL-SCNC: 4.6 MMOL/L — SIGNIFICANT CHANGE UP (ref 3.5–5.3)
POTASSIUM SERPL-SCNC: SIGNIFICANT CHANGE UP MMOL/L (ref 3.5–5.3)
PROT SERPL-MCNC: 6.5 G/DL — SIGNIFICANT CHANGE UP (ref 6–8.3)
PROT SERPL-MCNC: 6.8 G/DL — SIGNIFICANT CHANGE UP (ref 6–8.3)
PROTHROM AB SERPL-ACNC: 13.5 SEC — SIGNIFICANT CHANGE UP (ref 10.6–13.6)
RBC # BLD: 3.88 M/UL — SIGNIFICANT CHANGE UP (ref 3.8–5.2)
RBC # FLD: 15.4 % — HIGH (ref 10.3–14.5)
SARS-COV-2 RNA SPEC QL NAA+PROBE: SIGNIFICANT CHANGE UP
SODIUM SERPL-SCNC: 142 MMOL/L — SIGNIFICANT CHANGE UP (ref 135–145)
SODIUM SERPL-SCNC: 146 MMOL/L — HIGH (ref 135–145)
TROPONIN T SERPL-MCNC: <0.01 NG/ML — SIGNIFICANT CHANGE UP (ref 0–0.01)
WBC # BLD: 15.51 K/UL — HIGH (ref 3.8–10.5)
WBC # FLD AUTO: 15.51 K/UL — HIGH (ref 3.8–10.5)

## 2020-10-22 PROCEDURE — 71045 X-RAY EXAM CHEST 1 VIEW: CPT | Mod: 26

## 2020-10-22 PROCEDURE — 99223 1ST HOSP IP/OBS HIGH 75: CPT | Mod: GC

## 2020-10-22 PROCEDURE — 93010 ELECTROCARDIOGRAM REPORT: CPT

## 2020-10-22 PROCEDURE — 99285 EMERGENCY DEPT VISIT HI MDM: CPT | Mod: CS,25

## 2020-10-22 RX ORDER — ENOXAPARIN SODIUM 100 MG/ML
40 INJECTION SUBCUTANEOUS ONCE
Refills: 0 | Status: DISCONTINUED | OUTPATIENT
Start: 2020-10-22 | End: 2020-10-22

## 2020-10-22 RX ORDER — CEFEPIME 1 G/1
2000 INJECTION, POWDER, FOR SOLUTION INTRAMUSCULAR; INTRAVENOUS EVERY 12 HOURS
Refills: 0 | Status: DISCONTINUED | OUTPATIENT
Start: 2020-10-23 | End: 2020-10-23

## 2020-10-22 RX ORDER — SODIUM CHLORIDE 9 MG/ML
500 INJECTION INTRAMUSCULAR; INTRAVENOUS; SUBCUTANEOUS ONCE
Refills: 0 | Status: DISCONTINUED | OUTPATIENT
Start: 2020-10-22 | End: 2020-10-22

## 2020-10-22 RX ORDER — INFLUENZA VIRUS VACCINE 15; 15; 15; 15 UG/.5ML; UG/.5ML; UG/.5ML; UG/.5ML
0.7 SUSPENSION INTRAMUSCULAR ONCE
Refills: 0 | Status: COMPLETED | OUTPATIENT
Start: 2020-10-22 | End: 2020-11-01

## 2020-10-22 RX ORDER — CEFEPIME 1 G/1
2000 INJECTION, POWDER, FOR SOLUTION INTRAMUSCULAR; INTRAVENOUS EVERY 12 HOURS
Refills: 0 | Status: COMPLETED | OUTPATIENT
Start: 2020-10-22 | End: 2020-10-23

## 2020-10-22 RX ORDER — VANCOMYCIN HCL 1 G
750 VIAL (EA) INTRAVENOUS ONCE
Refills: 0 | Status: COMPLETED | OUTPATIENT
Start: 2020-10-22 | End: 2020-10-22

## 2020-10-22 RX ORDER — CEFEPIME 1 G/1
2000 INJECTION, POWDER, FOR SOLUTION INTRAMUSCULAR; INTRAVENOUS EVERY 12 HOURS
Refills: 0 | Status: DISCONTINUED | OUTPATIENT
Start: 2020-10-22 | End: 2020-10-22

## 2020-10-22 RX ORDER — ACETAMINOPHEN 500 MG
650 TABLET ORAL EVERY 6 HOURS
Refills: 0 | Status: DISCONTINUED | OUTPATIENT
Start: 2020-10-22 | End: 2020-10-27

## 2020-10-22 RX ORDER — INFLUENZA VIRUS VACCINE 15; 15; 15; 15 UG/.5ML; UG/.5ML; UG/.5ML; UG/.5ML
0.5 SUSPENSION INTRAMUSCULAR ONCE
Refills: 0 | Status: DISCONTINUED | OUTPATIENT
Start: 2020-10-22 | End: 2020-10-22

## 2020-10-22 RX ORDER — CEFTAZIDIME 6 G/30ML
1 INJECTION, POWDER, FOR SOLUTION INTRAVENOUS ONCE
Refills: 0 | Status: COMPLETED | OUTPATIENT
Start: 2020-10-22 | End: 2020-10-22

## 2020-10-22 RX ORDER — ATORVASTATIN CALCIUM 80 MG/1
20 TABLET, FILM COATED ORAL AT BEDTIME
Refills: 0 | Status: DISCONTINUED | OUTPATIENT
Start: 2020-10-22 | End: 2020-11-04

## 2020-10-22 RX ORDER — VANCOMYCIN HCL 1 G
750 VIAL (EA) INTRAVENOUS EVERY 12 HOURS
Refills: 0 | Status: DISCONTINUED | OUTPATIENT
Start: 2020-10-23 | End: 2020-10-23

## 2020-10-22 RX ORDER — ENOXAPARIN SODIUM 100 MG/ML
40 INJECTION SUBCUTANEOUS EVERY 24 HOURS
Refills: 0 | Status: DISCONTINUED | OUTPATIENT
Start: 2020-10-22 | End: 2020-10-24

## 2020-10-22 RX ADMIN — ENOXAPARIN SODIUM 40 MILLIGRAM(S): 100 INJECTION SUBCUTANEOUS at 19:29

## 2020-10-22 RX ADMIN — CEFTAZIDIME 100 GRAM(S): 6 INJECTION, POWDER, FOR SOLUTION INTRAVENOUS at 13:54

## 2020-10-22 RX ADMIN — Medication 250 MILLIGRAM(S): at 14:17

## 2020-10-22 RX ADMIN — ATORVASTATIN CALCIUM 20 MILLIGRAM(S): 80 TABLET, FILM COATED ORAL at 21:50

## 2020-10-22 RX ADMIN — CEFEPIME 100 MILLIGRAM(S): 1 INJECTION, POWDER, FOR SOLUTION INTRAMUSCULAR; INTRAVENOUS at 19:43

## 2020-10-22 RX ADMIN — CEFTAZIDIME 1 GRAM(S): 6 INJECTION, POWDER, FOR SOLUTION INTRAVENOUS at 14:24

## 2020-10-22 NOTE — H&P ADULT - PROBLEM SELECTOR PLAN 7
Patient with history of compression fractures and reports worsening back pain lately. Followed by outpatient ortho.  - PT consult  - Tylenol PRN for pain control

## 2020-10-22 NOTE — H&P ADULT - PROBLEM SELECTOR PLAN 1
Patient met criteria for severe sepsis w/ leukocytosis, tachycardia, tachypnea and elevated lactate. S/p Ceftazadme 1g IV x1, Vanc 750mg IV x1 in ED. No fluids given due to severe MR. Sepsis likely 2/2 to pneumonia.  -plan as below Patient met criteria for severe sepsis w/ leukocytosis, tachycardia, tachypnea and elevated lactate. S/p Ceftazadme 1g IV x1, Vanc 750mg IV x1 in ED. No fluids given due to moderate MR. Sepsis likely 2/2 to pneumonia.  - f/u blood cx  - repeat lactate pending

## 2020-10-22 NOTE — ED ADULT TRIAGE NOTE - CHIEF COMPLAINT QUOTE
86 y/o female BIBEMS for evaluation of SOB, as per EMS visiting nurse found patient ot be hypoxic in the 80s, Pt with hx of lung CA, on home oxygen at 3L usually, now on non-rebreather.

## 2020-10-22 NOTE — ED PROVIDER NOTE - OBJECTIVE STATEMENT
87F with PMHx of lung adenocarcinoma s/p resection/XRT, severe MR BIBA, here for worsening of leg edema and hypoxia noted at home.  Also, complains of chronic SOB for the past 6 months. Occasionally has productive cough with yellow thick mucous. Denies fever, chills, CP, urinary symptoms. 87F with PMHx of lung adenocarcinoma s/p resection/XRT, severe MR BIBA for worsening leg edema and hypoxia noted by VN at home. O2 sat in 80's on 3L NC. + chronic SOB which is worse in the past few days, no allev/ aggrav factors. No chest pain, palp. Occasionally has productive cough with yellow thick mucous. Denies fever, chills, abd pain, n/v/d, urinary sx's. No sick contacts/ recent travel.

## 2020-10-22 NOTE — ED ADULT NURSE NOTE - OBJECTIVE STATEMENT
86 yo f pmhx lung CA not currently on chemotherapy presents to ED BIBEMS co SOB. Pt home nurse called EMS d/t o2 saturation in the 80s on 3L NC which is patients baseline. Upgraded to MD Hagen. Upon arrival pt in 15L NC and sating at 10%, RR 22, denies feeling short of breath. Speaking in full and coherent sentences, chest rise equal and symmetrical, no tripod positioning noted. BLE edema present. Denies fevers, chills, N/V/D, CP.

## 2020-10-22 NOTE — CHART NOTE - NSCHARTNOTEFT_GEN_A_CORE
PULMONARY NOTIFICATION NOTE:    Notified by pt's pulmonologist, Dr. Nguyen, that Ms. Woods will be coming in to ED today for worsening SOB. She is well-known to our pulmonary service, and was originally planned for R-sided pleuroscopy with pleurodesis, however was cancelled due to 'not feeling well'.     Spoke with Dr. Nguyen - plan is to reschedule Ms. Woods for her intended procedure tomorrow, Friday 10/23 in the afternoon, here at in-patient endoscopy.     - Keep NPO at midnight  - Hold SubQ Heparin/Lovenox (DVT ppx) after midnight tonight  - Please obtain CBC, PT/INR, PTT, Type & Screen tomorrow AM      Formal consult note to follow

## 2020-10-22 NOTE — H&P ADULT - NSHPPHYSICALEXAM_GEN_ALL_CORE
.  VITAL SIGNS:  T(C): 36.8 (10-22-20 @ 12:12), Max: 36.8 (10-22-20 @ 12:12)  T(F): 98.2 (10-22-20 @ 12:12), Max: 98.2 (10-22-20 @ 12:12)  HR: 90 (10-22-20 @ 14:03) (86 - 106)  BP: 140/62 (10-22-20 @ 14:03) (118/89 - 152/72)  BP(mean): --  RR: 20 (10-22-20 @ 14:03) (20 - 40)  SpO2: 100% (10-22-20 @ 14:03) (99% - 100%)  Wt(kg): --    PHYSICAL EXAM:    Constitutional: Thin elderly female sitting upright in bed. Patient in NAD speaking in full sentences.  Head: NC/AT  Eyes: PERRL, EOMI, anicteric sclera  ENT: no nasal discharge; uvula midline, no oropharyngeal erythema or exudates; MMM  Neck: supple; no JVD or thyromegaly  Respiratory: decreased breath sounds B/L; no retractions  Cardiac: +S1/S2; RRR; no M/R/G; PMI non-displaced  Gastrointestinal: abdomen soft, NT/ND; no rebound or guarding; +BSx4  Back: spine midline, palpable step-offs of lumbosacral spine;  Extremities: WWP, no clubbing or cyanosis; 2+ pitting edema b/l LE  Musculoskeletal: NROM x4; no joint swelling, tenderness or erythema  Dermatologic: skin warm, dry and intact; no rashes, wounds, or scars  Lymphatic: no submandibular or cervical LAD  Neurologic: AAOx3; no focal deficits  Psychiatric: affect and characteristics of appearance, verbalizations, behaviors are appropriate

## 2020-10-22 NOTE — CONSULT NOTE ADULT - SUBJECTIVE AND OBJECTIVE BOX
PULMONARY SERVICE INITIAL CONSULT NOTE  -------------------------------------------------------------    Chief Complaint/Reason for Consult:     HPI:         Otherwise, pt denies fevers/chills, HA, dizziness, CP, SOB, cough, palpitations, abd pain, N/V, bowel changes, ext swelling     -------------------------------------------------------------  PAST MEDICAL & SURGICAL HISTORY:  Malignant neoplasm of bronchus and lung, unspecified site  Lung cancer    History of surgery to major organs, presenting hazards to health  removal of R-sided adenocarcinoma, negative lymphnodes        FAMILY HISTORY:  No pertinent family history  No significant family history        SOCIAL HISTORY:  Smoking Status: [ ] Current, [ ] Former, [ ] Never  Pack Years:    MEDICATIONS:  Pulmonary:    Antimicrobials:    Anticoagulants:  enoxaparin Injectable 40 milliGRAM(s) SubCutaneous once    Onc:    GI/:    Endocrine:    Cardiac:    Other Medications:      Allergies    Bactrim (Unknown)  Cleocin HCl (Unknown)  Flagyl (Unknown)  Honey (Unknown)  iodine (Anaphylaxis)  IV Contrast (Anaphylaxis)  Levaquin (Other; Rash)  penicillin (Unknown)  Zithromax (Unknown)    Intolerances        Vital Signs Last 24 Hrs  T(C): 36.8 (22 Oct 2020 12:12), Max: 36.8 (22 Oct 2020 12:12)  T(F): 98.2 (22 Oct 2020 12:12), Max: 98.2 (22 Oct 2020 12:12)  HR: 90 (22 Oct 2020 14:03) (86 - 106)  BP: 140/62 (22 Oct 2020 14:03) (118/89 - 152/72)  BP(mean): --  RR: 20 (22 Oct 2020 14:03) (20 - 40)  SpO2: 100% (22 Oct 2020 14:03) (99% - 100%)        -------------------------------------------------------------  PHYSICAL EXAM:    GEN: Comfortable, in NAD  HEENT: NC/AT, PEERLA, MMM  CARDIAC: RRR, Normal S1/S2, No MRGs  PULMONARY: CTA BL, no wheezing/rhonchi/rales - no accessory muscle use   ABDOMEN: Soft, NT/ND   EXT: No LE edema  NEURO: A&O x 3, CN II-XII grossly intact, moves all ext, sensation intact    -------------------------------------------------------------  LABS:        CBC Full  -  ( 22 Oct 2020 12:39 )  WBC Count : 15.51 K/uL  RBC Count : 3.88 M/uL  Hemoglobin : 12.4 g/dL  Hematocrit : 38.5 %  Platelet Count - Automated : 417 K/uL  Mean Cell Volume : 99.2 fl  Mean Cell Hemoglobin : 32.0 pg  Mean Cell Hemoglobin Concentration : 32.2 gm/dL  Auto Neutrophil # : 13.32 K/uL  Auto Lymphocyte # : 0.84 K/uL  Auto Monocyte # : 1.24 K/uL  Auto Eosinophil # : 0.03 K/uL  Auto Basophil # : 0.02 K/uL  Auto Neutrophil % : 85.9 %  Auto Lymphocyte % : 5.4 %  Auto Monocyte % : 8.0 %  Auto Eosinophil % : 0.2 %  Auto Basophil % : 0.1 %    10-22    146<H>  |  105  |  17  ----------------------------<  85  4.6   |  31  |  0.71    Ca    8.7      22 Oct 2020 13:52    TPro  6.5  /  Alb  3.0<L>  /  TBili  <0.2  /  DBili  x   /  AST  19  /  ALT  15  /  AlkPhos  128<H>  10-22    PT/INR - ( 22 Oct 2020 12:39 )   PT: 13.5 sec;   INR: 1.13          PTT - ( 22 Oct 2020 12:39 )  PTT:29.3 sec                  -------------------------------------------------------------  RADIOLOGY & ADDITIONAL STUDIES:   PULMONARY SERVICE INITIAL CONSULT NOTE  -------------------------------------------------------------    HPI:     86yo F, nonsmoker, with PMHx of severe MR, and recent admission 8/2020 for fall and R leg weakness, again 10/2020 for SOB, with multiple lung Ca, with recurrent R-sided pleural effusions,  who presents with productive cough.     --     In 2013 she had initial diagnosis of early stage lung ca, resected.  New lung adeno diagnosed in 2014, treated with SBRT. In 2017 another lesion was found in the RML on imaging, no biopsy, though to be new  primary ca, empirically treated with radiation. In 2019 right apical opacity  positive for another primary lung adenocarcinoma in RUL (NGS sent: all mutations negative, PDL1<1%) lymph nodes negative by bronchoscopy. Patient went post biopsy into pulmonary edema (valvular disease, HFpEF) and was intubated in MICU followed by hospitalization for HAP. RUL lesion was treated with SBRT 3000 cGy out of a planned 5000 cGy, did not complete course because of another pna. PET CT 7/30/20 revealed 2 new FDG-avid sub-centimeter nodules in left sub-pleural region and large right pleural effusion, which was Ly predominant but negative for malignancy ( 8/11/20).    Pt was last hospitalized 2 weeks ago, in the setting of worsening BROWN. She again underwent R-sided thoracentesis, with lymphocytic predominance - cytology negative for malignant cells (but remains the primary differential of her recurrent effusions). She was discharged, with plans for outpatient follow-up with Dr. Nguyen. She was ultimately planned for pleuroscopy with biopsy, as well as pleurodesis to reduce the likelihood of recurrence to occur this Friday, 10/23; however the patient had called stating that she did not want to go through with the procedure, due to severe anxiety. She was not deemed a good candidate for PleurX due to her small body habitus.     Today, she presents to ED for hypoxemia to 80s on 3L NC (her usual O2), as well as productive cough with green sputum and LE edema.   She does not particularly report fevers/chills, HA, dizziness, CP, SOB, abd pain, N/V, bowel changes.     In ED, she was placed on NRB --> 3L NC with improvement in saturation.       Otherwise, pt denies fevers/chills, HA, dizziness, CP, SOB, cough, palpitations, abd pain, N/V, bowel changes, ext swelling     -------------------------------------------------------------  PAST MEDICAL & SURGICAL HISTORY:  Malignant neoplasm of bronchus and lung, unspecified site  Lung cancer    History of surgery to major organs, presenting hazards to health  removal of R-sided adenocarcinoma, negative lymphnodes        FAMILY HISTORY:  No pertinent family history  No significant family history        SOCIAL HISTORY:  Smoking Status: [ ] Current, [ ] Former, [ ] Never  Pack Years:    MEDICATIONS:  Pulmonary:    Antimicrobials:    Anticoagulants:  enoxaparin Injectable 40 milliGRAM(s) SubCutaneous once    Onc:    GI/:    Endocrine:    Cardiac:    Other Medications:      Allergies    Bactrim (Unknown)  Cleocin HCl (Unknown)  Flagyl (Unknown)  Honey (Unknown)  iodine (Anaphylaxis)  IV Contrast (Anaphylaxis)  Levaquin (Other; Rash)  penicillin (Unknown)  Zithromax (Unknown)    Intolerances        Vital Signs Last 24 Hrs  T(C): 36.8 (22 Oct 2020 12:12), Max: 36.8 (22 Oct 2020 12:12)  T(F): 98.2 (22 Oct 2020 12:12), Max: 98.2 (22 Oct 2020 12:12)  HR: 90 (22 Oct 2020 14:03) (86 - 106)  BP: 140/62 (22 Oct 2020 14:03) (118/89 - 152/72)  BP(mean): --  RR: 20 (22 Oct 2020 14:03) (20 - 40)  SpO2: 100% (22 Oct 2020 14:03) (99% - 100%)        -------------------------------------------------------------  PHYSICAL EXAM:    GEN: Comfortable, in NAD  HEENT: NC/AT, PEERLA, MMM  CARDIAC: RRR, Normal S1/S2, No MRGs  PULMONARY: CTA BL, no wheezing/rhonchi/rales - no accessory muscle use   ABDOMEN: Soft, NT/ND   EXT: No LE edema  NEURO: A&O x 3, CN II-XII grossly intact, moves all ext, sensation intact    -------------------------------------------------------------  LABS:        CBC Full  -  ( 22 Oct 2020 12:39 )  WBC Count : 15.51 K/uL  RBC Count : 3.88 M/uL  Hemoglobin : 12.4 g/dL  Hematocrit : 38.5 %  Platelet Count - Automated : 417 K/uL  Mean Cell Volume : 99.2 fl  Mean Cell Hemoglobin : 32.0 pg  Mean Cell Hemoglobin Concentration : 32.2 gm/dL  Auto Neutrophil # : 13.32 K/uL  Auto Lymphocyte # : 0.84 K/uL  Auto Monocyte # : 1.24 K/uL  Auto Eosinophil # : 0.03 K/uL  Auto Basophil # : 0.02 K/uL  Auto Neutrophil % : 85.9 %  Auto Lymphocyte % : 5.4 %  Auto Monocyte % : 8.0 %  Auto Eosinophil % : 0.2 %  Auto Basophil % : 0.1 %    10-22    146<H>  |  105  |  17  ----------------------------<  85  4.6   |  31  |  0.71    Ca    8.7      22 Oct 2020 13:52    TPro  6.5  /  Alb  3.0<L>  /  TBili  <0.2  /  DBili  x   /  AST  19  /  ALT  15  /  AlkPhos  128<H>  10-22    PT/INR - ( 22 Oct 2020 12:39 )   PT: 13.5 sec;   INR: 1.13          PTT - ( 22 Oct 2020 12:39 )  PTT:29.3 sec                  -------------------------------------------------------------  RADIOLOGY & ADDITIONAL STUDIES:   PULMONARY SERVICE INITIAL CONSULT NOTE  -------------------------------------------------------------    HPI:     88yo F, nonsmoker, with PMHx of severe MR, and recent admission 8/2020 for fall and R leg weakness, again 10/2020 for SOB, with multiple lung Ca, with recurrent R-sided pleural effusions,  who presents with productive cough.     --     In 2013 she had initial diagnosis of early stage lung ca, resected.  New lung adeno diagnosed in 2014, treated with SBRT. In 2017 another lesion was found in the RML on imaging, no biopsy, though to be new  primary ca, empirically treated with radiation. In 2019 right apical opacity  positive for another primary lung adenocarcinoma in RUL (NGS sent: all mutations negative, PDL1<1%) lymph nodes negative by bronchoscopy. Patient went post biopsy into pulmonary edema (valvular disease, HFpEF) and was intubated in MICU followed by hospitalization for HAP. RUL lesion was treated with SBRT 3000 cGy out of a planned 5000 cGy, did not complete course because of another pna. PET CT 7/30/20 revealed 2 new FDG-avid sub-centimeter nodules in left sub-pleural region and large right pleural effusion, which was Ly predominant but negative for malignancy ( 8/11/20).    Pt was last hospitalized 2 weeks ago, in the setting of worsening BROWN. She again underwent R-sided thoracentesis, with lymphocytic predominance - cytology negative for malignant cells (but remains the primary differential of her recurrent effusions). She was discharged, with plans for outpatient follow-up with Dr. Nguyen. She was ultimately planned for pleuroscopy with biopsy, as well as pleurodesis to reduce the likelihood of recurrence to occur this Friday, 10/23; however the patient had called stating that she did not want to go through with the procedure, due to severe anxiety. She was not deemed a good candidate for PleurX due to her small body habitus.     Today, she presents to ED for hypoxemia to 80s on 3L NC (her usual O2), as well as productive cough with green sputum and LE edema.   She does not particularly report fevers/chills, HA, dizziness, CP, SOB, abd pain, N/V, bowel changes.     In ED, she was placed on NRB --> 3L NC with improvement in saturation -  afebrile   WBC 15, Plt > 400, Pro-BNP ~1000, Trop neg     CXR with R > L pleural effusions       -------------------------------------------------------------  PAST MEDICAL & SURGICAL HISTORY:  Malignant neoplasm of bronchus and lung, unspecified site  Lung cancer    History of surgery to major organs, presenting hazards to health  removal of R-sided adenocarcinoma, negative lymphnodes        FAMILY HISTORY:  No pertinent family history  No significant family history        SOCIAL HISTORY:  Smoking Status: [ ] Current, [ ] Former, [X] Never  Pack Years:    MEDICATIONS:  Pulmonary:    Antimicrobials:    Anticoagulants:  enoxaparin Injectable 40 milliGRAM(s) SubCutaneous once    Onc:    GI/:    Endocrine:    Cardiac:    Other Medications:      Allergies    Bactrim (Unknown)  Cleocin HCl (Unknown)  Flagyl (Unknown)  Honey (Unknown)  iodine (Anaphylaxis)  IV Contrast (Anaphylaxis)  Levaquin (Other; Rash)  penicillin (Unknown)  Zithromax (Unknown)    Intolerances        Vital Signs Last 24 Hrs  T(C): 36.8 (22 Oct 2020 12:12), Max: 36.8 (22 Oct 2020 12:12)  T(F): 98.2 (22 Oct 2020 12:12), Max: 98.2 (22 Oct 2020 12:12)  HR: 90 (22 Oct 2020 14:03) (86 - 106)  BP: 140/62 (22 Oct 2020 14:03) (118/89 - 152/72)  BP(mean): --  RR: 20 (22 Oct 2020 14:03) (20 - 40)  SpO2: 100% (22 Oct 2020 14:03) (99% - 100%)        -------------------------------------------------------------  PHYSICAL EXAM:    GEN: Chronically ill-appearing, mild tachypnea,   HEENT: NC/AT, PEERLA, dry MM   Cardiovascular: +S1/S2, 3/6 systolic murmur at apex  Respiratory: Decreased BS R lower lung fields; mild wheezing RUL > ANANYA; mild tachypnea; speaks in complete sentences  Gastrointestinal: soft, NT/ND  Extremities: WWP; No LE edema; no clubbing or cyanosis  Vascular: 2+ radial pulses B/L  Neurological: awake and alert; LIPSCOMB      -------------------------------------------------------------  LABS:        CBC Full  -  ( 22 Oct 2020 12:39 )  WBC Count : 15.51 K/uL  RBC Count : 3.88 M/uL  Hemoglobin : 12.4 g/dL  Hematocrit : 38.5 %  Platelet Count - Automated : 417 K/uL  Mean Cell Volume : 99.2 fl  Mean Cell Hemoglobin : 32.0 pg  Mean Cell Hemoglobin Concentration : 32.2 gm/dL  Auto Neutrophil # : 13.32 K/uL  Auto Lymphocyte # : 0.84 K/uL  Auto Monocyte # : 1.24 K/uL  Auto Eosinophil # : 0.03 K/uL  Auto Basophil # : 0.02 K/uL  Auto Neutrophil % : 85.9 %  Auto Lymphocyte % : 5.4 %  Auto Monocyte % : 8.0 %  Auto Eosinophil % : 0.2 %  Auto Basophil % : 0.1 %    10-22    146<H>  |  105  |  17  ----------------------------<  85  4.6   |  31  |  0.71    Ca    8.7      22 Oct 2020 13:52    TPro  6.5  /  Alb  3.0<L>  /  TBili  <0.2  /  DBili  x   /  AST  19  /  ALT  15  /  AlkPhos  128<H>  10-22    PT/INR - ( 22 Oct 2020 12:39 )   PT: 13.5 sec;   INR: 1.13          PTT - ( 22 Oct 2020 12:39 )  PTT:29.3 sec                  -------------------------------------------------------------  RADIOLOGY & ADDITIONAL STUDIES:   PULMONARY SERVICE INITIAL CONSULT NOTE  -------------------------------------------------------------    HPI:     86yo F, nonsmoker, with PMHx of severe MR, and recent admission 8/2020 for fall and R leg weakness, again 10/2020 for SOB, with multiple lung Ca, with recurrent R-sided pleural effusions,  who presents with productive cough.     --     In 2013 she had initial diagnosis of early stage lung ca, resected.  New lung adeno diagnosed in 2014, treated with SBRT. In 2017 another lesion was found in the RML on imaging, no biopsy, though to be new  primary ca, empirically treated with radiation. In 2019 right apical opacity  positive for another primary lung adenocarcinoma in RUL (NGS sent: all mutations negative, PDL1<1%) lymph nodes negative by bronchoscopy. Patient went post biopsy into pulmonary edema (valvular disease, HFpEF) and was intubated in MICU followed by hospitalization for HAP. RUL lesion was treated with SBRT 3000 cGy out of a planned 5000 cGy, did not complete course because of another pna. PET CT 7/30/20 revealed 2 new FDG-avid sub-centimeter nodules in left sub-pleural region and large right pleural effusion, which was Ly predominant but negative for malignancy ( 8/11/20).    Pt was last hospitalized 2 weeks ago, in the setting of worsening BROWN. She again underwent R-sided thoracentesis, with lymphocytic predominance - cytology negative for malignant cells (but remains the primary differential of her recurrent effusions). She was discharged, with plans for outpatient follow-up with Dr. Nguyen. She was ultimately planned for pleuroscopy with biopsy, as well as pleurodesis to reduce the likelihood of recurrence to occur this Friday, 10/23; however the patient had called stating that she did not want to go through with the procedure, due to severe anxiety. She was not deemed a good candidate for PleurX due to her small body habitus.     Today, she presents to ED for hypoxemia to 80s on 3L NC (her usual O2), as well as productive cough with green sputum and LE edema.   She does not particularly report fevers/chills, HA, dizziness, CP, SOB, abd pain, N/V, bowel changes, and ROS otherwise negative in detail.     In ED, she was placed on NRB --> 3L NC with improvement in saturation -  afebrile   WBC 15, Plt > 400, Pro-BNP ~1000, Trop neg     CXR with R > L pleural effusions       -------------------------------------------------------------  PAST MEDICAL & SURGICAL HISTORY:  Malignant neoplasm of bronchus and lung, unspecified site  Lung cancer    History of surgery to major organs, presenting hazards to health  removal of R-sided adenocarcinoma, negative lymphnodes        FAMILY HISTORY:  No pertinent family history  No significant family history        SOCIAL HISTORY:  Smoking Status: [ ] Current, [ ] Former, [X] Never  Pack Years:    MEDICATIONS:  Pulmonary:    Antimicrobials:    Anticoagulants:  enoxaparin Injectable 40 milliGRAM(s) SubCutaneous once    Onc:    GI/:    Endocrine:    Cardiac:    Other Medications:      Allergies    Bactrim (Unknown)  Cleocin HCl (Unknown)  Flagyl (Unknown)  Honey (Unknown)  iodine (Anaphylaxis)  IV Contrast (Anaphylaxis)  Levaquin (Other; Rash)  penicillin (Unknown)  Zithromax (Unknown)    Intolerances        Vital Signs Last 24 Hrs  T(C): 36.8 (22 Oct 2020 12:12), Max: 36.8 (22 Oct 2020 12:12)  T(F): 98.2 (22 Oct 2020 12:12), Max: 98.2 (22 Oct 2020 12:12)  HR: 90 (22 Oct 2020 14:03) (86 - 106)  BP: 140/62 (22 Oct 2020 14:03) (118/89 - 152/72)  BP(mean): --  RR: 20 (22 Oct 2020 14:03) (20 - 40)  SpO2: 100% (22 Oct 2020 14:03) (99% - 100%)        -------------------------------------------------------------  PHYSICAL EXAM:    GEN: Chronically ill-appearing, mild tachypnea,   HEENT: NC/AT, PEERLA, dry MM   Cardiovascular: +S1/S2, 3/6 systolic murmur at apex  Respiratory: Decreased BS R lower lung fields; mild wheezing RUL > ANANYA; mild tachypnea; speaks in complete sentences  Gastrointestinal: soft, NT/ND  Extremities: WWP; No LE edema; no clubbing or cyanosis  Vascular: 2+ radial pulses B/L  Neurological: awake and alert; LIPSCOMB      -------------------------------------------------------------  LABS:        CBC Full  -  ( 22 Oct 2020 12:39 )  WBC Count : 15.51 K/uL  RBC Count : 3.88 M/uL  Hemoglobin : 12.4 g/dL  Hematocrit : 38.5 %  Platelet Count - Automated : 417 K/uL  Mean Cell Volume : 99.2 fl  Mean Cell Hemoglobin : 32.0 pg  Mean Cell Hemoglobin Concentration : 32.2 gm/dL  Auto Neutrophil # : 13.32 K/uL  Auto Lymphocyte # : 0.84 K/uL  Auto Monocyte # : 1.24 K/uL  Auto Eosinophil # : 0.03 K/uL  Auto Basophil # : 0.02 K/uL  Auto Neutrophil % : 85.9 %  Auto Lymphocyte % : 5.4 %  Auto Monocyte % : 8.0 %  Auto Eosinophil % : 0.2 %  Auto Basophil % : 0.1 %    10-22    146<H>  |  105  |  17  ----------------------------<  85  4.6   |  31  |  0.71    Ca    8.7      22 Oct 2020 13:52    TPro  6.5  /  Alb  3.0<L>  /  TBili  <0.2  /  DBili  x   /  AST  19  /  ALT  15  /  AlkPhos  128<H>  10-22    PT/INR - ( 22 Oct 2020 12:39 )   PT: 13.5 sec;   INR: 1.13          PTT - ( 22 Oct 2020 12:39 )  PTT:29.3 sec                  -------------------------------------------------------------  RADIOLOGY & ADDITIONAL STUDIES:

## 2020-10-22 NOTE — ED PROVIDER NOTE - DIAGNOSTIC INTERPRETATION
ER Physician: June Ree  CHEST XRAY INTERPRETATION: worsening pleural effusion to right, heart shadow normal, bony structures intact

## 2020-10-22 NOTE — H&P ADULT - PROBLEM SELECTOR PLAN 3
Patient w/ increased sputum production, w/ AHRF and met severe sepsis criteria and on admission. Sepsis likely 2/2 to pneumonia.   - c/w Vanc 750 mg IV   - c/w Zosyn 3.375 mg IV   - f/u bcx  - f/u sputum cx Patient w/ increased sputum production, w/ AHRF and met severe sepsis criteria and on admission. Sepsis likely 2/2 to pneumonia.   - c/w Vanc 750 mg IV (10/22 - )  - due to penicillin allergy, patient received ceftazidime w/o reaction; to continue on cefepime 2g IV q12h (10/22 - ) renally dosed  - f/u bcx  - f/u sputum cx

## 2020-10-22 NOTE — H&P ADULT - PROBLEM SELECTOR PLAN 2
patient presents to ED for hypoxemia to 80s on 3L NC (her usual O2) initially tachypneic, and on NRB, now satting 100% on 6 L NC. CXR w/ slight interval increase in pleural effusions right > left. Hypoxemia likely 2/2 to pleural effusions vs. pneumonia or both.  - continue w/ NC. Wean O2 as tolerated.  - plan as below.

## 2020-10-22 NOTE — PHYSICAL THERAPY INITIAL EVALUATION ADULT - PLANNED THERAPY INTERVENTIONS, PT EVAL
postural re-education/ROM/strengthening/balance training/bed mobility training/transfer training/gait training

## 2020-10-22 NOTE — ED ADULT NURSE NOTE - CHPI ED NUR SYMPTOMS NEG
no chills/no chest pain/no fever/no headache/no cough/no wheezing/no diaphoresis/no hemoptysis/no body aches

## 2020-10-22 NOTE — ED PROVIDER NOTE - CLINICAL SUMMARY MEDICAL DECISION MAKING FREE TEXT BOX
Impression: h/o lung ca w/ r sided pleural effusion s/p thoracentesis, who p/w hypoxia noted at home (83% on 3L nc), worsening leg edema. SaO2 stable at 99% on nrb on arrival to ed. CXR + for worsening pleural effusion to right. Labs noted with leukocytosis to 15, elev lactate to 3. Trop neg. EKG non-ischemic. Pt given dose of ceftaz/vanc for possible hap. IVF withheld for concern for volume overload 2/2 severe MR. Case d/w pulmonary fellow; pt to undergo thoracentesis/ pleurodesis tomorrow.

## 2020-10-22 NOTE — ED ADULT NURSE NOTE - CHIEF COMPLAINT QUOTE
88 y/o female BIBEMS for evaluation of SOB, as per EMS visiting nurse found patient ot be hypoxic in the 80s, Pt with hx of lung CA, on home oxygen at 3L usually, now on non-rebreather.

## 2020-10-22 NOTE — ED ADULT NURSE NOTE - NSIMPLEMENTINTERV_GEN_ALL_ED
Implemented All Fall with Harm Risk Interventions:  [place] to call system. Call bell, personal items and telephone within reach. Instruct patient to call for assistance. Room bathroom lighting operational. Non-slip footwear when patient is off stretcher. Physically safe environment: no spills, clutter or unnecessary equipment. Stretcher in lowest position, wheels locked, appropriate side rails in place. Provide visual cue, wrist band, yellow gown, etc. Monitor gait and stability. Monitor for mental status changes and reorient to person, place, and time. Review medications for side effects contributing to fall risk. Reinforce activity limits and safety measures with patient and family. Provide visual clues: red socks.

## 2020-10-22 NOTE — H&P ADULT - PROBLEM SELECTOR PLAN 5
History of adenocarcinoma of RLL s/p VATS resection in 2013, ANANYA XRT in 2016, and RMF lesion s/p XRT in 2017  - recent PET showing 2 new FDG-avid subcentimeter nodules in L subpleural region  - f/u pulm recs

## 2020-10-22 NOTE — ED PROVIDER NOTE - PHYSICAL EXAMINATION
VITAL SIGNS: I have reviewed nursing notes and confirm.  CONSTITUTIONAL: Well-developed; well-nourished; in no acute distress.   SKIN:  warm and dry, no acute rash.   HEAD:  normocephalic, atraumatic.  EYES: EOM intact; conjunctiva and sclera clear.  ENT: No nasal discharge; airway clear.   NECK: Supple; non tender.  CARD: S1, S2 normal; no murmurs, gallops, or rubs. Regular rate and rhythm.   RESP:  Clear to auscultation b/l, no wheezes, rales or rhonchi.  ABD: Normal bowel sounds; soft; non-distended; non-tender; no guarding/ rebound.  EXT: Normal ROM. No clubbing, cyanosis or edema. 2+ pulses to b/l ue/le.  NEURO: Alert, oriented, grossly unremarkable  PSYCH: Cooperative, mood and affect appropriate. VITAL SIGNS: I have reviewed nursing notes and confirm.  CONSTITUTIONAL: Thin, elderly f in no acute distress.   SKIN:  warm and dry, no acute rash.   HEAD:  normocephalic, atraumatic.  EYES: EOM intact; conjunctiva and sclera clear.  ENT: No nasal discharge; airway clear.   NECK: Supple; non tender.  CARD: S1, S2 normal; no murmurs, gallops, or rubs. Regular rate and rhythm.   RESP:  Clear to auscultation b/l, no wheezes, rales or rhonchi.  ABD: Normal bowel sounds; soft; non-distended; non-tender; no guarding/ rebound.  EXT: 2+ pitting edema of b/l lower ext. 2+ pulses b/l.   NEURO: Alert, oriented, grossly unremarkable  PSYCH: Cooperative, mood and affect appropriate.

## 2020-10-22 NOTE — H&P ADULT - ASSESSMENT
86 yo F w/ a PMHx of lung adenocarcinoma (s/p resection/XRT) and severe MR p/w worsening SOB. Plan for R-sided pleuroscopy w/ pleurodesis Froday 10/23 in afternoon. Admitted to [facility] for further management of care. 88yo F, nonsmoker, with PMHx of lung adenocarcinoma (s/p resection/XRT), severe MR, and recent admission 8/2020 for fall and R leg weakness, again 10/2020 for SOB with recurrent R-sided pleural effusions,  who presents to ED for hypoxemia to 80s on 3L NC (her usual O2), as well as productive cough with green sputum and LE edema. Admitted to [facility] for management of sepsis 2/2 to pneumonia and acute hypoxic respiratory failure.

## 2020-10-22 NOTE — H&P ADULT - PROBLEM SELECTOR PLAN 6
Patient has severe mitral regurg, on echo 8/2020 with evidence of MR. No evidence of heart failure on recent Echo. Currently with new LE edema.  - Avoid IV fluids  - Lasix 20 mg IV PRN   - Strict I/O Patient has moderate mitral regurg, on echo 8/2020. EF >70%. No evidence of heart failure on recent Echo. Currently with new LE edema.  - caution w/ IV fluids  - May give Lasix 20 mg IV PRN   - Strict I/Os

## 2020-10-22 NOTE — H&P ADULT - NSHPLABSRESULTS_GEN_ALL_CORE
LABS:                         12.4   15.51 )-----------( 417      ( 22 Oct 2020 12:39 )             38.5     10-22    146<H>  |  105  |  17  ----------------------------<  85  4.6   |  31  |  0.71    Ca    8.7      22 Oct 2020 13:52    TPro  6.5  /  Alb  3.0<L>  /  TBili  <0.2  /  DBili  x   /  AST  19  /  ALT  15  /  AlkPhos  128<H>  10-22    PT/INR - ( 22 Oct 2020 12:39 )   PT: 13.5 sec;   INR: 1.13          PTT - ( 22 Oct 2020 12:39 )  PTT:29.3 sec    CARDIAC MARKERS ( 22 Oct 2020 12:39 )  x     / <0.01 ng/mL / x     / x     / x

## 2020-10-22 NOTE — H&P ADULT - PROBLEM SELECTOR PLAN 9
F: Avoid IVF given severe MR   E: Replete for K<4 Mag<2  N: Regular Diet  A: Lovenox 40   Code status: full code  Disposition: [facility] F: careful IVF given moderate MR   E: Replete for K<4 Mag<2  N: Regular Diet  A: Lovenox 40   Code status: full code  Disposition: [facility]

## 2020-10-22 NOTE — H&P ADULT - PROBLEM SELECTOR PLAN 4
Patient w/ hx of recurrent pleural effusions. On previous admission R-sided thoracentesis, with lymphocytic predominance - cytology negative for malignant cells but malignancy remains the primary differential of her recurrent effusions. CXR w/ slight interval increase in pleural effusions right > left.  -Pulm following w/ plan for possible R-sided pleuroscopy w/ pleurodesis   -f/u pulm recs Patient w/ hx of recurrent pleural effusions. On previous admission R-sided thoracentesis, with lymphocytic predominance - cytology negative for malignant cells but malignancy remains the primary differential of her recurrent effusions. CXR w/ slight interval increase in pleural effusions right > left.  - Pulm following w/ plan for possible R-sided pleuroscopy w/ pleurodesis   - f/u pulm recs

## 2020-10-22 NOTE — H&P ADULT - HISTORY OF PRESENT ILLNESS
86 yo F w/ a PMHx of lung adenocarcinoma (s/p resection/XRT) and severe MR p/w worsening SOB. Pt followed by pulmonologist (Dr. Nguyen). Pt originally planned for R-sided pleuroscopy w/ pleurodesis, but procedure cancelled due to pt "not feeling well". Procedure planned for tomorrow, Friday 10/23 in afternoon w/ inpatient endoscopy. 86 yo F w/ a PMHx of lung adenocarcinoma (s/p resection/XRT) and severe MR p/w worsening SOB. Pt followed by pulmonologist (Dr. Nguyen). Pt originally planned for R-sided pleuroscopy w/ pleurodesis, but procedure cancelled due to pt "not feeling well". Procedure planned for tomorrow, Friday 10/23 in afternoon w/ inpatient endoscopy.     ED Course:  T 98.2, H 106, /70, R 40, SpO2 99%  WBC 15.5, Hgb 12.4, Na 146, Alk Phos 128, Lactate 3, BNP 1034, Trop <0.01  EKG: PVC, nsr  XR: No pneumothorax. Slight interval increase in pleural effusions right > left  Ceftazadme 1g IV x1, Vanc 750mg IV x1   88yo F, nonsmoker, with PMHx of lung adenocarcinoma (s/p resection/XRT), severe MR, and recent admission 8/2020 for fall and R leg weakness, again 10/2020 for SOB with recurrent R-sided pleural effusions,  who presents to ED for hypoxemia to 80s on 3L NC (her usual O2), as well as productive cough with green sputum and LE edema. On recent admission patient w/ new effusion w/ concern for recurrent malignancy. Patient discharged with outpatient follow up with pulmonologist (Dr. Nguyen) and planned for  R-sided pleuroscopy w/ pleurodesis, but procedure cancelled due to pt "not feeling well". Patient reports increased sputum production, cough and L sided rib pain since discharge. Also c/o new b/l LE edema since yesterday. She denies HA, F/C, N/V, abdominal pain, diarrhea, constipation, dysuria.    ED Course:  T 98.2, H 106, /70, R 40--> 20 , SpO2 99 on NRB--> 100 on 6 L NC  WBC 15.5, Hgb 12.4, Na 146, Alk Phos 128, Lactate 3, BNP 1034, Trop <0.01  EKG: PVC, nsr  XR: No pneumothorax. Slight interval increase in pleural effusions right > left  Interventions: given Ceftazadme 1g IV x1, Vanc 750mg IV x1

## 2020-10-23 ENCOUNTER — APPOINTMENT (OUTPATIENT)
Dept: HEART AND VASCULAR | Facility: CLINIC | Age: 85
End: 2020-10-23

## 2020-10-23 ENCOUNTER — APPOINTMENT (OUTPATIENT)
Dept: PULMONOLOGY | Facility: HOSPITAL | Age: 85
End: 2020-10-23

## 2020-10-23 LAB
ANION GAP SERPL CALC-SCNC: 11 MMOL/L — SIGNIFICANT CHANGE UP (ref 5–17)
APPEARANCE UR: CLEAR — SIGNIFICANT CHANGE UP
APTT BLD: 31.2 SEC — SIGNIFICANT CHANGE UP (ref 27.5–35.5)
BILIRUB UR-MCNC: NEGATIVE — SIGNIFICANT CHANGE UP
BUN SERPL-MCNC: 14 MG/DL — SIGNIFICANT CHANGE UP (ref 7–23)
CALCIUM SERPL-MCNC: 8.8 MG/DL — SIGNIFICANT CHANGE UP (ref 8.4–10.5)
CHLORIDE SERPL-SCNC: 101 MMOL/L — SIGNIFICANT CHANGE UP (ref 96–108)
CO2 SERPL-SCNC: 29 MMOL/L — SIGNIFICANT CHANGE UP (ref 22–31)
COLOR SPEC: YELLOW — SIGNIFICANT CHANGE UP
CREAT SERPL-MCNC: 0.67 MG/DL — SIGNIFICANT CHANGE UP (ref 0.5–1.3)
DIFF PNL FLD: NEGATIVE — SIGNIFICANT CHANGE UP
GLUCOSE SERPL-MCNC: 88 MG/DL — SIGNIFICANT CHANGE UP (ref 70–99)
GLUCOSE UR QL: NEGATIVE — SIGNIFICANT CHANGE UP
HCT VFR BLD CALC: 38.2 % — SIGNIFICANT CHANGE UP (ref 34.5–45)
HGB BLD-MCNC: 12.3 G/DL — SIGNIFICANT CHANGE UP (ref 11.5–15.5)
INR BLD: 1.14 — SIGNIFICANT CHANGE UP (ref 0.88–1.16)
KETONES UR-MCNC: ABNORMAL MG/DL
LEUKOCYTE ESTERASE UR-ACNC: NEGATIVE — SIGNIFICANT CHANGE UP
MAGNESIUM SERPL-MCNC: 2 MG/DL — SIGNIFICANT CHANGE UP (ref 1.6–2.6)
MCHC RBC-ENTMCNC: 31.9 PG — SIGNIFICANT CHANGE UP (ref 27–34)
MCHC RBC-ENTMCNC: 32.2 GM/DL — SIGNIFICANT CHANGE UP (ref 32–36)
MCV RBC AUTO: 99.2 FL — SIGNIFICANT CHANGE UP (ref 80–100)
MRSA PCR RESULT.: NEGATIVE — SIGNIFICANT CHANGE UP
NITRITE UR-MCNC: NEGATIVE — SIGNIFICANT CHANGE UP
NRBC # BLD: 0 /100 WBCS — SIGNIFICANT CHANGE UP (ref 0–0)
PH UR: 5.5 — SIGNIFICANT CHANGE UP (ref 5–8)
PLATELET # BLD AUTO: 432 K/UL — HIGH (ref 150–400)
POTASSIUM SERPL-MCNC: 3.7 MMOL/L — SIGNIFICANT CHANGE UP (ref 3.5–5.3)
POTASSIUM SERPL-SCNC: 3.7 MMOL/L — SIGNIFICANT CHANGE UP (ref 3.5–5.3)
PROCALCITONIN SERPL-MCNC: 0.1 NG/ML — SIGNIFICANT CHANGE UP (ref 0.02–0.1)
PROT UR-MCNC: NEGATIVE MG/DL — SIGNIFICANT CHANGE UP
PROTHROM AB SERPL-ACNC: 13.6 SEC — SIGNIFICANT CHANGE UP (ref 10.6–13.6)
RBC # BLD: 3.85 M/UL — SIGNIFICANT CHANGE UP (ref 3.8–5.2)
RBC # FLD: 15.4 % — HIGH (ref 10.3–14.5)
S AUREUS DNA NOSE QL NAA+PROBE: NEGATIVE — SIGNIFICANT CHANGE UP
SODIUM SERPL-SCNC: 141 MMOL/L — SIGNIFICANT CHANGE UP (ref 135–145)
SP GR SPEC: >=1.03 — SIGNIFICANT CHANGE UP (ref 1–1.03)
UROBILINOGEN FLD QL: 0.2 E.U./DL — SIGNIFICANT CHANGE UP
WBC # BLD: 12.76 K/UL — HIGH (ref 3.8–10.5)
WBC # FLD AUTO: 12.76 K/UL — HIGH (ref 3.8–10.5)

## 2020-10-23 PROCEDURE — 99233 SBSQ HOSP IP/OBS HIGH 50: CPT | Mod: GC

## 2020-10-23 PROCEDURE — 99232 SBSQ HOSP IP/OBS MODERATE 35: CPT | Mod: GC

## 2020-10-23 RX ORDER — LIDOCAINE 4 G/100G
1 CREAM TOPICAL ONCE
Refills: 0 | Status: COMPLETED | OUTPATIENT
Start: 2020-10-23 | End: 2020-10-23

## 2020-10-23 RX ORDER — POTASSIUM CHLORIDE 20 MEQ
20 PACKET (EA) ORAL ONCE
Refills: 0 | Status: COMPLETED | OUTPATIENT
Start: 2020-10-23 | End: 2020-10-23

## 2020-10-23 RX ORDER — SENNA PLUS 8.6 MG/1
2 TABLET ORAL AT BEDTIME
Refills: 0 | Status: DISCONTINUED | OUTPATIENT
Start: 2020-10-23 | End: 2020-10-28

## 2020-10-23 RX ADMIN — LIDOCAINE 1 PATCH: 4 CREAM TOPICAL at 10:15

## 2020-10-23 RX ADMIN — LIDOCAINE 1 PATCH: 4 CREAM TOPICAL at 18:34

## 2020-10-23 RX ADMIN — SENNA PLUS 2 TABLET(S): 8.6 TABLET ORAL at 21:34

## 2020-10-23 RX ADMIN — ATORVASTATIN CALCIUM 20 MILLIGRAM(S): 80 TABLET, FILM COATED ORAL at 21:34

## 2020-10-23 RX ADMIN — Medication 650 MILLIGRAM(S): at 10:16

## 2020-10-23 RX ADMIN — ENOXAPARIN SODIUM 40 MILLIGRAM(S): 100 INJECTION SUBCUTANEOUS at 17:13

## 2020-10-23 RX ADMIN — Medication 250 MILLIGRAM(S): at 01:31

## 2020-10-23 RX ADMIN — LIDOCAINE 1 PATCH: 4 CREAM TOPICAL at 21:34

## 2020-10-23 RX ADMIN — Medication 650 MILLIGRAM(S): at 11:16

## 2020-10-23 RX ADMIN — CEFEPIME 100 MILLIGRAM(S): 1 INJECTION, POWDER, FOR SOLUTION INTRAMUSCULAR; INTRAVENOUS at 06:37

## 2020-10-23 NOTE — PROGRESS NOTE ADULT - PROBLEM SELECTOR PLAN 7
Patient with history of compression fractures and reports worsening back pain lately. Followed by outpatient ortho.  - Lidocaine patch for back.  - Tylenol PRN for pain control

## 2020-10-23 NOTE — DIETITIAN INITIAL EVALUATION ADULT. - FEEDING SKILL
minimal assistance/age appropriate assistance/patient unable to sit up so lies in bed with potential high aspiration risk

## 2020-10-23 NOTE — DIETITIAN INITIAL EVALUATION ADULT. - PROBLEM SELECTOR PLAN 3
Patient w/ increased sputum production, w/ AHRF and met severe sepsis criteria and on admission. Sepsis likely 2/2 to pneumonia.   - c/w Vanc 750 mg IV (10/22 - )  - due to penicillin allergy, patient received ceftazidime w/o reaction; to continue on cefepime 2g IV q12h (10/22 - ) renally dosed  - f/u bcx  - f/u sputum cx

## 2020-10-23 NOTE — DIETITIAN INITIAL EVALUATION ADULT. - PROBLEM SELECTOR PLAN 1
Patient met criteria for severe sepsis w/ leukocytosis, tachycardia, tachypnea and elevated lactate. S/p Ceftazadme 1g IV x1, Vanc 750mg IV x1 in ED. No fluids given due to moderate MR. Sepsis likely 2/2 to pneumonia.  - f/u blood cx  - repeat lactate pending

## 2020-10-23 NOTE — PROGRESS NOTE ADULT - ASSESSMENT
86yo F, nonsmoker, with PMHx of lung adenocarcinoma (s/p resection/XRT), severe MR, and recent admission 8/2020 for fall and R leg weakness,presented to ED for hypoxemia to 80s on 3L NC (her usual O2), as well as productive cough with green sputum and LE edema. Admitted to [facility] for management of sepsis 2/2 to pneumonia and acute hypoxic respiratory failure.

## 2020-10-23 NOTE — DIETITIAN INITIAL EVALUATION ADULT. - ORAL INTAKE PTA/DIET HISTORY
patient reported due to severe back pain she was mostly eating "chicken soup">She has help at home,but would order from local restaurant daily for some food delivery.She does not cook at all.

## 2020-10-23 NOTE — PROGRESS NOTE ADULT - PROBLEM SELECTOR PLAN 4
Patient w/ hx of recurrent pleural effusions. On previous admission R-sided thoracentesis, with lymphocytic predominance - cytology negative for malignant cells but malignancy remains the primary differential of her recurrent effusions. CXR w/ slight interval increase in pleural effusions right > left.  - Pulm following w/ plan for possible R-sided pleuroscopy w/ pleurodesis Monday, though patient is very much opposed to procedure.  - f/u pulm recs Yes

## 2020-10-23 NOTE — PROGRESS NOTE ADULT - ASSESSMENT
88yo F, nonsmoker, with PMHx of severe MR, and recent admission 8/2020 for fall and R leg weakness, again 10/2020 for SOB, with multiple lung Ca, with recurrent R-sided pleural effusions,  who presents with productive cough.     #r/o Hospital-Acquired Pneumonia - Pt is high-risk, given immunocompromised state, and recent hospitalizations. She is with new productive cough (but appears to be largely upper-airway), and new leukocytosis.     - s/p Broad-spectrum Abx in ED  - Hold off on further Abx; will re-evaluate on daily basis if fever spike, worsening WBCs, or worsening clinical symptoms of cough or SOB  - If worsens, will recommend HAP tx with Vancomycin and Zosyn  - Obtain MRSA swab, Blood Cx, Resp Cx, Respiratory Viral Panel    #Sepsis - 2/2 possible HAP; Tx as above     #Right sided pleural effusion  Patient w/ recurrent right sided pleural effusion, s/p thoracentesis in OP on 8/11 and 10/11 w/ lymphocyte predominant fluid and negative for malignant cells  - Remains highly suspicious for malignancy  - Not good candidate for PleurX given body habitus   - Will likely not undergo pleuroscopy w/ biopsy and pleurodesis, originally scheduled for Monday 10/26   - Avoid excessive IVF; encourage PO intake

## 2020-10-23 NOTE — PROGRESS NOTE ADULT - PROBLEM SELECTOR PLAN 6
Patient has moderate mitral regurg, on echo 8/2020. EF >70%. No evidence of heart failure on recent Echo. Currently with new LE edema.  - caution w/ IV fluids  - Strict I/Os

## 2020-10-23 NOTE — DIETITIAN INITIAL EVALUATION ADULT. - OTHER INFO
86y/o female with history of [place] CA(S/P resection/XRT) Severe MR,Fall,SOB, right sided pleural effusion/back pain due to compression fracture admitted with hypoxemia/cough/pneumonia and +2-3 LE edema.Patient stated that her back pain was so severe, and made her unable to eat much.She denied N/V/C/D.Skin with no PU but 2-3+LE edema.Severe PCM evident by NFPE(see below) and suspected weight loss..Very poor dentition noted.Patient declined ONS despite RD encouragement.She resides alone, but has help who clean.She orders from local restaurant daily, but taking mostly soups.RD observed patient eating about 60-75% of tray..Noted weights August 40kg(?edema) Stated UBW:100lbs.Presently 97% of IBW.Suspected that patient is below 92lbs at present.Will use IBW due to 2-3+ edema.RD encouraged patient to eat calorie dense sources and would add 1 Ensure pudding(170kcal/4gmprotein)

## 2020-10-23 NOTE — PROGRESS NOTE ADULT - PROBLEM SELECTOR PLAN 3
Patient w/ increased sputum production, w/ AHRF and met severe sepsis criteria on admission, now no longer septic. ProCal negtive.  - as per pulm recs, will monitor patient clinically and see if she becomes febrile/tachypneic again without antibiotics.   - f/u bcx  - f/u sputum cx  - f/u MRSA swab

## 2020-10-23 NOTE — PROGRESS NOTE ADULT - SUBJECTIVE AND OBJECTIVE BOX
PULMONARY CONSULT SERVICE FOLLOW-UP NOTE  -------------------------------------------------------------------  INTERVAL HPI:    No acute overnight events.   Pt seen and examined at bedside this AM.     VSS, afebrile - WBC down to 13; Blood Cx NTD; pending respiratory studies    Feels fatigued and tired; originally denied SOB, however after ambulating in hallway, pt was with more significant SOB.  Otherwise, denies fevers/chills, HA, dizziness, CP, cough, abd pain, N/V, bowel changes, ext swelling     She reiterated that she would not like to proceed with the pleuroscopy/pleurodesis procedure, because she is still not feeling well in the setting of what she states is a 'pneumonia'. Additionally, she is hesitant given that she has a weak heart, and she believes she will end up being intubated from the procedure.     -------------------------------------------------------------------  MEDICATIONS:    Pulmonary:    Antimicrobials:    Anticoagulants:  enoxaparin Injectable 40 milliGRAM(s) SubCutaneous every 24 hours    Cardiac:      Allergies    Bactrim (Unknown)  Cleocin HCl (Unknown)  Flagyl (Unknown)  Honey (Unknown)  iodine (Anaphylaxis)  IV Contrast (Anaphylaxis)  Levaquin (Other; Rash)  penicillin (Unknown)  Zithromax (Unknown)    Intolerances        Vital Signs Last 24 Hrs  T(C): 36.8 (23 Oct 2020 15:19), Max: 36.9 (22 Oct 2020 18:44)  T(F): 98.2 (23 Oct 2020 15:19), Max: 98.5 (22 Oct 2020 18:44)  HR: 100 (23 Oct 2020 15:19) (83 - 100)  BP: 110/49 (23 Oct 2020 15:19) (110/49 - 148/92)  BP(mean): --  RR: 24 (23 Oct 2020 15:19) (18 - 24)  SpO2: 93% (23 Oct 2020 15:19) (93% - 98%)        -------------------------------------------------------------------  PHYSICAL EXAM:    GEN: Chronically ill-appearing, mild tachypnea,   HEENT: NC/AT, PEERLA, dry MM   Cardiovascular: +S1/S2, 3/6 systolic murmur at apex  Respiratory: Decreased BS R lower lung fields; mild wheezing RUL > ANANYA; mild tachypnea; speaks in complete sentences  Gastrointestinal: soft, NT/ND  Extremities: WWP; No LE edema; no clubbing or cyanosis  Vascular: 2+ radial pulses B/L  Neurological: awake and alert; LIPSCOMB    -------------------------------------------------------------------  LABS:        CBC Full  -  ( 23 Oct 2020 06:47 )  WBC Count : 12.76 K/uL  RBC Count : 3.85 M/uL  Hemoglobin : 12.3 g/dL  Hematocrit : 38.2 %  Platelet Count - Automated : 432 K/uL  Mean Cell Volume : 99.2 fl  Mean Cell Hemoglobin : 31.9 pg  Mean Cell Hemoglobin Concentration : 32.2 gm/dL  Auto Neutrophil # : x  Auto Lymphocyte # : x  Auto Monocyte # : x  Auto Eosinophil # : x  Auto Basophil # : x  Auto Neutrophil % : x  Auto Lymphocyte % : x  Auto Monocyte % : x  Auto Eosinophil % : x  Auto Basophil % : x    10-23    141  |  101  |  14  ----------------------------<  88  3.7   |  29  |  0.67    Ca    8.8      23 Oct 2020 06:47  Mg     2.0     10-23    TPro  6.5  /  Alb  3.0<L>  /  TBili  <0.2  /  DBili  x   /  AST  19  /  ALT  15  /  AlkPhos  128<H>  10-22    PT/INR - ( 23 Oct 2020 06:47 )   PT: 13.6 sec;   INR: 1.14          PTT - ( 23 Oct 2020 06:47 )  PTT:31.2 sec                  -------------------------------------------------------------------  RADIOLOGY & ADDITIONAL STUDIES:

## 2020-10-23 NOTE — DIETITIAN INITIAL EVALUATION ADULT. - PROBLEM SELECTOR PLAN 9
F: careful IVF given moderate MR   E: Replete for K<4 Mag<2  N: Regular Diet  A: Lovenox 40   Code status: full code  Disposition: [facility]

## 2020-10-23 NOTE — PROGRESS NOTE ADULT - PROBLEM SELECTOR PLAN 9
F: careful IVF given moderate MR   E: Replete for K<4 Mag<2  N: Regular Diet  A: Lovenox 40   Code status: full code  Disposition: [facility]

## 2020-10-23 NOTE — PROGRESS NOTE ADULT - ATTENDING COMMENTS
Pt seen this morning on rounds, states she is feeling somewhat better. Endorsing increased cough as well as increased pain as reasons for coming to hospital. When discussing topic of pleuroscopy and pleurodesis, pt became upset and stated she would not want to undergo procedure 'until she was feeling better.' Exam shows thin female on 5L NC in NAD, RRR, nml effort, Decreased BS on L side, no wheezing or rales or rhonchi, soft abd, non-tender, alert, oriented x3, moving all extremities    #Acute on chronic hypoxemic respiratory failure - on 5L. possibly from increased R effusion > pna  #Lung CA c/b R sided effusion  #Mitral regurgitation  #Compression fx in spine  #HLD  --Titrate NC to sat >92%  --F/U Pulmonary recommendations  --Per pulm, currently holding abx. If worsening hypoxemia, cough, fever, etc would restart abx  --Standing tylenol for back pain; add lidocaine patch also    DISPO: PT recommended [facility] vs HPT pending progress.     To discuss w pulm if pt remains stable on nasal cannula while currently refusing pleuradesis for the upcoming Monday -- whether there are plans for thoracentesis for sxs management while inpatient

## 2020-10-23 NOTE — DIETITIAN INITIAL EVALUATION ADULT. - PROBLEM SELECTOR PLAN 4
Patient w/ hx of recurrent pleural effusions. On previous admission R-sided thoracentesis, with lymphocytic predominance - cytology negative for malignant cells but malignancy remains the primary differential of her recurrent effusions. CXR w/ slight interval increase in pleural effusions right > left.  - Pulm following w/ plan for possible R-sided pleuroscopy w/ pleurodesis   - f/u pulm recs

## 2020-10-23 NOTE — PROGRESS NOTE ADULT - PROBLEM SELECTOR PLAN 1
Patient initially met criteria for severe sepsis w/ leukocytosis, tachycardia, tachypnea and elevated lactate. S/p Ceftazadme 1g IV x1, Vanc 750mg IV x1 in ED. No fluids given due to moderate MR. Now, patient no longer meeting sepsis criteria, as lactate is resolved, and patient is no longer tachypneic or tachycardic, and has been afebrile.   - f/u blood cx  - repeat lactate 1.4. Signing off - call with questions…

## 2020-10-23 NOTE — CHART NOTE - TREATMENT: THE FOLLOWING DIET HAS BEEN RECOMMENDED
Diet, Regular:   Supplement Feeding Modality:  Oral  Ensure Pudding Cans or Servings Per Day:  1       Frequency:  Daily (10-23-20 @ 10:12) [Pending Verification By Attending]  Diet, Regular (10-22-20 @ 15:32) [Active]

## 2020-10-23 NOTE — PROGRESS NOTE ADULT - PROBLEM SELECTOR PLAN 2
Patient presents to ED for hypoxemia to 80s on 3L NC (her usual O2) initially tachypneic. . CXR w/ slight interval increase in pleural effusions right > left. Hypoxemia likely 2/2 to pleural effusions.  - continue w/ NC. Wean O2 as tolerated.  - plan as below.

## 2020-10-23 NOTE — DIETITIAN INITIAL EVALUATION ADULT. - PHYSCIAL ASSESSMENT
debilitated/Per physical assessment: Muscle Wasting- Temporal [ x  ]  Clavicle/Pectoral [ x  ]  Shoulder/Deltoid [ x  ]  Scapula [ x  ]  Interosseous [ x  ]  Quadriceps [  x ]  Gastrocnemius [   ]; Fat Wasting- Orbital [   ]  Buccal [ x  ]  Triceps [x   ]  Rib [x   ]--> Suspect Severe [PCM] 2/2 to physical assessment, [poor intake], and [wt loss]; please see malnutrition chart note. Patient with noted severe PCM with noted wasting of muscle and fat

## 2020-10-23 NOTE — CONSULT NOTE ADULT - SUBJECTIVE AND OBJECTIVE BOX
Patient is a 87y old  Female who presents with a chief complaint of      HPI:  86yo F, nonsmoker, with PMHx of lung adenocarcinoma (s/p resection/XRT), severe MR, and recent admission 8/2020 for fall and R leg weakness, again 10/2020 for SOB with recurrent R-sided pleural effusions,  who presents to ED for hypoxemia to 80s on 3L NC (her usual O2), as well as productive cough with green sputum and LE edema. On recent admission patient w/ new effusion w/ concern for recurrent malignancy. Patient discharged with outpatient follow up with pulmonologist (Dr. Nguyen) and planned for  R-sided pleuroscopy w/ pleurodesis, but procedure cancelled due to pt "not feeling well". Patient reports increased sputum production, cough and L sided rib pain since discharge. Also c/o new b/l LE edema since yesterday. She denies HA, F/C, N/V, abdominal pain, diarrhea, constipation, dysuria.    ED Course:  T 98.2, H 106, /70, R 40--> 20 , SpO2 99 on NRB--> 100 on 6 L NC  WBC 15.5, Hgb 12.4, Na 146, Alk Phos 128, Lactate 3, BNP 1034, Trop <0.01  EKG: PVC, nsr  XR: No pneumothorax. Slight interval increase in pleural effusions right > left  Interventions: given Ceftazadme 1g IV x1, Vanc 750mg IV x1   (22 Oct 2020 15:08)      PAST MEDICAL & SURGICAL HISTORY:  Malignant neoplasm of bronchus and lung, unspecified site  Lung cancer    History of surgery to major organs, presenting hazards to health  removal of R-sided adenocarcinoma, negative lymphnodes        MEDICATIONS  (STANDING):  atorvastatin 20 milliGRAM(s) Oral at bedtime  enoxaparin Injectable 40 milliGRAM(s) SubCutaneous every 24 hours  influenza  Vaccine (HIGH DOSE) 0.7 milliLiter(s) IntraMuscular once    MEDICATIONS  (PRN):  acetaminophen   Tablet .. 650 milliGRAM(s) Oral every 6 hours PRN Temp greater or equal to 38C (100.4F), Mild Pain (1 - 3)      Social History:           -  Home Living Status: lives alone in an elevator           -  Prior Home Care Services:  none    Baseline Functional Level Prior to Admission:           - ADL's/ IADL's:    patient states she is independent           - ambulatory status PTA:  walked without assistive devices    FAMILY HISTORY:  No pertinent family history  No significant family history        CBC Full  -  ( 23 Oct 2020 06:47 )  WBC Count : 12.76 K/uL  RBC Count : 3.85 M/uL  Hemoglobin : 12.3 g/dL  Hematocrit : 38.2 %  Platelet Count - Automated : 432 K/uL  Mean Cell Volume : 99.2 fl  Mean Cell Hemoglobin : 31.9 pg  Mean Cell Hemoglobin Concentration : 32.2 gm/dL  Auto Neutrophil # : x  Auto Lymphocyte # : x  Auto Monocyte # : x  Auto Eosinophil # : x  Auto Basophil # : x  Auto Neutrophil % : x  Auto Lymphocyte % : x  Auto Monocyte % : x  Auto Eosinophil % : x  Auto Basophil % : x      10-23    141  |  101  |  14  ----------------------------<  88  3.7   |  29  |  0.67    Ca    8.8      23 Oct 2020 06:47  Mg     2.0     10-23    TPro  6.5  /  Alb  3.0<L>  /  TBili  <0.2  /  DBili  x   /  AST  19  /  ALT  15  /  AlkPhos  128<H>  10-22            Radiology:    < from: Xray Chest 1 View AP/PA (10.22.20 @ 12:40) >  EXAM:  XR CHEST AP OR PA 1V                          PROCEDURE DATE:  10/22/2020          INTERPRETATION:  XR CHEST dated 10/22/2020 12:40 PM    CLINICAL INFORMATION: Female, 87 years old.  sob.    PRIOR STUDIES: 10/13/2020    FINDINGS: Heart size,mediastinal and hilar contours are unchanged. Linear opacity which has increased in extent which may represent fluid within a left accessory minor fissure and/or increasing left midlung zone atelectasis. Bibasilar blunting of the costophrenic angles compatible small pleural effusions increased on the left side and unchanged on the right. Increased perihilar right-sided opacity as well as asymmetric opacity over the right upper lung zone unchanged. Surgical staples are noted over the right midlungzone laterally.    IMPRESSION:  Question slight interval increase in pleural effusions right greater than left.  No pneumothorax.                  Vital Signs Last 24 Hrs  T(C): 36.5 (23 Oct 2020 06:06), Max: 36.9 (22 Oct 2020 18:44)  T(F): 97.7 (23 Oct 2020 06:06), Max: 98.5 (22 Oct 2020 18:44)  HR: 83 (23 Oct 2020 06:06) (83 - 106)  BP: 131/68 (23 Oct 2020 06:06) (118/89 - 152/72)  BP(mean): --  RR: 18 (23 Oct 2020 06:06) (18 - 40)  SpO2: 93% (23 Oct 2020 06:06) (93% - 100%)    REVIEW OF SYSTEMS:    CONSTITUTIONAL: No fever, weight loss, or fatigue  EYES: No eye pain, visual disturbances, or discharge  ENMT:  No difficulty hearing, tinnitus, vertigo; No sinus or throat pain  NECK: No pain or stiffness  BREASTS: No pain, masses, or nipple discharge  RESPIRATORY: cough, SOB  CARDIOVASCULAR: No chest pain, palpitations, dizziness, or leg swelling  GASTROINTESTINAL: No abdominal or epigastric pain. No nausea, vomiting, or hematemesis; No diarrhea or constipation. No melena or hematochezia.  GENITOURINARY: No dysuria, frequency, hematuria, or incontinence  NEUROLOGICAL: No headaches, memory loss, loss of strength, numbness, or tremors  SKIN: No itching, burning, rashes, or lesions   LYMPH NODES: No enlarged glands  ENDOCRINE: No heat or cold intolerance; No hair loss  MUSCULOSKELETAL: No joint pain or swelling; No muscle, back, or extremity pain  PSYCHIATRIC: No depression, anxiety, mood swings, or difficulty sleeping  HEME/LYMPH: No easy bruising, or bleeding gums  ALLERGY AND IMMUNOLOGIC: No hives or eczema  VASCULAR: leg swelling  : no dysuria, hematuria, frequency        Physical Exam: frail/ thin 86 yo  woman lying in semi Quintero's position, no acute complaints    Head: normocephalic, atraumatic    Eyes: PERRLA, EOMI, no nystagmus, sclera anicteric    ENT: nasal discharge, uvula midline, no oropharyngeal erythema/exudate    Neck: supple, negative JVD, negative carotid bruits, no thyromegaly    Chest: dec BS R>L bases    Cardiovascular: regular rate and rhythm, neg murmurs/rubs/gallops    Abdomen: soft, non distended, non tender to palpation in all 4 quadrants, negative rebound/guarding, normal bowel sounds    Extremities: 2 + LE edmea    Musculoskeletal:      :     Neurologic Exam:    Alert and oriented to person, place, date/year, speech fluent w/o dysarthria, recent and remote memory intact, repetition intact, comprehension intact,  attention/concentration intact, fund of knowledge appropriate    Cranial Nerves:     II:                       pupils equal, round and reactive to light, visual fields intact   III/ IV/VI:            extraocular movements intact, neg nystagmus, neg ptosis  V:                       facial sensation intact, V1-3 normal  VII:                     face symmetric, no droop, normal eye closure and smile  VIII:                    hearing intact to finger rub bilaterally  IX/ X:                 soft palate rise symmetrical  XI:                      head turning, shoulder shrug normal  XII:                     tongue midline    Motor Exam:    Upper Extremities:     Right:    no focal weakness > 3+/5    Left:      no focal weakness > 3+/5      Lower Extremities:    Right:   no focal weakness > 3+/5    Left :   no focal weakness > 3+/5               Sensation: Intact to light touch bilaterally x 4 extremities,                                         DTR:            = biceps/     triceps/     brachioradialis                      = patella/   medial hamstring/ankle                      neg clonus                      neg Babinski                        Gait:  not tested        PM&R Impression:    1) deconditioned  2) no focal weakness  3) pleural effusions    Plan:    1) Physical therapy focusing on therapeutic exercises, bed mobility/transfer out of bed evaluation, progressive ambulation with assistive devices prn.    2) Anticipated Disposition Plan/Recs:  pending functional progress

## 2020-10-23 NOTE — PROGRESS NOTE ADULT - SUBJECTIVE AND OBJECTIVE BOX
OVERNIGHT EVENTS:  RINA    SUBJECTIVE / INTERVAL HPI: Patient seen and examined at bedside. Complaining of lower back pain when she sleeps, but no shortness of breath or chest pain. She is adamant about not udnergoing any potential procedure, as she prefers to feel "well" before she goes for a procedure.     VITAL SIGNS:  Vital Signs Last 24 Hrs  T(C): 36.5 (23 Oct 2020 06:06), Max: 36.9 (22 Oct 2020 18:44)  T(F): 97.7 (23 Oct 2020 06:06), Max: 98.5 (22 Oct 2020 18:44)  HR: 83 (23 Oct 2020 06:06) (83 - 106)  BP: 131/68 (23 Oct 2020 06:06) (118/89 - 152/72)  BP(mean): --  RR: 18 (23 Oct 2020 06:06) (18 - 40)  SpO2: 93% (23 Oct 2020 06:06) (93% - 100%)    PHYSICAL EXAM:    General: Thin elderly female sitting upright in bed. Patient in NAD speaking in full sentences.  ENT: MMM  Neck: supple; no JVD or thyromegaly  Respiratory: decreased breath sounds B/L; R worse than left.  no retractions  Cardiac: +S1/S2; RRR; no M/R/G; PMI non-displaced  Gastrointestinal: abdomen soft, NT/ND; no rebound or guarding; +BSx4  Back: spine midline, palpable step-offs of lumbosacral spine;  Extremities: WWP, no clubbing or cyanosis; 2+ pitting edema b/l LE  Neurologic: AAOx4; no focal deficits      MEDICATIONS:  MEDICATIONS  (STANDING):  atorvastatin 20 milliGRAM(s) Oral at bedtime  enoxaparin Injectable 40 milliGRAM(s) SubCutaneous every 24 hours  influenza  Vaccine (HIGH DOSE) 0.7 milliLiter(s) IntraMuscular once    MEDICATIONS  (PRN):  acetaminophen   Tablet .. 650 milliGRAM(s) Oral every 6 hours PRN Temp greater or equal to 38C (100.4F), Mild Pain (1 - 3)      ALLERGIES:  Allergies    Bactrim (Unknown)  Cleocin HCl (Unknown)  Flagyl (Unknown)  Honey (Unknown)  iodine (Anaphylaxis)  IV Contrast (Anaphylaxis)  Levaquin (Other; Rash)  penicillin (Unknown)  Zithromax (Unknown)    Intolerances        LABS:                        12.3   12.76 )-----------( 432      ( 23 Oct 2020 06:47 )             38.2     10-23    141  |  101  |  14  ----------------------------<  88  3.7   |  29  |  0.67    Ca    8.8      23 Oct 2020 06:47  Mg     2.0     10-23    TPro  6.5  /  Alb  3.0<L>  /  TBili  <0.2  /  DBili  x   /  AST  19  /  ALT  15  /  AlkPhos  128<H>  10-22    PT/INR - ( 23 Oct 2020 06:47 )   PT: 13.6 sec;   INR: 1.14          PTT - ( 23 Oct 2020 06:47 )  PTT:31.2 sec    CAPILLARY BLOOD GLUCOSE          RADIOLOGY & ADDITIONAL TESTS: Reviewed.    ASSESSMENT:    PLAN:

## 2020-10-24 DIAGNOSIS — J96.01 ACUTE RESPIRATORY FAILURE WITH HYPOXIA: ICD-10-CM

## 2020-10-24 DIAGNOSIS — C34.90 MALIGNANT NEOPLASM OF UNSPECIFIED PART OF UNSPECIFIED BRONCHUS OR LUNG: ICD-10-CM

## 2020-10-24 LAB
ANION GAP SERPL CALC-SCNC: 9 MMOL/L — SIGNIFICANT CHANGE UP (ref 5–17)
BUN SERPL-MCNC: 24 MG/DL — HIGH (ref 7–23)
CALCIUM SERPL-MCNC: 9 MG/DL — SIGNIFICANT CHANGE UP (ref 8.4–10.5)
CHLORIDE SERPL-SCNC: 104 MMOL/L — SIGNIFICANT CHANGE UP (ref 96–108)
CO2 SERPL-SCNC: 30 MMOL/L — SIGNIFICANT CHANGE UP (ref 22–31)
CREAT SERPL-MCNC: 0.63 MG/DL — SIGNIFICANT CHANGE UP (ref 0.5–1.3)
GLUCOSE SERPL-MCNC: 96 MG/DL — SIGNIFICANT CHANGE UP (ref 70–99)
HCT VFR BLD CALC: 34.8 % — SIGNIFICANT CHANGE UP (ref 34.5–45)
HGB BLD-MCNC: 11.2 G/DL — LOW (ref 11.5–15.5)
MAGNESIUM SERPL-MCNC: 1.9 MG/DL — SIGNIFICANT CHANGE UP (ref 1.6–2.6)
MCHC RBC-ENTMCNC: 31.7 PG — SIGNIFICANT CHANGE UP (ref 27–34)
MCHC RBC-ENTMCNC: 32.2 GM/DL — SIGNIFICANT CHANGE UP (ref 32–36)
MCV RBC AUTO: 98.6 FL — SIGNIFICANT CHANGE UP (ref 80–100)
NRBC # BLD: 0 /100 WBCS — SIGNIFICANT CHANGE UP (ref 0–0)
PHOSPHATE SERPL-MCNC: 3 MG/DL — SIGNIFICANT CHANGE UP (ref 2.5–4.5)
PLATELET # BLD AUTO: 447 K/UL — HIGH (ref 150–400)
POTASSIUM SERPL-MCNC: 3.9 MMOL/L — SIGNIFICANT CHANGE UP (ref 3.5–5.3)
POTASSIUM SERPL-SCNC: 3.9 MMOL/L — SIGNIFICANT CHANGE UP (ref 3.5–5.3)
RBC # BLD: 3.53 M/UL — LOW (ref 3.8–5.2)
RBC # FLD: 15.5 % — HIGH (ref 10.3–14.5)
SODIUM SERPL-SCNC: 143 MMOL/L — SIGNIFICANT CHANGE UP (ref 135–145)
WBC # BLD: 12.87 K/UL — HIGH (ref 3.8–10.5)
WBC # FLD AUTO: 12.87 K/UL — HIGH (ref 3.8–10.5)

## 2020-10-24 PROCEDURE — 99232 SBSQ HOSP IP/OBS MODERATE 35: CPT | Mod: GC

## 2020-10-24 PROCEDURE — 99233 SBSQ HOSP IP/OBS HIGH 50: CPT | Mod: GC

## 2020-10-24 RX ORDER — LIDOCAINE 4 G/100G
1 CREAM TOPICAL ONCE
Refills: 0 | Status: COMPLETED | OUTPATIENT
Start: 2020-10-24 | End: 2020-10-24

## 2020-10-24 RX ORDER — ENOXAPARIN SODIUM 100 MG/ML
30 INJECTION SUBCUTANEOUS EVERY 24 HOURS
Refills: 0 | Status: DISCONTINUED | OUTPATIENT
Start: 2020-10-24 | End: 2020-10-26

## 2020-10-24 RX ADMIN — SENNA PLUS 2 TABLET(S): 8.6 TABLET ORAL at 22:06

## 2020-10-24 RX ADMIN — ATORVASTATIN CALCIUM 20 MILLIGRAM(S): 80 TABLET, FILM COATED ORAL at 22:06

## 2020-10-24 RX ADMIN — ENOXAPARIN SODIUM 30 MILLIGRAM(S): 100 INJECTION SUBCUTANEOUS at 17:28

## 2020-10-24 RX ADMIN — LIDOCAINE 1 PATCH: 4 CREAM TOPICAL at 18:00

## 2020-10-24 RX ADMIN — LIDOCAINE 1 PATCH: 4 CREAM TOPICAL at 13:53

## 2020-10-24 NOTE — PROGRESS NOTE ADULT - PROBLEM SELECTOR PLAN 1
Patient w/ recurrent right sided pleural effusion, s/p thoracentesis in OP on 8/11 and 10/11 w/ lymphocyte predominant fluid and negative for malignant cells.    However, her effusion remains highly suspicious for malignancy given her complicated cancer history. She is not good candidate for PleurX but follows with interventional pulmonologist Dr. Nguyen and was planned for pleuroscopy w/ pleurodesis as an outpatient before she was admitted again this time.     Recommendations:  - again discussed plan with the patient today, and she is agreeable to the scheduled pleuroscopy and pleurodesis on Monday 10/26  - keep NPOpMN 10/25-->10/26  - ensure coags, bmp and cbc checked 10/26 AM and that type and screen is active  - needs negative COVID swab within 72hrs of procedure

## 2020-10-24 NOTE — PROGRESS NOTE ADULT - ASSESSMENT
87F w h/o HLD, Pre-DM, severe MR, multiple lung CA c/b recurrent R pleural effusions most recently tapped 10/2020 - home on 2L NC p/w dyspnea and productive cough found to have acute on chronic hypoxemic respiratory failure 2/2 recurrence of R pleural effusion w     #Acute on chronic hypoxemic respiratory failure -- 2/2 increased R sided pleural effusion. Less likely pna as pt remains stable after stopping abx; procalcitonin also negative. MRSA pcr negative  #Multiple lung CA c/b recurrent R sided effusion. previously RLL s/p VATS, ANANYA XRT, and R lesion s/p XRT. PET w  #Leukocytosis - 12 from 12. Possibly reactive in setting of malignancy  #Severe MR - EF 08/2020 w EF >70%  #Pre-DM - a1c 6  #HLD - on statin 20    --PT evaluation; encourage OOB to chair and ambulation  --Per Pulmonary, pt now amenable to pleuroscopy and pleurodesis (not candidate for pleurex 2/2 habitus) on TUESDAY and will need to stay until procedure  --titrate NC to sat >90%  --If worsening hypoxemia, would call pulmonary for POCUS; low threshold in restarting abx dixon in setting of fever    DISPO: home pending completion of above procedure.

## 2020-10-24 NOTE — PROGRESS NOTE ADULT - SUBJECTIVE AND OBJECTIVE BOX
INTERVAL HPI/OVERNIGHT EVENTS: RINA O/N    SUBJECTIVE: Patient seen and examined at bedside.   Pt this morning states she's feeling a bit worse - specifically dyspnea when walking to bathroom. Continues endorsing cough w some yellow sputum. No fever, chest pain. Eating wo N/V/abd pain.     Discussed rationale for pleuroscopy and pleurodesis by pulm with patient once again. Pt focused on 'not wanting to die' from procedure and wishing to wait until she is feeling a bit better. Explained to her thoracentesis may be band-aid to underlying issue and above procedure can provide definitive treatment. She is open to thoracentesis. d/w pulm who will discuss w her further.    OBJECTIVE:    VITAL SIGNS:  ICU Vital Signs Last 24 Hrs  T(C): 36.6 (24 Oct 2020 12:35), Max: 36.7 (23 Oct 2020 21:14)  T(F): 97.9 (24 Oct 2020 12:35), Max: 98 (23 Oct 2020 21:14)  HR: 100 (24 Oct 2020 12:35) (86 - 100)  BP: 129/64 (24 Oct 2020 12:35) (114/65 - 129/64)  BP(mean): --  ABP: --  ABP(mean): --  RR: 21 (24 Oct 2020 12:35) (21 - 22)  SpO2: 94% (24 Oct 2020 12:35) (93% - 94%)        10-23 @ 07:01  -  10-24 @ 07:00  --------------------------------------------------------  IN: 0 mL / OUT: 300 mL / NET: -300 mL      CAPILLARY BLOOD GLUCOSE          PHYSICAL EXAM:  GEN: thin female in NAD on 5L NC  HEENT: NC/AT, MMM  CV: RRR, no BLE edema  PULM: increased RR today, no accessory muscle use, rhonchi w decreased BS on R  ABD; Soft, NABS, non-tender  NEURO: Alert, oriented x3, moving all ext, sensation intact  PSYCH: Appropriate, conversant    MEDICATIONS:  MEDICATIONS  (STANDING):  atorvastatin 20 milliGRAM(s) Oral at bedtime  influenza  Vaccine (HIGH DOSE) 0.7 milliLiter(s) IntraMuscular once  senna 2 Tablet(s) Oral at bedtime    MEDICATIONS  (PRN):  acetaminophen   Tablet .. 650 milliGRAM(s) Oral every 6 hours PRN Temp greater or equal to 38C (100.4F), Mild Pain (1 - 3)      ALLERGIES:  Allergies    Bactrim (Unknown)  Cleocin HCl (Unknown)  Flagyl (Unknown)  Honey (Unknown)  iodine (Anaphylaxis)  IV Contrast (Anaphylaxis)  Levaquin (Other; Rash)  penicillin (Unknown)  Zithromax (Unknown)    Intolerances        LABS:                        11.2   12.87 )-----------( 447      ( 24 Oct 2020 07:19 )             34.8     10-24    143  |  104  |  24<H>  ----------------------------<  96  3.9   |  30  |  0.63    Ca    9.0      24 Oct 2020 07:19  Phos  3.0     10-24  Mg     1.9     10-24      PT/INR - ( 23 Oct 2020 06:47 )   PT: 13.6 sec;   INR: 1.14          PTT - ( 23 Oct 2020 06:47 )  PTT:31.2 sec  Urinalysis Basic - ( 23 Oct 2020 21:27 )    Color: Yellow / Appearance: Clear / SG: >=1.030 / pH: x  Gluc: x / Ketone: Trace mg/dL  / Bili: Negative / Urobili: 0.2 E.U./dL   Blood: x / Protein: NEGATIVE mg/dL / Nitrite: NEGATIVE   Leuk Esterase: NEGATIVE / RBC: x / WBC x   Sq Epi: x / Non Sq Epi: x / Bacteria: x        RADIOLOGY & ADDITIONAL TESTS: Reviewed.

## 2020-10-24 NOTE — PROGRESS NOTE ADULT - ASSESSMENT
86yo woman and never smoker w/ PMH severe MR, HFpEF, and complicated history of NSCLC of multiple sites dating back to 2013 with parenchymal recurrence and recurrent right pleural effusions. recent admission 8/2020 for fall and R leg weakness and again in 10/2020 with dyspnea in setting of effusion presenting again with dyspnea. Planned for elective pleuroscopy w/ pleurodesis.

## 2020-10-24 NOTE — PROGRESS NOTE ADULT - ATTENDING COMMENTS
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 She has a reaccumulated right plef which is not causing respiratory distress; not empyema as per POCUS and clinical evaluation; continue to hold abx.  Patient has agreed to stick to plan of pleuroscopy with pleurodesis on October 27. Not indicated for an urgent thoracentesis.

## 2020-10-24 NOTE — PROGRESS NOTE ADULT - SUBJECTIVE AND OBJECTIVE BOX
PULMONARY CONSULT SERVICE FOLLOW-UP NOTE    INTERVAL HPI:  Reviewed chart and overnight events; patient seen and examined at bedside. Is agreeable to have procedure as discussed, with pleurodesis.     MEDICATIONS:  Pulmonary:    Antimicrobials:    Anticoagulants:  enoxaparin Injectable 30 milliGRAM(s) SubCutaneous every 24 hours    Cardiac:    Allergies    Bactrim (Unknown)  Cleocin HCl (Unknown)  Flagyl (Unknown)  Honey (Unknown)  iodine (Anaphylaxis)  IV Contrast (Anaphylaxis)  Levaquin (Other; Rash)  penicillin (Unknown)  Zithromax (Unknown)    Intolerances    Vital Signs Last 24 Hrs  T(C): 36.6 (24 Oct 2020 12:35), Max: 36.7 (23 Oct 2020 21:14)  T(F): 97.9 (24 Oct 2020 12:35), Max: 98 (23 Oct 2020 21:14)  HR: 100 (24 Oct 2020 12:35) (86 - 100)  BP: 129/64 (24 Oct 2020 12:35) (114/65 - 129/64)  BP(mean): --  RR: 21 (24 Oct 2020 12:35) (21 - 22)  SpO2: 94% (24 Oct 2020 12:35) (93% - 94%)    10-23 @ 07:01  -  10-24 @ 07:00  --------------------------------------------------------  IN: 0 mL / OUT: 300 mL / NET: -300 mL    PHYSICAL EXAM:  Constitutional: thin/cachectic appearing  HEENT: NC/AT; PERRL, anicteric sclera; MMM  Neck: supple  Cardiovascular: +S1/S2, RRR  Respiratory: CTA B/L; no W/R/R  Gastrointestinal: soft, NT/ND  Extremities: WWP; no edema, clubbing or cyanosis  Vascular: 2+ radial pulses B/L  Neurological: awake and alert; LIPSCOMB    LABS:  CBC Full  -  ( 24 Oct 2020 07:19 )  WBC Count : 12.87 K/uL  RBC Count : 3.53 M/uL  Hemoglobin : 11.2 g/dL  Hematocrit : 34.8 %  Platelet Count - Automated : 447 K/uL  Mean Cell Volume : 98.6 fl  Mean Cell Hemoglobin : 31.7 pg  Mean Cell Hemoglobin Concentration : 32.2 gm/dL  Auto Neutrophil # : x  Auto Lymphocyte # : x  Auto Monocyte # : x  Auto Eosinophil # : x  Auto Basophil # : x  Auto Neutrophil % : x  Auto Lymphocyte % : x  Auto Monocyte % : x  Auto Eosinophil % : x  Auto Basophil % : x    10-24    143  |  104  |  24<H>  ----------------------------<  96  3.9   |  30  |  0.63    Ca    9.0      24 Oct 2020 07:19  Phos  3.0     10-24  Mg     1.9     10-24    PT/INR - ( 23 Oct 2020 06:47 )   PT: 13.6 sec;   INR: 1.14        PTT - ( 23 Oct 2020 06:47 )  PTT:31.2 sec    Urinalysis Basic - ( 23 Oct 2020 21:27 )    Color: Yellow / Appearance: Clear / SG: >=1.030 / pH: x  Gluc: x / Ketone: Trace mg/dL  / Bili: Negative / Urobili: 0.2 E.U./dL   Blood: x / Protein: NEGATIVE mg/dL / Nitrite: NEGATIVE   Leuk Esterase: NEGATIVE / RBC: x / WBC x   Sq Epi: x / Non Sq Epi: x / Bacteria: x    RADIOLOGY & ADDITIONAL STUDIES:  No interval study for review

## 2020-10-25 LAB — SARS-COV-2 RNA SPEC QL NAA+PROBE: NEGATIVE — SIGNIFICANT CHANGE UP

## 2020-10-25 PROCEDURE — 99233 SBSQ HOSP IP/OBS HIGH 50: CPT | Mod: GC

## 2020-10-25 RX ADMIN — ENOXAPARIN SODIUM 30 MILLIGRAM(S): 100 INJECTION SUBCUTANEOUS at 17:18

## 2020-10-25 RX ADMIN — ATORVASTATIN CALCIUM 20 MILLIGRAM(S): 80 TABLET, FILM COATED ORAL at 22:12

## 2020-10-25 RX ADMIN — LIDOCAINE 1 PATCH: 4 CREAM TOPICAL at 01:30

## 2020-10-25 NOTE — PROGRESS NOTE ADULT - PROBLEM SELECTOR PLAN 9
F: careful IVF given moderate MR   E: Replete for K<4 Mag<2  N: Regular Diet  A: Lovenox 40   Code status: full code  Disposition: [facility]

## 2020-10-25 NOTE — PROGRESS NOTE ADULT - SUBJECTIVE AND OBJECTIVE BOX
OVERNIGHT EVENTS: no acute events    SUBJECTIVE / INTERVAL HPI: Patient seen and examined at bedside. She says she is very cold in her room and as a result is becoming short of breath. She is more amenable to Pleuroscopy procedure on Tuesday. Denies cp, abd pain, fever, n, v.    VITAL SIGNS:  Vital Signs Last 24 Hrs  T(C): 36.4 (25 Oct 2020 12:48), Max: 36.8 (25 Oct 2020 05:35)  T(F): 97.5 (25 Oct 2020 12:48), Max: 98.2 (25 Oct 2020 05:35)  HR: 88 (25 Oct 2020 12:48) (88 - 97)  BP: 125/65 (25 Oct 2020 12:48) (109/58 - 128/78)  BP(mean): --  RR: 21 (25 Oct 2020 12:48) (20 - 22)  SpO2: 96% (25 Oct 2020 12:48) (93% - 96%)    PHYSICAL EXAM:    General: WDWN  HEENT: NC/AT; PERRL, anicteric sclera; MMM  Neck: supple  Cardiovascular: +S1/S2; RRR  Respiratory: Decreased breath sounds in both lung bases bilaterally.  Gastrointestinal: soft, NT/ND; +BSx4  Extremities: WWP; no edema, clubbing or cyanosis  Vascular: 2+ radial, DP/PT pulses B/L  Neurological: AAOx3; no focal deficits    MEDICATIONS:  MEDICATIONS  (STANDING):  atorvastatin 20 milliGRAM(s) Oral at bedtime  enoxaparin Injectable 30 milliGRAM(s) SubCutaneous every 24 hours  influenza  Vaccine (HIGH DOSE) 0.7 milliLiter(s) IntraMuscular once  senna 2 Tablet(s) Oral at bedtime    MEDICATIONS  (PRN):  acetaminophen   Tablet .. 650 milliGRAM(s) Oral every 6 hours PRN Temp greater or equal to 38C (100.4F), Mild Pain (1 - 3)      ALLERGIES:  Allergies    Bactrim (Unknown)  Cleocin HCl (Unknown)  Flagyl (Unknown)  Honey (Unknown)  iodine (Anaphylaxis)  IV Contrast (Anaphylaxis)  Levaquin (Other; Rash)  penicillin (Unknown)  Zithromax (Unknown)    Intolerances        LABS:                        11.2   12.87 )-----------( 447      ( 24 Oct 2020 07:19 )             34.8     10-24    143  |  104  |  24<H>  ----------------------------<  96  3.9   |  30  |  0.63    Ca    9.0      24 Oct 2020 07:19  Phos  3.0     10-24  Mg     1.9     10-24        Urinalysis Basic - ( 23 Oct 2020 21:27 )    Color: Yellow / Appearance: Clear / SG: >=1.030 / pH: x  Gluc: x / Ketone: Trace mg/dL  / Bili: Negative / Urobili: 0.2 E.U./dL   Blood: x / Protein: NEGATIVE mg/dL / Nitrite: NEGATIVE   Leuk Esterase: NEGATIVE / RBC: x / WBC x   Sq Epi: x / Non Sq Epi: x / Bacteria: x      CAPILLARY BLOOD GLUCOSE          RADIOLOGY & ADDITIONAL TESTS: Reviewed.    ASSESSMENT:    PLAN:

## 2020-10-25 NOTE — PROGRESS NOTE ADULT - ASSESSMENT
88yo F, nonsmoker, with PMHx of lung adenocarcinoma (s/p resection/XRT), severe MR, and recent admission 8/2020 for fall and R leg weakness,presented to ED for hypoxemia to 80s on 3L NC (her usual O2), as well as productive cough with green sputum and LE edema. Admitted to [facility] for management of sepsis 2/2 to pneumonia and acute hypoxic respiratory failure.

## 2020-10-25 NOTE — PROGRESS NOTE ADULT - ATTENDING COMMENTS
87F w h/o HLD, Pre-DM, severe MR, multiple lung CA c/b recurrent R pleural effusions most recently tapped 10/2020 - home on 2L NC p/w dyspnea and productive cough found to have acute on chronic hypoxemic respiratory failure 2/2 recurrence of R pleural effusion, plan for pleuroscopy and pleurodesis by pulmonary on 10/26    Pt states she feels fine today. Denies any worsening in breathing. No chest pain.     #Acute on chronic hypoxemic respiratory failure -- 2/2 increased R sided pleural effusion. Less likely pna as pt remains stable after stopping abx; procalcitonin also negative. MRSA pcr negative  #Multiple lung CA c/b recurrent R sided effusion. previously RLL s/p VATS, ANANYA XRT, and R lesion s/p XRT.   #Leukocytosis -  Possibly reactive in setting of malignancy  #Severe MR - EF 08/2020 w EF >70%  #Pre-DM - a1c 6  #HLD - on statin 20  --NPO after midnight. Pt scheduled for pleuroscopy and pleurodesis tomorrow

## 2020-10-25 NOTE — PROGRESS NOTE ADULT - PROBLEM SELECTOR PLAN 1
RESOLVED -> Patient initially met criteria for severe sepsis w/ leukocytosis, tachycardia, tachypnea and elevated lactate. S/p Ceftazadme 1g IV x1, Vanc 750mg IV x1 in ED. No fluids given due to moderate MR. Now, patient no longer meeting sepsis criteria, as lactate is resolved, and patient is no longer tachypneic or tachycardic, and has been afebrile.   - Blood Cultures NGTD

## 2020-10-25 NOTE — PROGRESS NOTE ADULT - PROBLEM SELECTOR PLAN 4
Patient w/ hx of recurrent pleural effusions. On previous admission R-sided thoracentesis, with lymphocytic predominance - cytology negative for malignant cells but malignancy remains the primary differential of her recurrent effusions. CXR w/ slight interval increase in pleural effusions right > left.  - Pulm following w/ plan for possible R-sided pleuroscopy w/ pleurodesis tuesday.  - f/u pulm recs

## 2020-10-25 NOTE — PROGRESS NOTE ADULT - PROBLEM SELECTOR PLAN 3
Patient w/ increased sputum production, w/ AHRF and met severe sepsis criteria on admission, now no longer septic. ProCal negtive.  - as per pulm recs, will monitor patient clinically and see if she becomes febrile/tachypneic again without antibiotics.   -bcx negative to date, procal negative, mrsa negative.   -continue to monitor.

## 2020-10-26 PROCEDURE — 83605 ASSAY OF LACTIC ACID: CPT

## 2020-10-26 PROCEDURE — 96376 TX/PRO/DX INJ SAME DRUG ADON: CPT

## 2020-10-26 PROCEDURE — 82746 ASSAY OF FOLIC ACID SERUM: CPT

## 2020-10-26 PROCEDURE — 85730 THROMBOPLASTIN TIME PARTIAL: CPT

## 2020-10-26 PROCEDURE — 84311 SPECTROPHOTOMETRY: CPT

## 2020-10-26 PROCEDURE — 88305 TISSUE EXAM BY PATHOLOGIST: CPT

## 2020-10-26 PROCEDURE — 83986 ASSAY PH BODY FLUID NOS: CPT

## 2020-10-26 PROCEDURE — 36415 COLL VENOUS BLD VENIPUNCTURE: CPT

## 2020-10-26 PROCEDURE — 71275 CT ANGIOGRAPHY CHEST: CPT

## 2020-10-26 PROCEDURE — 84300 ASSAY OF URINE SODIUM: CPT

## 2020-10-26 PROCEDURE — 99232 SBSQ HOSP IP/OBS MODERATE 35: CPT | Mod: GC

## 2020-10-26 PROCEDURE — 88344 IMHCHEM/IMCYTCHM EA MLT ANTB: CPT

## 2020-10-26 PROCEDURE — 82042 OTHER SOURCE ALBUMIN QUAN EA: CPT

## 2020-10-26 PROCEDURE — 86850 RBC ANTIBODY SCREEN: CPT

## 2020-10-26 PROCEDURE — 88341 IMHCHEM/IMCYTCHM EA ADD ANTB: CPT

## 2020-10-26 PROCEDURE — 86900 BLOOD TYPING SEROLOGIC ABO: CPT

## 2020-10-26 PROCEDURE — U0003: CPT

## 2020-10-26 PROCEDURE — 88342 IMHCHEM/IMCYTCHM 1ST ANTB: CPT

## 2020-10-26 PROCEDURE — 82607 VITAMIN B-12: CPT

## 2020-10-26 PROCEDURE — 85610 PROTHROMBIN TIME: CPT

## 2020-10-26 PROCEDURE — 86901 BLOOD TYPING SEROLOGIC RH(D): CPT

## 2020-10-26 PROCEDURE — 84478 ASSAY OF TRIGLYCERIDES: CPT

## 2020-10-26 PROCEDURE — 82945 GLUCOSE OTHER FLUID: CPT

## 2020-10-26 PROCEDURE — 99285 EMERGENCY DEPT VISIT HI MDM: CPT | Mod: 25

## 2020-10-26 PROCEDURE — 87075 CULTR BACTERIA EXCEPT BLOOD: CPT

## 2020-10-26 PROCEDURE — 96375 TX/PRO/DX INJ NEW DRUG ADDON: CPT | Mod: XU

## 2020-10-26 PROCEDURE — 96374 THER/PROPH/DIAG INJ IV PUSH: CPT | Mod: XU

## 2020-10-26 PROCEDURE — 81003 URINALYSIS AUTO W/O SCOPE: CPT

## 2020-10-26 PROCEDURE — 83615 LACTATE (LD) (LDH) ENZYME: CPT

## 2020-10-26 PROCEDURE — 82803 BLOOD GASES ANY COMBINATION: CPT

## 2020-10-26 PROCEDURE — 83880 ASSAY OF NATRIURETIC PEPTIDE: CPT

## 2020-10-26 PROCEDURE — 82570 ASSAY OF URINE CREATININE: CPT

## 2020-10-26 PROCEDURE — 89051 BODY FLUID CELL COUNT: CPT

## 2020-10-26 PROCEDURE — 84484 ASSAY OF TROPONIN QUANT: CPT

## 2020-10-26 PROCEDURE — 88112 CYTOPATH CELL ENHANCE TECH: CPT

## 2020-10-26 PROCEDURE — 99233 SBSQ HOSP IP/OBS HIGH 50: CPT | Mod: GC

## 2020-10-26 PROCEDURE — 84100 ASSAY OF PHOSPHORUS: CPT

## 2020-10-26 PROCEDURE — 80053 COMPREHEN METABOLIC PANEL: CPT

## 2020-10-26 PROCEDURE — 93005 ELECTROCARDIOGRAM TRACING: CPT

## 2020-10-26 PROCEDURE — 87070 CULTURE OTHR SPECIMN AEROBIC: CPT

## 2020-10-26 PROCEDURE — 93970 EXTREMITY STUDY: CPT

## 2020-10-26 PROCEDURE — 83735 ASSAY OF MAGNESIUM: CPT

## 2020-10-26 PROCEDURE — 97116 GAIT TRAINING THERAPY: CPT

## 2020-10-26 PROCEDURE — 71045 X-RAY EXAM CHEST 1 VIEW: CPT

## 2020-10-26 PROCEDURE — 84157 ASSAY OF PROTEIN OTHER: CPT

## 2020-10-26 PROCEDURE — 87205 SMEAR GRAM STAIN: CPT

## 2020-10-26 PROCEDURE — 85025 COMPLETE CBC W/AUTO DIFF WBC: CPT

## 2020-10-26 PROCEDURE — 83935 ASSAY OF URINE OSMOLALITY: CPT

## 2020-10-26 PROCEDURE — 82977 ASSAY OF GGT: CPT

## 2020-10-26 RX ORDER — TALC 4 G/50ML
4 POWDER INTRAPLEURAL ONCE
Refills: 0 | Status: COMPLETED | OUTPATIENT
Start: 2020-10-27 | End: 2020-10-27

## 2020-10-26 RX ORDER — SODIUM CHLORIDE 0.65 %
1 AEROSOL, SPRAY (ML) NASAL EVERY 6 HOURS
Refills: 0 | Status: DISCONTINUED | OUTPATIENT
Start: 2020-10-26 | End: 2020-11-04

## 2020-10-26 RX ADMIN — ATORVASTATIN CALCIUM 20 MILLIGRAM(S): 80 TABLET, FILM COATED ORAL at 21:39

## 2020-10-26 RX ADMIN — ENOXAPARIN SODIUM 30 MILLIGRAM(S): 100 INJECTION SUBCUTANEOUS at 17:26

## 2020-10-26 NOTE — PROGRESS NOTE ADULT - PROBLEM SELECTOR PLAN 7
Patient with history of compression fractures and reports worsening back pain lately. Followed by outpatient ortho.  - Tylenol PRN for pain control

## 2020-10-26 NOTE — PROGRESS NOTE ADULT - PROBLEM SELECTOR PLAN 9
F: careful IVF given moderate MR   E: Replete for K<4 Mag<2  N: NPO   A: Lovenox 40   Code status: full code  Disposition: [facility]

## 2020-10-26 NOTE — PROGRESS NOTE ADULT - PROBLEM SELECTOR PLAN 4
Patient w/ hx of recurrent pleural effusions. On previous admission R-sided thoracentesis, with lymphocytic predominance - cytology negative for malignant cells but malignancy remains the primary differential of her recurrent effusions. CXR w/ slight interval increase in pleural effusions right > left. Plan for R-sided pleuroscopy w/ pleurodesis tuesday.  - f/u pulm recs  - patient to be NPO after midnight

## 2020-10-26 NOTE — PROGRESS NOTE ADULT - SUBJECTIVE AND OBJECTIVE BOX
OVERNIGHT EVENTS: no acute events overnight.     SUBJECTIVE / INTERVAL HPI: Patient seen and examined at bedside. She feels well today, only complaint is dryness in the nares. Aside from this, she denies shortness of breath, chest pain.     VITAL SIGNS:  Vital Signs Last 24 Hrs  T(C): 36.3 (26 Oct 2020 12:00), Max: 36.8 (25 Oct 2020 20:46)  T(F): 97.3 (26 Oct 2020 12:00), Max: 98.3 (25 Oct 2020 20:46)  HR: 94 (26 Oct 2020 12:00) (87 - 94)  BP: 130/71 (26 Oct 2020 12:00) (126/74 - 146/74)  BP(mean): --  RR: 18 (26 Oct 2020 12:00) (18 - 19)  SpO2: 96% (26 Oct 2020 12:00) (96% - 100%)    PHYSICAL EXAM:    General: Elderly thin female, no acute distress.   HEENT: NC/AT; PERRL, anicteric sclera; MMM  Neck: supple  Cardiovascular: +S1/S2; RRR  Respiratory: Decreased breath sounds in right lung base. Otherwise, no crackles, no wheezes, rales, ronchi.   Gastrointestinal: soft, NT/ND; +BSx4  Extremities: WWP; no edema, clubbing or cyanosis  Vascular: 2+ radial, DP/PT pulses B/L  Neurological: AAOx3; no focal deficits    MEDICATIONS:  MEDICATIONS  (STANDING):  atorvastatin 20 milliGRAM(s) Oral at bedtime  enoxaparin Injectable 30 milliGRAM(s) SubCutaneous every 24 hours  influenza  Vaccine (HIGH DOSE) 0.7 milliLiter(s) IntraMuscular once  senna 2 Tablet(s) Oral at bedtime    MEDICATIONS  (PRN):  acetaminophen   Tablet .. 650 milliGRAM(s) Oral every 6 hours PRN Temp greater or equal to 38C (100.4F), Mild Pain (1 - 3)  sodium chloride 0.65% Nasal 1 Spray(s) Both Nostrils every 6 hours PRN Nasal Congestion/Dryness      ALLERGIES:  Allergies    Bactrim (Unknown)  Cleocin HCl (Unknown)  Flagyl (Unknown)  Honey (Unknown)  iodine (Anaphylaxis)  IV Contrast (Anaphylaxis)  Levaquin (Other; Rash)  penicillin (Unknown)  Zithromax (Unknown)    Intolerances        LABS:              CAPILLARY BLOOD GLUCOSE          RADIOLOGY & ADDITIONAL TESTS: Reviewed.    ASSESSMENT:    PLAN:

## 2020-10-26 NOTE — PROGRESS NOTE ADULT - SUBJECTIVE AND OBJECTIVE BOX
PULMONARY CONSULT SERVICE FOLLOW-UP NOTE    INTERVAL HPI:  Reviewed chart and overnight events; patient seen and examined at bedside.   c/o nasal dryness.       MEDICATIONS:  MEDICATIONS  (STANDING):  atorvastatin 20 milliGRAM(s) Oral at bedtime  enoxaparin Injectable 30 milliGRAM(s) SubCutaneous every 24 hours  influenza  Vaccine (HIGH DOSE) 0.7 milliLiter(s) IntraMuscular once  senna 2 Tablet(s) Oral at bedtime    MEDICATIONS  (PRN):  acetaminophen   Tablet .. 650 milliGRAM(s) Oral every 6 hours PRN Temp greater or equal to 38C (100.4F), Mild Pain (1 - 3)    Allergies    Bactrim (Unknown)  Cleocin HCl (Unknown)  Flagyl (Unknown)  Honey (Unknown)  iodine (Anaphylaxis)  IV Contrast (Anaphylaxis)  Levaquin (Other; Rash)  penicillin (Unknown)  Zithromax (Unknown)    Intolerances    Vital Signs Last 24 Hrs  Vital Signs Last 24 Hrs  T(C): 36.3 (26 Oct 2020 05:18), Max: 36.8 (25 Oct 2020 20:46)  T(F): 97.3 (26 Oct 2020 05:18), Max: 98.3 (25 Oct 2020 20:46)  HR: 87 (26 Oct 2020 05:18) (87 - 91)  BP: 146/74 (26 Oct 2020 05:18) (125/65 - 146/74)  BP(mean): --  RR: 18 (26 Oct 2020 05:18) (18 - 21)  SpO2: 100% (26 Oct 2020 05:18) (96% - 100%)    PHYSICAL EXAM:  Constitutional: thin/cachectic appearing  HEENT: NC/AT; PERRL, anicteric sclera; MMM  Neck: supple  Cardiovascular: +S1/S2, RRR  Respiratory: CTA B/L; no W/R/R  Gastrointestinal: soft, NT/ND  Extremities: WWP; no edema, clubbing or cyanosis  Vascular: 2+ radial pulses B/L  Neurological: awake and alert; LIPSCOMB    RADIOLOGY & ADDITIONAL STUDIES:  No interval study for review

## 2020-10-26 NOTE — PROGRESS NOTE ADULT - ATTENDING COMMENTS
Pt seen and examined by me at bedside this AM. Agree with above with additions,   c/o dryness of nares otherwise no complaints. Agreeable to pleuroscopy with pleurodesis tomorrow.  appreciate pulm recs.

## 2020-10-26 NOTE — PROGRESS NOTE ADULT - ASSESSMENT
88yo woman and never smoker w/ PMH severe MR, HFpEF, and complicated history of NSCLC of multiple sites dating back to 2013 with parenchymal recurrence and recurrent right pleural effusions. recent admission 8/2020 for fall and R leg weakness and again in 10/2020 with dyspnea in setting of effusion presenting again with dyspnea. Planned for elective pleuroscopy w/ pleurodesis.

## 2020-10-26 NOTE — PROGRESS NOTE ADULT - ASSESSMENT
88yo F, nonsmoker, with PMHx of lung adenocarcinoma (s/p resection/XRT), severe MR, and recent admission 8/2020 for fall and R leg weakness,presented to ED for hypoxemia to 80s on 3L NC (her usual O2), as well as productive cough with green sputum and LE edema. Admitted to [facility] for management of sepsis 2/2 to pneumonia and acute hypoxic respiratory failure.

## 2020-10-26 NOTE — PROGRESS NOTE ADULT - PROBLEM SELECTOR PLAN 1
Patient w/ recurrent right sided pleural effusion, s/p thoracentesis in OP on 8/11 and 10/11 w/ lymphocyte predominant fluid and negative for malignant cells.    However, her effusion remains highly suspicious for malignancy given her complicated cancer history. She is not good candidate for PleurX but follows with interventional pulmonologist Dr. Nguyen and was planned for pleuroscopy w/ pleurodesis as an outpatient before she was admitted again this time.     Recommendations:  - again discussed plan with the patient today, and she is agreeable to the scheduled pleuroscopy and pleurodesis on Tuesday 10/27.   - keep NPOpMN 10/25-->10/26  - ensure coags, bmp and cbc checked 10/26 AM and that type and screen is active.  - No DVT prophylaxis on 10/27 AM.   - needs negative COVID swab within 72hrs of procedure

## 2020-10-27 ENCOUNTER — RESULT REVIEW (OUTPATIENT)
Age: 85
End: 2020-10-27

## 2020-10-27 LAB
ANION GAP SERPL CALC-SCNC: 7 MMOL/L — SIGNIFICANT CHANGE UP (ref 5–17)
APTT BLD: 29.2 SEC — SIGNIFICANT CHANGE UP (ref 27.5–35.5)
BUN SERPL-MCNC: 15 MG/DL — SIGNIFICANT CHANGE UP (ref 7–23)
CALCIUM SERPL-MCNC: 8.7 MG/DL — SIGNIFICANT CHANGE UP (ref 8.4–10.5)
CHLORIDE SERPL-SCNC: 101 MMOL/L — SIGNIFICANT CHANGE UP (ref 96–108)
CO2 SERPL-SCNC: 35 MMOL/L — HIGH (ref 22–31)
CREAT SERPL-MCNC: 0.62 MG/DL — SIGNIFICANT CHANGE UP (ref 0.5–1.3)
CULTURE RESULTS: SIGNIFICANT CHANGE UP
CULTURE RESULTS: SIGNIFICANT CHANGE UP
GLUCOSE SERPL-MCNC: 89 MG/DL — SIGNIFICANT CHANGE UP (ref 70–99)
HCT VFR BLD CALC: 34.4 % — LOW (ref 34.5–45)
HGB BLD-MCNC: 10.7 G/DL — LOW (ref 11.5–15.5)
INR BLD: 1.04 — SIGNIFICANT CHANGE UP (ref 0.88–1.16)
MCHC RBC-ENTMCNC: 31.1 GM/DL — LOW (ref 32–36)
MCHC RBC-ENTMCNC: 31.7 PG — SIGNIFICANT CHANGE UP (ref 27–34)
MCV RBC AUTO: 101.8 FL — HIGH (ref 80–100)
NRBC # BLD: 0 /100 WBCS — SIGNIFICANT CHANGE UP (ref 0–0)
PLATELET # BLD AUTO: 429 K/UL — HIGH (ref 150–400)
POTASSIUM SERPL-MCNC: 4.4 MMOL/L — SIGNIFICANT CHANGE UP (ref 3.5–5.3)
POTASSIUM SERPL-SCNC: 4.4 MMOL/L — SIGNIFICANT CHANGE UP (ref 3.5–5.3)
PROTHROM AB SERPL-ACNC: 12.4 SEC — SIGNIFICANT CHANGE UP (ref 10.6–13.6)
RBC # BLD: 3.38 M/UL — LOW (ref 3.8–5.2)
RBC # FLD: 15.2 % — HIGH (ref 10.3–14.5)
SODIUM SERPL-SCNC: 143 MMOL/L — SIGNIFICANT CHANGE UP (ref 135–145)
SPECIMEN SOURCE: SIGNIFICANT CHANGE UP
SPECIMEN SOURCE: SIGNIFICANT CHANGE UP
WBC # BLD: 9.8 K/UL — SIGNIFICANT CHANGE UP (ref 3.8–10.5)
WBC # FLD AUTO: 9.8 K/UL — SIGNIFICANT CHANGE UP (ref 3.8–10.5)

## 2020-10-27 PROCEDURE — 88344 IMHCHEM/IMCYTCHM EA MLT ANTB: CPT | Mod: 26

## 2020-10-27 PROCEDURE — 88342 IMHCHEM/IMCYTCHM 1ST ANTB: CPT | Mod: 26,59

## 2020-10-27 PROCEDURE — 88341 IMHCHEM/IMCYTCHM EA ADD ANTB: CPT | Mod: 26

## 2020-10-27 PROCEDURE — 88112 CYTOPATH CELL ENHANCE TECH: CPT | Mod: 26

## 2020-10-27 PROCEDURE — 88312 SPECIAL STAINS GROUP 1: CPT | Mod: 26

## 2020-10-27 PROCEDURE — 99223 1ST HOSP IP/OBS HIGH 75: CPT

## 2020-10-27 PROCEDURE — 99358 PROLONG SERVICE W/O CONTACT: CPT

## 2020-10-27 PROCEDURE — 71045 X-RAY EXAM CHEST 1 VIEW: CPT | Mod: 26

## 2020-10-27 PROCEDURE — 32650 THORACOSCOPY W/PLEURODESIS: CPT | Mod: GC

## 2020-10-27 PROCEDURE — 88305 TISSUE EXAM BY PATHOLOGIST: CPT | Mod: 26,59

## 2020-10-27 PROCEDURE — 32609 THORACOSCOPY W/BX PLEURA: CPT | Mod: GC

## 2020-10-27 PROCEDURE — 99232 SBSQ HOSP IP/OBS MODERATE 35: CPT | Mod: GC,25

## 2020-10-27 PROCEDURE — 99233 SBSQ HOSP IP/OBS HIGH 50: CPT | Mod: GC

## 2020-10-27 RX ORDER — ACETAMINOPHEN 500 MG
650 TABLET ORAL EVERY 6 HOURS
Refills: 0 | Status: DISCONTINUED | OUTPATIENT
Start: 2020-10-27 | End: 2020-11-04

## 2020-10-27 RX ORDER — OXYCODONE AND ACETAMINOPHEN 5; 325 MG/1; MG/1
2 TABLET ORAL EVERY 6 HOURS
Refills: 0 | Status: DISCONTINUED | OUTPATIENT
Start: 2020-10-27 | End: 2020-10-27

## 2020-10-27 RX ORDER — ENOXAPARIN SODIUM 100 MG/ML
30 INJECTION SUBCUTANEOUS AT BEDTIME
Refills: 0 | Status: DISCONTINUED | OUTPATIENT
Start: 2020-10-27 | End: 2020-11-04

## 2020-10-27 RX ORDER — ENOXAPARIN SODIUM 100 MG/ML
40 INJECTION SUBCUTANEOUS AT BEDTIME
Refills: 0 | Status: DISCONTINUED | OUTPATIENT
Start: 2020-10-27 | End: 2020-10-27

## 2020-10-27 RX ADMIN — TALC 4 GRAM(S): 4 POWDER INTRAPLEURAL at 10:15

## 2020-10-27 RX ADMIN — ATORVASTATIN CALCIUM 20 MILLIGRAM(S): 80 TABLET, FILM COATED ORAL at 21:53

## 2020-10-27 RX ADMIN — ENOXAPARIN SODIUM 30 MILLIGRAM(S): 100 INJECTION SUBCUTANEOUS at 21:53

## 2020-10-27 NOTE — PROGRESS NOTE ADULT - PROBLEM SELECTOR PLAN 4
Patient has moderate mitral regurg, on echo 8/2020. EF >70%. No evidence of heart failure on recent Echo.   - monitor for LE edema  - caution w/ IV fluids  - Strict I/Os

## 2020-10-27 NOTE — CHART NOTE - NSCHARTNOTEFT_GEN_A_CORE
PALLIATIVE MEDICINE COORDINATION OF CARE NOTE FOR LAZARO GRANT  [  ] ED Trigger   [  ] MICU Trigger     [x  ] Consult    Patient last assessed: __10/27___  to manage: GOC/AD, Symptoms, and Support was recommended:__10/27____    ___30___ Minutes; Start: _1000____  End: _1030___, of non-face-to-face prolonged service provided that relates to (face-to-face) care that has or will occur and ongoing patient management, including one or more of the following:   - Reviewed records from other physicians or other health care professional services, including one or more of the following: other medical records and diagnostic / radiology study results     HPI:  88yo F, nonsmoker, with PMHx of lung adenocarcinoma (s/p resection/XRT), severe MR, and recent admission 8/2020 for fall and R leg weakness, again 10/2020 for SOB with recurrent R-sided pleural effusions,  who presents to ED for hypoxemia to 80s on 3L NC (her usual O2), as well as productive cough with green sputum and LE edema. On recent admission patient w/ new effusion w/ concern for recurrent malignancy. Patient discharged with outpatient follow up with pulmonologist (Dr. Nguyen) and planned for  R-sided pleuroscopy w/ pleurodesis, but procedure cancelled due to pt "not feeling well". Patient reports increased sputum production, cough and L sided rib pain since discharge. Also c/o new b/l LE edema since yesterday. She denies HA, F/C, N/V, abdominal pain, diarrhea, constipation, dysuria.    ED Course:  T 98.2, H 106, /70, R 40--> 20 , SpO2 99 on NRB--> 100 on 6 L NC  WBC 15.5, Hgb 12.4, Na 146, Alk Phos 128, Lactate 3, BNP 1034, Trop <0.01  EKG: PVC, nsr  XR: No pneumothorax. Slight interval increase in pleural effusions right > left  Interventions: given Ceftazadme 1g IV x1, Vanc 750mg IV x1   (22 Oct 2020 15:08)      - Other: iStop reviewed.    - Other: Medication reviewed.    The patient HAS NOT used PRN's in the last 24h.    MEDICATIONS  (STANDING):  atorvastatin 20 milliGRAM(s) Oral at bedtime  enoxaparin Injectable 30 milliGRAM(s) SubCutaneous at bedtime  influenza  Vaccine (HIGH DOSE) 0.7 milliLiter(s) IntraMuscular once  lidocaine 1% Injectable 10 milliLiter(s) Local Injection once    MEDICATIONS  (PRN):  acetaminophen   Tablet .. 650 milliGRAM(s) Oral every 6 hours PRN Moderate Pain (4 - 6)  oxycodone    5 mG/acetaminophen 325 mG 2 Tablet(s) Oral every 6 hours PRN Severe Pain (7 - 10)  sodium chloride 0.65% Nasal 1 Spray(s) Both Nostrils every 6 hours PRN Nasal Congestion/Dryness      - Other: Advanced directives     Full Code/DNR DNI     No documented MOLST form found on [facility]     No documented HCP form found on [facility]     No Living will / POA / Advance directives found on [place] / [facility].     No documented GOC notes on [facility]    - Other: Coordination/Plan of care     _3___ admissions in 1 year     Current admission LOS: _5__ days     LACE score: _15___ ADVANCE ILLNESS PATIENT.     Patient NOT previously seen by palliative medicine consult service.      Discussed with  Consult request for: "  Recurrent Pleural effusion, lung cancer  "    Patient is an Advanced Illness patient hence the primary team will need to complete GOC document of any prior GOC discussions that were done during this admission so far as well as information available for Proxy/surrogates/NOK/guardians prior to a palliative contact.    Full consult to follow within 24h.    Will reassess later today during bedside rounds.

## 2020-10-27 NOTE — PROGRESS NOTE ADULT - ASSESSMENT
88yo woman and never smoker w/ PMH severe MR, HFpEF, and complicated history of NSCLC of multiple sites dating back to 2013 with parenchymal recurrence and recurrent right pleural effusions. recent admission 8/2020 for fall and R leg weakness and again in 10/2020 with dyspnea in setting of effusion presenting again with dyspnea.   Now s/p pleuroscopy with pleural biopsy and R-sided chest tube insertion on 10/27

## 2020-10-27 NOTE — PROGRESS NOTE ADULT - PROBLEM SELECTOR PLAN 9
F: careful IVF given moderate MR   E: Replete for K<4 Mag<2  N: Regular diet  A: Lovenox 40   Code status: full code  Disposition: [facility]

## 2020-10-27 NOTE — PROGRESS NOTE ADULT - PROBLEM SELECTOR PLAN 2
Patient presents to ED for hypoxemia to 80s on 3L NC (her usual O2) initially tachypneic. . CXR w/ slight interval increase in pleural effusions right > left. Hypoxemia likely 2/2 to pleural effusions.  - s/p pleuroscopy and pleurodesis  - plan as above

## 2020-10-27 NOTE — BRIEF OPERATIVE NOTE - NSICDXBRIEFPROCEDURE_GEN_ALL_CORE_FT
PROCEDURES:  Talc pleurodesis 27-Oct-2020 22:11:15  Santiago Meyers  Thoracoscopy, with pleural biopsy 27-Oct-2020 22:11:03  Santiago Meyers  
PROCEDURES:  Pleurodesis, using talc 27-Oct-2020 11:28:22  Rohit Godinez  Pleuroscopy 27-Oct-2020 11:27:37  Rohit Godinez

## 2020-10-27 NOTE — PROGRESS NOTE ADULT - PROBLEM SELECTOR PLAN 8
Patient w/ increased sputum production, w/ AHRF and met severe sepsis criteria on admission, now no longer septic. ProCal negtive.  - as per pulm recs, will monitor patient clinically and see if she becomes febrile/tachypneic again without antibiotics.   -bcx negative to date, procal negative, mrsa negative.   -s/p cefepime and ceftazidime

## 2020-10-27 NOTE — BRIEF OPERATIVE NOTE - COMMENTS
Co-surgeon participated on procedure deemed necessary considering patients anatomy, overall health status and comorbidities and poor tolerance of previous procedures. Co-surgeon participated on procedure deemed necessary considering patients anatomy, overall health status and comorbidities and poor tolerance of previous procedures.    Pain control for next 2-3 days, until CT drainage discontinues and pleurodesis achieved.  CT to low wall suction.

## 2020-10-27 NOTE — PROGRESS NOTE ADULT - ASSESSMENT
88yo F, nonsmoker, with PMHx of lung adenocarcinoma (s/p resection/XRT), severe MR, and recent admission 8/2020 for fall and R leg weakness,presented to ED for hypoxemia to 80s on 3L NC (her usual O2), as well as productive cough with green sputum and LE edema. Admitted to [facility] for management of sepsis 2/2 to pneumonia and acute hypoxic respiratory failure and stepped up to [facility] for observation s/p pleuroscopy and pleurodesis.

## 2020-10-27 NOTE — PROGRESS NOTE ADULT - ASSESSMENT
88yo F, nonsmoker, with PMHx of lung adenocarcinoma (s/p resection/XRT), severe MR, and recent admission 8/2020 for fall and R leg weakness,presented to ED for hypoxemia to 80s on 3L NC (her usual O2), as well as productive cough with green sputum and LE edema. Admitted to [facility] for management of sepsis 2/2 to pneumonia and acute hypoxic respiratory failure.

## 2020-10-27 NOTE — CONSULT NOTE ADULT - SUBJECTIVE AND OBJECTIVE BOX
HPI:  88yo F, nonsmoker, with PMHx of lung adenocarcinoma (s/p resection/XRT), severe MR, and recent admission 8/2020 for fall and R leg weakness, again 10/2020 for SOB with recurrent R-sided pleural effusions,  who presents to ED for hypoxemia to 80s on 3L NC (her usual O2), as well as productive cough with green sputum and LE edema. On recent admission patient w/ new effusion w/ concern for recurrent malignancy. Patient discharged with outpatient follow up with pulmonologist (Dr. Nguyen) and planned for  R-sided pleuroscopy w/ pleurodesis, but procedure cancelled due to pt "not feeling well". Patient reports increased sputum production, cough and L sided rib pain since discharge. Also c/o new b/l LE edema since yesterday. She denies HA, F/C, N/V, abdominal pain, diarrhea, constipation, dysuria.    ED Course:  T 98.2, H 106, /70, R 40--> 20 , SpO2 99 on NRB--> 100 on 6 L NC  WBC 15.5, Hgb 12.4, Na 146, Alk Phos 128, Lactate 3, BNP 1034, Trop <0.01  EKG: PVC, nsr  XR: No pneumothorax. Slight interval increase in pleural effusions right > left  Interventions: given Ceftazadme 1g IV x1, Vanc 750mg IV x1   (22 Oct 2020 15:08)    PERTINENT PM/SXH:   Malignant neoplasm of bronchus and lung, unspecified site      History of surgery to major organs, presenting hazards to health      FAMILY HISTORY:  No pertinent family history  No significant family history      ITEMS NOT CHECKED ARE NOT PRESENT    SOCIAL HISTORY:   Significant other/partner:  [ ]  Children:  [ ]  Judaism/Spirituality: Zoroastrianism  Substance hx:  [ ]   Tobacco hx:  [ ]   Alcohol hx: [ ]   Home Opioid hx:  [ ] I-Stop Reference No:  Living Situation: [x ]Home  [ ]Long term care  [ ]Rehab [ ]Other    ADVANCE DIRECTIVES:    DNR  MOLST  [ ]  Living Will  [ ]   DECISION MAKER(s):  [ ] Health Care Proxy(s)  [x ] Surrogate(s)  [ ] Guardian           Name(s): Phone Number(s): Jesus Gonzalez (nephew)    BASELINE (I)ADL(s) (prior to admission):  [place]: [ ]Total  [x ] Moderate [ ]Dependent    Allergies    Bactrim (Unknown)  Cleocin HCl (Unknown)  Flagyl (Unknown)  Honey (Unknown)  iodine (Anaphylaxis)  IV Contrast (Anaphylaxis)  Levaquin (Other; Rash)  penicillin (Unknown)  Zithromax (Unknown)    Intolerances    MEDICATIONS  (STANDING):  atorvastatin 20 milliGRAM(s) Oral at bedtime  enoxaparin Injectable 30 milliGRAM(s) SubCutaneous at bedtime  influenza  Vaccine (HIGH DOSE) 0.7 milliLiter(s) IntraMuscular once  lidocaine 1% Injectable 10 milliLiter(s) Local Injection once    MEDICATIONS  (PRN):  acetaminophen   Tablet .. 650 milliGRAM(s) Oral every 6 hours PRN Moderate Pain (4 - 6)  oxycodone    5 mG/acetaminophen 325 mG 2 Tablet(s) Oral every 6 hours PRN Severe Pain (7 - 10)  sodium chloride 0.65% Nasal 1 Spray(s) Both Nostrils every 6 hours PRN Nasal Congestion/Dryness    PRESENT SYMPTOMS: [ ]Unable to obtain due to poor mentation   Source if other than patient:  [ ]Family   [ ]Team     Pain (Impact on QOL):  None at this time  Location -         Minimal acceptable level (0-10 scale):                    Aggravating factors -  Quality -  Radiation -  Severity (0-10 scale) -    Timing -    PAIN AD Score:     [email or web address].[email or web address]/cog/painad.pdf (press ctrl +  left click to view)    Dyspnea:                           [x ]Mild [ ]Moderate [ ]Severe  Anxiety:                             [x ]Mild [ ]Moderate [ ]Severe  Fatigue:                             [ ]Mild [x ]Moderate [ ]Severe  Nausea:                             [ ]Mild [ ]Moderate [ ]Severe  Loss of appetite:              [x ]Mild [ ]Moderate [ ]Severe  Constipation:                    [ ]Mild [ ]Moderate [ ]Severe  Grief Present                    [ ] Yes   [ ] No   Other Symptoms:  [x ]All other review of systems negative     Karnofsky Performance Score/Palliative Performance Status Version 2: 40         %    http://palliative.info/resource_material/PPSv2.pdf  PHYSICAL EXAM:  Vital Signs Last 24 Hrs  T(C): 36.6 (28 Oct 2020 14:00), Max: 37.2 (28 Oct 2020 02:02)  T(F): 97.8 (28 Oct 2020 14:00), Max: 99 (28 Oct 2020 02:02)  HR: 90 (28 Oct 2020 08:17) (90 - 100)  BP: 145/65 (28 Oct 2020 08:17) (112/52 - 145/65)  BP(mean): 93 (28 Oct 2020 08:17) (75 - 94)  RR: 16 (28 Oct 2020 08:17) (16 - 18)  SpO2: 97% (28 Oct 2020 08:17) (96% - 98%) I&O's Summary    27 Oct 2020 07:01  -  28 Oct 2020 07:00  --------------------------------------------------------  IN: 0 mL / OUT: 880 mL / NET: -880 mL    GENERAL:  [x ]Alert  [x ]Oriented x 3  [ ]Lethargic  [ ]Cachexia  [ ]Unarousable  [ ]Verbal  [ ]Non-Verbal  Behavioral:   [ ] Anxiety  [ ] Delirium [ ] Agitation [x ] Other  HEENT:  [ ]Normal   [x ]Dry mouth   [ ]ET Tube/Trach  [ ]Oral lesions  PULMONARY:   [ ]Clear [ ]Tachypnea  [ ]Audible excessive secretions   [x ]Rhonchi        [ ]Right [ ]Left [ ]Bilateral  [ ]Crackles        [ ]Right [ ]Left [ ]Bilateral  [ ]Wheezing     [ ]Right [ ]Left [ ]Bilateral  CARDIOVASCULAR:    [x ]Regular [ ]Irregular [ ]Tachy  [ ]Renny [ ]Murmur [ ]Other  GASTROINTESTINAL:  x[ ]Soft  [ ]Distended   [x ]+BS  [x ]Non tender [ ]Tender  [ ]PEG [ ]OGT/ NGT  Last BM:   GENITOURINARY:  [x ]Normal [ ] Incontinent   [ ]Oliguria/Anuria   [ ][place]  MUSCULOSKELETAL:   [ ]Normal   [ x]Weakness  [ ]Bed/Wheelchair bound [ ]Edema  NEUROLOGIC:   [x ]No focal deficits  [ ] Cognitive impairment  [ ] Dysphagia [ ]Dysarthria [ ] Paresis [ ]Other   SKIN:   [x ]Normal   [ ]Pressure ulcer(s)  [ ]Rash    CRITICAL CARE:  [ ] Shock Present  [ ]Septic [ ]Cardiogenic [ ]Neurologic [ ]Hypovolemic  [ ]  Vasopressors [ ]  Inotropes   [ ] Respiratory failure present  [ ] Acute  [ ] Chronic [ ] Hypoxic  [ ] Hypercarbic [ ] Other  [ ] Other organ failure     LABS:                        11.6   15.35 )-----------( 444      ( 28 Oct 2020 07:49 )             36.7   10-28    141  |  98  |  16  ----------------------------<  128<H>  4.2   |  34<H>  |  0.71    Ca    9.2      28 Oct 2020 07:49  Phos  3.4     10-28  Mg     2.2     10-28    PT/INR - ( 27 Oct 2020 06:58 )   PT: 12.4 sec;   INR: 1.04          PTT - ( 27 Oct 2020 06:58 )  PTT:29.2 sec      RADIOLOGY & ADDITIONAL STUDIES:    PROTEIN CALORIE MALNUTRITION PRESENT: [ ] Yes [ ] No  [ ] PPSV2 < or = to 30% [ ] significant weight loss  [ ] poor nutritional intake [ ] catabolic state [ ] anasarca     Albumin, Serum: 3.0 g/dL (10-22-20 @ 13:52)  Artificial Nutrition [ ]     REFERRALS:   [ ]Chaplaincy  [ ] Hospice  [ ]Child Life  [ ]Social Work  [ ]Case management [ ]Holistic Therapy   Goals of Care Discussion Document:

## 2020-10-27 NOTE — PROGRESS NOTE ADULT - PROBLEM SELECTOR PLAN 9
F: careful IVF given moderate MR   E: Replete for K<4 Mag<2  N: NPO   A: Lovenox 40   Code status: full code  Disposition: [facility]

## 2020-10-27 NOTE — PROGRESS NOTE ADULT - SUBJECTIVE AND OBJECTIVE BOX
TRANSFER ACCEPT FROM [facility] TO [facility]:    Patient is an 86yo F, nonsmoker, with PMHx of lung adenocarcinoma (s/p resection/XRT), severe MR, admitted on 10/22 for hypoxemia to 80s on 3L NC (her usual O2), as well as productive cough with green sputum, and LE edema. She was admitted a on 10/8 with similar complaints, and during that admission she was found to have w/ new effusion w/ concern for recurrent malignancy. Patient discharged with outpatient follow up with pulmonologist (Dr. Nguyen) and planned for  R-sided pleuroscopy w/ pleurodesis, but procedure cancelled at that time due to patient feeling anxious about the procedure. Prior to this admission, she reported increased sputum production, cough and L sided rib pain. Once admitted, there was concern for pneumonia, so she received CAP coverage; however, antibiotics were discontinued because patient was stable and was not symptomatic. Ultimately, she was seen by Pulmonology for evaluation and management of her pleural effusions, who recommended Pleuroscopy and Pleurodeisis, which she received today. She is being transferred to  from  for monitoring post procedure.     SUBJECTIVE / INTERVAL HPI: Patient seen and examined at bedside.     VITAL SIGNS:  Vital Signs Last 24 Hrs  T(C): 36.3 (27 Oct 2020 13:39), Max: 36.5 (26 Oct 2020 20:50)  T(F): 97.4 (27 Oct 2020 13:39), Max: 97.7 (26 Oct 2020 20:50)  HR: 88 (27 Oct 2020 05:39) (88 - 100)  BP: 145/77 (27 Oct 2020 05:39) (111/73 - 145/77)  BP(mean): --  RR: 18 (27 Oct 2020 05:39) (18 - 18)  SpO2: 97% (27 Oct 2020 05:39) (97% - 98%)    PHYSICAL EXAM:    General: WDWN  HEENT: NCAT; PERRL, anicteric sclera; MMM  Neck: supple, trachea midline  Cardiovascular: S1, S2 normal; RRR, no M/G/R  Respiratory: CTABL; no W/R/R  Gastrointestinal: soft, nontender, nondistended. bowel sounds present.  Skin: no ulcerations or visible rashes appreciated  Extremities: WWP; no edema, clubbing or cyanosis  Vascular: 2+ radial, DP/PT pulses B/L  Neurological: AAOx3; CN II-XII grossly intact; no focal deficits    MEDICATIONS:  MEDICATIONS  (STANDING):  atorvastatin 20 milliGRAM(s) Oral at bedtime  influenza  Vaccine (HIGH DOSE) 0.7 milliLiter(s) IntraMuscular once  senna 2 Tablet(s) Oral at bedtime    MEDICATIONS  (PRN):  acetaminophen   Tablet .. 650 milliGRAM(s) Oral every 6 hours PRN Temp greater or equal to 38C (100.4F), Mild Pain (1 - 3)  sodium chloride 0.65% Nasal 1 Spray(s) Both Nostrils every 6 hours PRN Nasal Congestion/Dryness      ALLERGIES:  Allergies    Bactrim (Unknown)  Cleocin HCl (Unknown)  Flagyl (Unknown)  Honey (Unknown)  iodine (Anaphylaxis)  IV Contrast (Anaphylaxis)  Levaquin (Other; Rash)  penicillin (Unknown)  Zithromax (Unknown)    Intolerances        LABS:                        10.7   9.80  )-----------( 429      ( 27 Oct 2020 06:58 )             34.4     10-27    143  |  101  |  15  ----------------------------<  89  4.4   |  35<H>  |  0.62    Ca    8.7      27 Oct 2020 06:58      PT/INR - ( 27 Oct 2020 06:58 )   PT: 12.4 sec;   INR: 1.04          PTT - ( 27 Oct 2020 06:58 )  PTT:29.2 sec    CAPILLARY BLOOD GLUCOSE          RADIOLOGY & ADDITIONAL TESTS: Reviewed. TRANSFER ACCEPT FROM [facility] TO [facility]:    Patient is an 86yo F, nonsmoker, with PMHx of lung adenocarcinoma (s/p resection/XRT), severe MR, admitted on 10/22 for hypoxemia to 80s on 3L NC (her usual O2), as well as productive cough with green sputum, and LE edema. She was admitted a on 10/8 with similar complaints, and during that admission she was found to have w/ new effusion w/ concern for recurrent malignancy. Patient discharged with outpatient follow up with pulmonologist (Dr. Nguyen) and planned for  R-sided pleuroscopy w/ pleurodesis, but procedure cancelled at that time due to patient feeling anxious about the procedure. Prior to this admission, she reported increased sputum production, cough and L sided rib pain. Once admitted, there was concern for pneumonia, so she received CAP coverage; however, antibiotics were discontinued because patient was stable and was not symptomatic. Ultimately, she was seen by Pulmonology for evaluation and management of her pleural effusions, who recommended Pleuroscopy and Pleurodeisis, which she received today. She is being transferred to  from  for monitoring post procedure.     SUBJECTIVE / INTERVAL HPI: Patient seen and examined at bedside.  C/o feeling very dry: lips, mouth, throat.  States her pain is "in the middle" on a scale of 1-10.  States her breathing is okay.  Complains that she urinates all the time.  Denies any chest pain.      VITAL SIGNS:  Vital Signs Last 24 Hrs  T(C): 36.3 (27 Oct 2020 13:39), Max: 36.5 (26 Oct 2020 20:50)  T(F): 97.4 (27 Oct 2020 13:39), Max: 97.7 (26 Oct 2020 20:50)  HR: 88 (27 Oct 2020 05:39) (88 - 100)  BP: 145/77 (27 Oct 2020 05:39) (111/73 - 145/77)  BP(mean): --  RR: 18 (27 Oct 2020 05:39) (18 - 18)  SpO2: 97% (27 Oct 2020 05:39) (97% - 98%)    PHYSICAL EXAM:    General: WDWN, cachectic, in pain  HEENT: NCAT; PERRL, anicteric sclera; dry mucous membranes  Neck: supple, trachea midline  Cardiovascular: distant heart sounds with slight murmur best heard in mitral area  Respiratory: CTABL; no W/R/R, able to take full deep breaths, 5L NC  Gastrointestinal: soft, nontender, nondistended. bowel sounds present, suprapubic fullness  Skin: no ulcerations or visible rashes appreciated  Extremities: WWP; no edema, clubbing or cyanosis, pain upon palpation of L leg  Vascular: 2+ radial, DP/PT pulses B/L, varicose veins on b/l LE  Neurological: AAOx3; CN II-XII grossly intact; no focal deficits    MEDICATIONS:  MEDICATIONS  (STANDING):  atorvastatin 20 milliGRAM(s) Oral at bedtime  influenza  Vaccine (HIGH DOSE) 0.7 milliLiter(s) IntraMuscular once  senna 2 Tablet(s) Oral at bedtime    MEDICATIONS  (PRN):  acetaminophen   Tablet .. 650 milliGRAM(s) Oral every 6 hours PRN Temp greater or equal to 38C (100.4F), Mild Pain (1 - 3)  sodium chloride 0.65% Nasal 1 Spray(s) Both Nostrils every 6 hours PRN Nasal Congestion/Dryness      ALLERGIES:  Allergies    Bactrim (Unknown)  Cleocin HCl (Unknown)  Flagyl (Unknown)  Honey (Unknown)  iodine (Anaphylaxis)  IV Contrast (Anaphylaxis)  Levaquin (Other; Rash)  penicillin (Unknown)  Zithromax (Unknown)    Intolerances        LABS:                        10.7   9.80  )-----------( 429      ( 27 Oct 2020 06:58 )             34.4     10-27    143  |  101  |  15  ----------------------------<  89  4.4   |  35<H>  |  0.62    Ca    8.7      27 Oct 2020 06:58      PT/INR - ( 27 Oct 2020 06:58 )   PT: 12.4 sec;   INR: 1.04          PTT - ( 27 Oct 2020 06:58 )  PTT:29.2 sec    CAPILLARY BLOOD GLUCOSE          RADIOLOGY & ADDITIONAL TESTS: Reviewed. TRANSFER ACCEPT FROM [facility] TO [facility]:    Patient is an 88yo F, nonsmoker, with PMHx of lung adenocarcinoma (s/p resection/XRT), severe MR, admitted on 10/22 for hypoxemia to 80s on 3L NC (her usual O2), as well as productive cough with green sputum, and LE edema. She was admitted a on 10/8 with similar complaints, and during that admission she was found to have w/ new effusion w/ concern for recurrent malignancy. Patient discharged with outpatient follow up with pulmonologist (Dr. Nguyen) and planned for  R-sided pleuroscopy w/ pleurodesis, but procedure cancelled at that time due to patient feeling anxious about the procedure. Prior to this admission, she reported increased sputum production, cough and L sided rib pain. Once admitted, there was concern for pneumonia, so she received CAP coverage; however, antibiotics were discontinued because patient was stable and was not symptomatic. Ultimately, she was seen by Pulmonology for evaluation and management of her pleural effusions, who recommended Pleuroscopy and Pleurodeisis, which she received today. She is being transferred to  from  for monitoring post procedure.     SUBJECTIVE / INTERVAL HPI: Patient seen and examined at bedside.  C/o feeling very dry: lips, mouth, throat.  States her pain is "in the middle" on a scale of 1-10.  States her breathing is okay.  Complains that she urinates all the time.  Denies any chest pain.      VITAL SIGNS:  Vital Signs Last 24 Hrs  T(C): 36.3 (27 Oct 2020 13:39), Max: 36.5 (26 Oct 2020 20:50)  T(F): 97.4 (27 Oct 2020 13:39), Max: 97.7 (26 Oct 2020 20:50)  HR: 88 (27 Oct 2020 05:39) (88 - 100)  BP: 145/77 (27 Oct 2020 05:39) (111/73 - 145/77)  BP(mean): --  RR: 18 (27 Oct 2020 05:39) (18 - 18)  SpO2: 97% (27 Oct 2020 05:39) (97% - 98%)    PHYSICAL EXAM:    General: WDWN, cachectic, in pain  HEENT: NCAT; PERRL, anicteric sclera; dry mucous membranes  Neck: supple, trachea midline  Cardiovascular: distant heart sounds with slight murmur best heard in mitral area  Respiratory: CTABL; no W/R/R, able to take full deep breaths, 5L NC, R side chest tube draining serous fluid  Gastrointestinal: soft, nontender, nondistended. bowel sounds present, suprapubic fullness  Skin: no ulcerations or visible rashes appreciated  Extremities: WWP; no edema, clubbing or cyanosis, pain upon palpation of L leg  Vascular: 2+ radial, DP/PT pulses B/L, varicose veins on b/l LE  Neurological: AAOx3; CN II-XII grossly intact; no focal deficits    MEDICATIONS:  MEDICATIONS  (STANDING):  atorvastatin 20 milliGRAM(s) Oral at bedtime  influenza  Vaccine (HIGH DOSE) 0.7 milliLiter(s) IntraMuscular once  senna 2 Tablet(s) Oral at bedtime    MEDICATIONS  (PRN):  acetaminophen   Tablet .. 650 milliGRAM(s) Oral every 6 hours PRN Temp greater or equal to 38C (100.4F), Mild Pain (1 - 3)  sodium chloride 0.65% Nasal 1 Spray(s) Both Nostrils every 6 hours PRN Nasal Congestion/Dryness      ALLERGIES:  Allergies    Bactrim (Unknown)  Cleocin HCl (Unknown)  Flagyl (Unknown)  Honey (Unknown)  iodine (Anaphylaxis)  IV Contrast (Anaphylaxis)  Levaquin (Other; Rash)  penicillin (Unknown)  Zithromax (Unknown)    Intolerances        LABS:                        10.7   9.80  )-----------( 429      ( 27 Oct 2020 06:58 )             34.4     10-27    143  |  101  |  15  ----------------------------<  89  4.4   |  35<H>  |  0.62    Ca    8.7      27 Oct 2020 06:58      PT/INR - ( 27 Oct 2020 06:58 )   PT: 12.4 sec;   INR: 1.04          PTT - ( 27 Oct 2020 06:58 )  PTT:29.2 sec    CAPILLARY BLOOD GLUCOSE          RADIOLOGY & ADDITIONAL TESTS: Reviewed.

## 2020-10-27 NOTE — BRIEF OPERATIVE NOTE - OPERATION/FINDINGS
Informed consent obtained from patient. Patient positioned in left lateral decubital position with elevated right arm.  Site of pleural cavity entry was identified using ultrasound. Site was marked. ICS block in to 7th and 8th ICS administered by anesthesia (separate note).  Site was cleaned and procedure was performed under sterile conditions. 7th ICS was anesthetized with 7 ml of 1 % lidocaine. Pleural space was entered with 20G needle, yellow fluid obtained. Incisions was made in right anterior axillary line in 7th ICS. Blunt dissection and entrance to pleural space using locking hemostatic forceps was done. Olympus trochar(OD10mm) was used and was place into the pleural cavity. Olympus semirigid pleuroscope was placed via trochar into the pleural cavity. Fluid was suctioned and pleural cavity was examined. Posterior costal parietal pleura was significant for white soft tissue studding, entire pleura her heterogeneous appearance. RLL lung appeared diseased (tumor involved).  Alligator forceps was used to performed posterior costal parietal pleura studding biopsies.   After additional local anesthesia into pleural cavity via spray catheter (11 ml of 1% lidocaine) direct visualization talc pludrage with 3 g of graded steritalc was performed. Informed consent obtained from patient. Patient positioned in left lateral decubital position with elevated right arm.  Site of pleural cavity entry was identified using ultrasound. Site was marked. ICS block in to 7th and 8th ICS administered by anesthesia (separate note).  Site was cleaned and procedure was performed under sterile conditions. 7th ICS was anesthetized with 7 ml of 1 % lidocaine. Pleural space was entered with 20G needle, yellow fluid obtained. Incisions was made in right anterior axillary line in 7th ICS. Blunt dissection and entrance to pleural space using locking hemostatic forceps was done. Olympus trochar(OD10mm) was used and was place into the pleural cavity. Olympus semirigid pleuroscope was placed via trochar into the pleural cavity. Fluid was suctioned and pleural cavity was examined. Posterior costal parietal pleura was significant for white soft tissue studding, entire pleura had heterogeneous appearance. RLL lung appeared diseased (tumor involved).  Alligator forceps was used to performed posterior costal parietal pleura studding biopsies.   After additional local anesthesia into pleural cavity via spray catheter (11 ml of 1% lidocaine) direct visualization talc pludrage with 3 g of graded steritalc was performed.  Pleuroscope and trochar was removed, 16F CT was placed and attached to low intermittent suction. Subcutaneous emphysema was noted due to patient specific anatomy (minimal body fat and  excess of skin). Air was pushed out,  CT was secured with sutures and dressing was applied.

## 2020-10-27 NOTE — CONSULT NOTE ADULT - ASSESSMENT
87 F with history of lung cancer, pleural effusion, debility, encounter for palliative care.
per Internal Medicine    88yo F, nonsmoker, with PMHx of lung adenocarcinoma (s/p resection/XRT), severe MR, and recent admission 8/2020 for fall and R leg weakness, again 10/2020 for SOB with recurrent R-sided pleural effusions,  who presents to ED for hypoxemia to 80s on 3L NC (her usual O2), as well as productive cough with green sputum and LE edema. Admitted to [facility] for management of sepsis 2/2 to pneumonia and acute hypoxic respiratory failure.      Problem/Plan - 1:  ·  Problem: Severe sepsis.  Plan: Patient met criteria for severe sepsis w/ leukocytosis, tachycardia, tachypnea and elevated lactate. S/p Ceftazadme 1g IV x1, Vanc 750mg IV x1 in ED. No fluids given due to moderate MR. Sepsis likely 2/2 to pneumonia.  - f/u blood cx  - repeat lactate pending.     Problem/Plan - 2:  ·  Problem: Acute respiratory failure with hypoxia.  Plan: patient presents to ED for hypoxemia to 80s on 3L NC (her usual O2) initially tachypneic, and on NRB, now satting 100% on 6 L NC. CXR w/ slight interval increase in pleural effusions right > left. Hypoxemia likely 2/2 to pleural effusions vs. pneumonia or both.  - continue w/ NC. Wean O2 as tolerated.  - plan as below.     Problem/Plan - 3:  ·  Problem: Pneumonia.  Plan: Patient w/ increased sputum production, w/ AHRF and met severe sepsis criteria and on admission. Sepsis likely 2/2 to pneumonia.   - c/w Vanc 750 mg IV (10/22 - )  - due to penicillin allergy, patient received ceftazidime w/o reaction; to continue on cefepime 2g IV q12h (10/22 - ) renally dosed  - f/u bcx  - f/u sputum cx.     Problem/Plan - 4:  ·  Problem: Pleural effusion.  Plan: Patient w/ hx of recurrent pleural effusions. On previous admission R-sided thoracentesis, with lymphocytic predominance - cytology negative for malignant cells but malignancy remains the primary differential of her recurrent effusions. CXR w/ slight interval increase in pleural effusions right > left.  - Pulm following w/ plan for possible R-sided pleuroscopy w/ pleurodesis   - f/u pulm recs.     Problem/Plan - 5:  ·  Problem: Adenocarcinoma of lung.  Plan: History of adenocarcinoma of RLL s/p VATS resection in 2013, ANANYA XRT in 2016, and RMF lesion s/p XRT in 2017  - recent PET showing 2 new FDG-avid subcentimeter nodules in L subpleural region  - f/u pulm recs.     Problem/Plan - 6:  Problem: Mitral regurgitation. Plan: Patient has moderate mitral regurg, on echo 8/2020. EF >70%. No evidence of heart failure on recent Echo. Currently with new LE edema.  - caution w/ IV fluids  - May give Lasix 20 mg IV PRN   - Strict I/Os.    Problem/Plan - 7:  ·  Problem: Back pain.  Plan: Patient with history of compression fractures and reports worsening back pain lately. Followed by outpatient ortho.  - PT consult  - Tylenol PRN for pain control.     Problem/Plan - 8:  ·  Problem: Hyperlipidemia.  Plan: Patient w/ known hx HLD on Lipitor.  -c/w Lipitor 20 mg PO daily.     Problem/Plan - 9:  ·  Problem: Nutrition, metabolism, and development symptoms.  Plan: F: careful IVF given moderate MR   E: Replete for K<4 Mag<2  N: Regular Diet  A: Lovenox 40   Code status: full code  Disposition: [facility].   
88yo F, nonsmoker, with PMHx of severe MR, and recent admission 8/2020 for fall and R leg weakness, again 10/2020 for SOB, with multiple lung Ca, with recurrent R-sided pleural effusions,  who presents with productive cough.     #r/o Hospital-Acquired Pneumonia - Pt is high-risk, given immunocompromised state, and recent hospitalizations. She is with new productive cough (but appears to be largely upper-airway), and new leukocytosis.     - s/p Broad-spectrum Abx in ED  - Hold off on further Abx; will re-evaluate tomorrow and recommend further treatment if fever spike, worsening WBCs, or worsening clinical symptoms of cough or SOB  - If worsens, will recommend HAP tx with Vancomycin and Zosyn  - Obtain MRSA swab, Blood Cx, Resp Cx, Respiratory Viral Panel, ProCalcitonin    #Sepsis - 2/2 possible HAP; Tx as above     #Right sided pleural effusion  Patient w/ recurrent right sided pleural effusion, s/p thoracentesis in OP on 8/11 and 10/11 w/ lymphocyte predominant fluid and negative for malignant cells  - Remains highly suspicious for malignancy  - Not good candidate for PleurX given body habitus   - Will tentatively plan for pleuroscopy w/ biopsy and pleurodesis on Monday 10/26   - Avoid excessive IVF; encourage PO intake

## 2020-10-27 NOTE — PROGRESS NOTE ADULT - PROBLEM SELECTOR PLAN 1
Patient w/ recurrent right sided pleural effusion, s/p thoracentesis in OP on 8/11 and 10/11 w/ lymphocyte predominant fluid and negative for malignant cells.    However, her effusion remains highly suspicious for malignancy given her complicated cancer history. She is not good candidate for PleurX but follows with interventional pulmonologist Dr. Nguyen and was planned for pleuroscopy w/ pleurodesis as an outpatient before she was admitted again this time.     - s/p R-pleuroscopy and pleurodesis on 10/27  - IP to manage R-sided chest tube for now; keep on suction  - Hold off on NSAIDs for now    - Pain control to avoid splinting  - Incentive spirometry

## 2020-10-27 NOTE — BRIEF OPERATIVE NOTE - OPERATION/FINDINGS
Was consulted to assist with the single port pleuroscopy/thoracoscopy due to difficult anatomy and patient requiring talc pleurodesis. Please see separate operative note by Dr. Nguyen for details of the entire surgical intervention. I assisted with the thoracoscopy due to difficult patient anatomy and overall health status and poor tolerance of previous procedure. Single port thoracoscopy was performed. Pleurl biopsies were obtain. In addition, I used the spray catheter to inject 3g of josé miguel diluted in 17.5 ml of saline into the pleural space equally across the entire pleural . Then a 16 Fr chest tube was inserted and secured with mattress sutures with a 2-0 silk. Post procedure radiography reviewed, with SQ emphysema, due to entrained air during the procedure 2/2 difficult anatomy which was pushed out. Chest tube dressing in place.

## 2020-10-27 NOTE — CONSULT NOTE ADULT - PROBLEM SELECTOR RECOMMENDATION 2
- Patient is s/p pleuroscopy and pleurodesis today.  - Currently with chest tube.  - Tylenol as needed for pain.  - Percocet ordered but patient hesitant to take.

## 2020-10-27 NOTE — CONSULT NOTE ADULT - PROBLEM SELECTOR PROBLEM 2
Pleural effusion
Sepsis, due to unspecified organism, unspecified whether acute organ dysfunction present

## 2020-10-27 NOTE — PROGRESS NOTE ADULT - ATTENDING COMMENTS
Case discussed with housestaff. Plan for pleuroscopy and pleurodesis w pulmonary. However writer unable to see patient as she was off floor for procedure --> transferred to  for further monitoring post above.

## 2020-10-27 NOTE — BRIEF OPERATIVE NOTE - COMMENTS
Please see separate operative note by Dr. Nguyen for further details    Santiago Meyers MD  Interventional Pulmonology & Bronchoscopy  Pager: [phone] ([facility])/[phone] ([facility])

## 2020-10-27 NOTE — PROGRESS NOTE ADULT - PROBLEM SELECTOR PLAN 4
Patient w/ hx of recurrent pleural effusions. On previous admission R-sided thoracentesis, with lymphocytic predominance - cytology negative for malignant cells but malignancy remains the primary differential of her recurrent effusions. CXR w/ slight interval increase in pleural effusions right > left. Plan for R-sided pleuroscopy w/ pleurodesis tuesday.  - procedure complete today without complications, transferred to tele unit for further management.

## 2020-10-27 NOTE — PROGRESS NOTE ADULT - PROBLEM SELECTOR PLAN 1
Patient w/ hx of recurrent pleural effusions. On previous admission R-sided thoracentesis, with lymphocytic predominance - cytology negative for malignant cells but malignancy remains the primary differential of her recurrent effusions. CXR w/ slight interval increase in pleural effusions right > left. R-sided pleuroscopy w/ pleurodesis today  - f/u pulm recs  - pain control

## 2020-10-27 NOTE — BRIEF OPERATIVE NOTE - NSICDXBRIEFPOSTOP_GEN_ALL_CORE_FT
POST-OP DIAGNOSIS:  Lung cancer 27-Oct-2020 12:51:58  Arianne Nguyen  Recurrent right pleural effusion 27-Oct-2020 12:51:50  Arianne Nguyen

## 2020-10-27 NOTE — PROGRESS NOTE ADULT - SUBJECTIVE AND OBJECTIVE BOX
PULMONARY CONSULT SERVICE FOLLOW-UP NOTE  -------------------------------------------------------------------  SUMMARY:    ***    INTERVAL HPI:    No acute overnight events.   Pt seen and examined at bedside this AM.       -------------------------------------------------------------------  MEDICATIONS:    Pulmonary:    Antimicrobials:    Anticoagulants:    Cardiac:      Allergies    Bactrim (Unknown)  Cleocin HCl (Unknown)  Flagyl (Unknown)  Honey (Unknown)  iodine (Anaphylaxis)  IV Contrast (Anaphylaxis)  Levaquin (Other; Rash)  penicillin (Unknown)  Zithromax (Unknown)    Intolerances        Vital Signs Last 24 Hrs  T(C): 36.3 (27 Oct 2020 05:39), Max: 36.5 (26 Oct 2020 20:50)  T(F): 97.4 (27 Oct 2020 05:39), Max: 97.7 (26 Oct 2020 20:50)  HR: 88 (27 Oct 2020 05:39) (88 - 100)  BP: 145/77 (27 Oct 2020 05:39) (111/73 - 145/77)  BP(mean): --  RR: 18 (27 Oct 2020 05:39) (18 - 18)  SpO2: 97% (27 Oct 2020 05:39) (96% - 98%)        -------------------------------------------------------------------  PHYSICAL EXAM:    GEN: Comfortable, in NAD  HEENT: NC/AT, PEERLA, MMM  CARDIAC: RRR, Normal S1/S2, No MRGs  PULMONARY: CTA BL, no wheezing/rhonchi/rales - no accessory muscle use   ABDOMEN: Soft, NT/ND   EXT: No LE edema  NEURO: A&O x 3, CN II-XII grossly intact, moves all ext, sensation intact    -------------------------------------------------------------------  LABS:        CBC Full  -  ( 27 Oct 2020 06:58 )  WBC Count : 9.80 K/uL  RBC Count : 3.38 M/uL  Hemoglobin : 10.7 g/dL  Hematocrit : 34.4 %  Platelet Count - Automated : 429 K/uL  Mean Cell Volume : 101.8 fl  Mean Cell Hemoglobin : 31.7 pg  Mean Cell Hemoglobin Concentration : 31.1 gm/dL  Auto Neutrophil # : x  Auto Lymphocyte # : x  Auto Monocyte # : x  Auto Eosinophil # : x  Auto Basophil # : x  Auto Neutrophil % : x  Auto Lymphocyte % : x  Auto Monocyte % : x  Auto Eosinophil % : x  Auto Basophil % : x    10-27    143  |  101  |  15  ----------------------------<  89  4.4   |  35<H>  |  0.62    Ca    8.7      27 Oct 2020 06:58      PT/INR - ( 27 Oct 2020 06:58 )   PT: 12.4 sec;   INR: 1.04          PTT - ( 27 Oct 2020 06:58 )  PTT:29.2 sec                  -------------------------------------------------------------------  RADIOLOGY & ADDITIONAL STUDIES:   PULMONARY CONSULT SERVICE FOLLOW-UP NOTE  -------------------------------------------------------------------  INTERVAL HPI:    No acute overnight events.   Pt seen and examined at bedside this PM following pleuroscopy procedure this AM.     Feels some pain at R-sided chest insertion site of chest tube. On suction.  Otherwise, no new symptoms.   Denies fevers/chills, HA, dizziness, SOB, cough, abd pain, N/V, bowel changes, ext swelling       -------------------------------------------------------------------  MEDICATIONS:    Pulmonary:    Antimicrobials:    Anticoagulants:    Cardiac:      Allergies    Bactrim (Unknown)  Cleocin HCl (Unknown)  Flagyl (Unknown)  Honey (Unknown)  iodine (Anaphylaxis)  IV Contrast (Anaphylaxis)  Levaquin (Other; Rash)  penicillin (Unknown)  Zithromax (Unknown)    Intolerances        Vital Signs Last 24 Hrs  T(C): 36.3 (27 Oct 2020 05:39), Max: 36.5 (26 Oct 2020 20:50)  T(F): 97.4 (27 Oct 2020 05:39), Max: 97.7 (26 Oct 2020 20:50)  HR: 88 (27 Oct 2020 05:39) (88 - 100)  BP: 145/77 (27 Oct 2020 05:39) (111/73 - 145/77)  BP(mean): --  RR: 18 (27 Oct 2020 05:39) (18 - 18)  SpO2: 97% (27 Oct 2020 05:39) (96% - 98%)        -------------------------------------------------------------------  PHYSICAL EXAM:    Constitutional: thin/cachectic appearing  HEENT: NC/AT; PERRL, anicteric sclera; MMM  Neck: supple  Cardiovascular: +S1/S2, RRR  Respiratory: CTA B/L; no W/R/R; R-chest tube in place (suction)   Gastrointestinal: soft, NT/ND  Extremities: WWP; no edema, clubbing or cyanosis  Vascular: 2+ radial pulses B/L  Neurological: awake and alert; LIPSCOMB      -------------------------------------------------------------------  LABS:        CBC Full  -  ( 27 Oct 2020 06:58 )  WBC Count : 9.80 K/uL  RBC Count : 3.38 M/uL  Hemoglobin : 10.7 g/dL  Hematocrit : 34.4 %  Platelet Count - Automated : 429 K/uL  Mean Cell Volume : 101.8 fl  Mean Cell Hemoglobin : 31.7 pg  Mean Cell Hemoglobin Concentration : 31.1 gm/dL  Auto Neutrophil # : x  Auto Lymphocyte # : x  Auto Monocyte # : x  Auto Eosinophil # : x  Auto Basophil # : x  Auto Neutrophil % : x  Auto Lymphocyte % : x  Auto Monocyte % : x  Auto Eosinophil % : x  Auto Basophil % : x    10-27    143  |  101  |  15  ----------------------------<  89  4.4   |  35<H>  |  0.62    Ca    8.7      27 Oct 2020 06:58      PT/INR - ( 27 Oct 2020 06:58 )   PT: 12.4 sec;   INR: 1.04          PTT - ( 27 Oct 2020 06:58 )  PTT:29.2 sec                  -------------------------------------------------------------------  RADIOLOGY & ADDITIONAL STUDIES:

## 2020-10-27 NOTE — BRIEF OPERATIVE NOTE - NSICDXBRIEFPREOP_GEN_ALL_CORE_FT
PRE-OP DIAGNOSIS:  Lung cancer 27-Oct-2020 12:51:30  Arianne Nguyen  Recurrent right pleural effusion 27-Oct-2020 12:51:17  Arianne Nguyen

## 2020-10-27 NOTE — CONSULT NOTE ADULT - PROBLEM SELECTOR RECOMMENDATION 9
- Patient follows up with Dr. Nguyen and Azael.  - S/p lobectomy and radiation therapy.  - No chemotherapy or immunotherapy in the past.  - Was scheduled to see Dr. Kwan, but never got a chance.   - Now s/p pleuroscopy and pleurodesis.

## 2020-10-27 NOTE — CONSULT NOTE ADULT - PROBLEM SELECTOR RECOMMENDATION 4
- Patient with history of lung adenocarcinoma, with possibility of 2 new subleural nodules.   - Although cytology negative from effusion, malignancy remains on the differential.  - Will initiate goals of care discussion tomorrow.

## 2020-10-27 NOTE — PROGRESS NOTE ADULT - SUBJECTIVE AND OBJECTIVE BOX
TRANSFER NOTE RMF TO 7L: Patient is an 88yo F, nonsmoker, with PMHx of lung adenocarcinoma (s/p resection/XRT), severe MR, admitted on 10/22 for hypoxemia to 80s on 3L NC (her usual O2), as well as productive cough with green sputum, and LE edema. She was admitted a on 10/8 with similar complaints, and during that admission she was found to have w/ new effusion w/ concern for recurrent malignancy. Patient discharged with outpatient follow up with pulmonologist (Dr. Nguyen) and planned for  R-sided pleuroscopy w/ pleurodesis, but procedure cancelled at that time due to patient feeling anxious about the procedure. Prior to this admission, she reported Patient reports increased sputum production, cough and L sided rib pain since discharge. Also c/o new b/l LE edema since yesterday. She denies HA, F/C, N/V, abdominal pain, diarrhea, constipation, dysuria.    SUBJECTIVE / INTERVAL HPI: Patient seen and examined at bedside.     VITAL SIGNS:  Vital Signs Last 24 Hrs  T(C): 36.3 (27 Oct 2020 05:39), Max: 36.5 (26 Oct 2020 20:50)  T(F): 97.4 (27 Oct 2020 05:39), Max: 97.7 (26 Oct 2020 20:50)  HR: 88 (27 Oct 2020 05:39) (88 - 100)  BP: 145/77 (27 Oct 2020 05:39) (111/73 - 145/77)  BP(mean): --  RR: 18 (27 Oct 2020 05:39) (18 - 18)  SpO2: 97% (27 Oct 2020 05:39) (96% - 98%)    PHYSICAL EXAM:    General: WDWN  HEENT: NC/AT; PERRL, anicteric sclera; MMM  Neck: supple  Cardiovascular: +S1/S2; RRR  Respiratory: CTA B/L; no W/R/R  Gastrointestinal: soft, NT/ND; +BSx4  Extremities: WWP; no edema, clubbing or cyanosis  Vascular: 2+ radial, DP/PT pulses B/L  Neurological: AAOx3; no focal deficits    MEDICATIONS:  MEDICATIONS  (STANDING):  atorvastatin 20 milliGRAM(s) Oral at bedtime  influenza  Vaccine (HIGH DOSE) 0.7 milliLiter(s) IntraMuscular once  senna 2 Tablet(s) Oral at bedtime    MEDICATIONS  (PRN):  acetaminophen   Tablet .. 650 milliGRAM(s) Oral every 6 hours PRN Temp greater or equal to 38C (100.4F), Mild Pain (1 - 3)  sodium chloride 0.65% Nasal 1 Spray(s) Both Nostrils every 6 hours PRN Nasal Congestion/Dryness      ALLERGIES:  Allergies    Bactrim (Unknown)  Cleocin HCl (Unknown)  Flagyl (Unknown)  Honey (Unknown)  iodine (Anaphylaxis)  IV Contrast (Anaphylaxis)  Levaquin (Other; Rash)  penicillin (Unknown)  Zithromax (Unknown)    Intolerances        LABS:                        10.7   9.80  )-----------( 429      ( 27 Oct 2020 06:58 )             34.4     10-27    143  |  101  |  15  ----------------------------<  89  4.4   |  35<H>  |  0.62    Ca    8.7      27 Oct 2020 06:58      PT/INR - ( 27 Oct 2020 06:58 )   PT: 12.4 sec;   INR: 1.04          PTT - ( 27 Oct 2020 06:58 )  PTT:29.2 sec    CAPILLARY BLOOD GLUCOSE          RADIOLOGY & ADDITIONAL TESTS: Reviewed.    ASSESSMENT:    PLAN: TRANSFER NOTE RMF TO : Patient is an 88yo F, nonsmoker, with PMHx of lung adenocarcinoma (s/p resection/XRT), severe MR, admitted on 10/22 for hypoxemia to 80s on 3L NC (her usual O2), as well as productive cough with green sputum, and LE edema. She was admitted a on 10/8 with similar complaints, and during that admission she was found to have w/ new effusion w/ concern for recurrent malignancy. Patient discharged with outpatient follow up with pulmonologist (Dr. Nguyen) and planned for  R-sided pleuroscopy w/ pleurodesis, but procedure cancelled at that time due to patient feeling anxious about the procedure. Prior to this admission, she reported increased sputum production, cough and L sided rib pain. Once admitted, there was concern for pneumonia, so she received CAP coverage; however, antibiotics were discontinued because patient was stable and was not symptomatic. Ultimately, she was seen by Pulmonology for evaluation and management of her pleural effusions, who recommended Pleuroscopy and Pleurodeisis, which she received today. She is being transferred to  from  for monitoring post procedure.     SUBJECTIVE / INTERVAL HPI: Patient seen and examined at bedside.     VITAL SIGNS:  Vital Signs Last 24 Hrs  T(C): 36.3 (27 Oct 2020 05:39), Max: 36.5 (26 Oct 2020 20:50)  T(F): 97.4 (27 Oct 2020 05:39), Max: 97.7 (26 Oct 2020 20:50)  HR: 88 (27 Oct 2020 05:39) (88 - 100)  BP: 145/77 (27 Oct 2020 05:39) (111/73 - 145/77)  BP(mean): --  RR: 18 (27 Oct 2020 05:39) (18 - 18)  SpO2: 97% (27 Oct 2020 05:39) (96% - 98%)    PHYSICAL EXAM:    General: Elderly thin female, no acute distress.   HEENT: NC/AT; PERRL, anicteric sclera; MMM  Neck: supple  Cardiovascular: +S1/S2; RRR  Respiratory: Decreased breath sounds in right lung base. Otherwise, no crackles, no wheezes, rales, ronchi.   Gastrointestinal: soft, NT/ND; +BSx4  Extremities: WWP; no edema, clubbing or cyanosis  Vascular: 2+ radial, DP/PT pulses B/L  Neurological: AAOx3; no focal deficits    MEDICATIONS:  MEDICATIONS  (STANDING):  atorvastatin 20 milliGRAM(s) Oral at bedtime  influenza  Vaccine (HIGH DOSE) 0.7 milliLiter(s) IntraMuscular once  senna 2 Tablet(s) Oral at bedtime    MEDICATIONS  (PRN):  acetaminophen   Tablet .. 650 milliGRAM(s) Oral every 6 hours PRN Temp greater or equal to 38C (100.4F), Mild Pain (1 - 3)  sodium chloride 0.65% Nasal 1 Spray(s) Both Nostrils every 6 hours PRN Nasal Congestion/Dryness      ALLERGIES:  Allergies    Bactrim (Unknown)  Cleocin HCl (Unknown)  Flagyl (Unknown)  Honey (Unknown)  iodine (Anaphylaxis)  IV Contrast (Anaphylaxis)  Levaquin (Other; Rash)  penicillin (Unknown)  Zithromax (Unknown)    Intolerances        LABS:                        10.7   9.80  )-----------( 429      ( 27 Oct 2020 06:58 )             34.4     10-27    143  |  101  |  15  ----------------------------<  89  4.4   |  35<H>  |  0.62    Ca    8.7      27 Oct 2020 06:58      PT/INR - ( 27 Oct 2020 06:58 )   PT: 12.4 sec;   INR: 1.04          PTT - ( 27 Oct 2020 06:58 )  PTT:29.2 sec    CAPILLARY BLOOD GLUCOSE          RADIOLOGY & ADDITIONAL TESTS: Reviewed.    ASSESSMENT:    PLAN:

## 2020-10-28 ENCOUNTER — TRANSCRIPTION ENCOUNTER (OUTPATIENT)
Age: 85
End: 2020-10-28

## 2020-10-28 DIAGNOSIS — E87.0 HYPEROSMOLALITY AND HYPERNATREMIA: ICD-10-CM

## 2020-10-28 DIAGNOSIS — R19.7 DIARRHEA, UNSPECIFIED: ICD-10-CM

## 2020-10-28 DIAGNOSIS — R53.81 OTHER MALAISE: ICD-10-CM

## 2020-10-28 DIAGNOSIS — Z86.73 PERSONAL HISTORY OF TRANSIENT ISCHEMIC ATTACK (TIA), AND CEREBRAL INFARCTION WITHOUT RESIDUAL DEFICITS: ICD-10-CM

## 2020-10-28 DIAGNOSIS — C34.90 MALIGNANT NEOPLASM OF UNSPECIFIED PART OF UNSPECIFIED BRONCHUS OR LUNG: ICD-10-CM

## 2020-10-28 DIAGNOSIS — Z51.5 ENCOUNTER FOR PALLIATIVE CARE: ICD-10-CM

## 2020-10-28 DIAGNOSIS — Z79.82 LONG TERM (CURRENT) USE OF ASPIRIN: ICD-10-CM

## 2020-10-28 DIAGNOSIS — J98.11 ATELECTASIS: ICD-10-CM

## 2020-10-28 DIAGNOSIS — J90 PLEURAL EFFUSION, NOT ELSEWHERE CLASSIFIED: ICD-10-CM

## 2020-10-28 DIAGNOSIS — R06.02 SHORTNESS OF BREATH: ICD-10-CM

## 2020-10-28 DIAGNOSIS — Z71.89 OTHER SPECIFIED COUNSELING: ICD-10-CM

## 2020-10-28 DIAGNOSIS — E87.2 ACIDOSIS: ICD-10-CM

## 2020-10-28 DIAGNOSIS — I34.0 NONRHEUMATIC MITRAL (VALVE) INSUFFICIENCY: ICD-10-CM

## 2020-10-28 DIAGNOSIS — Z88.8 ALLERGY STATUS TO OTHER DRUGS, MEDICAMENTS AND BIOLOGICAL SUBSTANCES: ICD-10-CM

## 2020-10-28 DIAGNOSIS — Z88.0 ALLERGY STATUS TO PENICILLIN: ICD-10-CM

## 2020-10-28 LAB
ANION GAP SERPL CALC-SCNC: 9 MMOL/L — SIGNIFICANT CHANGE UP (ref 5–17)
BASOPHILS # BLD AUTO: 0.04 K/UL — SIGNIFICANT CHANGE UP (ref 0–0.2)
BASOPHILS NFR BLD AUTO: 0.3 % — SIGNIFICANT CHANGE UP (ref 0–2)
BUN SERPL-MCNC: 16 MG/DL — SIGNIFICANT CHANGE UP (ref 7–23)
CALCIUM SERPL-MCNC: 9.2 MG/DL — SIGNIFICANT CHANGE UP (ref 8.4–10.5)
CHLORIDE SERPL-SCNC: 98 MMOL/L — SIGNIFICANT CHANGE UP (ref 96–108)
CO2 SERPL-SCNC: 34 MMOL/L — HIGH (ref 22–31)
CREAT SERPL-MCNC: 0.71 MG/DL — SIGNIFICANT CHANGE UP (ref 0.5–1.3)
EOSINOPHIL # BLD AUTO: 0.03 K/UL — SIGNIFICANT CHANGE UP (ref 0–0.5)
EOSINOPHIL NFR BLD AUTO: 0.2 % — SIGNIFICANT CHANGE UP (ref 0–6)
GLUCOSE SERPL-MCNC: 128 MG/DL — HIGH (ref 70–99)
HCT VFR BLD CALC: 36.7 % — SIGNIFICANT CHANGE UP (ref 34.5–45)
HGB BLD-MCNC: 11.6 G/DL — SIGNIFICANT CHANGE UP (ref 11.5–15.5)
IMM GRANULOCYTES NFR BLD AUTO: 0.6 % — SIGNIFICANT CHANGE UP (ref 0–1.5)
LYMPHOCYTES # BLD AUTO: 0.94 K/UL — LOW (ref 1–3.3)
LYMPHOCYTES # BLD AUTO: 6.1 % — LOW (ref 13–44)
MAGNESIUM SERPL-MCNC: 2.2 MG/DL — SIGNIFICANT CHANGE UP (ref 1.6–2.6)
MCHC RBC-ENTMCNC: 31.6 GM/DL — LOW (ref 32–36)
MCHC RBC-ENTMCNC: 31.6 PG — SIGNIFICANT CHANGE UP (ref 27–34)
MCV RBC AUTO: 100 FL — SIGNIFICANT CHANGE UP (ref 80–100)
MONOCYTES # BLD AUTO: 1.33 K/UL — HIGH (ref 0–0.9)
MONOCYTES NFR BLD AUTO: 8.7 % — SIGNIFICANT CHANGE UP (ref 2–14)
NEUTROPHILS # BLD AUTO: 12.92 K/UL — HIGH (ref 1.8–7.4)
NEUTROPHILS NFR BLD AUTO: 84.1 % — HIGH (ref 43–77)
NRBC # BLD: 0 /100 WBCS — SIGNIFICANT CHANGE UP (ref 0–0)
PHOSPHATE SERPL-MCNC: 3.4 MG/DL — SIGNIFICANT CHANGE UP (ref 2.5–4.5)
PLATELET # BLD AUTO: 444 K/UL — HIGH (ref 150–400)
POTASSIUM SERPL-MCNC: 4.2 MMOL/L — SIGNIFICANT CHANGE UP (ref 3.5–5.3)
POTASSIUM SERPL-SCNC: 4.2 MMOL/L — SIGNIFICANT CHANGE UP (ref 3.5–5.3)
RBC # BLD: 3.67 M/UL — LOW (ref 3.8–5.2)
RBC # FLD: 15.1 % — HIGH (ref 10.3–14.5)
SODIUM SERPL-SCNC: 141 MMOL/L — SIGNIFICANT CHANGE UP (ref 135–145)
WBC # BLD: 15.35 K/UL — HIGH (ref 3.8–10.5)
WBC # FLD AUTO: 15.35 K/UL — HIGH (ref 3.8–10.5)

## 2020-10-28 PROCEDURE — 99232 SBSQ HOSP IP/OBS MODERATE 35: CPT | Mod: GC

## 2020-10-28 PROCEDURE — 99233 SBSQ HOSP IP/OBS HIGH 50: CPT

## 2020-10-28 PROCEDURE — 99497 ADVNCD CARE PLAN 30 MIN: CPT | Mod: 25

## 2020-10-28 PROCEDURE — 99498 ADVNCD CARE PLAN ADDL 30 MIN: CPT | Mod: 25

## 2020-10-28 PROCEDURE — 71045 X-RAY EXAM CHEST 1 VIEW: CPT | Mod: 26

## 2020-10-28 RX ORDER — LIDOCAINE HCL 20 MG/ML
10 VIAL (ML) INJECTION ONCE
Refills: 0 | Status: DISCONTINUED | OUTPATIENT
Start: 2020-10-28 | End: 2020-11-02

## 2020-10-28 RX ADMIN — ENOXAPARIN SODIUM 30 MILLIGRAM(S): 100 INJECTION SUBCUTANEOUS at 21:55

## 2020-10-28 RX ADMIN — Medication 650 MILLIGRAM(S): at 22:56

## 2020-10-28 RX ADMIN — ATORVASTATIN CALCIUM 20 MILLIGRAM(S): 80 TABLET, FILM COATED ORAL at 21:55

## 2020-10-28 RX ADMIN — Medication 650 MILLIGRAM(S): at 21:55

## 2020-10-28 NOTE — PROGRESS NOTE ADULT - PROBLEM SELECTOR PLAN 7
RESOLVED -> Patient initially met criteria for severe sepsis w/ leukocytosis, tachycardia, tachypnea and elevated lactate. S/p Ceftazadme 1g IV x1, Vanc 750mg IV x1 in ED. No fluids given due to moderate MR. Now, patient no longer meeting sepsis criteria, as lactate is resolved, and patient is no longer tachypneic or tachycardic, and has been afebrile.   - Blood Cultures NGTD Patient w/ known hx HLD on Lipitor.  -c/w Lipitor 20 mg PO daily

## 2020-10-28 NOTE — PROGRESS NOTE ADULT - PROBLEM SELECTOR PLAN 2
Patient presents to ED for hypoxemia to 80s on 3L NC (her usual O2) initially tachypneic. . CXR w/ slight interval increase in pleural effusions right > left. Hypoxemia likely 2/2 to pleural effusions.  - s/p pleuroscopy and pleurodesis  - plan as above Patient presents to ED for hypoxemia to 80s on 3L NC (her usual O2) initially tachypneic. . CXR w/ slight interval increase in pleural effusions right > left. Hypoxemia likely 2/2 to pleural effusions. Currently satting well on home 3L NC  RESOLVED  - s/p pleuroscopy and pleurodesis  - plan as above

## 2020-10-28 NOTE — PROGRESS NOTE ADULT - ASSESSMENT
86yo F, nonsmoker, with PMHx of lung adenocarcinoma (s/p resection/XRT), severe MR, and recent admission 8/2020 for fall and R leg weakness,presented to ED for hypoxemia to 80s on 3L NC (her usual O2), as well as productive cough with green sputum and LE edema. Admitted to [facility] for management of sepsis 2/2 to pneumonia and acute hypoxic respiratory failure and stepped up to [facility] for observation s/p pleuroscopy and pleurodesis.

## 2020-10-28 NOTE — PROGRESS NOTE ADULT - CONVERSATION DETAILS
Discussed patients current medical condition, in light of pleural effusions and history of lung cancer. Patient vocalized desire for a quality of life, over a quantity. She understands that she has been physically declining but still wants to follow up with Dr. Graff. Code status discussed at length. She is considering a DNR/DNI and MOLST form. She is sure that she would not want any aggressive interventions if they were to decrease her quality of life.  Discussed the possibility of filling out a HCP.  Patient reports to being  and not having anyone close in her life. She reports a nephew, but states that she is not close to him.

## 2020-10-28 NOTE — PROGRESS NOTE ADULT - PROBLEM SELECTOR PLAN 1
Patient w/ hx of recurrent pleural effusions. On previous admission R-sided thoracentesis, with lymphocytic predominance - cytology negative for malignant cells but malignancy remains the primary differential of her recurrent effusions. CXR w/ slight interval increase in pleural effusions right > left. R-sided pleuroscopy w/ pleurodesis today  - f/u pulm recs  - pain control Patient w/ hx of recurrent pleural effusions. On previous admission R-sided thoracentesis, with lymphocytic predominance - cytology negative for malignant cells but malignancy remains the primary differential of her recurrent effusions. CXR w/ slight interval increase in pleural effusions right > left. R-sided pleuroscopy w/ pleurodesis 10/27  - f/u pulm recs  - pain control: tylenol and percocet

## 2020-10-28 NOTE — DISCHARGE NOTE PROVIDER - HOSPITAL COURSE
INCOMPLETE NOTE STARTED EARLY    88yo F, nonsmoker, with PMHx of lung adenocarcinoma (s/p resection/XRT), severe MR, and recent admission 8/2020 for fall and R leg weakness,presented to ED for hypoxemia to 80s on 3L NC (her usual O2), as well as productive cough with green sputum and LE edema. Admitted to [facility] for management of sepsis 2/2 to pneumonia and acute hypoxic respiratory failure and stepped up to [facility] for observation s/p pleuroscopy and pleurodesis.    Problem List/Main Diagnoses  #Pleural effusion: Patient w/ hx of recurrent pleural effusions. On previous admission R-sided thoracentesis, with lymphocytic predominance - cytology negative for malignant cells but malignancy remains the primary differential of her recurrent effusions. CXR w/ slight interval increase in pleural effusions right > left.   - s/p R-sided pleuroscopy w/ pleurodesis 10/27  - f/u pulm recs  - pain control    #Acute respiratory failure with hypoxia: Patient presented to ED for hypoxemia to 80s on 3L NC (her usual O2) initially tachypneic. CXR w/ slight interval increase in pleural effusions right > left. Hypoxemia likely 2/2 to pleural effusions.  RESOLVED  - s/p pleuroscopy and pleurodesis    #Adenocarcinoma of lung.  Plan: History of adenocarcinoma of RLL s/p VATS resection in 2013, ANANYA XRT in 2016, and RMF lesion s/p XRT in 2017  - recent PET showing 2 new FDG-avid subcentimeter nodules in L subpleural region  - f/u pulm recs.     #Diarrhea: Patient having loose bm every few hours for the past day. no blood in stool, brown color, no abm pain    #Mitral regurgitation: Patient has moderate mitral regurg, on echo 8/2020. EF >70%. No evidence of heart failure on recent Echo.   - f/u with outpatient providers    #Back pain: history of compression fractures and reports worsening back pain lately.   - Followed by outpatient ortho.  - Tylenol PRN for pain control.    #Hyperlipidemia.  Plan: Patient w/ known hx HLD on Lipitor.  -c/w Lipitor 20 mg PO daily.     #Severe sepsis. RESOLVED -> Patient initially met criteria for severe sepsis w/ leukocytosis, tachycardia, tachypnea and elevated lactate. S/p Ceftazadme 1g IV x1, Vanc 750mg IV x1 in ED. No fluids given due to moderate MR. lactate is resolved, and patient is no longer tachypneic or tachycardic, and has been afebrile.   - Blood Cultures NGTD    #Pneumonia: Patient w/ increased sputum production, w/ AHRF and met severe sepsis criteria on admission. ProCal negtive.  -bcx negative to date, procal negative, mrsa negative.   -s/p cefepime and ceftazidime    Inpatient treatment course: Patient was admitted for further monitoring and work-up.     New medications:   Labs to be followed outpatient:   Exam to be followed outpatient:    INCOMPLETE NOTE STARTED EARLY    86yo F, nonsmoker, with PMHx of lung adenocarcinoma (s/p resection/XRT), severe MR, and recent admission 8/2020 for fall and R leg weakness,presented to ED for hypoxemia to 80s on 3L NC (her usual O2), as well as productive cough with green sputum and LE edema. Admitted to [facility] for management of sepsis 2/2 to pneumonia and acute hypoxic respiratory failure and stepped up to [facility] for observation s/p pleuroscopy and pleurodesis.     Problem List/Main Diagnoses  #Pleural effusion: Patient w/ hx of recurrent pleural effusions. On previous admission R-sided thoracentesis, with lymphocytic predominance - cytology negative for malignant cells but malignancy remains the primary differential of her recurrent effusions. CXR w/ slight interval increase in pleural effusions right > left.   - s/p R-sided pleuroscopy w/ pleurodesis 10/27  - f/u pulm recs  - pain control    #Acute respiratory failure with hypoxia: Patient presented to ED for hypoxemia to 80s on 3L NC (her usual O2) initially tachypneic. CXR w/ slight interval increase in pleural effusions right > left. Hypoxemia likely 2/2 to pleural effusions.  RESOLVED  - s/p pleuroscopy and pleurodesis    #Adenocarcinoma of lung.  Plan: History of adenocarcinoma of RLL s/p VATS resection in 2013, ANANYA XRT in 2016, and RMF lesion s/p XRT in 2017  - recent PET showing 2 new FDG-avid subcentimeter nodules in L subpleural region  - f/u pulm recs.     #Diarrhea: Patient having loose bm every few hours for the past day. no blood in stool, brown color, no abm pain    #Mitral regurgitation: Patient has moderate mitral regurg, on echo 8/2020. EF >70%. No evidence of heart failure on recent Echo.   - f/u with outpatient providers    #Back pain: history of compression fractures and reports worsening back pain lately.   - Followed by outpatient ortho.  - Tylenol PRN for pain control.    #Hyperlipidemia.  Plan: Patient w/ known hx HLD on Lipitor.  -c/w Lipitor 20 mg PO daily    #Severe sepsis. RESOLVED -> Patient initially met criteria for severe sepsis w/ leukocytosis, tachycardia, tachypnea and elevated lactate. S/p Ceftazadme 1g IV x1, Vanc 750mg IV x1 in ED. No fluids given due to moderate MR. lactate is resolved, and patient is no longer tachypneic or tachycardic, and has been afebrile.   - Blood Cultures NGTD    #Pneumonia: Patient w/ increased sputum production, w/ AHRF and met severe sepsis criteria on admission. ProCal negtive.  -bcx negative to date, procal negative, mrsa negative.   -s/p cefepime and ceftazidime    Inpatient treatment course: Patient was admitted for further monitoring and work-up. She was admitted on 10/8 with similar complaints, and during that admission she was found to have w/ new effusion w/ concern for recurrent malignancy. Patient discharged with outpatient follow up with pulmonologist (Dr. Nguyen) and planned for R-sided pleuroscopy w/ pleurodesis, but procedure cancelled at that time due to patient feeling anxious about the procedure. Once admitted, there was concern for pneumonia, so she received CAP coverage; however, antibiotics were discontinued because patient was stable and was not symptomatic. Ultimately, she was seen by Pulmonology for evaluation and management of her pleural effusions, who recommended Pleuroscopy and Pleurodeisis, which she received 10/27. She was transferred from the [facility] to  for post procedure monitoring. She had good output from her chest tube and breathing was stable on 3L NC.     New medications:   Labs to be followed outpatient:   Exam to be followed outpatient:    INCOMPLETE NOTE STARTED EARLY    86yo F, nonsmoker, with PMHx of lung adenocarcinoma (s/p resection/XRT), severe MR, and recent admission 8/2020 for fall and R leg weakness,presented to ED for hypoxemia to 80s on 3L NC (her usual O2), as well as productive cough with green sputum and LE edema. Admitted to [facility] for management of sepsis 2/2 to pneumonia and acute hypoxic respiratory failure and stepped up to [facility] for observation s/p pleuroscopy and pleurodesis.     Problem List/Main Diagnoses  #Pleural effusion: Patient w/ hx of recurrent pleural effusions. On previous admission R-sided thoracentesis, with lymphocytic predominance - cytology negative for malignant cells but malignancy remains the primary differential of her recurrent effusions. CXR w/ slight interval increase in pleural effusions right > left.   - s/p R-sided pleuroscopy w/ pleurodesis 10/27. chest tube removed on 10/29 with small fluid remaining  - f/u pulm recs  - pain control    #Acute respiratory failure with hypoxia: Patient presented to ED for hypoxemia to 80s on 3L NC (her usual O2) initially tachypneic. CXR w/ slight interval increase in pleural effusions right > left. Hypoxemia likely 2/2 to pleural effusions.  RESOLVED  - s/p pleuroscopy and pleurodesis, chest tube removal     #Adenocarcinoma of lung.  Plan: History of adenocarcinoma of RLL s/p VATS resection in 2013, ANANYA XRT in 2016, and RMF lesion s/p XRT in 2017  - recent PET showing 2 new FDG-avid subcentimeter nodules in L subpleural region  - f/u pulm recs.     #Diarrhea: Patient having loose bm every few hours. no blood in stool, brown color, no abm pain  RESOLVED    #Mitral regurgitation: Patient has moderate mitral regurg, on echo 8/2020. EF >70%. No evidence of heart failure on recent Echo.   - f/u with outpatient providers    #Back pain: history of compression fractures and reports worsening back pain lately.   - Followed by outpatient ortho.  - Tylenol PRN for pain control.    #Hyperlipidemia.  Plan: Patient w/ known hx HLD on Lipitor.  -c/w Lipitor 20 mg PO daily    #Severe sepsis. RESOLVED -> Patient initially met criteria for severe sepsis w/ leukocytosis, tachycardia, tachypnea and elevated lactate. S/p Ceftazadme 1g IV x1, Vanc 750mg IV x1 in ED. No fluids given due to moderate MR. lactate is resolved, and patient is no longer tachypneic or tachycardic, and has been afebrile.   - Blood Cultures NGTD    #Pneumonia: Patient w/ increased sputum production, w/ AHRF and met severe sepsis criteria on admission. ProCal negtive.  -bcx negative to date, procal negative, mrsa negative.   -s/p cefepime and ceftazidime    Inpatient treatment course: Patient was admitted for further monitoring and work-up. She was admitted on 10/8 with similar complaints, and during that admission she was found to have w/ new effusion w/ concern for recurrent malignancy. Patient discharged with outpatient follow up with pulmonologist (Dr. Nguyen) and planned for R-sided pleuroscopy w/ pleurodesis, but procedure cancelled at that time due to patient feeling anxious about the procedure. Once admitted, there was concern for pneumonia, so she received CAP coverage; however, antibiotics were discontinued because patient was stable and was not symptomatic. Ultimately, she was seen by Pulmonology for evaluation and management of her pleural effusions, who recommended Pleuroscopy and Pleurodeisis, which she received 10/27. She was transferred from the [facility] to  for post procedure monitoring. She had good output from her chest tube and breathing was stable on 3L NC. Pulmonary team removed the chest tube with small amount of fluid remaining seen on bedside US.     New medications:   Labs to be followed outpatient:   Exam to be followed outpatient:    86yo F, nonsmoker, with PMHx of lung adenocarcinoma (s/p resection/XRT), severe MR, and recent admission 8/2020 for fall and R leg weakness,presented to ED for hypoxemia to 80s on 3L NC (her usual O2), as well as productive cough with green sputum and LE edema. Admitted to [facility] for management of sepsis 2/2 to pneumonia and acute hypoxic respiratory failure and stepped up to [facility] for observation s/p pleuroscopy and talc pleurodesis. Later deemed stable for stepdown back to [facility].    Problem List/Main Diagnoses  #Pleural effusion: Patient w/ hx of recurrent pleural effusions. On previous admission R-sided thoracentesis, with lymphocytic predominance - cytology negative for malignant cells but malignancy remains the primary differential of her recurrent effusions. CXR w/ slight interval increase in pleural effusions right > left.   - s/p R-sided pleuroscopy w/ pleurodesis 10/27. chest tube removed on 10/29 with small fluid remaining.  - weaned back down to home O2 3L NC  - pain control    #Acute respiratory failure with hypoxia: Patient presented to ED for hypoxemia to 80s on 3L NC (her usual O2) initially tachypneic. CXR w/ slight interval increase in pleural effusions right > left. Hypoxemia likely 2/2 to pleural effusions.  RESOLVED  - s/p pleuroscopy and pleurodesis, chest tube removal    #Adenocarcinoma of lung.  Plan: History of adenocarcinoma of RLL s/p VATS resection in 2013, ANANYA XRT in 2016, and RMF lesion s/p XRT in 2017  - recent PET showing 2 new FDG-avid subcentimeter nodules in L subpleural region  - will need to f/u outpatient with pulm Dr. Marquez.    #Diarrhea: Patient having loose bm every few hours. no blood in stool, brown color, no abm pain. C diff and GI PCR negative.  RESOLVED    #Mitral regurgitation: Patient has moderate mitral regurg, on echo 8/2020. EF >70%. No evidence of heart failure on recent Echo.   - f/u with outpatient providers    #Back pain: history of compression fractures and reports worsening back pain lately.   - Followed by outpatient ortho.  - Tylenol PRN for pain control.    #Hyperlipidemia.  Plan: Patient w/ known hx HLD on Lipitor.  -c/w Lipitor 20 mg PO daily    #Severe sepsis. RESOLVED -> Patient initially met criteria for severe sepsis w/ leukocytosis, tachycardia, tachypnea and elevated lactate. S/p Ceftazadme 1g IV x1, Vanc 750mg IV x1 in ED. No fluids given due to moderate MR. lactate is resolved, and patient is no longer tachypneic or tachycardic, and has been afebrile.   - Blood Cultures NGTD    #Pneumonia: Patient w/ increased sputum production, w/ AHRF and met severe sepsis criteria on admission. ProCal negtive.  -bcx negative to date, procal negative, mrsa negative.   -s/p cefepime and ceftazidime    Inpatient treatment course: Patient was admitted for further monitoring and work-up. She was admitted on 10/8 with similar complaints, and during that admission she was found to have w/ new effusion w/ concern for recurrent malignancy. Patient discharged with outpatient follow up with pulmonologist (Dr. Nguyen) and planned for R-sided pleuroscopy w/ pleurodesis, but procedure cancelled at that time due to patient feeling anxious about the procedure. Once admitted, there was concern for pneumonia, so she received CAP coverage; however, antibiotics were discontinued because patient was stable and was not symptomatic. Ultimately, she was seen by Pulmonology for evaluation and management of her pleural effusions, who recommended Pleuroscopy and Pleurodeisis, which she received 10/27. She was transferred from the [facility] to  for post procedure monitoring. She had good output from her chest tube and breathing was stable on 3L NC. Pulmonary team removed the chest tube with small amount of fluid remaining seen on bedside US.     New medications: n/a  Labs to be followed outpatient: n/a  Exam to be followed outpatient: pulmonary for workup of possible recurrence of lung adenocarcinoma   88yo F, nonsmoker, with PMHx of lung adenocarcinoma (s/p resection/XRT), severe MR, and recent admission 8/2020 for fall and R leg weakness,presented to ED for hypoxemia to 80s on 3L NC (her usual O2), as well as productive cough with green sputum and LE edema. Admitted to [facility] for management of sepsis 2/2 to pneumonia and acute hypoxic respiratory failure and stepped up to [facility] for observation s/p pleuroscopy and talc pleurodesis. Later deemed stable for stepdown back to [facility].    Problem List/Main Diagnoses  #Pleural effusion: Patient w/ hx of recurrent pleural effusions. On previous admission R-sided thoracentesis, with lymphocytic predominance - cytology negative for malignant cells but malignancy remains the primary differential of her recurrent effusions. CXR w/ slight interval increase in pleural effusions right > left.   - s/p R-sided pleuroscopy w/ pleurodesis 10/27. chest tube removed on 10/29 with small fluid remaining.  - weaned back down to home O2 3L NC  - pain control    #Acute respiratory failure with hypoxia: Patient presented to ED for hypoxemia to 80s on 3L NC (her usual O2) initially tachypneic. CXR w/ slight interval increase in pleural effusions right > left. Hypoxemia likely 2/2 to pleural effusions.  RESOLVED  - s/p pleuroscopy and pleurodesis, chest tube removal    #Adenocarcinoma of lung.  Plan: History of adenocarcinoma of RLL s/p VATS resection in 2013, ANANYA XRT in 2016, and RMF lesion s/p XRT in 2017  - recent PET showing 2 new FDG-avid subcentimeter nodules in L subpleural region  - will need to f/u outpatient with pulm Dr. Marquez.    #Diarrhea: Patient having loose bm every few hours. no blood in stool, brown color, no abm pain. C diff and GI PCR negative.  RESOLVED    #Mitral regurgitation: Patient has moderate mitral regurg, on echo 8/2020. EF >70%. No evidence of heart failure on recent Echo.   - f/u with outpatient providers    #Back pain: history of compression fractures and reports worsening back pain lately.   - Followed by outpatient ortho.  - Tylenol PRN for pain control.    #Hyperlipidemia.  Plan: Patient w/ known hx HLD on Lipitor.  -c/w Lipitor 20 mg PO daily    #Severe sepsis. RESOLVED -> Patient initially met criteria for severe sepsis w/ leukocytosis, tachycardia, tachypnea and elevated lactate. S/p Ceftazadme 1g IV x1, Vanc 750mg IV x1 in ED. No fluids given due to moderate MR. lactate is resolved, and patient is no longer tachypneic or tachycardic, and has been afebrile.   - Blood Cultures NGTD    #Pneumonia: Patient w/ increased sputum production, w/ AHRF and met severe sepsis criteria on admission. ProCal negtive.  -bcx negative to date, procal negative, mrsa negative.   -s/p cefepime and ceftazidime    Inpatient treatment course: Patient was admitted for further monitoring and work-up. She was admitted on 10/8 with similar complaints, and during that admission she was found to have w/ new effusion w/ concern for recurrent malignancy. Patient discharged with outpatient follow up with pulmonologist (Dr. Nguyen) and planned for R-sided pleuroscopy w/ pleurodesis, but procedure cancelled at that time due to patient feeling anxious about the procedure. Once admitted, there was concern for pneumonia, so she received CAP coverage; however, antibiotics were discontinued because patient was stable and was not symptomatic. Ultimately, she was seen by Pulmonology for evaluation and management of her pleural effusions, who recommended Pleuroscopy and Pleurodeisis, which she received 10/27. She was transferred from the [facility] to  for post procedure monitoring. She had good output from her chest tube and breathing was stable on 3L NC. Pulmonary team removed the chest tube with small amount of fluid remaining seen on bedside US.     New medications: Lasix now 20mg QD  Labs to be followed outpatient: n/a  Exam to be followed outpatient: pulmonary for workup of possible recurrence of lung adenocarcinoma   86yo F, nonsmoker, with PMHx of lung adenocarcinoma (s/p resection/XRT), severe MR, and recent admission 8/2020 for fall and R leg weakness,presented to ED for hypoxemia to 80s on 3L NC (her usual O2), as well as productive cough with green sputum and LE edema. Admitted to [facility] for management of sepsis 2/2 to pneumonia and acute hypoxic respiratory failure and stepped up to [facility] for observation s/p pleuroscopy and talc pleurodesis. Later deemed stable for stepdown back to [facility].    Problem List/Main Diagnoses  #Pleural effusion: Patient w/ hx of recurrent pleural effusions. On previous admission R-sided thoracentesis, with lymphocytic predominance - cytology negative for malignant cells but malignancy remains the primary differential of her recurrent effusions. CXR w/ slight interval increase in pleural effusions right > left.   - s/p R-sided pleuroscopy w/ pleurodesis 10/27. chest tube removed on 10/29 with small fluid remaining.  - weaned back down to home O2 3L NC  - pain control    #Acute respiratory failure with hypoxia: Patient presented to ED for hypoxemia to 80s on 3L NC (her usual O2) initially tachypneic. CXR w/ slight interval increase in pleural effusions right > left. Hypoxemia likely 2/2 to pleural effusions.  RESOLVED  - s/p pleuroscopy and pleurodesis, chest tube removal    #Adenocarcinoma of lung.  Plan: History of adenocarcinoma of RLL s/p VATS resection in 2013, ANANYA XRT in 2016, and RMF lesion s/p XRT in 2017  - recent PET showing 2 new FDG-avid subcentimeter nodules in L subpleural region  - will need to f/u outpatient with pulm Dr. Marquez.    #Diarrhea: Patient having loose bm every few hours. no blood in stool, brown color, no abm pain. C diff and GI PCR negative.  RESOLVED    #Mitral regurgitation: Patient has moderate mitral regurg, on echo 8/2020. EF >70%. No evidence of heart failure on recent Echo.   - f/u with outpatient providers    #Back pain: history of compression fractures and reports worsening back pain lately.   - Followed by outpatient ortho.  - Tylenol PRN for pain control.    #Hyperlipidemia.  Plan: Patient w/ known hx HLD on Lipitor.  -c/w Lipitor 20 mg PO daily    #Severe sepsis. RESOLVED -> Patient initially met criteria for severe sepsis w/ leukocytosis, tachycardia, tachypnea and elevated lactate. S/p Ceftazadme 1g IV x1, Vanc 750mg IV x1 in ED. No fluids given due to moderate MR. lactate is resolved, and patient is no longer tachypneic or tachycardic, and has been afebrile.   - Blood Cultures NGTD    #Pneumonia: Patient w/ increased sputum production, w/ AHRF and met severe sepsis criteria on admission. ProCal negtive.  -bcx negative to date, procal negative, mrsa negative.   -s/p cefepime and ceftazidime    Inpatient treatment course: Patient was admitted for further monitoring and work-up. She was admitted on 10/8 with similar complaints, and during that admission she was found to have w/ new effusion w/ concern for recurrent malignancy. Patient discharged with outpatient follow up with pulmonologist (Dr. Nguyen) and planned for R-sided pleuroscopy w/ pleurodesis, but procedure cancelled at that time due to patient feeling anxious about the procedure. Once admitted, there was concern for pneumonia, so she received CAP coverage; however, antibiotics were discontinued because patient was stable and was not symptomatic. Ultimately, she was seen by Pulmonology for evaluation and management of her pleural effusions, who recommended Pleuroscopy and Pleurodeisis, which she received 10/27. She was transferred from the [facility] to  for post procedure monitoring. She had good output from her chest tube and breathing was stable on 3L NC. Pulmonary team removed the chest tube with small amount of fluid remaining seen on bedside US. PT eval done and recommended [facility].     New medications: Lasix now 20mg QD  Labs to be followed outpatient: n/a  Exam to be followed outpatient: pulmonary for workup of possible recurrence of lung adenocarcinoma

## 2020-10-28 NOTE — PROGRESS NOTE ADULT - PROBLEM SELECTOR PLAN 10
F: careful IVF given moderate MR   E: Replete for K<4 Mag<2  N: Regular diet  A: Lovenox 40   Code status: full code  Disposition: [facility] F: careful IVF given moderate MR   E: Replete for K<4 Mag<2  N: Regular diet  Nutrition diagnosis: Increased nutrient needs r/t increased Kcal/Protein demands AEB: suspected severe PCM in setting of malignancy and NFPE    A: Lovenox 40   Code status: full code  Disposition: [facility]

## 2020-10-28 NOTE — PROGRESS NOTE ADULT - PROBLEM SELECTOR PLAN 8
Patient w/ increased sputum production, w/ AHRF and met severe sepsis criteria on admission, now no longer septic. ProCal negtive.  - as per pulm recs, will monitor patient clinically and see if she becomes febrile/tachypneic again without antibiotics.   -bcx negative to date, procal negative, mrsa negative.   -s/p cefepime and ceftazidime RESOLVED -> Patient initially met criteria for severe sepsis w/ leukocytosis, tachycardia, tachypnea and elevated lactate. S/p Ceftazadme 1g IV x1, Vanc 750mg IV x1 in ED. No fluids given due to moderate MR. Now, patient no longer meeting sepsis criteria, as lactate is resolved, and patient is no longer tachypneic or tachycardic, and has been afebrile.   - Blood Cultures NGTD

## 2020-10-28 NOTE — DISCHARGE NOTE PROVIDER - PROVIDER TOKENS
PROVIDER:[[id number]],FOLLOWUP:[2 weeks],ESTABLISHEDPATIENT:[T]] PROVIDER:[TOKEN:[[id number]],SCHEDULEDAPPT:[11/12/2020],SCHEDULEDAPPTTIME:[10:30 AM],ESTABLISHEDPATIENT:[T]],PROVIDER:[TOKEN:[[id number]],FOLLOWUP:[2 weeks],ESTABLISHEDPATIENT:[T]]

## 2020-10-28 NOTE — DISCHARGE NOTE PROVIDER - CARE PROVIDERS DIRECT ADDRESSES
,[email or web address],DirectAddress_Unknown

## 2020-10-28 NOTE — PROGRESS NOTE ADULT - SUBJECTIVE AND OBJECTIVE BOX
SUBJECTIVE AND OBJECTIVE:  Remains with O2.  Reports some pain at her chest tube site.  INTERVAL HPI/OVERNIGHT EVENTS:  None  DNR on chart:   Allergies    Bactrim (Unknown)  Cleocin HCl (Unknown)  Flagyl (Unknown)  Honey (Unknown)  iodine (Anaphylaxis)  IV Contrast (Anaphylaxis)  Levaquin (Other; Rash)  penicillin (Unknown)  Zithromax (Unknown)    Intolerances    MEDICATIONS  (STANDING):  atorvastatin 20 milliGRAM(s) Oral at bedtime  enoxaparin Injectable 30 milliGRAM(s) SubCutaneous at bedtime  influenza  Vaccine (HIGH DOSE) 0.7 milliLiter(s) IntraMuscular once  lidocaine 1% Injectable 10 milliLiter(s) Local Injection once    MEDICATIONS  (PRN):  acetaminophen   Tablet .. 650 milliGRAM(s) Oral every 6 hours PRN Moderate Pain (4 - 6)  oxycodone    5 mG/acetaminophen 325 mG 2 Tablet(s) Oral every 6 hours PRN Severe Pain (7 - 10)  sodium chloride 0.65% Nasal 1 Spray(s) Both Nostrils every 6 hours PRN Nasal Congestion/Dryness      ITEMS UNCHECKED ARE NOT PRESENT    PRESENT SYMPTOMS: [ ]Unable to obtain due to poor mentation   Source if other than patient:  [ ]Family   [ ]Team     Pain (Impact on QOL):  Difficulty moving.  Location: chest tube site  Minimal acceptable level (0-10 scale):  4         Aggravating factors: movement  Quality: sharp  Radiation: none  Severity (0-10 scale): 7   Timing: intermittent    Dyspnea:                           [x ]Mild [ ]Moderate [ ]Severe  Anxiety:                             [ x]Mild [ ]Moderate [ ]Severe  Fatigue:                             [ ]Mild [x ]Moderate [ ]Severe  Nausea:                             [ ]Mild [ ]Moderate [ ]Severe  Loss of appetite:              [ ]Mild [x ]Moderate [ ]Severe  Constipation:                    [ ]Mild [ ]Moderate [ ]Severe  Grief Present                    [ ] Yes  [x ] No   PAIN AD Score:	  [email or web address] (Ctrl + left click to view)    Other Symptoms:  [x ]All other review of systems negative     Karnofsky Performance Score/Palliative Performance Status Version 2:   40-50      %    http://palliative.info/resource_material/[email or web address].pdf  PHYSICAL EXAM:  Vital Signs Last 24 Hrs  T(C): 36.6 (28 Oct 2020 14:00), Max: 37.2 (28 Oct 2020 02:02)  T(F): 97.8 (28 Oct 2020 14:00), Max: 99 (28 Oct 2020 02:02)  HR: 90 (28 Oct 2020 08:17) (90 - 100)  BP: 145/65 (28 Oct 2020 08:17) (112/52 - 145/65)  BP(mean): 93 (28 Oct 2020 08:17) (75 - 94)  RR: 16 (28 Oct 2020 08:17) (16 - 18)  SpO2: 97% (28 Oct 2020 08:17) (96% - 98%) I&O's Summary    27 Oct 2020 07:01  -  28 Oct 2020 07:00  --------------------------------------------------------  IN: 0 mL / OUT: 880 mL / NET: -880 mL     GENERAL:  [x ]Alert  [ x]Oriented x 3  [ ]Lethargic  [ ]Cachexia  [ ]Unarousable  [ ]Verbal  [ ]Non-Verbal  Behavioral:   [ ] Anxiety  [ ] Delirium [ ] Agitation [x ] Other  HEENT:  [ ]Normal   [x ]Dry mouth   [ ]ET Tube/Trach  [ ]Oral lesions  PULMONARY:   [x ]Clear [ ]Tachypnea  [ ]Audible excessive secretions   [ ]Rhonchi        [ ]Right [ ]Left [ ]Bilateral  [ ]Crackles        [ ]Right [ ]Left [ ]Bilateral  [ ]Wheezing     [ ]Right [ ]Left [ ]Bilateral  CARDIOVASCULAR:    [x ]Regular [ ]Irregular [ ]Tachy  [ ]Renny [ ]Murmur [ ]Other  GASTROINTESTINAL:  [ x]Soft  [ ]Distended   [ x]+BS  [x ]Non tender [ ]Tender  [ ]PEG [ ]OGT/ NGT   Last BM:    GENITOURINARY:  [x ]Normal [ ] Incontinent   [ ]Oliguria/Anuria   [ ]Mcduffie  MUSCULOSKELETAL:   [ ]Normal   [x]Weakness  [ ]Bed/Wheelchair bound [ ]Edema  NEUROLOGIC:   [ x]No focal deficits  [ ] Cognitive impairment  [ ] Dysphagia [ ]Dysarthria [ ] Paresis [ ]Other   SKIN:   [x]Normal   [ ]Pressure ulcer(s)  [ ]Rash    CRITICAL CARE:  [ ] Shock Present  [ ]Septic [ ]Cardiogenic [ ]Neurologic [ ]Hypovolemic  [ ]  Vasopressors [ ]  Inotropes   [ ] Respiratory failure present  [ ] Acute  [ ] Chronic [ ] Hypoxic  [ ] Hypercarbic [ ] Other  [ ] Other organ failure     LABS:                        11.6   15.35 )-----------( 444      ( 28 Oct 2020 07:49 )             36.7   10-28    141  |  98  |  16  ----------------------------<  128<H>  4.2   |  34<H>  |  0.71    Ca    9.2      28 Oct 2020 07:49  Phos  3.4     10-28  Mg     2.2     10-28    PT/INR - ( 27 Oct 2020 06:58 )   PT: 12.4 sec;   INR: 1.04          PTT - ( 27 Oct 2020 06:58 )  PTT:29.2 sec      RADIOLOGY & ADDITIONAL STUDIES:    Protein Calorie Malnutrition Present: [ ] yes [ ] no  [ ] PPSV2 < or = 30%  [ ] significant weight loss [ ] poor nutritional intake [ ] anasarca [ ] catabolic state Albumin, Serum: 3.0 g/dL (10-22-20 @ 13:52)  Artificial Nutrition [ ]     REFERRALS:   [ ]Chaplaincy  [ ] Hospice  [ ]Child Life  [ ]Social Work  [ ]Case management [ ]Holistic Therapy   Goals of Care Document:

## 2020-10-28 NOTE — DISCHARGE NOTE PROVIDER - NSDCFUSCHEDAPPT_GEN_ALL_CORE_FT
LAZARO GRANT ; 10/29/2020 ; [facility] [address]  LAZARO GRANT ; 01/06/2021 ; [facility] [address] LAZARO GRANT ; 01/06/2021 ; [facility] [address] [place] [address] LAZARO GRANT ; 11/12/2020 ; [facility] [address]  LAZARO GRANT ; 01/06/2021 ; [facility] [address]

## 2020-10-28 NOTE — PROGRESS NOTE ADULT - PROBLEM SELECTOR PLAN 9
F: careful IVF given moderate MR   E: Replete for K<4 Mag<2  N: Regular diet  A: Lovenox 40   Code status: full code  Disposition: 7Lach Patient w/ increased sputum production, w/ AHRF and met severe sepsis criteria on admission, now no longer septic. ProCal negtive.  - as per pulm recs, will monitor patient clinically and see if she becomes febrile/tachypneic again without antibiotics.   -bcx negative to date, procal negative, mrsa negative.   -s/p cefepime and ceftazidime

## 2020-10-28 NOTE — PROGRESS NOTE ADULT - PROBLEM SELECTOR PLAN 2
- Patient is s/p pleuroscopy and pleurodesis.  - Remains with chest tube.  - Encouraged use of Tylenol again for pain,

## 2020-10-28 NOTE — PROGRESS NOTE ADULT - PROBLEM SELECTOR PLAN 5
Patient with history of compression fractures and reports worsening back pain lately. Followed by outpatient ortho.  - Tylenol PRN for pain control Patient has moderate mitral regurg, on echo 8/2020. EF >70%. No evidence of heart failure on recent Echo.   - monitor for LE edema  - caution w/ IV fluids  - Strict I/Os

## 2020-10-28 NOTE — PROGRESS NOTE ADULT - PROBLEM SELECTOR PLAN 6
Patient w/ known hx HLD on Lipitor.  -c/w Lipitor 20 mg PO daily Patient with history of compression fractures and reports worsening back pain lately. Followed by outpatient ortho.  - Tylenol PRN for pain control

## 2020-10-28 NOTE — DISCHARGE NOTE PROVIDER - NSDCCPCAREPLAN_GEN_ALL_CORE_FT
PRINCIPAL DISCHARGE DIAGNOSIS  Diagnosis: Pleural effusion  Assessment and Plan of Treatment: You were admitted with shortness of breath and low oxygen saturation numbers.       PRINCIPAL DISCHARGE DIAGNOSIS  Diagnosis: Pleural effusion  Assessment and Plan of Treatment: You were admitted with shortness of breath and low oxygen saturation numbers most likely due to bilateral pleural effusions that you have also had in the past. Pleural effusions are collections of fluid in the cavity between where the lung ends and the diaphragm (muscle that aids with breathing) begins. The pulmonary team was consulted and they performed what is called a pleuroscopy and pleurodesis, in which they made the pleural space (the space between the chest wall and the lung) smaller so that it is harder for fluid to congregate there. You were given supplementary oxygen and were breathing well on the same amount of oxygen that you recieve at home. Following the procedure you had a chest tube placed to drain fluid and help heal a small lung collapse. The small area of lung looked healed on xr and the chest tube was removed by the pulmonary doctors.   Please follow up with your pulmonary doctor on *   If you develop fever/chills, chest pain, shortness of breath, please seek immediate medical attention       PRINCIPAL DISCHARGE DIAGNOSIS  Diagnosis: Pleural effusion  Assessment and Plan of Treatment: You were admitted with shortness of breath and low oxygen saturation numbers most likely due to bilateral pleural effusions that you have also had in the past. Pleural effusions are collections of fluid in the cavity between where the lung ends and the diaphragm (muscle that aids with breathing) begins. The pulmonary team was consulted and they performed what is called a pleuroscopy and pleurodesis, in which they made the pleural space (the space between the chest wall and the lung) smaller so that it is harder for fluid to congregate there. You were given supplementary oxygen and were breathing well on the same amount of oxygen that you recieve at home. Following the procedure you had a chest tube placed to drain fluid and help heal a small lung collapse. The small area of lung looked healed on xr and the chest tube was removed by the pulmonary doctors.   Please follow up with your pulmonary doctor, Dr. Marquez, on Thursday, 11/12 at 10:30AM to continue management of your lungs and breathing.  If you develop fever/chills, chest pain, shortness of breath, please seek immediate medical attention.       PRINCIPAL DISCHARGE DIAGNOSIS  Diagnosis: Pleural effusion  Assessment and Plan of Treatment: You were admitted with shortness of breath and low oxygen saturation numbers most likely due to bilateral pleural effusions that you have also had in the past. Pleural effusions are collections of fluid in the cavity between where the lung ends and the diaphragm (muscle that aids with breathing) begins. The pulmonary team was consulted and they performed what is called a pleuroscopy and pleurodesis, in which they made the pleural space (the space between the chest wall and the lung) smaller so that it is harder for fluid to congregate there. You were given supplementary oxygen and were breathing well on the same amount of oxygen that you recieve at home. Following the procedure you had a chest tube placed to drain fluid and help heal a small lung collapse. The small area of lung looked healed on xr and the chest tube was removed by the pulmonary doctors.   Please be sure to  your prescriptions from your pharmacy and start taking lasix 20mg daily after leaving the hospital. Please also follow up with your pulmonary doctor, Dr. Marquez, on Thursday, 11/12 at 10:30AM to continue management of your lungs and breathing.  If you develop fever/chills, chest pain, shortness of breath, please seek immediate medical attention.

## 2020-10-28 NOTE — PROGRESS NOTE ADULT - PROBLEM SELECTOR PLAN 4
Patient has moderate mitral regurg, on echo 8/2020. EF >70%. No evidence of heart failure on recent Echo.   - monitor for LE edema  - caution w/ IV fluids  - Strict I/Os Patient having loose bm every few hours for the past day. no blood in stool, brown color, no abm pain  - will stop senna and monitor for 24 hrs

## 2020-10-28 NOTE — CHART NOTE - NSCHARTNOTEFT_GEN_A_CORE
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        Current Nutrition Order:   Diet, Regular (10-27-20 @ 16:48)  Diet, Regular (10-27-20 @ 16:36)      PO Intake: Good (%) [ x  ]  Fair (50-75%) [   ] Poor (<25%) [   ]observed 100% intake    GI Issues: claims diarrhea since procedure yesterday    Pain:yes    Skin Integrity:Intact/Surgical incision    Labs:   10-28    141  |  98  |  16  ----------------------------<  128<H>  4.2   |  34<H>  |  0.71    Ca    9.2      28 Oct 2020 07:49  Phos  3.4     10-28  Mg     2.2     10-28      CAPILLARY BLOOD GLUCOSE          Medications:  MEDICATIONS  (STANDING):  atorvastatin 20 milliGRAM(s) Oral at bedtime  enoxaparin Injectable 30 milliGRAM(s) SubCutaneous at bedtime  influenza  Vaccine (HIGH DOSE) 0.7 milliLiter(s) IntraMuscular once    MEDICATIONS  (PRN):  acetaminophen   Tablet .. 650 milliGRAM(s) Oral every 6 hours PRN Moderate Pain (4 - 6)  oxycodone    5 mG/acetaminophen 325 mG 2 Tablet(s) Oral every 6 hours PRN Severe Pain (7 - 10)  sodium chloride 0.65% Nasal 1 Spray(s) Both Nostrils every 6 hours PRN Nasal Congestion/Dryness      Weight:41.9kg  Daily     Daily no updated weights    Weight Change: no updated weights.IBW:43kg    Estimated energy needs: Based on IBW due to LE swelling. IBW:43kg [id number]:1075-1290kcal and 1-1.2gmprotein:43-51.6gmprotein(adult demands) and fluids per team    Subjective: 88y/o female with history of Lung CA(s/p resection and RT) Severe MR admitted with hypoxia/productive cough and LE edema .Found to have new Pleural Effusion/S/P pleuroscopy and Pleurodesis/ with CT .Patient seen in bed, S/P 100% consumption of breakfast tray. Patient reported she has been eating "fine" but has diarrhea due to "All the chemicals they gave me in the procedure yesterday". Poor dentition. RD obtained additional food requests .Found to have PCM based on suspected weight loss/variable po/inability to meet hypermetabolic demands of CA    Previous Nutrition Diagnosis :Increased nutrient needs r/t increased Kcal/Protein demands AEB: suspected severe PCM in setting of malignancy and NFPE    Active [ x  ]  Resolved [   ]    If resolved, new PES:     Goal :Meet 80% of EER consistently with no further S/S of malnutrition     Recommendations:1.Please update weights 2.Add Ensure Pudding BID(340kcal and 8gmprotein)    Education: RD encouraged ONS.    Risk Level: High [  x ] Moderate [   ] Low [   ]

## 2020-10-28 NOTE — PROGRESS NOTE ADULT - SUBJECTIVE AND OBJECTIVE BOX
INCOMPLETE    O/N Events:    Subjective/ROS: Patient seen and examined at bedside.     Denies Fever/Chills, HA, CP, SOB, n/v, changes in bowel/urinary habits.  12pt ROS otherwise negative.    VITALS  Vital Signs Last 24 Hrs  T(C): 36.6 (28 Oct 2020 05:17), Max: 37.2 (28 Oct 2020 02:02)  T(F): 97.8 (28 Oct 2020 05:17), Max: 99 (28 Oct 2020 02:02)  HR: 96 (28 Oct 2020 04:53) (82 - 100)  BP: 138/64 (28 Oct 2020 04:53) (112/52 - 138/64)  BP(mean): 92 (28 Oct 2020 04:53) (75 - 94)  RR: 18 (28 Oct 2020 04:53) (18 - 18)  SpO2: 96% (28 Oct 2020 04:53) (96% - 98%)    CAPILLARY BLOOD GLUCOSE    PHYSICAL EXAM  General: NAD,  Head: NC/AT; MMM; PERRL; EOMI;  Neck: Supple; no JVD  Respiratory: CTAB; no wheezes/rales/rhonchi, breathing well on 3L NC, R drain in place, site c/d/i  Cardiovascular: Regular rhythm/rate; S1/S2+, no murmurs, rubs gallops   Gastrointestinal: Soft; NTND; bowel sounds normal and present  Extremities: WWP; no edema/cyanosis  Neurological: A&Ox3, CNII-XII grossly intact; no obvious focal deficits    MEDICATIONS  (STANDING):  atorvastatin 20 milliGRAM(s) Oral at bedtime  enoxaparin Injectable 30 milliGRAM(s) SubCutaneous at bedtime  influenza  Vaccine (HIGH DOSE) 0.7 milliLiter(s) IntraMuscular once  senna 2 Tablet(s) Oral at bedtime    MEDICATIONS  (PRN):  acetaminophen   Tablet .. 650 milliGRAM(s) Oral every 6 hours PRN Moderate Pain (4 - 6)  oxycodone    5 mG/acetaminophen 325 mG 2 Tablet(s) Oral every 6 hours PRN Severe Pain (7 - 10)  sodium chloride 0.65% Nasal 1 Spray(s) Both Nostrils every 6 hours PRN Nasal Congestion/Dryness      Bactrim (Unknown)  Cleocin HCl (Unknown)  Flagyl (Unknown)  Honey (Unknown)  iodine (Anaphylaxis)  IV Contrast (Anaphylaxis)  Levaquin (Other; Rash)  penicillin (Unknown)  Zithromax (Unknown)      LABS                        10.7   9.80  )-----------( 429      ( 27 Oct 2020 06:58 )             34.4     10-27    143  |  101  |  15  ----------------------------<  89  4.4   |  35<H>  |  0.62    Ca    8.7      27 Oct 2020 06:58      PT/INR - ( 27 Oct 2020 06:58 )   PT: 12.4 sec;   INR: 1.04          PTT - ( 27 Oct 2020 06:58 )  PTT:29.2 sec        IMAGING/EKG/ETC   O/N Events: RINA    Subjective/ROS: Patient seen and examined at bedside. Slight pain around tube site. Breathing well on 3L NC, no shortness of breath. States that she's been having diarrhea for the past day every few hours. No blood in stool, brown, loose, no fever    Denies Fever/Chills, HA, CP, SOB, n/v,.  12pt ROS otherwise negative.    VITALS  Vital Signs Last 24 Hrs  T(C): 36.6 (28 Oct 2020 05:17), Max: 37.2 (28 Oct 2020 02:02)  T(F): 97.8 (28 Oct 2020 05:17), Max: 99 (28 Oct 2020 02:02)  HR: 96 (28 Oct 2020 04:53) (82 - 100)  BP: 138/64 (28 Oct 2020 04:53) (112/52 - 138/64)  BP(mean): 92 (28 Oct 2020 04:53) (75 - 94)  RR: 18 (28 Oct 2020 04:53) (18 - 18)  SpO2: 96% (28 Oct 2020 04:53) (96% - 98%)    CAPILLARY BLOOD GLUCOSE    PHYSICAL EXAM  General: NAD,  Head: NC/AT; MMM; PERRL; EOMI;  Neck: Supple; no JVD  Respiratory: CTAB; no wheezes/rales/rhonchi, breathing well on 3L NC, R chest tube in place, site c/d/i  Cardiovascular: Regular rhythm/rate; distant heart sounds, could not hear mrg  Gastrointestinal: Soft; NTND; bowel sounds normal and present  Extremities: WWP; no edema/cyanosis  Neurological: A&Ox3, CNII-XII grossly intact; no obvious focal deficits    MEDICATIONS  (STANDING):  atorvastatin 20 milliGRAM(s) Oral at bedtime  enoxaparin Injectable 30 milliGRAM(s) SubCutaneous at bedtime  influenza  Vaccine (HIGH DOSE) 0.7 milliLiter(s) IntraMuscular once  senna 2 Tablet(s) Oral at bedtime    MEDICATIONS  (PRN):  acetaminophen   Tablet .. 650 milliGRAM(s) Oral every 6 hours PRN Moderate Pain (4 - 6)  oxycodone    5 mG/acetaminophen 325 mG 2 Tablet(s) Oral every 6 hours PRN Severe Pain (7 - 10)  sodium chloride 0.65% Nasal 1 Spray(s) Both Nostrils every 6 hours PRN Nasal Congestion/Dryness      Bactrim (Unknown)  Cleocin HCl (Unknown)  Flagyl (Unknown)  Honey (Unknown)  iodine (Anaphylaxis)  IV Contrast (Anaphylaxis)  Levaquin (Other; Rash)  penicillin (Unknown)  Zithromax (Unknown)      `LABS                        11.6   15.35 )-----------( 444      ( 28 Oct 2020 07:49 )             36.7     10-28    141  |  98  |  16  ----------------------------<  128<H>  4.2   |  34<H>  |  0.71    Ca    9.2      28 Oct 2020 07:49  Phos  3.4     10-28  Mg     2.2     10-28      PT/INR - ( 27 Oct 2020 06:58 )   PT: 12.4 sec;   INR: 1.04          PTT - ( 27 Oct 2020 06:58 )  PTT:29.2 sec

## 2020-10-28 NOTE — DISCHARGE NOTE PROVIDER - NSDCMRMEDTOKEN_GEN_ALL_CORE_FT
acetaminophen 325 mg oral tablet: 2 tab(s) orally every 6 hours, As needed,  pain  aspirin 81 mg oral delayed release tablet: 1 tab(s) orally every 24 hours  Lipitor 20 mg oral tablet: 1 tab(s) orally once a day   acetaminophen 325 mg oral tablet: 2 tab(s) orally every 6 hours, As needed,  pain  aspirin 81 mg oral delayed release tablet: 1 tab(s) orally every 24 hours  Lasix 20 mg oral tablet: 1 tab(s) orally once a day   Lipitor 20 mg oral tablet: 1 tab(s) orally once a day

## 2020-10-28 NOTE — DISCHARGE NOTE PROVIDER - CARE PROVIDER_API CALL
Arianne Nguyen)  Pulmonary Disease  [address] [place] [address]  Phone: [phone]  Fax: [phone]  Established Patient  Follow Up Time: 2 weeks   Arianne Nguyen)  Pulmonary Disease  [address] [place] [address]  Phone: [phone]  Fax: [phone]  Established Patient  Scheduled Appointment: 11/12/2020 10:30 AM    Stefano Zavala  [address] [address]  Phone: [phone]  Fax: [phone]  Established Patient  Follow Up Time: 2 weeks

## 2020-10-29 ENCOUNTER — APPOINTMENT (OUTPATIENT)
Dept: PULMONOLOGY | Facility: CLINIC | Age: 85
End: 2020-10-29

## 2020-10-29 LAB
ANION GAP SERPL CALC-SCNC: 9 MMOL/L — SIGNIFICANT CHANGE UP (ref 5–17)
BASOPHILS # BLD AUTO: 0.03 K/UL — SIGNIFICANT CHANGE UP (ref 0–0.2)
BASOPHILS NFR BLD AUTO: 0.3 % — SIGNIFICANT CHANGE UP (ref 0–2)
BLD GP AB SCN SERPL QL: NEGATIVE — SIGNIFICANT CHANGE UP
BUN SERPL-MCNC: 21 MG/DL — SIGNIFICANT CHANGE UP (ref 7–23)
CALCIUM SERPL-MCNC: 8.8 MG/DL — SIGNIFICANT CHANGE UP (ref 8.4–10.5)
CHLORIDE SERPL-SCNC: 100 MMOL/L — SIGNIFICANT CHANGE UP (ref 96–108)
CO2 SERPL-SCNC: 33 MMOL/L — HIGH (ref 22–31)
CREAT SERPL-MCNC: 0.76 MG/DL — SIGNIFICANT CHANGE UP (ref 0.5–1.3)
EOSINOPHIL # BLD AUTO: 0.08 K/UL — SIGNIFICANT CHANGE UP (ref 0–0.5)
EOSINOPHIL NFR BLD AUTO: 0.7 % — SIGNIFICANT CHANGE UP (ref 0–6)
GLUCOSE SERPL-MCNC: 106 MG/DL — HIGH (ref 70–99)
HCT VFR BLD CALC: 31.7 % — LOW (ref 34.5–45)
HGB BLD-MCNC: 10.2 G/DL — LOW (ref 11.5–15.5)
IMM GRANULOCYTES NFR BLD AUTO: 0.6 % — SIGNIFICANT CHANGE UP (ref 0–1.5)
LYMPHOCYTES # BLD AUTO: 0.73 K/UL — LOW (ref 1–3.3)
LYMPHOCYTES # BLD AUTO: 6.7 % — LOW (ref 13–44)
MAGNESIUM SERPL-MCNC: 2.1 MG/DL — SIGNIFICANT CHANGE UP (ref 1.6–2.6)
MCHC RBC-ENTMCNC: 32 PG — SIGNIFICANT CHANGE UP (ref 27–34)
MCHC RBC-ENTMCNC: 32.2 GM/DL — SIGNIFICANT CHANGE UP (ref 32–36)
MCV RBC AUTO: 99.4 FL — SIGNIFICANT CHANGE UP (ref 80–100)
MONOCYTES # BLD AUTO: 1.12 K/UL — HIGH (ref 0–0.9)
MONOCYTES NFR BLD AUTO: 10.3 % — SIGNIFICANT CHANGE UP (ref 2–14)
NEUTROPHILS # BLD AUTO: 8.87 K/UL — HIGH (ref 1.8–7.4)
NEUTROPHILS NFR BLD AUTO: 81.4 % — HIGH (ref 43–77)
NON-GYNECOLOGICAL CYTOLOGY STUDY: SIGNIFICANT CHANGE UP
NRBC # BLD: 0 /100 WBCS — SIGNIFICANT CHANGE UP (ref 0–0)
PHOSPHATE SERPL-MCNC: 3.5 MG/DL — SIGNIFICANT CHANGE UP (ref 2.5–4.5)
PLATELET # BLD AUTO: 441 K/UL — HIGH (ref 150–400)
POTASSIUM SERPL-MCNC: 4.5 MMOL/L — SIGNIFICANT CHANGE UP (ref 3.5–5.3)
POTASSIUM SERPL-SCNC: 4.5 MMOL/L — SIGNIFICANT CHANGE UP (ref 3.5–5.3)
RBC # BLD: 3.19 M/UL — LOW (ref 3.8–5.2)
RBC # FLD: 15.4 % — HIGH (ref 10.3–14.5)
RH IG SCN BLD-IMP: POSITIVE — SIGNIFICANT CHANGE UP
SODIUM SERPL-SCNC: 142 MMOL/L — SIGNIFICANT CHANGE UP (ref 135–145)
WBC # BLD: 10.89 K/UL — HIGH (ref 3.8–10.5)
WBC # FLD AUTO: 10.89 K/UL — HIGH (ref 3.8–10.5)

## 2020-10-29 PROCEDURE — 71045 X-RAY EXAM CHEST 1 VIEW: CPT | Mod: 26,77

## 2020-10-29 PROCEDURE — 99233 SBSQ HOSP IP/OBS HIGH 50: CPT

## 2020-10-29 PROCEDURE — 99232 SBSQ HOSP IP/OBS MODERATE 35: CPT | Mod: GC

## 2020-10-29 PROCEDURE — 71045 X-RAY EXAM CHEST 1 VIEW: CPT | Mod: 26,76

## 2020-10-29 PROCEDURE — 99233 SBSQ HOSP IP/OBS HIGH 50: CPT | Mod: GC

## 2020-10-29 RX ORDER — FUROSEMIDE 40 MG
40 TABLET ORAL ONCE
Refills: 0 | Status: COMPLETED | OUTPATIENT
Start: 2020-10-29 | End: 2020-10-29

## 2020-10-29 RX ORDER — FUROSEMIDE 40 MG
40 TABLET ORAL DAILY
Refills: 0 | Status: DISCONTINUED | OUTPATIENT
Start: 2020-10-30 | End: 2020-10-30

## 2020-10-29 RX ADMIN — Medication 40 MILLIGRAM(S): at 19:47

## 2020-10-29 RX ADMIN — ENOXAPARIN SODIUM 30 MILLIGRAM(S): 100 INJECTION SUBCUTANEOUS at 21:46

## 2020-10-29 RX ADMIN — ATORVASTATIN CALCIUM 20 MILLIGRAM(S): 80 TABLET, FILM COATED ORAL at 21:45

## 2020-10-29 RX ADMIN — Medication 650 MILLIGRAM(S): at 12:40

## 2020-10-29 RX ADMIN — Medication 650 MILLIGRAM(S): at 11:57

## 2020-10-29 NOTE — PROGRESS NOTE ADULT - PROBLEM SELECTOR PLAN 1
Patient w/ hx of recurrent pleural effusions. On previous admission R-sided thoracentesis, with lymphocytic predominance - cytology negative for malignant cells but malignancy remains the primary differential of her recurrent effusions. CXR w/ slight interval increase in pleural effusions right > left. R-sided pleuroscopy w/ pleurodesis 10/27  - f/u pulm recs  - pain control: tylenol and percocet Patient w/ hx of recurrent pleural effusions. On previous admission R-sided thoracentesis, with lymphocytic predominance - cytology negative for malignant cells but malignancy remains the primary differential of her recurrent effusions. CXR w/ slight interval increase in pleural effusions right > left. R-sided pleuroscopy w/ pleurodesis 10/27  - f/u pulm recs: chest tube clamped, repeat CXR this afternoon  - pain control: tylenol and percocet

## 2020-10-29 NOTE — PROGRESS NOTE ADULT - SUBJECTIVE AND OBJECTIVE BOX
PULMONARY CONSULT SERVICE FOLLOW-UP NOTE  -------------------------------------------------------------------    INTERVAL HPI:    No acute overnight events.   Pt seen and examined at bedside this AM.     VSS, afebrile  Reports intermittent right-sided chest pain at chest-tube insertion site. Was on -20 suction at time.  Denies fevers/chills, HA, dizziness, SOB, cough, abd pain, N/V, bowel changes, ext swelling       -------------------------------------------------------------------  MEDICATIONS:    Pulmonary:    Antimicrobials:    Anticoagulants:  enoxaparin Injectable 30 milliGRAM(s) SubCutaneous at bedtime    Cardiac:      Allergies    Bactrim (Unknown)  Cleocin HCl (Unknown)  Flagyl (Unknown)  Honey (Unknown)  iodine (Anaphylaxis)  IV Contrast (Anaphylaxis)  Levaquin (Other; Rash)  penicillin (Unknown)  Zithromax (Unknown)    Intolerances        Vital Signs Last 24 Hrs  T(C): 36.7 (29 Oct 2020 09:26), Max: 36.8 (28 Oct 2020 17:02)  T(F): 98 (29 Oct 2020 09:26), Max: 98.2 (28 Oct 2020 17:02)  HR: 86 (29 Oct 2020 08:08) (82 - 108)  BP: 127/73 (29 Oct 2020 08:08) (111/56 - 147/79)  BP(mean): 94 (29 Oct 2020 08:08) (79 - 104)  RR: 16 (29 Oct 2020 04:15) (16 - 16)  SpO2: 98% (29 Oct 2020 08:08) (92% - 98%)    10-28 @ 07:01  -  10-29 @ 07:00  --------------------------------------------------------  IN: 0 mL / OUT: 205 mL / NET: -205 mL          -------------------------------------------------------------------  PHYSICAL EXAM:    Constitutional: thin/cachectic appearing  HEENT: NC/AT; PERRL, anicteric sclera; MMM  Neck: supple  Cardiovascular: +S1/S2, Tachycardic  Respiratory: CTA B/L; no W/R/R; R-chest tube in place (suction)   Gastrointestinal: soft, NT/ND  Extremities: WWP; no edema, clubbing or cyanosis  Vascular: 2+ radial pulses B/L  Neurological: awake and alert; LIPSCOMB    -------------------------------------------------------------------  LABS:        CBC Full  -  ( 29 Oct 2020 07:01 )  WBC Count : 10.89 K/uL  RBC Count : 3.19 M/uL  Hemoglobin : 10.2 g/dL  Hematocrit : 31.7 %  Platelet Count - Automated : 441 K/uL  Mean Cell Volume : 99.4 fl  Mean Cell Hemoglobin : 32.0 pg  Mean Cell Hemoglobin Concentration : 32.2 gm/dL  Auto Neutrophil # : 8.87 K/uL  Auto Lymphocyte # : 0.73 K/uL  Auto Monocyte # : 1.12 K/uL  Auto Eosinophil # : 0.08 K/uL  Auto Basophil # : 0.03 K/uL  Auto Neutrophil % : 81.4 %  Auto Lymphocyte % : 6.7 %  Auto Monocyte % : 10.3 %  Auto Eosinophil % : 0.7 %  Auto Basophil % : 0.3 %    10-29    142  |  100  |  21  ----------------------------<  106<H>  4.5   |  33<H>  |  0.76    Ca    8.8      29 Oct 2020 07:01  Phos  3.5     10-29  Mg     2.1     10-29                        -------------------------------------------------------------------  RADIOLOGY & ADDITIONAL STUDIES:

## 2020-10-29 NOTE — CHART NOTE - NSCHARTNOTEFT_GEN_A_CORE
Right chest tube removed at 3:00pm. Local anesthetic with 5ml of Lidocaine 1% was used. Sutures were removed. An additional 60 cc of pleural fluid was removed via manual suction. The chest was removed and the wound was closed with a 2.0 vinyl chloride suture and a bandage was applied with gauze and silk tape. A post-removal POC chest US demonstrated lung sliding and a small pocket of fluid remaining. The patient tolerated the procedure well. Plan to obtain CXR in 1 hour.

## 2020-10-29 NOTE — PROGRESS NOTE ADULT - PROBLEM SELECTOR PLAN 1
- Patient is s/p lobectomy and XRT.  - Evidence of 2 subpleural nodules concerning for malignancy.  - Pleural effusions without malignant cells, but concern for malignancy is still in the differential.  - Supportive care.  - Patient will ultimately follow up with Dr. Kwan for consideration of immunotherapy

## 2020-10-29 NOTE — PROGRESS NOTE ADULT - PROBLEM SELECTOR PLAN 4
- Patient with history of lung cancer, as well as pleural effusions.  - Some evidence of functional decline.  - Supportive care.

## 2020-10-29 NOTE — PROGRESS NOTE ADULT - ASSESSMENT
88yo F, nonsmoker, with PMHx of lung adenocarcinoma (s/p resection/XRT), severe MR, and recent admission 8/2020 for fall and R leg weakness,presented to ED for hypoxemia to 80s on 3L NC (her usual O2), as well as productive cough with green sputum and LE edema. Admitted to [facility] for management of sepsis 2/2 to pneumonia and acute hypoxic respiratory failure and stepped up to [facility] for observation s/p pleuroscopy and pleurodesis.   88yo F, nonsmoker, with PMHx of lung adenocarcinoma (s/p resection/XRT), severe MR, and recent admission 8/2020 for fall and R leg weakness,presented to ED for hypoxemia to 80s on 3L NC (her usual O2), as well as productive cough with green sputum and LE edema. Admitted to [facility] for management of sepsis 2/2 to pneumonia and acute hypoxic respiratory failure and stepped up to [facility] for observation s/p pleuroscopy and pleurodesis.

## 2020-10-29 NOTE — PROGRESS NOTE ADULT - ASSESSMENT
per Internal Medicine    86 yo F, nonsmoker, with PMHx of lung adenocarcinoma (s/p resection/XRT), severe MR, and recent admission 8/2020 for fall and R leg weakness,presented to ED for hypoxemia to 80s on 3L NC (her usual O2), as well as productive cough with green sputum and LE edema. Admitted to [facility] for management of sepsis 2/2 to pneumonia and acute hypoxic respiratory failure and stepped up to [facility] for observation s/p pleuroscopy and pleurodesis.     · Nutritional Assessment  This patient has been assessed with a concern for Malnutrition and has been determined to have a diagnosis/diagnoses of Severe protein-calorie malnutrition.    This patient is being managed with:   Diet Regular-  Entered: Oct 27 2020  4:48PM    Diet Regular-  Supplement Feeding Modality:  Oral  Ensure Pudding Cans or Servings Per Day:  1       Frequency:  Daily  Entered: Oct 23 2020 10:12AM    The following pending diet order is being considered for treatment of Severe protein-calorie malnutrition:null      Problem/Plan - 1:  ·  Problem: Pleural effusion.  Plan: Patient w/ hx of recurrent pleural effusions. On previous admission R-sided thoracentesis, with lymphocytic predominance - cytology negative for malignant cells but malignancy remains the primary differential of her recurrent effusions. CXR w/ slight interval increase in pleural effusions right > left. R-sided pleuroscopy w/ pleurodesis 10/27  - f/u pulm recs: chest tube clamped, repeat CXR this afternoon  - pain control: tylenol and percocet.    Problem/Plan - 2:  ·  Problem: Acute respiratory failure with hypoxia.  Plan: Patient presents to ED for hypoxemia to 80s on 3L NC (her usual O2) initially tachypneic. . CXR w/ slight interval increase in pleural effusions right > left. Hypoxemia likely 2/2 to pleural effusions. Currently satting well on home 3L NC  RESOLVED  - s/p pleuroscopy and pleurodesis  - plan as above.     Problem/Plan - 3:  ·  Problem: Adenocarcinoma of lung.  Plan: History of adenocarcinoma of RLL s/p VATS resection in 2013, ANANYA XRT in 2016, and RMF lesion s/p XRT in 2017  - recent PET showing 2 new FDG-avid subcentimeter nodules in L subpleural region  - f/u pulm recs.     Problem/Plan - 4:  ·  Problem: Diarrhea.  Plan: Patient having loose bm every few hours for the past day. no blood in stool, brown color, no abm pain  - stopped senna, monitor.    Problem/Plan - 5:  ·  Problem: Mitral regurgitation.  Plan: Patient has moderate mitral regurg, on echo 8/2020. EF >70%. No evidence of heart failure on recent Echo.   - monitor for LE edema  - caution w/ IV fluids  - Strict I/Os.     Problem/Plan - 6:  Problem: Back pain. Plan: Patient with history of compression fractures and reports worsening back pain lately. Followed by outpatient ortho.  - Tylenol PRN for pain control.    Problem/Plan - 7:  ·  Problem: Hyperlipidemia.  Plan: Patient w/ known hx HLD on Lipitor.  -c/w Lipitor 20 mg PO daily.     Problem/Plan - 8:  ·  Problem: Severe sepsis.  Plan: RESOLVED -> Patient initially met criteria for severe sepsis w/ leukocytosis, tachycardia, tachypnea and elevated lactate. S/p Ceftazadme 1g IV x1, Vanc 750mg IV x1 in ED. No fluids given due to moderate MR. Now, patient no longer meeting sepsis criteria, as lactate is resolved, and patient is no longer tachypneic or tachycardic, and has been afebrile.   - Blood Cultures NGTD.     Problem/Plan - 9:  ·  Problem: Pneumonia.  Plan: Patient w/ increased sputum production, w/ AHRF and met severe sepsis criteria on admission, now no longer septic. ProCal negtive.  - as per pulm recs, will monitor patient clinically and see if she becomes febrile/tachypneic again without antibiotics.   -bcx negative to date, procal negative, mrsa negative.   -s/p cefepime and ceftazidime.     Problem/Plan - 10:  Problem: Nutrition, metabolism, and development symptoms. Plan; F: careful IVF given moderate MR   E: Replete for K<4 Mag<2  N: Regular diet  Nutrition diagnosis: Increased nutrient needs r/t increased Kcal/Protein demands AEB: suspected severe PCM in setting of malignancy and NFPE    A: Lovenox 40   Code status: full code  Disposition: [facility].

## 2020-10-29 NOTE — PROGRESS NOTE ADULT - PROBLEM SELECTOR PLAN 1
Patient w/ recurrent right sided pleural effusion, s/p thoracentesis in OP on 8/11 and 10/11 w/ lymphocyte predominant fluid and negative for malignant cells.    However, her effusion remains highly suspicious for malignancy given her complicated cancer history.     - s/p R-pleuroscopy and pleurodesis on 10/27  - Chest-tube is now clamped/suction-off in 10/29AM  - Will repeat CXR this afternoon and assess when chest-tube can be removed   - Hold off on NSAIDs for now    - Pain control to avoid splinting  - Incentive spirometry

## 2020-10-29 NOTE — PROGRESS NOTE ADULT - PROBLEM SELECTOR PLAN 2
Patient presents to ED for hypoxemia to 80s on 3L NC (her usual O2) initially tachypneic. . CXR w/ slight interval increase in pleural effusions right > left. Hypoxemia likely 2/2 to pleural effusions. Currently satting well on home 3L NC  RESOLVED  - s/p pleuroscopy and pleurodesis  - plan as above

## 2020-10-29 NOTE — PROGRESS NOTE ADULT - SUBJECTIVE AND OBJECTIVE BOX
INCOMPLETE INCOMPLETE    O/N Events: Diarrhea at start of night shift. Nursing says her BM have been formed    Subjective/ROS: Patient seen and examined at bedside. Says that she believes she just had another episode of diarrhea. No pain at the chest tube site unless she moves in bed. Some abdominal pain, but nontender to palpation     Denies Fever/Chills, HA, CP, SOB, n/v  12pt ROS otherwise negative.    VITALS  Vital Signs Last 24 Hrs  T(C): 36.4 (29 Oct 2020 05:14), Max: 36.8 (28 Oct 2020 17:02)  T(F): 97.6 (29 Oct 2020 05:14), Max: 98.2 (28 Oct 2020 17:02)  HR: 82 (29 Oct 2020 04:15) (82 - 108)  BP: 116/63 (29 Oct 2020 04:15) (111/56 - 147/79)  BP(mean): 84 (29 Oct 2020 04:15) (79 - 104)  RR: 16 (29 Oct 2020 04:15) (16 - 16)  SpO2: 95% (29 Oct 2020 04:15) (92% - 97%)    CAPILLARY BLOOD GLUCOSE    PHYSICAL EXAM  General: NAD  Head: NC/AT; PERRL; EOMI; dry mucous membranes  Neck: Supple; no JVD  Respiratory: CTAB; no wheezes/rales/rhonchi  Cardiovascular: Distant heart sounds, Regular rhythm/rate; S1/S2+, no murmurs, rubs gallops   Gastrointestinal: Soft; NTND; bowel sounds normal and present  Extremities: WWP; 1+ pitting edema to knee  Neurological: A&Ox3, CNII-XII grossly intact; no obvious focal deficits    MEDICATIONS  (STANDING):  atorvastatin 20 milliGRAM(s) Oral at bedtime  enoxaparin Injectable 30 milliGRAM(s) SubCutaneous at bedtime  influenza  Vaccine (HIGH DOSE) 0.7 milliLiter(s) IntraMuscular once  lidocaine 1% Injectable 10 milliLiter(s) Local Injection once    MEDICATIONS  (PRN):  acetaminophen   Tablet .. 650 milliGRAM(s) Oral every 6 hours PRN Moderate Pain (4 - 6)  oxycodone    5 mG/acetaminophen 325 mG 2 Tablet(s) Oral every 6 hours PRN Severe Pain (7 - 10)  sodium chloride 0.65% Nasal 1 Spray(s) Both Nostrils every 6 hours PRN Nasal Congestion/Dryness      Bactrim (Unknown)  Cleocin HCl (Unknown)  Flagyl (Unknown)  Honey (Unknown)  iodine (Anaphylaxis)  IV Contrast (Anaphylaxis)  Levaquin (Other; Rash)  penicillin (Unknown)  Zithromax (Unknown)      LABS                        10.2   10.89 )-----------( 441      ( 29 Oct 2020 07:01 )             31.7     10-29    142  |  100  |  21  ----------------------------<  106<H>  4.5   |  33<H>  |  0.76    Ca    8.8      29 Oct 2020 07:01  Phos  3.5     10-29  Mg     2.1     10-29                  IMAGING/EKG/ETC   O/N Events: Diarrhea at start of night shift. Nursing says her BM have been formed    Subjective/ROS: Patient seen and examined at bedside. Says that she believes she just had another episode of diarrhea. No pain at the chest tube site unless she moves in bed. Some abdominal pain, but nontender to palpation     Denies Fever/Chills, HA, CP, SOB, n/v  12pt ROS otherwise negative.    VITALS  Vital Signs Last 24 Hrs  T(C): 36.4 (29 Oct 2020 05:14), Max: 36.8 (28 Oct 2020 17:02)  T(F): 97.6 (29 Oct 2020 05:14), Max: 98.2 (28 Oct 2020 17:02)  HR: 82 (29 Oct 2020 04:15) (82 - 108)  BP: 116/63 (29 Oct 2020 04:15) (111/56 - 147/79)  BP(mean): 84 (29 Oct 2020 04:15) (79 - 104)  RR: 16 (29 Oct 2020 04:15) (16 - 16)  SpO2: 95% (29 Oct 2020 04:15) (92% - 97%)    CAPILLARY BLOOD GLUCOSE    PHYSICAL EXAM  General: NAD  Head: NC/AT; PERRL; EOMI; dry mucous membranes  Neck: Supple; no JVD  Respiratory: CTAB; no wheezes/rales/rhonchi  Cardiovascular: Distant heart sounds, Regular rhythm/rate; S1/S2+, no murmurs, rubs gallops   Gastrointestinal: Soft; NTND; bowel sounds normal and present  Extremities: WWP; 1+ pitting edema to knee  Neurological: A&Ox3, CNII-XII grossly intact; no obvious focal deficits    MEDICATIONS  (STANDING):  atorvastatin 20 milliGRAM(s) Oral at bedtime  enoxaparin Injectable 30 milliGRAM(s) SubCutaneous at bedtime  influenza  Vaccine (HIGH DOSE) 0.7 milliLiter(s) IntraMuscular once  lidocaine 1% Injectable 10 milliLiter(s) Local Injection once    MEDICATIONS  (PRN):  acetaminophen   Tablet .. 650 milliGRAM(s) Oral every 6 hours PRN Moderate Pain (4 - 6)  oxycodone    5 mG/acetaminophen 325 mG 2 Tablet(s) Oral every 6 hours PRN Severe Pain (7 - 10)  sodium chloride 0.65% Nasal 1 Spray(s) Both Nostrils every 6 hours PRN Nasal Congestion/Dryness      Bactrim (Unknown)  Cleocin HCl (Unknown)  Flagyl (Unknown)  Honey (Unknown)  iodine (Anaphylaxis)  IV Contrast (Anaphylaxis)  Levaquin (Other; Rash)  penicillin (Unknown)  Zithromax (Unknown)      LABS                        10.2   10.89 )-----------( 441      ( 29 Oct 2020 07:01 )             31.7     10-29    142  |  100  |  21  ----------------------------<  106<H>  4.5   |  33<H>  |  0.76    Ca    8.8      29 Oct 2020 07:01  Phos  3.5     10-29  Mg     2.1     10-29    IMAGING/EKG/ETC

## 2020-10-29 NOTE — PROGRESS NOTE ADULT - SUBJECTIVE AND OBJECTIVE BOX
SUBJECTIVE AND OBJECTIVE:  Remains with O2.  Reports some pain at her chest tube site.  INTERVAL HPI/OVERNIGHT EVENTS:  None  DNR on chart:   Allergies    Bactrim (Unknown)  Cleocin HCl (Unknown)  Flagyl (Unknown)  Honey (Unknown)  iodine (Anaphylaxis)  IV Contrast (Anaphylaxis)  Levaquin (Other; Rash)  penicillin (Unknown)  Zithromax (Unknown)    Intolerances    MEDICATIONS  (STANDING):  atorvastatin 20 milliGRAM(s) Oral at bedtime  enoxaparin Injectable 30 milliGRAM(s) SubCutaneous at bedtime  influenza  Vaccine (HIGH DOSE) 0.7 milliLiter(s) IntraMuscular once  lidocaine 1% Injectable 10 milliLiter(s) Local Injection once    MEDICATIONS  (PRN):  acetaminophen   Tablet .. 650 milliGRAM(s) Oral every 6 hours PRN Moderate Pain (4 - 6)  oxycodone    5 mG/acetaminophen 325 mG 2 Tablet(s) Oral every 6 hours PRN Severe Pain (7 - 10)  sodium chloride 0.65% Nasal 1 Spray(s) Both Nostrils every 6 hours PRN Nasal Congestion/Dryness      ITEMS UNCHECKED ARE NOT PRESENT    PRESENT SYMPTOMS: [ ]Unable to obtain due to poor mentation   Source if other than patient:  [ ]Family   [ ]Team     Pain (Impact on QOL):  Difficulty moving.  Location: chest tube site  Minimal acceptable level (0-10 scale):  4         Aggravating factors: movement  Quality: sharp  Radiation: none  Severity (0-10 scale): 7   Timing: intermittent    Dyspnea:                           [x ]Mild [ ]Moderate [ ]Severe  Anxiety:                             [ x]Mild [ ]Moderate [ ]Severe  Fatigue:                             [ ]Mild [x ]Moderate [ ]Severe  Nausea:                             [ ]Mild [ ]Moderate [ ]Severe  Loss of appetite:              [ ]Mild [x ]Moderate [ ]Severe  Constipation:                    [ ]Mild [ ]Moderate [ ]Severe  Grief Present                    [ ] Yes  [x ] No   PAIN AD Score:	  http://geriatrictoolkit.[place].[facility]/cog/painad.pdf (Ctrl + left click to view)    Other Symptoms:  [x ]All other review of systems negative     Karnofsky Performance Score/Palliative Performance Status Version 2:   40-50      %    http://palliative.info/resource_material/PPSv2.pdf  PHYSICAL EXAM:  Vital Signs Last 24 Hrs  T(C): 36.6 (28 Oct 2020 14:00), Max: 37.2 (28 Oct 2020 02:02)  T(F): 97.8 (28 Oct 2020 14:00), Max: 99 (28 Oct 2020 02:02)  HR: 90 (28 Oct 2020 08:17) (90 - 100)  BP: 145/65 (28 Oct 2020 08:17) (112/52 - 145/65)  BP(mean): 93 (28 Oct 2020 08:17) (75 - 94)  RR: 16 (28 Oct 2020 08:17) (16 - 18)  SpO2: 97% (28 Oct 2020 08:17) (96% - 98%) I&O's Summary    27 Oct 2020 07:01  -  28 Oct 2020 07:00  --------------------------------------------------------  IN: 0 mL / OUT: 880 mL / NET: -880 mL     GENERAL:  [x ]Alert  [ x]Oriented x 3  [ ]Lethargic  [ ]Cachexia  [ ]Unarousable  [ ]Verbal  [ ]Non-Verbal  Behavioral:   [ ] Anxiety  [ ] Delirium [ ] Agitation [x ] Other  HEENT:  [ ]Normal   [x ]Dry mouth   [ ]ET Tube/Trach  [ ]Oral lesions  PULMONARY:   [x ]Clear [ ]Tachypnea  [ ]Audible excessive secretions   [ ]Rhonchi        [ ]Right [ ]Left [ ]Bilateral  [ ]Crackles        [ ]Right [ ]Left [ ]Bilateral  [ ]Wheezing     [ ]Right [ ]Left [ ]Bilateral  CARDIOVASCULAR:    [x ]Regular [ ]Irregular [ ]Tachy  [ ]Renny [ ]Murmur [ ]Other  GASTROINTESTINAL:  [ x]Soft  [ ]Distended   [ x]+BS  [x ]Non tender [ ]Tender  [ ]PEG [ ]OGT/ NGT   Last BM:    GENITOURINARY:  [x ]Normal [ ] Incontinent   [ ]Oliguria/Anuria   [ ]Mcduffie  MUSCULOSKELETAL:   [ ]Normal   [x]Weakness  [ ]Bed/Wheelchair bound [ ]Edema  NEUROLOGIC:   [ x]No focal deficits  [ ] Cognitive impairment  [ ] Dysphagia [ ]Dysarthria [ ] Paresis [ ]Other   SKIN:   [x]Normal   [ ]Pressure ulcer(s)  [ ]Rash    CRITICAL CARE:  [ ] Shock Present  [ ]Septic [ ]Cardiogenic [ ]Neurologic [ ]Hypovolemic  [ ]  Vasopressors [ ]  Inotropes   [ ] Respiratory failure present  [ ] Acute  [ ] Chronic [ ] Hypoxic  [ ] Hypercarbic [ ] Other  [ ] Other organ failure     LABS:                        11.6   15.35 )-----------( 444      ( 28 Oct 2020 07:49 )             36.7   10-28    141  |  98  |  16  ----------------------------<  128<H>  4.2   |  34<H>  |  0.71    Ca    9.2      28 Oct 2020 07:49  Phos  3.4     10-28  Mg     2.2     10-28    PT/INR - ( 27 Oct 2020 06:58 )   PT: 12.4 sec;   INR: 1.04          PTT - ( 27 Oct 2020 06:58 )  PTT:29.2 sec      RADIOLOGY & ADDITIONAL STUDIES:    Protein Calorie Malnutrition Present: [ ] yes [ ] no  [ ] PPSV2 < or = 30%  [ ] significant weight loss [ ] poor nutritional intake [ ] anasarca [ ] catabolic state Albumin, Serum: 3.0 g/dL (10-22-20 @ 13:52)  Artificial Nutrition [ ]     REFERRALS:   [ ]Chaplaincy  [ ] Hospice  [ ]Child Life  [ ]Social Work  [ ]Case management [ ]Holistic Therapy   Goals of Care Document:

## 2020-10-29 NOTE — PROGRESS NOTE ADULT - PROBLEM SELECTOR PLAN 10
F: careful IVF given moderate MR   E: Replete for K<4 Mag<2  N: Regular diet  Nutrition diagnosis: Increased nutrient needs r/t increased Kcal/Protein demands AEB: suspected severe PCM in setting of malignancy and NFPE    A: Lovenox 40   Code status: full code  Disposition: [facility]

## 2020-10-29 NOTE — PROGRESS NOTE ADULT - ASSESSMENT
86yo woman and never smoker w/ PMH severe MR, HFpEF, and complicated history of NSCLC of multiple sites dating back to 2013 with parenchymal recurrence and recurrent right pleural effusions. recent admission 8/2020 for fall and R leg weakness and again in 10/2020 with dyspnea in setting of effusion presenting again with dyspnea.   Now s/p pleuroscopy with pleural biopsy and R-sided chest tube insertion on 10/27

## 2020-10-29 NOTE — PROGRESS NOTE ADULT - PROBLEM SELECTOR PLAN 4
Patient having loose bm every few hours for the past day. no blood in stool, brown color, no abm pain  - will stop senna and monitor for 24 hrs Patient having loose bm every few hours for the past day. no blood in stool, brown color, no abm pain  - stopped senna, monitor

## 2020-10-29 NOTE — PROGRESS NOTE ADULT - SUBJECTIVE AND OBJECTIVE BOX
Physical Medicine and Rehabilitation Progress Note:    Patient is a 87y old  Female who presents with a chief complaint of Pleural effusion (28 Oct 2020 15:23)      HPI:  88yo F, nonsmoker, with PMHx of lung adenocarcinoma (s/p resection/XRT), severe MR, and recent admission 8/2020 for fall and R leg weakness, again 10/2020 for SOB with recurrent R-sided pleural effusions,  who presents to ED for hypoxemia to 80s on 3L NC (her usual O2), as well as productive cough with green sputum and LE edema. On recent admission patient w/ new effusion w/ concern for recurrent malignancy. Patient discharged with outpatient follow up with pulmonologist (Dr. Nguyen) and planned for  R-sided pleuroscopy w/ pleurodesis, but procedure cancelled due to pt "not feeling well". Patient reports increased sputum production, cough and L sided rib pain since discharge. Also c/o new b/l LE edema since yesterday. She denies HA, F/C, N/V, abdominal pain, diarrhea, constipation, dysuria.    ED Course:  T 98.2, H 106, /70, R 40--> 20 , SpO2 99 on NRB--> 100 on 6 L NC  WBC 15.5, Hgb 12.4, Na 146, Alk Phos 128, Lactate 3, BNP 1034, Trop <0.01  EKG: PVC, nsr  XR: No pneumothorax. Slight interval increase in pleural effusions right > left  Interventions: given Ceftazadme 1g IV x1, Vanc 750mg IV x1   (22 Oct 2020 15:08)                            10.2   10.89 )-----------( 441      ( 29 Oct 2020 07:01 )             31.7       10-29    142  |  100  |  21  ----------------------------<  106<H>  4.5   |  33<H>  |  0.76    Ca    8.8      29 Oct 2020 07:01  Phos  3.5     10-29  Mg     2.1     10-29      Vital Signs Last 24 Hrs  T(C): 36.4 (29 Oct 2020 13:00), Max: 36.8 (28 Oct 2020 17:02)  T(F): 97.6 (29 Oct 2020 13:00), Max: 98.2 (28 Oct 2020 17:02)  HR: 86 (29 Oct 2020 08:08) (82 - 108)  BP: 127/73 (29 Oct 2020 08:08) (111/56 - 147/79)  BP(mean): 94 (29 Oct 2020 08:08) (79 - 104)  RR: 16 (29 Oct 2020 04:15) (16 - 16)  SpO2: 98% (29 Oct 2020 08:08) (92% - 98%)    MEDICATIONS  (STANDING):  atorvastatin 20 milliGRAM(s) Oral at bedtime  enoxaparin Injectable 30 milliGRAM(s) SubCutaneous at bedtime  influenza  Vaccine (HIGH DOSE) 0.7 milliLiter(s) IntraMuscular once  lidocaine 1% Injectable 10 milliLiter(s) Local Injection once    MEDICATIONS  (PRN):  acetaminophen   Tablet .. 650 milliGRAM(s) Oral every 6 hours PRN Moderate Pain (4 - 6)  oxycodone    5 mG/acetaminophen 325 mG 2 Tablet(s) Oral every 6 hours PRN Severe Pain (7 - 10)  sodium chloride 0.65% Nasal 1 Spray(s) Both Nostrils every 6 hours PRN Nasal Congestion/Dryness    Currently Undergoing Physical/ Occupational Therapy at bedside.    Functional Status Assessment:   10/28/2002      Cognitive/Perceptual/Neuro  Cognitive/Neuro/Behavioral [WDL Definition: Alert; opens eyes spontaneously; arouses to voice or touch; oriented x 4; follows commands; speech spontaneous, logical; purposeful motor response; behavior appropriate to situation]: WDL    Therapeutic Interventions      Bed Mobility  Bed Mobility Training Sit-to-Supine: minimum assist (75% patient effort);  1 person assist;  nonverbal cues (demo/gestures);  verbal cues  Bed Mobility Training Supine-to-Sit: minimum assist (75% patient effort);  1 person assist;  contact guard;  nonverbal cues (demo/gestures);  verbal cues  Bed Mobility Training Limitations: decreased ability to use arms for pushing/pulling;  decreased ability to use legs for bridging/pushing;  impaired ability to control trunk for mobility;  impaired balance;  decreased strength;  impaired postural control    Sit-Stand Transfer Training  Transfer Training Sit-to-Stand Transfer: minimum assist (75% patient effort);  1 person assist;  nonverbal cues (demo/gestures);  verbal cues;  weight-bearing as tolerated   UE R UE assist   Transfer Training Stand-to-Sit Transfer: minimum assist (75% patient effort);  1 person assist;  nonverbal cues (demo/gestures);  verbal cues;  weight-bearing as tolerated   UE R UE assist   Sit-to-Stand Transfer Training Transfer Safety Analysis: decreased balance;  decreased proprioception;  decreased step length;  decreased strength;  impaired balance;  impaired postural control    Gait Training  Gait Training: minimum assist (75% patient effort);  1 person assist;  nonverbal cues (demo/gestures);  verbal cues;  weight-bearing as tolerated   UE assist ;  4 side steps   Gait Analysis: 2-point gait   increased time in double stance;  decreased hip/knee flexion;  decreased step length;  decreased strength;  impaired balance;  impaired coordination;  pain          PM&R Impression: as above    Current Disposition Plan Recommendations:    subacute rehab placement

## 2020-10-30 LAB
ANION GAP SERPL CALC-SCNC: 10 MMOL/L — SIGNIFICANT CHANGE UP (ref 5–17)
BASOPHILS # BLD AUTO: 0.04 K/UL — SIGNIFICANT CHANGE UP (ref 0–0.2)
BASOPHILS NFR BLD AUTO: 0.3 % — SIGNIFICANT CHANGE UP (ref 0–2)
BUN SERPL-MCNC: 22 MG/DL — SIGNIFICANT CHANGE UP (ref 7–23)
C DIFF GDH STL QL: NEGATIVE — SIGNIFICANT CHANGE UP
C DIFF GDH STL QL: SIGNIFICANT CHANGE UP
CALCIUM SERPL-MCNC: 9.1 MG/DL — SIGNIFICANT CHANGE UP (ref 8.4–10.5)
CHLORIDE SERPL-SCNC: 96 MMOL/L — SIGNIFICANT CHANGE UP (ref 96–108)
CO2 SERPL-SCNC: 37 MMOL/L — HIGH (ref 22–31)
CREAT SERPL-MCNC: 0.78 MG/DL — SIGNIFICANT CHANGE UP (ref 0.5–1.3)
CULTURE RESULTS: SIGNIFICANT CHANGE UP
EOSINOPHIL # BLD AUTO: 0.07 K/UL — SIGNIFICANT CHANGE UP (ref 0–0.5)
EOSINOPHIL NFR BLD AUTO: 0.6 % — SIGNIFICANT CHANGE UP (ref 0–6)
GLUCOSE SERPL-MCNC: 98 MG/DL — SIGNIFICANT CHANGE UP (ref 70–99)
HCT VFR BLD CALC: 35 % — SIGNIFICANT CHANGE UP (ref 34.5–45)
HGB BLD-MCNC: 11 G/DL — LOW (ref 11.5–15.5)
IMM GRANULOCYTES NFR BLD AUTO: 0.4 % — SIGNIFICANT CHANGE UP (ref 0–1.5)
LYMPHOCYTES # BLD AUTO: 0.91 K/UL — LOW (ref 1–3.3)
LYMPHOCYTES # BLD AUTO: 7.4 % — LOW (ref 13–44)
MAGNESIUM SERPL-MCNC: 2 MG/DL — SIGNIFICANT CHANGE UP (ref 1.6–2.6)
MCHC RBC-ENTMCNC: 31.4 GM/DL — LOW (ref 32–36)
MCHC RBC-ENTMCNC: 31.8 PG — SIGNIFICANT CHANGE UP (ref 27–34)
MCV RBC AUTO: 101.2 FL — HIGH (ref 80–100)
MONOCYTES # BLD AUTO: 1.19 K/UL — HIGH (ref 0–0.9)
MONOCYTES NFR BLD AUTO: 9.6 % — SIGNIFICANT CHANGE UP (ref 2–14)
NEUTROPHILS # BLD AUTO: 10.11 K/UL — HIGH (ref 1.8–7.4)
NEUTROPHILS NFR BLD AUTO: 81.7 % — HIGH (ref 43–77)
NRBC # BLD: 0 /100 WBCS — SIGNIFICANT CHANGE UP (ref 0–0)
PHOSPHATE SERPL-MCNC: 3.3 MG/DL — SIGNIFICANT CHANGE UP (ref 2.5–4.5)
PLATELET # BLD AUTO: 438 K/UL — HIGH (ref 150–400)
POTASSIUM SERPL-MCNC: 4.4 MMOL/L — SIGNIFICANT CHANGE UP (ref 3.5–5.3)
POTASSIUM SERPL-SCNC: 4.4 MMOL/L — SIGNIFICANT CHANGE UP (ref 3.5–5.3)
RBC # BLD: 3.46 M/UL — LOW (ref 3.8–5.2)
RBC # FLD: 15.3 % — HIGH (ref 10.3–14.5)
SODIUM SERPL-SCNC: 143 MMOL/L — SIGNIFICANT CHANGE UP (ref 135–145)
SPECIMEN SOURCE: SIGNIFICANT CHANGE UP
SURGICAL PATHOLOGY STUDY: SIGNIFICANT CHANGE UP
WBC # BLD: 12.37 K/UL — HIGH (ref 3.8–10.5)
WBC # FLD AUTO: 12.37 K/UL — HIGH (ref 3.8–10.5)

## 2020-10-30 PROCEDURE — 71045 X-RAY EXAM CHEST 1 VIEW: CPT | Mod: 26

## 2020-10-30 PROCEDURE — 99232 SBSQ HOSP IP/OBS MODERATE 35: CPT | Mod: GC

## 2020-10-30 PROCEDURE — 99233 SBSQ HOSP IP/OBS HIGH 50: CPT | Mod: GC

## 2020-10-30 RX ORDER — FUROSEMIDE 40 MG
20 TABLET ORAL DAILY
Refills: 0 | Status: DISCONTINUED | OUTPATIENT
Start: 2020-10-31 | End: 2020-11-03

## 2020-10-30 RX ORDER — FUROSEMIDE 40 MG
40 TABLET ORAL ONCE
Refills: 0 | Status: COMPLETED | OUTPATIENT
Start: 2020-10-30 | End: 2020-10-30

## 2020-10-30 RX ADMIN — Medication 650 MILLIGRAM(S): at 12:31

## 2020-10-30 RX ADMIN — ENOXAPARIN SODIUM 30 MILLIGRAM(S): 100 INJECTION SUBCUTANEOUS at 21:07

## 2020-10-30 RX ADMIN — Medication 650 MILLIGRAM(S): at 13:10

## 2020-10-30 RX ADMIN — Medication 40 MILLIGRAM(S): at 17:05

## 2020-10-30 RX ADMIN — ATORVASTATIN CALCIUM 20 MILLIGRAM(S): 80 TABLET, FILM COATED ORAL at 21:07

## 2020-10-30 NOTE — PROGRESS NOTE ADULT - PROBLEM SELECTOR PLAN 4
Patient having loose BMs every few hours for the past 1-2 days. No blood in stool, brown color, no abdominal pain.  - stopped senna, continue to monitor for improvement Patient having loose BMs every few hours for the past 1-2 days. No blood in stool, brown color, no abdominal pain.  - stopped senna, continue to monitor for improvement  - f/u C. diff Patient having loose BMs every few hours for the past 1-2 days. No blood in stool, brown color, no abdominal pain.  - stopped senna, continue to monitor for improvement  - f/u C. diff and GI PCR stool

## 2020-10-30 NOTE — PROGRESS NOTE ADULT - ASSESSMENT
86yo F, nonsmoker, with PMHx of lung adenocarcinoma (s/p resection/XRT), severe MR, and recent admission 8/2020 for fall and R leg weakness,presented to ED for hypoxemia to 80s on 3L NC (her usual O2), as well as productive cough with green sputum and LE edema. Admitted for management of sepsis 2/2 to pneumonia and acute hypoxic respiratory failure, now resolved and s/p pleuroscopy with talc pleurodesis, and R chest tube removal.

## 2020-10-30 NOTE — PROGRESS NOTE ADULT - SUBJECTIVE AND OBJECTIVE BOX
INCOMPLETE    O/N Events: RINA    Subjective/ROS: Patient seen and examined at bedside. Says that she is not currently in any pain, does have some pain when she moves. No drainage from where the chest tube was pulled. Notes that she has been urinating frequently after taking the lasix. Says that she had another episode of diarrhea overnight. Breathing well on 3L    Denies Fever/Chills, HA, CP, SOB, n/v, changes in bowel/urinary habits.  12pt ROS otherwise negative.    VITALS  Vital Signs Last 24 Hrs  T(C): 36.9 (30 Oct 2020 05:48), Max: 36.9 (30 Oct 2020 05:48)  T(F): 98.4 (30 Oct 2020 05:48), Max: 98.4 (30 Oct 2020 05:48)  HR: 94 (30 Oct 2020 04:15) (86 - 106)  BP: 137/66 (30 Oct 2020 04:15) (114/57 - 137/66)  BP(mean): 95 (30 Oct 2020 04:15) (80 - 95)  RR: 18 (30 Oct 2020 04:15) (18 - 18)  SpO2: 97% (30 Oct 2020 04:15) (91% - 98%)    CAPILLARY BLOOD GLUCOSE    PHYSICAL EXAM  General: Elderly woman in NAD  Head: NC/AT; MMM; PERRL; EOMI;, poor dentition  Neck: Supple; no JVD  Respiratory: CTAB; no wheezes/rales/rhonchi  Cardiovascular: Regular rhythm/rate; S1/S2+, no murmurs, rubs gallops, tachycardic  Gastrointestinal: Soft; NTND; bowel sounds normal and present  Extremities: WWP; b/l pitting edema  Neurological: A&Ox3, CNII-XII grossly intact; no obvious focal deficits    MEDICATIONS  (STANDING):  atorvastatin 20 milliGRAM(s) Oral at bedtime  enoxaparin Injectable 30 milliGRAM(s) SubCutaneous at bedtime  furosemide    Tablet 40 milliGRAM(s) Oral daily  influenza  Vaccine (HIGH DOSE) 0.7 milliLiter(s) IntraMuscular once  lidocaine 1% Injectable 10 milliLiter(s) Local Injection once    MEDICATIONS  (PRN):  acetaminophen   Tablet .. 650 milliGRAM(s) Oral every 6 hours PRN Moderate Pain (4 - 6)  oxycodone    5 mG/acetaminophen 325 mG 2 Tablet(s) Oral every 6 hours PRN Severe Pain (7 - 10)  sodium chloride 0.65% Nasal 1 Spray(s) Both Nostrils every 6 hours PRN Nasal Congestion/Dryness      Bactrim (Unknown)  Cleocin HCl (Unknown)  Flagyl (Unknown)  Honey (Unknown)  iodine (Anaphylaxis)  IV Contrast (Anaphylaxis)  Levaquin (Other; Rash)  penicillin (Unknown)  Zithromax (Unknown)      LABS                        11.0   12.37 )-----------( 438      ( 30 Oct 2020 07:06 )             35.0     10-29    142  |  100  |  21  ----------------------------<  106<H>  4.5   |  33<H>  |  0.76    Ca    8.8      29 Oct 2020 07:01  Phos  3.5     10-29  Mg     2.1     10-29    IMAGING/EKG/ETC   TRANSFER FROM  TO [facility]  Hospital Course:  88yo F, nonsmoker, with PMHx of lung adenocarcinoma (s/p resection/XRT), severe MR, and recent admission 8/2020 for fall and R leg weakness,presented to ED for hypoxemia to 80s on 3L NC (her usual O2), as well as productive cough with green sputum and LE edema. She was admitted on 10/8 with similar complaints, and during that admission she was found to have w/ new effusion w/ concern for recurrent malignancy. Patient discharged with outpatient follow up with pulmonologist (Dr. Nguyen) and planned for R-sided pleuroscopy w/ pleurodesis, but procedure cancelled at that time due to patient feeling anxious about the procedure. Once admitted, there was concern for pneumonia, so she received CAP coverage; however, antibiotics were discontinued because patient was stable and was not symptomatic. Ultimately, she was seen by Pulmonology for evaluation and management of her pleural effusions, who recommended Pleuroscopy and Pleurodeisis, which she received 10/27. She was transferred from the [facility] to  for post procedure monitoring. She had good output from her chest tube and breathing was stable on 3L NC. Pulmonary team removed the chest tube with no interval change seen on CXR. Started on lasix 40 PO daily per pulm recs.     O/N Events: RINA    Subjective/ROS: Patient seen and examined at bedside. Says that she is not currently in any pain, does have some pain when she moves. No drainage from where the chest tube was pulled. Notes that she has been urinating frequently after taking the lasix. Says that she had another episode of diarrhea overnight. Breathing well on 3L    Denies Fever/Chills, HA, CP, SOB, n/v, changes in bowel/urinary habits.  12pt ROS otherwise negative.    VITALS  Vital Signs Last 24 Hrs  T(C): 36.9 (30 Oct 2020 05:48), Max: 36.9 (30 Oct 2020 05:48)  T(F): 98.4 (30 Oct 2020 05:48), Max: 98.4 (30 Oct 2020 05:48)  HR: 94 (30 Oct 2020 04:15) (86 - 106)  BP: 137/66 (30 Oct 2020 04:15) (114/57 - 137/66)  BP(mean): 95 (30 Oct 2020 04:15) (80 - 95)  RR: 18 (30 Oct 2020 04:15) (18 - 18)  SpO2: 97% (30 Oct 2020 04:15) (91% - 98%)    CAPILLARY BLOOD GLUCOSE    PHYSICAL EXAM  General: Elderly woman in NAD  Head: NC/AT; MMM; PERRL; EOMI;, poor dentition  Neck: Supple; no JVD  Respiratory: CTAB; no wheezes/rales/rhonchi  Cardiovascular: Regular rhythm/rate; S1/S2+, no murmurs, rubs gallops, tachycardic  Gastrointestinal: Soft; NTND; bowel sounds normal and present  Extremities: WWP; b/l pitting edema  Neurological: A&Ox3, CNII-XII grossly intact; no obvious focal deficits    MEDICATIONS  (STANDING):  atorvastatin 20 milliGRAM(s) Oral at bedtime  enoxaparin Injectable 30 milliGRAM(s) SubCutaneous at bedtime  furosemide    Tablet 40 milliGRAM(s) Oral daily  influenza  Vaccine (HIGH DOSE) 0.7 milliLiter(s) IntraMuscular once  lidocaine 1% Injectable 10 milliLiter(s) Local Injection once    MEDICATIONS  (PRN):  acetaminophen   Tablet .. 650 milliGRAM(s) Oral every 6 hours PRN Moderate Pain (4 - 6)  oxycodone    5 mG/acetaminophen 325 mG 2 Tablet(s) Oral every 6 hours PRN Severe Pain (7 - 10)  sodium chloride 0.65% Nasal 1 Spray(s) Both Nostrils every 6 hours PRN Nasal Congestion/Dryness      Bactrim (Unknown)  Cleocin HCl (Unknown)  Flagyl (Unknown)  Honey (Unknown)  iodine (Anaphylaxis)  IV Contrast (Anaphylaxis)  Levaquin (Other; Rash)  penicillin (Unknown)  Zithromax (Unknown)      LABS                        11.0   12.37 )-----------( 438      ( 30 Oct 2020 07:06 )             35.0     10-29    142  |  100  |  21  ----------------------------<  106<H>  4.5   |  33<H>  |  0.76    Ca    8.8      29 Oct 2020 07:01  Phos  3.5     10-29  Mg     2.1     10-29    IMAGING/EKG/ETC

## 2020-10-30 NOTE — PROGRESS NOTE ADULT - PROBLEM SELECTOR PLAN 1
Patient w/ hx of recurrent pleural effusions. On previous admission R-sided thoracentesis, with lymphocytic predominance - cytology negative for malignant cells but malignancy remains the primary differential of her recurrent effusions. CXR w/ slight interval increase in pleural effusions right > left. R-sided pleuroscopy w/ pleurodesis 10/27  - f/u pulm recs: chest tube clamped, repeat CXR this afternoon  - pain control: tylenol and percocet Patient w/ hx of recurrent pleural effusions. On previous admission R-sided thoracentesis, with lymphocytic predominance - cytology negative for malignant cells but malignancy remains the primary differential of her recurrent effusions. CXR w/ slight interval increase in pleural effusions right > left. R-sided pleuroscopy w/ pleurodesis 10/27  - f/u pulm recs: s/p chest tube removal 10/30   - pain control: tylenol and percocet

## 2020-10-30 NOTE — PROGRESS NOTE ADULT - ASSESSMENT
88yo woman and never smoker w/ PMH severe MR, HFpEF, and complicated history of NSCLC of multiple sites dating back to 2013 with parenchymal recurrence and recurrent right pleural effusions. recent admission 8/2020 for fall and R leg weakness and again in 10/2020 with dyspnea in setting of effusion presenting again with dyspnea.   Now s/p pleuroscopy with pleural biopsy and R-sided chest tube insertion on 10/27 with removal 10/29

## 2020-10-30 NOTE — PROGRESS NOTE ADULT - SUBJECTIVE AND OBJECTIVE BOX
OVERNIGHT EVENTS:    SUBJECTIVE / INTERVAL HPI: Patient seen and examined at bedside.     VITAL SIGNS:  Vital Signs Last 24 Hrs  T(C): 36.7 (30 Oct 2020 14:23), Max: 36.9 (30 Oct 2020 05:48)  T(F): 98.1 (30 Oct 2020 14:23), Max: 98.4 (30 Oct 2020 05:48)  HR: 98 (30 Oct 2020 12:50) (94 - 106)  BP: 118/60 (30 Oct 2020 12:50) (114/57 - 137/66)  BP(mean): 83 (30 Oct 2020 12:50) (80 - 95)  RR: 18 (30 Oct 2020 12:50) (18 - 18)  SpO2: 95% (30 Oct 2020 12:50) (91% - 100%)    PHYSICAL EXAM:    General: Well developed, well nourished, no acute distress  HEENT: NC/AT; PERRL, anicteric sclera; MMM  Neck: supple, no JVD  Cardiovascular: +S1/S2, RRR; no murmurs, rubs, gallops  Respiratory: CTAB; no wheezes, rales, or rhonchi  Gastrointestinal: soft, NT/ND, no massess palpated; no rebound tenderness or guarding; +BS  Extremities: warm and well-perfused; no edema, clubbing or cyanosis  Vascular: 2+ radial, DP pulses B/L  Neurological: AAOx3; no focal deficits; KATHY    MEDICATIONS:  MEDICATIONS  (STANDING):  atorvastatin 20 milliGRAM(s) Oral at bedtime  enoxaparin Injectable 30 milliGRAM(s) SubCutaneous at bedtime  furosemide    Tablet 40 milliGRAM(s) Oral daily  influenza  Vaccine (HIGH DOSE) 0.7 milliLiter(s) IntraMuscular once  lidocaine 1% Injectable 10 milliLiter(s) Local Injection once    MEDICATIONS  (PRN):  acetaminophen   Tablet .. 650 milliGRAM(s) Oral every 6 hours PRN Moderate Pain (4 - 6)  oxycodone    5 mG/acetaminophen 325 mG 2 Tablet(s) Oral every 6 hours PRN Severe Pain (7 - 10)  sodium chloride 0.65% Nasal 1 Spray(s) Both Nostrils every 6 hours PRN Nasal Congestion/Dryness      ALLERGIES:  Allergies    Bactrim (Unknown)  Cleocin HCl (Unknown)  Flagyl (Unknown)  Honey (Unknown)  iodine (Anaphylaxis)  IV Contrast (Anaphylaxis)  Levaquin (Other; Rash)  penicillin (Unknown)  Zithromax (Unknown)    Intolerances        LABS:                        11.0   12.37 )-----------( 438      ( 30 Oct 2020 07:06 )             35.0     10-30    143  |  96  |  22  ----------------------------<  98  4.4   |  37<H>  |  0.78    Ca    9.1      30 Oct 2020 07:06  Phos  3.3     10-30  Mg     2.0     10-30          CAPILLARY BLOOD GLUCOSE          RADIOLOGY & ADDITIONAL TESTS: Reviewed.    PLAN:  TRANSFER ACCEPTANCE NOTE  TO     HOSPITAL COURSE:  88yo F, nonsmoker, with PMHx of lung adenocarcinoma (s/p resection/XRT), severe MR, and recent admission 8/2020 for fall and R leg weakness,presented to ED for hypoxemia to 80s on 3L NC (her usual O2), as well as productive cough with green sputum and LE edema. She was admitted on 10/8 with similar complaints, and during that admission she was found to have w/ new effusion w/ concern for recurrent malignancy. Patient discharged with outpatient follow up with pulmonologist (Dr. Nguyen) and planned for R-sided pleuroscopy w/ pleurodesis, but procedure cancelled at that time due to patient feeling anxious about the procedure. Once admitted, there was concern for pneumonia, so she received CAP coverage; however, antibiotics were discontinued because patient was stable and was not symptomatic. Ultimately, she was seen by Pulmonology for evaluation and management of her pleural effusions, who recommended Pleuroscopy and Pleurodeisis, which she received 10/27. She was transferred from the [facility] to  for post procedure monitoring. She had good output from her chest tube and breathing was stable on 3L NC. Pulmonary team removed the chest tube with no interval change seen on CXR. Started on lasix 40 PO daily per pulm recs.     OVERNIGHT EVENTS:    SUBJECTIVE / INTERVAL HPI: Patient seen and examined at bedside.     VITAL SIGNS:  Vital Signs Last 24 Hrs  T(C): 36.7 (30 Oct 2020 14:23), Max: 36.9 (30 Oct 2020 05:48)  T(F): 98.1 (30 Oct 2020 14:23), Max: 98.4 (30 Oct 2020 05:48)  HR: 98 (30 Oct 2020 12:50) (94 - 106)  BP: 118/60 (30 Oct 2020 12:50) (114/57 - 137/66)  BP(mean): 83 (30 Oct 2020 12:50) (80 - 95)  RR: 18 (30 Oct 2020 12:50) (18 - 18)  SpO2: 95% (30 Oct 2020 12:50) (91% - 100%)    PHYSICAL EXAM:    General: Well developed, well nourished, no acute distress  HEENT: NC/AT; PERRL, anicteric sclera; MMM; poor dentition  Neck: supple, no JVD  Cardiovascular: +S1/S2, RRR; no murmurs, rubs, gallops  Respiratory: CTAB; no wheezes, rales, or rhonchi  Gastrointestinal: soft, NT/ND, no masses palpated; no rebound tenderness or guarding; +BS  Extremities: warm and well-perfused; no edema, clubbing or cyanosis  Vascular: 2+ radial, DP pulses B/L  Neurological: AAOx3; no focal deficits; KATHY    MEDICATIONS:  MEDICATIONS  (STANDING):  atorvastatin 20 milliGRAM(s) Oral at bedtime  enoxaparin Injectable 30 milliGRAM(s) SubCutaneous at bedtime  furosemide    Tablet 40 milliGRAM(s) Oral daily  influenza  Vaccine (HIGH DOSE) 0.7 milliLiter(s) IntraMuscular once  lidocaine 1% Injectable 10 milliLiter(s) Local Injection once    MEDICATIONS  (PRN):  acetaminophen   Tablet .. 650 milliGRAM(s) Oral every 6 hours PRN Moderate Pain (4 - 6)  oxycodone    5 mG/acetaminophen 325 mG 2 Tablet(s) Oral every 6 hours PRN Severe Pain (7 - 10)  sodium chloride 0.65% Nasal 1 Spray(s) Both Nostrils every 6 hours PRN Nasal Congestion/Dryness      ALLERGIES:  Allergies    Bactrim (Unknown)  Cleocin HCl (Unknown)  Flagyl (Unknown)  Honey (Unknown)  iodine (Anaphylaxis)  IV Contrast (Anaphylaxis)  Levaquin (Other; Rash)  penicillin (Unknown)  Zithromax (Unknown)    Intolerances        LABS:                        11.0   12.37 )-----------( 438      ( 30 Oct 2020 07:06 )             35.0     10-30    143  |  96  |  22  ----------------------------<  98  4.4   |  37<H>  |  0.78    Ca    9.1      30 Oct 2020 07:06  Phos  3.3     10-30  Mg     2.0     10-30          CAPILLARY BLOOD GLUCOSE          RADIOLOGY & ADDITIONAL TESTS: Reviewed.    PLAN:  TRANSFER ACCEPTANCE NOTE  TO     HOSPITAL COURSE:  88yo F, nonsmoker, with PMHx of lung adenocarcinoma (s/p resection/XRT), severe MR, and recent admission 8/2020 for fall and R leg weakness,presented to ED for hypoxemia to 80s on 3L NC (her usual home O2), as well as productive cough with green sputum and LE edema. She was admitted on 10/8 with similar complaints, and during that admission she was found to have w/ new effusion w/ concern for recurrent malignancy in lung. Patient discharged with outpatient follow up with pulmonologist (Dr. Nguyen) and planned for R-sided pleuroscopy w/ pleurodesis, but procedure cancelled at that time due to patient feeling anxious about the procedure. Once admitted, there was concern for pneumonia, so she received CAP coverage; however, antibiotics were discontinued because patient was stable and was not symptomatic. Ultimately, she was seen by Pulmonology for evaluation and management of her pleural effusions. Pleuroscopy with talc pleurodesis and R chest tube placement was done 10/27. Pleural effusion deemed 2/2 likely recurrent malignancy. She was transferred from the [facility] to  for post-procedure monitoring. She had good output from her chest tube and breathing was stable on 3L NC. Pulmonary team removed the chest tube 10/29 with no interval change seen on CXR. Started on lasix 40 PO daily per pulm recs. Stable for stepdown to [facility].    SUBJECTIVE / INTERVAL HPI: Patient seen and examined at bedside.   Breathing without issue on 3L NC. Denies SOB and cough.   Site of prior chest tube has some mild tenderness on R side. Endorses tenderness if palpating R upper chest as well.  Reports having episodes of loose stool about every 2 hours.  No fevers, chills, CP, or abdominal pain.    VITAL SIGNS:  Vital Signs Last 24 Hrs  T(C): 36.7 (30 Oct 2020 14:23), Max: 36.9 (30 Oct 2020 05:48)  T(F): 98.1 (30 Oct 2020 14:23), Max: 98.4 (30 Oct 2020 05:48)  HR: 98 (30 Oct 2020 12:50) (94 - 106)  BP: 118/60 (30 Oct 2020 12:50) (114/57 - 137/66)  BP(mean): 83 (30 Oct 2020 12:50) (80 - 95)  RR: 18 (30 Oct 2020 12:50) (18 - 18)  SpO2: 95% (30 Oct 2020 12:50) (91% - 100%)    PHYSICAL EXAM:    General: Elderly lady, NAD  HEENT: NC/AT; PERRL, anicteric sclera; MMM; poor dentition  Neck: supple, no JVD  Cardiovascular: +S1/S2, RRR; no murmurs, rubs, gallops  Respiratory: CTAB; mild wheezes present diffusely; no crackles or rhonchi  Gastrointestinal: soft, NT/ND, no masses palpated; no rebound tenderness or guarding; +BS  Extremities: 2+ pitting edema in LEs L > R, and 3+ pitting edema in L foot; mild tenderness on palpation of both LEs; no erythema or lesions noted  Vascular: 2+ radial, DP pulses B/L  Neurological: AAOx3; no focal deficits; KATHY    MEDICATIONS:  MEDICATIONS  (STANDING):  atorvastatin 20 milliGRAM(s) Oral at bedtime  enoxaparin Injectable 30 milliGRAM(s) SubCutaneous at bedtime  furosemide    Tablet 40 milliGRAM(s) Oral daily  influenza  Vaccine (HIGH DOSE) 0.7 milliLiter(s) IntraMuscular once  lidocaine 1% Injectable 10 milliLiter(s) Local Injection once    MEDICATIONS  (PRN):  acetaminophen   Tablet .. 650 milliGRAM(s) Oral every 6 hours PRN Moderate Pain (4 - 6)  oxycodone    5 mG/acetaminophen 325 mG 2 Tablet(s) Oral every 6 hours PRN Severe Pain (7 - 10)  sodium chloride 0.65% Nasal 1 Spray(s) Both Nostrils every 6 hours PRN Nasal Congestion/Dryness      ALLERGIES:  Allergies    Bactrim (Unknown)  Cleocin HCl (Unknown)  Flagyl (Unknown)  Honey (Unknown)  iodine (Anaphylaxis)  IV Contrast (Anaphylaxis)  Levaquin (Other; Rash)  penicillin (Unknown)  Zithromax (Unknown)    Intolerances        LABS:                        11.0   12.37 )-----------( 438      ( 30 Oct 2020 07:06 )             35.0     10-30    143  |  96  |  22  ----------------------------<  98  4.4   |  37<H>  |  0.78    Ca    9.1      30 Oct 2020 07:06  Phos  3.3     10-30  Mg     2.0     10-30          RADIOLOGY & ADDITIONAL TESTS: Reviewed.    PLAN:  TRANSFER ACCEPTANCE NOTE  TO     HOSPITAL COURSE:  88yo F, nonsmoker, with PMHx of lung adenocarcinoma (s/p resection/XRT), severe MR, and recent admission 8/2020 for fall and R leg weakness,presented to ED for hypoxemia to 80s on 3L NC (her usual home O2), as well as productive cough with green sputum and LE edema. She was admitted on 10/8 with similar complaints, and during that admission she was found to have w/ new effusion w/ concern for recurrent malignancy in lung. Patient discharged with outpatient follow up with pulmonologist (Dr. Nguyen) and planned for R-sided pleuroscopy w/ pleurodesis, but procedure cancelled at that time due to patient feeling anxious about the procedure. Once admitted, there was concern for pneumonia, so she received CAP coverage; however, antibiotics were discontinued because patient was stable and was not symptomatic. Ultimately, she was seen by Pulmonology for evaluation and management of her pleural effusions. Pleuroscopy with talc pleurodesis and R chest tube placement was done 10/27. Pleural effusion deemed 2/2 likely recurrent malignancy. She was transferred from the [email or web address] to  for post-procedure monitoring. She had good output from her chest tube and breathing was stable on 3L NC. Pulmonary team removed the chest tube 10/29 with no interval change seen on CXR. Started on lasix 40 PO daily per pulm recs. Stable for stepdown to [facility] [[email or web address]    SUBJECTIVE / INTERVAL HPI: Patient seen and examined at bedside.   Breathing without issue on 3L NC. Denies SOB and cough.   Site of prior chest tube has some mild tenderness on R side. Endorses tenderness if palpating R upper chest as well.  Reports having episodes of loose stool about every 2 hours.  No fevers, chills, CP, or abdominal pain.    VITAL SIGNS:  Vital Signs Last 24 Hrs  T(C): 36.7 (30 Oct 2020 14:23), Max: 36.9 (30 Oct 2020 05:48)  T(F): 98.1 (30 Oct 2020 14:23), Max: 98.4 (30 Oct 2020 05:48)  HR: 98 (30 Oct 2020 12:50) (94 - 106)  BP: 118/60 (30 Oct 2020 12:50) (114/57 - 137/66)  BP(mean): 83 (30 Oct 2020 12:50) (80 - 95)  RR: 18 (30 Oct 2020 12:50) (18 - 18)  SpO2: 95% (30 Oct 2020 12:50) (91% - 100%)    PHYSICAL EXAM:    General: Elderly lady, NAD  HEENT: NC/AT; PERRL, anicteric sclera; MMM; poor dentition  Neck: supple, no JVD  Cardiovascular: +S1/S2, RRR; no murmurs, rubs, gallops  Respiratory: CTAB; mild wheezes present diffusely; no crackles or rhonchi  Gastrointestinal: soft, NT/ND, no masses palpated; no rebound tenderness or guarding; +BS  Extremities: 2+ pitting edema in LEs L > R, and 3+ pitting edema in L foot; mild tenderness on palpation of both LEs; no erythema or lesions noted  Vascular: 2+ radial, DP pulses B/L  Neurological: AAOx3; no focal deficits; KATHY    MEDICATIONS:  MEDICATIONS  (STANDING):  atorvastatin 20 milliGRAM(s) Oral at bedtime  enoxaparin Injectable 30 milliGRAM(s) SubCutaneous at bedtime  furosemide    Tablet 40 milliGRAM(s) Oral daily  influenza  Vaccine (HIGH DOSE) 0.7 milliLiter(s) IntraMuscular once  lidocaine 1% Injectable 10 milliLiter(s) Local Injection once    MEDICATIONS  (PRN):  acetaminophen   Tablet .. 650 milliGRAM(s) Oral every 6 hours PRN Moderate Pain (4 - 6)  oxycodone    5 mG/acetaminophen 325 mG 2 Tablet(s) Oral every 6 hours PRN Severe Pain (7 - 10)  sodium chloride 0.65% Nasal 1 Spray(s) Both Nostrils every 6 hours PRN Nasal Congestion/Dryness      ALLERGIES:  Allergies    Bactrim (Unknown)  Cleocin HCl (Unknown)  Flagyl (Unknown)  Honey (Unknown)  iodine (Anaphylaxis)  IV Contrast (Anaphylaxis)  Levaquin (Other; Rash)  penicillin (Unknown)  Zithromax (Unknown)    Intolerances        LABS:                        11.0   12.37 )-----------( 438      ( 30 Oct 2020 07:06 )             35.0     10-30    143  |  96  |  22  ----------------------------<  98  4.4   |  37<H>  |  0.78    Ca    9.1      30 Oct 2020 07:06  Phos  3.3     10-30  Mg     2.0     10-30          RADIOLOGY & ADDITIONAL TESTS: Reviewed.    PLAN:  TRANSFER ACCEPTANCE NOTE  TO     HOSPITAL COURSE:  86yo F, nonsmoker, with PMHx of lung adenocarcinoma (s/p resection/XRT) and severe MR presented to ED  and was admitted on 10/22 for hypoxemia to 80s on 3L NC (her usual home O2), as well as productive cough with green sputum and LE edema. She had a recent admission on 10/8 with similar complaints, and during that admission she was found to have w/ new effusion w/ concern for recurrent malignancy in lung. Patient discharged with outpatient follow up with pulmonologist (Dr. Nguyen) and planned for R-sided pleuroscopy w/ pleurodesis, but procedure cancelled at that time due to patient feeling anxious about the procedure. Once admitted, there was concern for pneumonia, so she received CAP coverage; however, antibiotics were discontinued because patient was stable and was not symptomatic. Ultimately, she was seen by Pulmonology for evaluation and management of her pleural effusions. Pleuroscopy with talc pleurodesis and R chest tube placement was done 10/27. Pleural effusion deemed 2/2 likely recurrent malignancy. She was transferred from the [facility] to  for post-procedure monitoring. She had good output from her chest tube and breathing was stable on 3L NC. Pulmonary team removed the chest tube 10/29 with no interval change seen on CXR. Started on lasix 40 PO daily per pulm recs. Stable for stepdown to [facility].    SUBJECTIVE / INTERVAL HPI: Patient seen and examined at bedside.   Breathing without issue on 3L NC. Denies SOB and cough.   Site of prior chest tube has some mild tenderness on R side. Endorses tenderness if palpating R upper chest as well.  Reports having episodes of loose stool about every 2 hours.  No fevers, chills, CP, or abdominal pain.    VITAL SIGNS:  Vital Signs Last 24 Hrs  T(C): 36.7 (30 Oct 2020 14:23), Max: 36.9 (30 Oct 2020 05:48)  T(F): 98.1 (30 Oct 2020 14:23), Max: 98.4 (30 Oct 2020 05:48)  HR: 98 (30 Oct 2020 12:50) (94 - 106)  BP: 118/60 (30 Oct 2020 12:50) (114/57 - 137/66)  BP(mean): 83 (30 Oct 2020 12:50) (80 - 95)  RR: 18 (30 Oct 2020 12:50) (18 - 18)  SpO2: 95% (30 Oct 2020 12:50) (91% - 100%)    PHYSICAL EXAM:    General: Elderly lady, NAD  HEENT: NC/AT; PERRL, anicteric sclera; MMM; poor dentition  Neck: supple, no JVD  Cardiovascular: +S1/S2, RRR; no murmurs, rubs, gallops  Respiratory: CTAB; mild wheezes present diffusely; no crackles or rhonchi  Gastrointestinal: soft, NT/ND, no masses palpated; no rebound tenderness or guarding; +BS  Extremities: 2+ pitting edema in LEs L > R, and 3+ pitting edema in L foot; mild tenderness on palpation of both LEs; no erythema or lesions noted  Vascular: 2+ radial, DP pulses B/L  Neurological: AAOx3; no focal deficits; KATHY    MEDICATIONS:  MEDICATIONS  (STANDING):  atorvastatin 20 milliGRAM(s) Oral at bedtime  enoxaparin Injectable 30 milliGRAM(s) SubCutaneous at bedtime  furosemide    Tablet 40 milliGRAM(s) Oral daily  influenza  Vaccine (HIGH DOSE) 0.7 milliLiter(s) IntraMuscular once  lidocaine 1% Injectable 10 milliLiter(s) Local Injection once    MEDICATIONS  (PRN):  acetaminophen   Tablet .. 650 milliGRAM(s) Oral every 6 hours PRN Moderate Pain (4 - 6)  oxycodone    5 mG/acetaminophen 325 mG 2 Tablet(s) Oral every 6 hours PRN Severe Pain (7 - 10)  sodium chloride 0.65% Nasal 1 Spray(s) Both Nostrils every 6 hours PRN Nasal Congestion/Dryness      ALLERGIES:  Allergies    Bactrim (Unknown)  Cleocin HCl (Unknown)  Flagyl (Unknown)  Honey (Unknown)  iodine (Anaphylaxis)  IV Contrast (Anaphylaxis)  Levaquin (Other; Rash)  penicillin (Unknown)  Zithromax (Unknown)    Intolerances        LABS:                        11.0   12.37 )-----------( 438      ( 30 Oct 2020 07:06 )             35.0     10-30    143  |  96  |  22  ----------------------------<  98  4.4   |  37<H>  |  0.78    Ca    9.1      30 Oct 2020 07:06  Phos  3.3     10-30  Mg     2.0     10-30          RADIOLOGY & ADDITIONAL TESTS: Reviewed.    PLAN:

## 2020-10-30 NOTE — PROGRESS NOTE ADULT - ASSESSMENT
86yo F, nonsmoker, with PMHx of lung adenocarcinoma (s/p resection/XRT), severe MR, and recent admission 8/2020 for fall and R leg weakness,presented to ED for hypoxemia to 80s on 3L NC (her usual O2), as well as productive cough with green sputum and LE edema. Admitted to [facility] for management of sepsis 2/2 to pneumonia and acute hypoxic respiratory failure and stepped up to [facility] for observation s/p pleuroscopy and pleurodesis.    88yo F, nonsmoker, with PMHx of lung adenocarcinoma (s/p resection/XRT), severe MR, and recent admission 8/2020 for fall and R leg weakness,presented to ED for hypoxemia to 80s on 3L NC (her usual O2), as well as productive cough with green sputum and LE edema. Admitted to [facility] for management of sepsis 2/2 to pneumonia and acute hypoxic respiratory failure and stepped up to [facility] for observation s/p pleuroscopy and pleurodesis, now stable for step down to [facility]

## 2020-10-30 NOTE — PROGRESS NOTE ADULT - PROBLEM SELECTOR PLAN 5
Patient has moderate mitral regurg, on echo 8/2020. EF >70%. No evidence of heart failure on recent Echo.   - monitor for LE edema  - caution w/ IV fluids  - Strict I/Os Patient has moderate mitral regurg, on echo 8/2020. EF >70%. No evidence of heart failure on recent Echo.   - monitor for increase in LE edema  - caution w/ IV fluids  - Strict I/Os

## 2020-10-30 NOTE — PROGRESS NOTE ADULT - PROBLEM SELECTOR PLAN 4
Patient having loose bm every few hours for the past day. no blood in stool, brown color, no abm pain  - stopped senna, monitor Patient having loose bm every few hours for the past day. no blood in stool, brown color, no abm pain  RESOLVED  - stopped senna, continue to monitor

## 2020-10-30 NOTE — PROGRESS NOTE ADULT - PROBLEM SELECTOR PLAN 10
F: careful IVF given moderate MR   E: Replete for K<4 Mag<2  N: Regular diet  Nutrition diagnosis: Increased nutrient needs r/t increased Kcal/Protein demands AEB: suspected severe PCM in setting of malignancy and NFPE    A: Lovenox 40   Code status: full code  Disposition: [facility] F: careful IVF given moderate MR   E: Replete for K<4 Mag<2  N: Regular diet  Nutrition diagnosis: Increased nutrient needs r/t increased Kcal/Protein demands AEB: suspected severe PCM in setting of malignancy and NFPE. Added pudding BID    A: Lovenox 40   Code status: full code  Disposition: Transfer [facility] to [facility]

## 2020-10-30 NOTE — PROGRESS NOTE ADULT - PROBLEM SELECTOR PLAN 5
Patient has moderate mitral regurg, on echo 8/2020. EF >70%. No evidence of heart failure on recent Echo.   - monitor for increase in LE edema  - caution w/ IVF

## 2020-10-30 NOTE — PROGRESS NOTE ADULT - SUBJECTIVE AND OBJECTIVE BOX
PULMONARY CONSULT SERVICE FOLLOW-UP NOTE  -------------------------------------------------------------------  SUMMARY:    ***    INTERVAL HPI:    No acute overnight events.   Pt seen and examined at bedside this AM.       -------------------------------------------------------------------  MEDICATIONS:    Pulmonary:    Antimicrobials:    Anticoagulants:  enoxaparin Injectable 30 milliGRAM(s) SubCutaneous at bedtime    Cardiac:  furosemide    Tablet 40 milliGRAM(s) Oral daily      Allergies    Bactrim (Unknown)  Cleocin HCl (Unknown)  Flagyl (Unknown)  Honey (Unknown)  iodine (Anaphylaxis)  IV Contrast (Anaphylaxis)  Levaquin (Other; Rash)  penicillin (Unknown)  Zithromax (Unknown)    Intolerances        Vital Signs Last 24 Hrs  T(C): 36.9 (30 Oct 2020 05:48), Max: 36.9 (30 Oct 2020 05:48)  T(F): 98.4 (30 Oct 2020 05:48), Max: 98.4 (30 Oct 2020 05:48)  HR: 94 (30 Oct 2020 04:15) (94 - 106)  BP: 137/66 (30 Oct 2020 04:15) (114/57 - 137/66)  BP(mean): 95 (30 Oct 2020 04:15) (80 - 95)  RR: 18 (30 Oct 2020 04:15) (18 - 18)  SpO2: 97% (30 Oct 2020 04:15) (91% - 97%)    10-29 @ 07:01  -  10-30 @ 07:00  --------------------------------------------------------  IN: 0 mL / OUT: 900 mL / NET: -900 mL          -------------------------------------------------------------------  PHYSICAL EXAM:    GEN: Comfortable, in NAD  HEENT: NC/AT, PEERLA, MMM  CARDIAC: RRR, Normal S1/S2, No MRGs  PULMONARY: CTA BL, no wheezing/rhonchi/rales - no accessory muscle use   ABDOMEN: Soft, NT/ND   EXT: No LE edema  NEURO: A&O x 3, CN II-XII grossly intact, moves all ext, sensation intact    -------------------------------------------------------------------  LABS:        CBC Full  -  ( 30 Oct 2020 07:06 )  WBC Count : 12.37 K/uL  RBC Count : 3.46 M/uL  Hemoglobin : 11.0 g/dL  Hematocrit : 35.0 %  Platelet Count - Automated : 438 K/uL  Mean Cell Volume : 101.2 fl  Mean Cell Hemoglobin : 31.8 pg  Mean Cell Hemoglobin Concentration : 31.4 gm/dL  Auto Neutrophil # : 10.11 K/uL  Auto Lymphocyte # : 0.91 K/uL  Auto Monocyte # : 1.19 K/uL  Auto Eosinophil # : 0.07 K/uL  Auto Basophil # : 0.04 K/uL  Auto Neutrophil % : 81.7 %  Auto Lymphocyte % : 7.4 %  Auto Monocyte % : 9.6 %  Auto Eosinophil % : 0.6 %  Auto Basophil % : 0.3 %    10-30    143  |  96  |  22  ----------------------------<  98  4.4   |  37<H>  |  0.78    Ca    9.1      30 Oct 2020 07:06  Phos  3.3     10-30  Mg     2.0     10-30                        -------------------------------------------------------------------  RADIOLOGY & ADDITIONAL STUDIES:   PULMONARY CONSULT SERVICE FOLLOW-UP NOTE  -------------------------------------------------------------------  INTERVAL HPI:    No acute overnight events.   Pt seen and examined at bedside this AM.     Some improvement in her right-sided chest pain where the chest tube was.   Otherwise, no fevers/chills, HA, dizziness, SOB, cough, abd pain, N/V, bowel changes, ext swelling       -------------------------------------------------------------------  MEDICATIONS:    Pulmonary:    Antimicrobials:    Anticoagulants:  enoxaparin Injectable 30 milliGRAM(s) SubCutaneous at bedtime    Cardiac:  furosemide    Tablet 40 milliGRAM(s) Oral daily      Allergies    Bactrim (Unknown)  Cleocin HCl (Unknown)  Flagyl (Unknown)  Honey (Unknown)  iodine (Anaphylaxis)  IV Contrast (Anaphylaxis)  Levaquin (Other; Rash)  penicillin (Unknown)  Zithromax (Unknown)    Intolerances        Vital Signs Last 24 Hrs  T(C): 36.9 (30 Oct 2020 05:48), Max: 36.9 (30 Oct 2020 05:48)  T(F): 98.4 (30 Oct 2020 05:48), Max: 98.4 (30 Oct 2020 05:48)  HR: 94 (30 Oct 2020 04:15) (94 - 106)  BP: 137/66 (30 Oct 2020 04:15) (114/57 - 137/66)  BP(mean): 95 (30 Oct 2020 04:15) (80 - 95)  RR: 18 (30 Oct 2020 04:15) (18 - 18)  SpO2: 97% (30 Oct 2020 04:15) (91% - 97%)    10-29 @ 07:01  -  10-30 @ 07:00  --------------------------------------------------------  IN: 0 mL / OUT: 900 mL / NET: -900 mL          -------------------------------------------------------------------  PHYSICAL EXAM:    Constitutional: thin/cachectic appearing  HEENT: NC/AT; PERRL, anicteric sclera; MMM  Neck: supple  Cardiovascular: +S1/S2, Tachycardic  Respiratory: CTA B/L; no W/R/R  Gastrointestinal: soft, NT/ND  Extremities: WWP; no edema, clubbing or cyanosis  Vascular: 2+ radial pulses B/L  Neurological: awake and alert; LIPSCOMB    -------------------------------------------------------------------  LABS:        CBC Full  -  ( 30 Oct 2020 07:06 )  WBC Count : 12.37 K/uL  RBC Count : 3.46 M/uL  Hemoglobin : 11.0 g/dL  Hematocrit : 35.0 %  Platelet Count - Automated : 438 K/uL  Mean Cell Volume : 101.2 fl  Mean Cell Hemoglobin : 31.8 pg  Mean Cell Hemoglobin Concentration : 31.4 gm/dL  Auto Neutrophil # : 10.11 K/uL  Auto Lymphocyte # : 0.91 K/uL  Auto Monocyte # : 1.19 K/uL  Auto Eosinophil # : 0.07 K/uL  Auto Basophil # : 0.04 K/uL  Auto Neutrophil % : 81.7 %  Auto Lymphocyte % : 7.4 %  Auto Monocyte % : 9.6 %  Auto Eosinophil % : 0.6 %  Auto Basophil % : 0.3 %    10-30    143  |  96  |  22  ----------------------------<  98  4.4   |  37<H>  |  0.78    Ca    9.1      30 Oct 2020 07:06  Phos  3.3     10-30  Mg     2.0     10-30                        -------------------------------------------------------------------  RADIOLOGY & ADDITIONAL STUDIES:

## 2020-10-30 NOTE — PROGRESS NOTE ADULT - PROBLEM SELECTOR PLAN 3
History of adenocarcinoma of RLL s/p VATS resection in 2013, ANANYA XRT in 2016, and RMF lesion s/p XRT in 2017  - recent PET showing 2 new FDG-avid subcentimeter nodules in L subpleural region  - f/u pulm recs  - will need outpatient f/u w/ Dr. Beal on discharge History of adenocarcinoma of RLL s/p VATS resection in 2013, ANANYA XRT in 2016, and RMF lesion s/p XRT in 2017  - recent PET showing 2 new FDG-avid subcentimeter nodules in L subpleural region  - persisting leukocytosis most likely 2/2 recurrent malignancy as no other signs of active infection at this time  - f/u pulm recs  - will need outpatient f/u w/ Dr. Beal on discharge

## 2020-10-30 NOTE — PROGRESS NOTE ADULT - PROBLEM SELECTOR PLAN 2
On admission found hypoxemic to 80s on 3L NC (her usual O2). Initially tachypneic and CXR w/ slight interval increase in pleural effusions right > left. Hypoxemia likely 2/2 to pleural effusions. Currently satting well on home 3L NC. - RESOLVED.  - s/p pleuroscopy and pleurodesis  - management as above

## 2020-10-30 NOTE — PROGRESS NOTE ADULT - PROBLEM SELECTOR PLAN 1
Patient w/ recurrent right sided pleural effusion, s/p thoracentesis in OP on 8/11 and 10/11 w/ lymphocyte predominant fluid and negative for malignant cells.    However, her effusion remains highly suspicious for malignancy given her complicated cancer history.     - s/p R-pleuroscopy and pleurodesis w/ chest-tube placement on 10/27  - s/p R-chest tube removal 10/29 PM   - Cytology from 10/27 again negative for malignant cells   - Hold off on NSAIDs for now    - Pain control to avoid splinting  - Incentive spirometry, PT/OT, out of bed to chair Patient w/ recurrent right sided pleural effusion, s/p thoracentesis in OP on 8/11 and 10/11 w/ lymphocyte predominant fluid and negative for malignant cells.    However, her effusion remains highly suspicious for malignancy given her complicated cancer history.     - s/p R-pleuroscopy and pleurodesis w/ chest-tube placement on 10/27  - s/p R-chest tube removal 10/29 PM   - Cytology from 10/27 again negative for malignant cells   - Consider reducing Lasix dose  - Hold off on NSAIDs for now    - Pain control to avoid splinting  - Incentive spirometry, PT/OT, out of bed to chair

## 2020-10-30 NOTE — PROGRESS NOTE ADULT - PROBLEM SELECTOR PLAN 1
Patient w/ hx of recurrent pleural effusions. On previous admission R-sided thoracentesis, with lymphocytic predominance - cytology negative for malignant cells but malignancy remains the primary differential of her recurrent effusions. CXR w/ slight interval increase in pleural effusions right > left. R-sided pleuroscopy w/ pleurodesis 10/27. S/p chest tube removal 10/29.  - f/u pulm recs: can decrease lasix to 20mg QD starting tomorrow  - pain control: tylenol and percocet

## 2020-10-30 NOTE — PROGRESS NOTE ADULT - ATTENDING COMMENTS
s/p chest tube removal f/u pulm recs re: need for repeat CXR   diarrhea: "like liquid" every 2 hours; per patient, has been having diarrhea "continuously" ,  - f/u C diff, GI PCR    Rest of history and plan per Dr. Locke's excellent note above Family history, social history, past medical history, and home medications from ICU H&P were reviewed and are unchanged except as noted above      s/p chest tube removal f/u pulm recs re: need for repeat CXR   diarrhea: "like liquid" every 2 hours; per patient, has been having diarrhea "continuously" ,  - f/u C diff, GI PCR    Rest of history and plan per Dr. Locke's excellent note above

## 2020-10-30 NOTE — PROGRESS NOTE ADULT - PROBLEM SELECTOR PLAN 9
F: none  E: Replete for K<4 Mag<2  N: Regular diet  Nutrition diagnosis: Increased nutrient needs r/t increased Kcal/Protein demands AEB: suspected severe PCM in setting of malignancy and NFPE. Added pudding BID  A: Lovenox 40mg subQ QD     FULL CODE  Disposition: F

## 2020-10-31 LAB
ANION GAP SERPL CALC-SCNC: 9 MMOL/L — SIGNIFICANT CHANGE UP (ref 5–17)
BUN SERPL-MCNC: 19 MG/DL — SIGNIFICANT CHANGE UP (ref 7–23)
CALCIUM SERPL-MCNC: 8.9 MG/DL — SIGNIFICANT CHANGE UP (ref 8.4–10.5)
CHLORIDE SERPL-SCNC: 93 MMOL/L — LOW (ref 96–108)
CO2 SERPL-SCNC: 36 MMOL/L — HIGH (ref 22–31)
CREAT SERPL-MCNC: 0.73 MG/DL — SIGNIFICANT CHANGE UP (ref 0.5–1.3)
GLUCOSE SERPL-MCNC: 108 MG/DL — HIGH (ref 70–99)
HCT VFR BLD CALC: 38.1 % — SIGNIFICANT CHANGE UP (ref 34.5–45)
HGB BLD-MCNC: 11.9 G/DL — SIGNIFICANT CHANGE UP (ref 11.5–15.5)
MAGNESIUM SERPL-MCNC: 2.1 MG/DL — SIGNIFICANT CHANGE UP (ref 1.6–2.6)
MCHC RBC-ENTMCNC: 31.2 GM/DL — LOW (ref 32–36)
MCHC RBC-ENTMCNC: 31.9 PG — SIGNIFICANT CHANGE UP (ref 27–34)
MCV RBC AUTO: 102.1 FL — HIGH (ref 80–100)
NRBC # BLD: 0 /100 WBCS — SIGNIFICANT CHANGE UP (ref 0–0)
PHOSPHATE SERPL-MCNC: 3.3 MG/DL — SIGNIFICANT CHANGE UP (ref 2.5–4.5)
PLATELET # BLD AUTO: 444 K/UL — HIGH (ref 150–400)
POTASSIUM SERPL-MCNC: 4.2 MMOL/L — SIGNIFICANT CHANGE UP (ref 3.5–5.3)
POTASSIUM SERPL-SCNC: 4.2 MMOL/L — SIGNIFICANT CHANGE UP (ref 3.5–5.3)
RBC # BLD: 3.73 M/UL — LOW (ref 3.8–5.2)
RBC # FLD: 15.3 % — HIGH (ref 10.3–14.5)
SODIUM SERPL-SCNC: 138 MMOL/L — SIGNIFICANT CHANGE UP (ref 135–145)
WBC # BLD: 11.46 K/UL — HIGH (ref 3.8–10.5)
WBC # FLD AUTO: 11.46 K/UL — HIGH (ref 3.8–10.5)

## 2020-10-31 PROCEDURE — 99233 SBSQ HOSP IP/OBS HIGH 50: CPT | Mod: GC

## 2020-10-31 RX ADMIN — Medication 650 MILLIGRAM(S): at 15:20

## 2020-10-31 RX ADMIN — ATORVASTATIN CALCIUM 20 MILLIGRAM(S): 80 TABLET, FILM COATED ORAL at 22:00

## 2020-10-31 RX ADMIN — Medication 650 MILLIGRAM(S): at 15:50

## 2020-10-31 RX ADMIN — ENOXAPARIN SODIUM 30 MILLIGRAM(S): 100 INJECTION SUBCUTANEOUS at 22:00

## 2020-10-31 NOTE — PROGRESS NOTE ADULT - SUBJECTIVE AND OBJECTIVE BOX
OVERNIGHT EVENTS:    SUBJECTIVE / INTERVAL HPI: Patient seen and examined at bedside.     VITAL SIGNS:  Vital Signs Last 24 Hrs  T(C): 36.6 (31 Oct 2020 09:00), Max: 36.7 (30 Oct 2020 14:23)  T(F): 97.8 (31 Oct 2020 09:00), Max: 98.1 (30 Oct 2020 14:23)  HR: 103 (31 Oct 2020 09:00) (93 - 103)  BP: 113/69 (31 Oct 2020 09:00) (112/56 - 146/76)  BP(mean): 83 (30 Oct 2020 12:50) (83 - 83)  RR: 18 (31 Oct 2020 09:00) (16 - 18)  SpO2: 96% (31 Oct 2020 09:00) (94% - 100%)      PHYSICAL EXAM:    General: WDWN  HEENT: NC/AT; PERRL, anicteric sclera; MMM  Neck: supple  Cardiovascular: +S1/S2; RRR  Respiratory: CTA B/L; no W/R/R  Gastrointestinal: soft, NT/ND; +BSx4  Extremities: WWP; no edema, clubbing or cyanosis  Vascular: 2+ radial, DP/PT pulses B/L  Neurological: AAOx3; no focal deficits    MEDICATIONS:  MEDICATIONS  (STANDING):  atorvastatin 20 milliGRAM(s) Oral at bedtime  enoxaparin Injectable 30 milliGRAM(s) SubCutaneous at bedtime  furosemide    Tablet 20 milliGRAM(s) Oral daily  influenza  Vaccine (HIGH DOSE) 0.7 milliLiter(s) IntraMuscular once  lidocaine 1% Injectable 10 milliLiter(s) Local Injection once    MEDICATIONS  (PRN):  acetaminophen   Tablet .. 650 milliGRAM(s) Oral every 6 hours PRN Moderate Pain (4 - 6)  oxycodone    5 mG/acetaminophen 325 mG 2 Tablet(s) Oral every 6 hours PRN Severe Pain (7 - 10)  sodium chloride 0.65% Nasal 1 Spray(s) Both Nostrils every 6 hours PRN Nasal Congestion/Dryness      ALLERGIES:  Allergies    Bactrim (Unknown)  Cleocin HCl (Unknown)  Flagyl (Unknown)  Honey (Unknown)  iodine (Anaphylaxis)  IV Contrast (Anaphylaxis)  Levaquin (Other; Rash)  penicillin (Unknown)  Zithromax (Unknown)    Intolerances        LABS:                        11.9   11.46 )-----------( 444      ( 31 Oct 2020 06:41 )             38.1     10-31    138  |  93<L>  |  19  ----------------------------<  108<H>  4.2   |  36<H>  |  0.73    Ca    8.9      31 Oct 2020 06:41  Phos  3.3     10-31  Mg     2.1     10-31          CAPILLARY BLOOD GLUCOSE          GI PCR Panel, Stool (collected 10-30-20 @ 21:58)  Source: .Stool Feces  Final Report (10-30-20 @ 23:23):    GI PCR Results: NOT detected    *******Please Note:*******    GI panel PCR evaluates for:    Campylobacter, Plesiomonas shigelloides, Salmonella,    Vibrio, Yersinia enterocolitica, Enteroaggregative    Escherichia coli (EAEC), Enteropathogenic E.coli (EPEC),    Enterotoxigenic E. coli (ETEC) lt/st, Shiga-like    toxin-producing E. coli (STEC) stx1/stx2,    Shigella/ Enteroinvasive E. coli (EIEC), Cryptosporidium,    Cyclospora cayetanensis, Entamoeba histolytica,    Giardia lamblia, Adenovirus F 40/41, Astrovirus,    Norovirus GI/GII, Rotavirus A, Sapovirus        RADIOLOGY & ADDITIONAL TESTS: Reviewed.   OVERNIGHT EVENTS: No acute events.    SUBJECTIVE / INTERVAL HPI: Patient seen and examined at bedside. C/o right sided rib pain. Endorses continued SOB but in NAD. Continues to have soft stool. Denies HA, F/C, palpitations, N/V, abdominal pain, diarrhea, constipation, dysuria, LE swelling. ROS otherwise negative.     VITAL SIGNS:  Vital Signs Last 24 Hrs  T(C): 36.6 (31 Oct 2020 09:00), Max: 36.7 (30 Oct 2020 14:23)  T(F): 97.8 (31 Oct 2020 09:00), Max: 98.1 (30 Oct 2020 14:23)  HR: 103 (31 Oct 2020 09:00) (93 - 103)  BP: 113/69 (31 Oct 2020 09:00) (112/56 - 146/76)  BP(mean): 83 (30 Oct 2020 12:50) (83 - 83)  RR: 18 (31 Oct 2020 09:00) (16 - 18)  SpO2: 96% (31 Oct 2020 09:00) (94% - 100%)      General: Elderly lady, NAD  HEENT: NC/AT; PERRL, anicteric sclera; MMM; poor dentition  Neck: supple, no JVD  Cardiovascular: +S1/S2, RRR; no murmurs, rubs, gallops  Respiratory: CTAB; mild wheezes present diffusely; no crackles or rhonchi  Gastrointestinal: soft, NT/ND, no masses palpated; no rebound tenderness or guarding; +BS  Extremities: 2+ pitting edema in LEs L > R, and 3+ pitting edema in L foot; mild tenderness on palpation of both LEs; no erythema or lesions noted  Vascular: 2+ radial, DP pulses B/L  Neurological: AAOx3; no focal deficits; KATHY    MEDICATIONS:  MEDICATIONS  (STANDING):  atorvastatin 20 milliGRAM(s) Oral at bedtime  enoxaparin Injectable 30 milliGRAM(s) SubCutaneous at bedtime  furosemide    Tablet 20 milliGRAM(s) Oral daily  influenza  Vaccine (HIGH DOSE) 0.7 milliLiter(s) IntraMuscular once  lidocaine 1% Injectable 10 milliLiter(s) Local Injection once    MEDICATIONS  (PRN):  acetaminophen   Tablet .. 650 milliGRAM(s) Oral every 6 hours PRN Moderate Pain (4 - 6)  oxycodone    5 mG/acetaminophen 325 mG 2 Tablet(s) Oral every 6 hours PRN Severe Pain (7 - 10)  sodium chloride 0.65% Nasal 1 Spray(s) Both Nostrils every 6 hours PRN Nasal Congestion/Dryness      ALLERGIES:  Allergies    Bactrim (Unknown)  Cleocin HCl (Unknown)  Flagyl (Unknown)  Honey (Unknown)  iodine (Anaphylaxis)  IV Contrast (Anaphylaxis)  Levaquin (Other; Rash)  penicillin (Unknown)  Zithromax (Unknown)    Intolerances        LABS:                        11.9   11.46 )-----------( 444      ( 31 Oct 2020 06:41 )             38.1     10-31    138  |  93<L>  |  19  ----------------------------<  108<H>  4.2   |  36<H>  |  0.73    Ca    8.9      31 Oct 2020 06:41  Phos  3.3     10-31  Mg     2.1     10-31          CAPILLARY BLOOD GLUCOSE          GI PCR Panel, Stool (collected 10-30-20 @ 21:58)  Source: .Stool Feces  Final Report (10-30-20 @ 23:23):    GI PCR Results: NOT detected    *******Please Note:*******    GI panel PCR evaluates for:    Campylobacter, Plesiomonas shigelloides, Salmonella,    Vibrio, Yersinia enterocolitica, Enteroaggregative    Escherichia coli (EAEC), Enteropathogenic E.coli (EPEC),    Enterotoxigenic E. coli (ETEC) lt/st, Shiga-like    toxin-producing E. coli (STEC) stx1/stx2,    Shigella/ Enteroinvasive E. coli (EIEC), Cryptosporidium,    Cyclospora cayetanensis, Entamoeba histolytica,    Giardia lamblia, Adenovirus F 40/41, Astrovirus,    Norovirus GI/GII, Rotavirus A, Sapovirus        RADIOLOGY & ADDITIONAL TESTS: Reviewed.

## 2020-10-31 NOTE — PROGRESS NOTE ADULT - PROBLEM SELECTOR PLAN 4
Patient having soft stools x days. No blood in stool, brown color, no abdominal pain.  - stopped senna, continue to monitor for improvement  - C. diff and GI PCR stool negative

## 2020-10-31 NOTE — PROGRESS NOTE ADULT - PROBLEM SELECTOR PLAN 8
Patient initially met criteria for severe sepsis w/ leukocytosis, tachycardia, tachypnea and elevated lactate. S/p Ceftazadme 1g IV x1, Vanc 750mg IV x1 in ED. No fluids given due to moderate MR. Now, patient no longer meeting sepsis criteria, as lactate is resolved, and patient is no longer tachypneic or tachycardic, and has been afebrile. ProCal negtive.  -BCx NGTD, procal negative, mrsa negative.   -s/p cefepime and ceftazidime; stopped treatment as pt was stable and asymptomatic  - as per pulm recs, continuing monitoring to see if becomes febrile/tachypneic again without antibiotics

## 2020-10-31 NOTE — PROGRESS NOTE ADULT - ATTENDING COMMENTS
Chest tube removed, CXR with small apical PTX however improving, will f/u Pulm recs re further eval if needed   Diarrhea: C diff and GI PCR negative, improving per patient   Dispo: PT eval, likely for Home with home service

## 2020-11-01 LAB
ANION GAP SERPL CALC-SCNC: 6 MMOL/L — SIGNIFICANT CHANGE UP (ref 5–17)
BUN SERPL-MCNC: 18 MG/DL — SIGNIFICANT CHANGE UP (ref 7–23)
CALCIUM SERPL-MCNC: 9.1 MG/DL — SIGNIFICANT CHANGE UP (ref 8.4–10.5)
CHLORIDE SERPL-SCNC: 94 MMOL/L — LOW (ref 96–108)
CO2 SERPL-SCNC: 38 MMOL/L — HIGH (ref 22–31)
CREAT SERPL-MCNC: 0.71 MG/DL — SIGNIFICANT CHANGE UP (ref 0.5–1.3)
GLUCOSE SERPL-MCNC: 90 MG/DL — SIGNIFICANT CHANGE UP (ref 70–99)
HCT VFR BLD CALC: 35.6 % — SIGNIFICANT CHANGE UP (ref 34.5–45)
HGB BLD-MCNC: 11.1 G/DL — LOW (ref 11.5–15.5)
MAGNESIUM SERPL-MCNC: 2.1 MG/DL — SIGNIFICANT CHANGE UP (ref 1.6–2.6)
MCHC RBC-ENTMCNC: 31.2 GM/DL — LOW (ref 32–36)
MCHC RBC-ENTMCNC: 31.6 PG — SIGNIFICANT CHANGE UP (ref 27–34)
MCV RBC AUTO: 101.4 FL — HIGH (ref 80–100)
NRBC # BLD: 0 /100 WBCS — SIGNIFICANT CHANGE UP (ref 0–0)
PHOSPHATE SERPL-MCNC: 3 MG/DL — SIGNIFICANT CHANGE UP (ref 2.5–4.5)
PLATELET # BLD AUTO: 451 K/UL — HIGH (ref 150–400)
POTASSIUM SERPL-MCNC: 5.7 MMOL/L — HIGH (ref 3.5–5.3)
POTASSIUM SERPL-SCNC: 5.7 MMOL/L — HIGH (ref 3.5–5.3)
RBC # BLD: 3.51 M/UL — LOW (ref 3.8–5.2)
RBC # FLD: 15.2 % — HIGH (ref 10.3–14.5)
SODIUM SERPL-SCNC: 138 MMOL/L — SIGNIFICANT CHANGE UP (ref 135–145)
WBC # BLD: 13.72 K/UL — HIGH (ref 3.8–10.5)
WBC # FLD AUTO: 13.72 K/UL — HIGH (ref 3.8–10.5)

## 2020-11-01 PROCEDURE — 99233 SBSQ HOSP IP/OBS HIGH 50: CPT | Mod: GC

## 2020-11-01 RX ORDER — DEXTROSE 50 % IN WATER 50 %
50 SYRINGE (ML) INTRAVENOUS ONCE
Refills: 0 | Status: COMPLETED | OUTPATIENT
Start: 2020-11-01 | End: 2020-11-01

## 2020-11-01 RX ORDER — INSULIN HUMAN 100 [IU]/ML
10 INJECTION, SOLUTION SUBCUTANEOUS ONCE
Refills: 0 | Status: COMPLETED | OUTPATIENT
Start: 2020-11-01 | End: 2020-11-01

## 2020-11-01 RX ADMIN — Medication 20 MILLIGRAM(S): at 06:56

## 2020-11-01 RX ADMIN — ATORVASTATIN CALCIUM 20 MILLIGRAM(S): 80 TABLET, FILM COATED ORAL at 22:20

## 2020-11-01 RX ADMIN — INFLUENZA VIRUS VACCINE 0.7 MILLILITER(S): 15; 15; 15; 15 SUSPENSION INTRAMUSCULAR at 12:29

## 2020-11-01 RX ADMIN — ENOXAPARIN SODIUM 30 MILLIGRAM(S): 100 INJECTION SUBCUTANEOUS at 22:21

## 2020-11-01 RX ADMIN — Medication 50 MILLILITER(S): at 12:18

## 2020-11-01 RX ADMIN — INSULIN HUMAN 10 UNIT(S): 100 INJECTION, SOLUTION SUBCUTANEOUS at 12:18

## 2020-11-01 NOTE — PROGRESS NOTE ADULT - ASSESSMENT
88yo F, nonsmoker, with PMHx of lung adenocarcinoma (s/p resection/XRT), severe MR, and recent admission 8/2020 for fall and R leg weakness,presented to ED for hypoxemia to 80s on 3L NC (her usual O2), Admitted for management of sepsis 2/2 to pneumonia and acute hypoxic respiratory failure, now resolved and s/p pleuroscopy with talc pleurodesis, and R chest tube removal.

## 2020-11-01 NOTE — PROGRESS NOTE ADULT - PROBLEM SELECTOR PLAN 2
Hypoxia likely 2/2 to pleural effusions. Currently satting well on home 3L NC. - RESOLVED.  - s/p pleuroscopy and pleurodesis

## 2020-11-01 NOTE — PROGRESS NOTE ADULT - PROBLEM SELECTOR PLAN 3
History of adenocarcinoma of RLL s/p VATS resection in 2013, ANANYA XRT in 2016, and RMF lesion s/p XRT in 2017  - recent PET showing 2 new FDG-avid subcentimeter nodules in L subpleural region  - persisting leukocytosis most likely 2/2 recurrent malignancy as no other signs of active infection at this time  - f/u pulm recs  - will need outpatient f/u w/ Dr. Beal on discharge

## 2020-11-01 NOTE — PROGRESS NOTE ADULT - PROBLEM SELECTOR PLAN 1
S/P R-sided pleuroscopy w/ pleurodesis 10/27. S/p chest tube removal 10/29.  Pulm following, cw lasix 20 QD  Continue with pain control   Will need outpt pulm following

## 2020-11-01 NOTE — PROGRESS NOTE ADULT - SUBJECTIVE AND OBJECTIVE BOX
LAZARO GRANT  87y  Female      Patient is a 87y old  Female who presents with a chief complaint of pleural effusion (31 Oct 2020 11:41)  Still with SOB and rib pain, however stable from yesterday. Has not moved from bed much.       PAST MEDICAL/SURGICAL HISTORY  PAST MEDICAL & SURGICAL HISTORY:  Malignant neoplasm of bronchus and lung, unspecified site  Lung cancer    History of surgery to major organs, presenting hazards to health  removal of R-sided adenocarcinoma, negative lymphnodes        REVIEW OF SYSTEMS:  CONSTITUTIONAL: No fever, weight loss, or fatigue  EYES: No eye pain, visual disturbances, or discharge  ENMT:  No difficulty hearing, tinnitus, vertigo; No sinus or throat pain  NECK: No pain or stiffness  BREASTS: No pain, masses, or nipple discharge  RESPIRATORY: No cough, wheezing, chills or hemoptysis; +shortness of breath  CARDIOVASCULAR: No chest pain, palpitations, dizziness, or leg swelling  GASTROINTESTINAL: No abdominal or epigastric pain. No nausea, vomiting, or hematemesis; No diarrhea or constipation. No melena or hematochezia.  GENITOURINARY: No dysuria, frequency, hematuria, or incontinence  NEUROLOGICAL: No headaches, memory loss, loss of strength, numbness, or tremors  SKIN: No itching, burning, rashes, or lesions   LYMPH NODES: No enlarged glands  ENDOCRINE: No heat or cold intolerance; No hair loss  MUSCULOSKELETAL: No joint pain or swelling; No muscle, back, or extremity pain  PSYCHIATRIC: No depression, anxiety, mood swings, or difficulty sleeping  HEME/LYMPH: No easy bruising, or bleeding gums  ALLERY AND IMMUNOLOGIC: No hives or eczema    T(C): 36.2 (11-01-20 @ 08:22), Max: 36.8 (10-31-20 @ 17:23)  HR: 92 (11-01-20 @ 08:22) (91 - 105)  BP: 121/73 (11-01-20 @ 08:22) (104/62 - 127/77)  RR: 18 (11-01-20 @ 08:22) (18 - 20)  SpO2: 99% (11-01-20 @ 08:22) (95% - 100%)  Wt(kg): --Vital Signs Last 24 Hrs  T(C): 36.2 (01 Nov 2020 08:22), Max: 36.8 (31 Oct 2020 17:23)  T(F): 97.2 (01 Nov 2020 08:22), Max: 98.2 (31 Oct 2020 17:23)  HR: 92 (01 Nov 2020 08:22) (91 - 105)  BP: 121/73 (01 Nov 2020 08:22) (104/62 - 127/77)  BP(mean): --  RR: 18 (01 Nov 2020 08:22) (18 - 20)  SpO2: 99% (01 Nov 2020 08:22) (95% - 100%)    PHYSICAL EXAM:  General: Elderly lady, NAD  HEENT: NC/AT; PERRL, anicteric sclera; MMM; poor dentition  Neck: supple, no JVD  Cardiovascular: +S1/S2, RRR; no murmurs, rubs, gallops  Respiratory: CTAB; diffuse mild wheezes present; no crackles or rhonchi  Gastrointestinal: soft, NT/ND, no masses palpated; no rebound tenderness or guarding; +BS  Extremities: b/l pitting edema with slight tenderness to palpation   Vascular: 2+ radial, DP pulses B/L  Neurological: AAOx3; no focal deficits, moves all four extremities     Consultant(s) Notes Reviewed:  [x ] YES  [ ] NO  Care Discussed with Consultants/Other Providers [ x] YES  [ ] NO    LABS:  CBC   11-01-20 @ 08:57  Hematcorit 35.6  Hemoglobin 11.1  Mean Cell Hemoglobin 31.6  Platelet Count-Automated 451  RBC Count 3.51  Red Cell Distrib Width 15.2  Wbc Count 13.72      BMP  11-01-20 @ 08:57  Anion Gap. Serum 6  Blood Urea Nitrogen,Serm 18  Calcium, Total Serum 9.1  Carbon Dioxide, Serum 38  Chloride, Serum 94  Creatinine, Serum 0.71  eGFR in  89  eGFR in Non Afican American 77  Gloucose, serum 90  Potassium, Serum 5.7  Sodium, Serum 138              10-31-20 @ 06:41  Anion Gap. Serum 9  Blood Urea Nitrogen,Serm 19  Calcium, Total Serum 8.9  Carbon Dioxide, Serum 36  Chloride, Serum 93  Creatinine, Serum 0.73  eGFR in  86  eGFR in Non Afican American 74  Gloucose, serum 108  Potassium, Serum 4.2  Sodium, Serum 138              10-30-20 @ 07:06  Anion Gap. Serum 10  Blood Urea Nitrogen,Serm 22  Calcium, Total Serum 9.1  Carbon Dioxide, Serum 37  Chloride, Serum 96  Creatinine, Serum 0.78  eGFR in  79  eGFR in Non Afican American 68  Gloucose, serum 98  Potassium, Serum 4.4  Sodium, Serum 143                  CMP  11-01-20 @ 08:57  Kellen Aminotransferase(ALT/SGPT)--  Albumin, Serum --  Alkaline Phosphatase, Serum --  Anion Gap, Serum 6  Aspartate Aminotransferase (AST/SGOT)--  Bilirubin Total, Serum --  Blood Urea Nitrogen, Serum 18  Calcium,Total Serum 9.1  Carbon Dioxide, Serum 38  Chloride, Serum 94  Creatinine, Serum 0.71  eGFR if  89  eGFR if Non African American 77  Glucose, Serum 90  Potassium, Serum 5.7  Protein Total, Serum --  Sodium, Serum 138                      10-31-20 @ 06:41  Kellen Aminotransferase(ALT/SGPT)--  Albumin, Serum --  Alkaline Phosphatase, Serum --  Anion Gap, Serum 9  Aspartate Aminotransferase (AST/SGOT)--  Bilirubin Total, Serum --  Blood Urea Nitrogen, Serum 19  Calcium,Total Serum 8.9  Carbon Dioxide, Serum 36  Chloride, Serum 93  Creatinine, Serum 0.73  eGFR if  86  eGFR if Non African American 74  Glucose, Serum 108  Potassium, Serum 4.2  Protein Total, Serum --  Sodium, Serum 138                      10-30-20 @ 07:06  Kellen Aminotransferase(ALT/SGPT)--  Albumin, Serum --  Alkaline Phosphatase, Serum --  Anion Gap, Serum 10  Aspartate Aminotransferase (AST/SGOT)--  Bilirubin Total, Serum --  Blood Urea Nitrogen, Serum 22  Calcium,Total Serum 9.1  Carbon Dioxide, Serum 37  Chloride, Serum 96  Creatinine, Serum 0.78  eGFR if  79  eGFR if Non African American 68  Glucose, Serum 98  Potassium, Serum 4.4  Protein Total, Serum --  Sodium, Serum 143                          PT/INR      Amylase/Lipase            RADIOLOGY & ADDITIONAL TESTS:    Imaging Personally Reviewed:  [ ] YES  [ ] NO

## 2020-11-02 ENCOUNTER — NON-APPOINTMENT (OUTPATIENT)
Age: 85
End: 2020-11-02

## 2020-11-02 LAB
ANION GAP SERPL CALC-SCNC: 8 MMOL/L — SIGNIFICANT CHANGE UP (ref 5–17)
BASE EXCESS BLDA CALC-SCNC: SIGNIFICANT CHANGE UP MMOL/L (ref -2–3)
BUN SERPL-MCNC: 22 MG/DL — SIGNIFICANT CHANGE UP (ref 7–23)
CALCIUM SERPL-MCNC: 9 MG/DL — SIGNIFICANT CHANGE UP (ref 8.4–10.5)
CHLORIDE SERPL-SCNC: 97 MMOL/L — SIGNIFICANT CHANGE UP (ref 96–108)
CO2 SERPL-SCNC: 37 MMOL/L — HIGH (ref 22–31)
CREAT SERPL-MCNC: 0.73 MG/DL — SIGNIFICANT CHANGE UP (ref 0.5–1.3)
GLUCOSE SERPL-MCNC: 83 MG/DL — SIGNIFICANT CHANGE UP (ref 70–99)
HCO3 BLDA-SCNC: SIGNIFICANT CHANGE UP MMOL/L (ref 21–28)
HCT VFR BLD CALC: 33.2 % — LOW (ref 34.5–45)
HGB BLD-MCNC: 10.4 G/DL — LOW (ref 11.5–15.5)
MAGNESIUM SERPL-MCNC: 2.1 MG/DL — SIGNIFICANT CHANGE UP (ref 1.6–2.6)
MCHC RBC-ENTMCNC: 31.3 GM/DL — LOW (ref 32–36)
MCHC RBC-ENTMCNC: 31.3 PG — SIGNIFICANT CHANGE UP (ref 27–34)
MCV RBC AUTO: 100 FL — SIGNIFICANT CHANGE UP (ref 80–100)
NRBC # BLD: 0 /100 WBCS — SIGNIFICANT CHANGE UP (ref 0–0)
PCO2 BLDA: SIGNIFICANT CHANGE UP MMHG (ref 32–45)
PH BLDA: SIGNIFICANT CHANGE UP (ref 7.35–7.45)
PHOSPHATE SERPL-MCNC: 3.6 MG/DL — SIGNIFICANT CHANGE UP (ref 2.5–4.5)
PLATELET # BLD AUTO: 458 K/UL — HIGH (ref 150–400)
PO2 BLDA: SIGNIFICANT CHANGE UP MMHG (ref 83–108)
POTASSIUM SERPL-MCNC: 4.7 MMOL/L — SIGNIFICANT CHANGE UP (ref 3.5–5.3)
POTASSIUM SERPL-SCNC: 4.7 MMOL/L — SIGNIFICANT CHANGE UP (ref 3.5–5.3)
RBC # BLD: 3.32 M/UL — LOW (ref 3.8–5.2)
RBC # FLD: 15.2 % — HIGH (ref 10.3–14.5)
SAO2 % BLDA: SIGNIFICANT CHANGE UP % (ref 95–100)
SODIUM SERPL-SCNC: 142 MMOL/L — SIGNIFICANT CHANGE UP (ref 135–145)
WBC # BLD: 12.4 K/UL — HIGH (ref 3.8–10.5)
WBC # FLD AUTO: 12.4 K/UL — HIGH (ref 3.8–10.5)

## 2020-11-02 PROCEDURE — 99233 SBSQ HOSP IP/OBS HIGH 50: CPT | Mod: GC

## 2020-11-02 RX ORDER — FUROSEMIDE 40 MG
1 TABLET ORAL
Qty: 30 | Refills: 0
Start: 2020-11-02 | End: 2020-12-01

## 2020-11-02 RX ADMIN — Medication 20 MILLIGRAM(S): at 05:54

## 2020-11-02 RX ADMIN — ENOXAPARIN SODIUM 30 MILLIGRAM(S): 100 INJECTION SUBCUTANEOUS at 22:38

## 2020-11-02 RX ADMIN — ATORVASTATIN CALCIUM 20 MILLIGRAM(S): 80 TABLET, FILM COATED ORAL at 22:38

## 2020-11-02 NOTE — PROGRESS NOTE ADULT - ASSESSMENT
88yo F, nonsmoker, with PMHx of lung adenocarcinoma (s/p resection/XRT), severe MR, and recent admission 8/2020 for fall and R leg weakness,presented to ED for hypoxemia to 80s on 3L NC (her usual O2), as well as productive cough with green sputum and LE edema. Admitted for management of sepsis 2/2 to pneumonia and acute hypoxic respiratory failure, now resolved and s/p pleuroscopy with talc pleurodesis, and R chest tube removal.

## 2020-11-02 NOTE — PROGRESS NOTE ADULT - SUBJECTIVE AND OBJECTIVE BOX
OVERNIGHT EVENTS:    SUBJECTIVE / INTERVAL HPI: Patient seen and examined at bedside.     VITAL SIGNS:  Vital Signs Last 24 Hrs  T(C): 36.2 (02 Nov 2020 08:33), Max: 36.8 (02 Nov 2020 00:12)  T(F): 97.2 (02 Nov 2020 08:33), Max: 98.2 (02 Nov 2020 00:12)  HR: 92 (02 Nov 2020 08:33) (89 - 94)  BP: 128/72 (02 Nov 2020 08:33) (114/75 - 134/73)  BP(mean): --  RR: 18 (02 Nov 2020 08:33) (18 - 20)  SpO2: 98% (02 Nov 2020 08:33) (97% - 98%)    PHYSICAL EXAM:    General: Well developed, well nourished, no acute distress  HEENT: NC/AT; PERRL, anicteric sclera; MMM  Neck: supple, no JVD  Cardiovascular: +S1/S2, RRR; no murmurs, rubs, gallops  Respiratory: CTAB; no wheezes, rales, or rhonchi  Gastrointestinal: soft, NT/ND, no massess palpated; no rebound tenderness or guarding; +BS  Extremities: warm and well-perfused; no edema, clubbing or cyanosis  Vascular: 2+ radial, DP pulses B/L  Neurological: AAOx3; no focal deficits; KATHY    MEDICATIONS:  MEDICATIONS  (STANDING):  atorvastatin 20 milliGRAM(s) Oral at bedtime  enoxaparin Injectable 30 milliGRAM(s) SubCutaneous at bedtime  furosemide    Tablet 20 milliGRAM(s) Oral daily  lidocaine 1% Injectable 10 milliLiter(s) Local Injection once    MEDICATIONS  (PRN):  acetaminophen   Tablet .. 650 milliGRAM(s) Oral every 6 hours PRN Moderate Pain (4 - 6)  oxycodone    5 mG/acetaminophen 325 mG 2 Tablet(s) Oral every 6 hours PRN Severe Pain (7 - 10)  sodium chloride 0.65% Nasal 1 Spray(s) Both Nostrils every 6 hours PRN Nasal Congestion/Dryness      ALLERGIES:  Allergies    Bactrim (Unknown)  Cleocin HCl (Unknown)  Flagyl (Unknown)  Honey (Unknown)  iodine (Anaphylaxis)  IV Contrast (Anaphylaxis)  Levaquin (Other; Rash)  penicillin (Unknown)  Zithromax (Unknown)    Intolerances        LABS:                        10.4   12.40 )-----------( 458      ( 02 Nov 2020 05:54 )             33.2     11-02    142  |  97  |  22  ----------------------------<  83  4.7   |  37<H>  |  0.73    Ca    9.0      02 Nov 2020 05:54  Phos  3.6     11-02  Mg     2.1     11-02          CAPILLARY BLOOD GLUCOSE          RADIOLOGY & ADDITIONAL TESTS: Reviewed.    PLAN: OVERNIGHT EVENTS: RINA    SUBJECTIVE / INTERVAL HPI: Patient seen and examined at bedside.   Breathing ok on NC this AM. Reports becomes mildly SOB if on room air.  No fevers, chills, CP, palpitations, or cough.    VITAL SIGNS:  Vital Signs Last 24 Hrs  T(C): 36.2 (02 Nov 2020 08:33), Max: 36.8 (02 Nov 2020 00:12)  T(F): 97.2 (02 Nov 2020 08:33), Max: 98.2 (02 Nov 2020 00:12)  HR: 92 (02 Nov 2020 08:33) (89 - 94)  BP: 128/72 (02 Nov 2020 08:33) (114/75 - 134/73)  BP(mean): --  RR: 18 (02 Nov 2020 08:33) (18 - 20)  SpO2: 98% (02 Nov 2020 08:33) (97% - 98%)    PHYSICAL EXAM:  General: Elderly lady, NAD  HEENT: NC/AT; PERRL, anicteric sclera; MMM; poor dentition  Neck: supple, no JVD  Cardiovascular: +S1/S2, RRR; no murmurs, rubs, gallops  Respiratory: CTAB, decreased breath sounds; no crackles or rhonchi  Gastrointestinal: soft, NT/ND, no masses palpated; no rebound tenderness or guarding; +BS  Extremities: 2+ pitting edema in LEs L > R; mild tenderness on palpation of both LEs; no erythema or lesions noted  Vascular: 2+ radial, DP pulses B/L  Neurological: AAOx3; no focal deficits; KATHY    MEDICATIONS:  MEDICATIONS  (STANDING):  atorvastatin 20 milliGRAM(s) Oral at bedtime  enoxaparin Injectable 30 milliGRAM(s) SubCutaneous at bedtime  furosemide    Tablet 20 milliGRAM(s) Oral daily  lidocaine 1% Injectable 10 milliLiter(s) Local Injection once    MEDICATIONS  (PRN):  acetaminophen   Tablet .. 650 milliGRAM(s) Oral every 6 hours PRN Moderate Pain (4 - 6)  oxycodone    5 mG/acetaminophen 325 mG 2 Tablet(s) Oral every 6 hours PRN Severe Pain (7 - 10)  sodium chloride 0.65% Nasal 1 Spray(s) Both Nostrils every 6 hours PRN Nasal Congestion/Dryness      ALLERGIES:  Allergies    Bactrim (Unknown)  Cleocin HCl (Unknown)  Flagyl (Unknown)  Honey (Unknown)  iodine (Anaphylaxis)  IV Contrast (Anaphylaxis)  Levaquin (Other; Rash)  penicillin (Unknown)  Zithromax (Unknown)    Intolerances        LABS:                        10.4   12.40 )-----------( 458      ( 02 Nov 2020 05:54 )             33.2     11-02    142  |  97  |  22  ----------------------------<  83  4.7   |  37<H>  |  0.73    Ca    9.0      02 Nov 2020 05:54  Phos  3.6     11-02  Mg     2.1     11-02          RADIOLOGY & ADDITIONAL TESTS: Reviewed.    PLAN:

## 2020-11-02 NOTE — PROGRESS NOTE ADULT - PROBLEM SELECTOR PLAN 9
F: none  E: Replete for K<4 Mag<2  N: Regular diet  Nutrition diagnosis: Increased nutrient needs r/t increased Kcal/Protein demands AEB: suspected severe PCM in setting of malignancy and NFPE. Added pudding BID  A: Lovenox 40mg subQ QD     FULL CODE  Disposition: [facility], likely discharge today or tomorrow

## 2020-11-02 NOTE — PROGRESS NOTE ADULT - PROBLEM SELECTOR PLAN 1
Patient w/ hx of recurrent pleural effusions. On previous admission R-sided thoracentesis, with lymphocytic predominance - cytology negative for malignant cells but malignancy remains the primary differential of her recurrent effusions. CXR w/ slight interval increase in pleural effusions right > left. R-sided pleuroscopy w/ pleurodesis 10/27. S/p chest tube removal 10/29.  - f/u pulm recs  - continue lasix 20mg QD  - pain control: tylenol and percocet

## 2020-11-03 LAB
ANION GAP SERPL CALC-SCNC: 9 MMOL/L — SIGNIFICANT CHANGE UP (ref 5–17)
BASE EXCESS BLDA CALC-SCNC: 12.8 MMOL/L — HIGH (ref -2–3)
BUN SERPL-MCNC: 25 MG/DL — HIGH (ref 7–23)
CALCIUM SERPL-MCNC: 8.8 MG/DL — SIGNIFICANT CHANGE UP (ref 8.4–10.5)
CHLORIDE SERPL-SCNC: 97 MMOL/L — SIGNIFICANT CHANGE UP (ref 96–108)
CO2 SERPL-SCNC: 36 MMOL/L — HIGH (ref 22–31)
CREAT SERPL-MCNC: 0.76 MG/DL — SIGNIFICANT CHANGE UP (ref 0.5–1.3)
GAS PNL BLDA: SIGNIFICANT CHANGE UP
GLUCOSE SERPL-MCNC: 92 MG/DL — SIGNIFICANT CHANGE UP (ref 70–99)
HCO3 BLDA-SCNC: 38 MMOL/L — HIGH (ref 21–28)
HCT VFR BLD CALC: 32.6 % — LOW (ref 34.5–45)
HGB BLD-MCNC: 10.1 G/DL — LOW (ref 11.5–15.5)
MAGNESIUM SERPL-MCNC: 2 MG/DL — SIGNIFICANT CHANGE UP (ref 1.6–2.6)
MCHC RBC-ENTMCNC: 31 GM/DL — LOW (ref 32–36)
MCHC RBC-ENTMCNC: 31.3 PG — SIGNIFICANT CHANGE UP (ref 27–34)
MCV RBC AUTO: 100.9 FL — HIGH (ref 80–100)
NRBC # BLD: 0 /100 WBCS — SIGNIFICANT CHANGE UP (ref 0–0)
PCO2 BLDA: 38 MMHG — SIGNIFICANT CHANGE UP (ref 32–45)
PCO2 BLDA: 53 MMHG — HIGH (ref 32–45)
PH BLDA: 7.48 — HIGH (ref 7.35–7.45)
PH BLDA: 7.57 — SIGNIFICANT CHANGE UP (ref 7.35–7.45)
PHOSPHATE SERPL-MCNC: 3.5 MG/DL — SIGNIFICANT CHANGE UP (ref 2.5–4.5)
PLATELET # BLD AUTO: 453 K/UL — HIGH (ref 150–400)
PO2 BLDA: 84 MMHG — SIGNIFICANT CHANGE UP (ref 83–108)
PO2 BLDA: SIGNIFICANT CHANGE UP MMHG (ref 83–108)
POTASSIUM SERPL-MCNC: 4.5 MMOL/L — SIGNIFICANT CHANGE UP (ref 3.5–5.3)
POTASSIUM SERPL-SCNC: 4.5 MMOL/L — SIGNIFICANT CHANGE UP (ref 3.5–5.3)
RBC # BLD: 3.23 M/UL — LOW (ref 3.8–5.2)
RBC # FLD: 14.9 % — HIGH (ref 10.3–14.5)
SAO2 % BLDA: 97 % — SIGNIFICANT CHANGE UP (ref 95–100)
SAO2 % BLDA: 99 % — SIGNIFICANT CHANGE UP (ref 95–100)
SODIUM SERPL-SCNC: 142 MMOL/L — SIGNIFICANT CHANGE UP (ref 135–145)
WBC # BLD: 10.64 K/UL — HIGH (ref 3.8–10.5)
WBC # FLD AUTO: 10.64 K/UL — HIGH (ref 3.8–10.5)

## 2020-11-03 PROCEDURE — 99233 SBSQ HOSP IP/OBS HIGH 50: CPT | Mod: GC

## 2020-11-03 RX ORDER — ACETAZOLAMIDE 250 MG/1
250 TABLET ORAL ONCE
Refills: 0 | Status: COMPLETED | OUTPATIENT
Start: 2020-11-03 | End: 2020-11-03

## 2020-11-03 RX ADMIN — ATORVASTATIN CALCIUM 20 MILLIGRAM(S): 80 TABLET, FILM COATED ORAL at 23:06

## 2020-11-03 RX ADMIN — ENOXAPARIN SODIUM 30 MILLIGRAM(S): 100 INJECTION SUBCUTANEOUS at 23:06

## 2020-11-03 RX ADMIN — Medication 20 MILLIGRAM(S): at 06:46

## 2020-11-03 RX ADMIN — ACETAZOLAMIDE 105 MILLIGRAM(S): 250 TABLET ORAL at 17:35

## 2020-11-03 NOTE — PROGRESS NOTE ADULT - ATTENDING COMMENTS
patient dispo pending home services - complicated by no active phone # and unable to reach patient's nephew to set up services  continuing to work with case management and social work today - medically stable ready for discharge

## 2020-11-03 NOTE — PROGRESS NOTE ADULT - ASSESSMENT
per Internal Medicine    88yo F, nonsmoker, with PMHx of lung adenocarcinoma (s/p resection/XRT), severe MR, and recent admission 8/2020 for fall and R leg weakness,presented to ED for hypoxemia to 80s on 3L NC (her usual O2), as well as productive cough with green sputum and LE edema. Admitted for management of sepsis 2/2 to pneumonia and acute hypoxic respiratory failure, now resolved and s/p pleuroscopy with talc pleurodesis, and R chest tube removal.    · Nutritional Assessment  This patient has been assessed with a concern for Malnutrition and has been determined to have a diagnosis/diagnoses of Severe protein-calorie malnutrition.    This patient is being managed with:   Diet Regular-  Entered: Oct 27 2020  4:48PM    Diet Regular-  Supplement Feeding Modality:  Oral  Ensure Pudding Cans or Servings Per Day:  1       Frequency:  Daily  Entered: Oct 23 2020 10:12AM    The following pending diet order is being considered for treatment of Severe protein-calorie malnutrition:null      Problem/Plan - 1:  ·  Problem: Pleural effusion.  Plan: Patient w/ hx of recurrent pleural effusions. On previous admission R-sided thoracentesis, with lymphocytic predominance - cytology negative for malignant cells but malignancy remains the primary differential of her recurrent effusions. CXR w/ slight interval increase in pleural effusions right > left. R-sided pleuroscopy w/ pleurodesis 10/27. S/p chest tube removal 10/29.  - f/u pulm recs  - continue lasix 20mg QD  - pain control: tylenol and percocet.     Problem/Plan - 2:  ·  Problem: Acute respiratory failure with hypoxia.  Plan: On admission found hypoxemic to 80s on 3L NC (her usual O2). Initially tachypneic and CXR w/ slight interval increase in pleural effusions right > left. Hypoxemia likely 2/2 to pleural effusions. Currently satting well on home 3L NC. - RESOLVED.  - s/p pleuroscopy and pleurodesis  - management as above.     Problem/Plan - 3:  ·  Problem: Adenocarcinoma of lung.  Plan: History of adenocarcinoma of RLL s/p VATS resection in 2013, ANANYA XRT in 2016, and RMF lesion s/p XRT in 2017  - recent PET showing 2 new FDG-avid subcentimeter nodules in L subpleural region  - persisting leukocytosis most likely 2/2 recurrent malignancy as no other signs of active infection at this time  - f/u pulm recs  - will need outpatient f/u w/ Dr. Beal on discharge.     Problem/Plan - 4:  ·  Problem: Diarrhea.  Plan: Patient having soft stools x days. No blood in stool, brown color, no abdominal pain. Now resolved.  - C. diff and GI PCR stool negative.    Problem/Plan - 5:  ·  Problem: Mitral regurgitation.  Plan: Patient has moderate mitral regurg, on echo 8/2020. EF >70%. No evidence of heart failure on recent Echo.   - monitor for increase in LE edema  - caution w/ IVF.     Problem/Plan - 6:  Problem: Back pain. Plan: Patient with history of compression fractures and reports worsening back pain lately. Followed by outpatient ortho.  - Tylenol PRN for pain control.    Problem/Plan - 7:  ·  Problem: Hyperlipidemia.  Plan: Patient w/ known hx HLD on Lipitor.  -c/w Lipitor 20 mg PO daily.     Problem/Plan - 8:  ·  Problem: Pneumonia.  Plan: Patient initially met criteria for severe sepsis w/ leukocytosis, tachycardia, tachypnea and elevated lactate. S/p Ceftazadme 1g IV x1, Vanc 750mg IV x1 in ED. No fluids given due to moderate MR. Now, patient no longer meeting sepsis criteria, as lactate is resolved, and patient is no longer tachypneic or tachycardic, and has been afebrile. ProCal negtive.  -BCx NGTD, procal negative, mrsa negative.   -s/p cefepime and ceftazidime; stopped treatment as pt was stable and asymptomatic  - as per pulm recs, continuing monitoring to see if becomes febrile/tachypneic again without antibiotics.     Problem/Plan - 9:  ·  Problem: Nutrition, metabolism, and development symptoms.  Plan: F: none  E: Replete for K<4 Mag<2  N: Regular diet  Nutrition diagnosis: Increased nutrient needs r/t increased Kcal/Protein demands AEB: suspected severe PCM in setting of malignancy and NFPE. Added pudding BID  A: Lovenox 40mg subQ QD     FULL CODE  Disposition: [facility], pending dispo arrangement.

## 2020-11-03 NOTE — PROGRESS NOTE ADULT - NSHPATTENDINGPLANDISCUSS_GEN_ALL_CORE
Patient, housestaff
SEVEN, Pulm - Dr. Oks
hs, patient
patient, housestaff
HS
Housestaff
Housestaff, Lana - Dr Burnett
hs, patient
Dr. Chucky Blum

## 2020-11-03 NOTE — PROGRESS NOTE ADULT - PROBLEM SELECTOR PLAN 4
Patient having soft stools x days. No blood in stool, brown color, no abdominal pain.  - stopped senna, continue to monitor for improvement  - C. diff and GI PCR stool negative Patient having soft stools x days. No blood in stool, brown color, no abdominal pain. Now resolved.  - C. diff and GI PCR stool negative

## 2020-11-03 NOTE — PROGRESS NOTE ADULT - SUBJECTIVE AND OBJECTIVE BOX
Physical Medicine and Rehabilitation Progress Note:    Patient is a 87y old  Female who presents with a chief complaint of pleural effusion (31 Oct 2020 11:41)      HPI:  86yo F, nonsmoker, with PMHx of lung adenocarcinoma (s/p resection/XRT), severe MR, and recent admission 8/2020 for fall and R leg weakness, again 10/2020 for SOB with recurrent R-sided pleural effusions,  who presents to ED for hypoxemia to 80s on 3L NC (her usual O2), as well as productive cough with green sputum and LE edema. On recent admission patient w/ new effusion w/ concern for recurrent malignancy. Patient discharged with outpatient follow up with pulmonologist (Dr. Nguyen) and planned for  R-sided pleuroscopy w/ pleurodesis, but procedure cancelled due to pt "not feeling well". Patient reports increased sputum production, cough and L sided rib pain since discharge. Also c/o new b/l LE edema since yesterday. She denies HA, F/C, N/V, abdominal pain, diarrhea, constipation, dysuria.    ED Course:  T 98.2, H 106, /70, R 40--> 20 , SpO2 99 on NRB--> 100 on 6 L NC  WBC 15.5, Hgb 12.4, Na 146, Alk Phos 128, Lactate 3, BNP 1034, Trop <0.01  EKG: PVC, nsr  XR: No pneumothorax. Slight interval increase in pleural effusions right > left  Interventions: given Ceftazadme 1g IV x1, Vanc 750mg IV x1   (22 Oct 2020 15:08)                            10.1   10.64 )-----------( 453      ( 03 Nov 2020 06:11 )             32.6       11-03    142  |  97  |  25<H>  ----------------------------<  92  4.5   |  36<H>  |  0.76    Ca    8.8      03 Nov 2020 06:11  Phos  3.5     11-03  Mg     2.0     11-03      Vital Signs Last 24 Hrs  T(C): 36.8 (03 Nov 2020 08:41), Max: 36.9 (02 Nov 2020 16:34)  T(F): 98.2 (03 Nov 2020 08:41), Max: 98.5 (02 Nov 2020 16:34)  HR: 83 (03 Nov 2020 08:41) (83 - 104)  BP: 120/71 (03 Nov 2020 08:41) (105/70 - 120/71)  BP(mean): --  RR: 18 (03 Nov 2020 08:41) (18 - 18)  SpO2: 98% (03 Nov 2020 08:41) (93% - 98%)    MEDICATIONS  (STANDING):  atorvastatin 20 milliGRAM(s) Oral at bedtime  enoxaparin Injectable 30 milliGRAM(s) SubCutaneous at bedtime    MEDICATIONS  (PRN):  acetaminophen   Tablet .. 650 milliGRAM(s) Oral every 6 hours PRN Moderate Pain (4 - 6)  oxycodone    5 mG/acetaminophen 325 mG 2 Tablet(s) Oral every 6 hours PRN Severe Pain (7 - 10)  sodium chloride 0.65% Nasal 1 Spray(s) Both Nostrils every 6 hours PRN Nasal Congestion/Dryness    Currently Undergoing Physical/ Occupational Therapy at bedside.    Functional Status Assessment:         Therapeutic Interventions      Bed Mobility  Bed Mobility Training Scooting: independent  Bed Mobility Training Bridging: independent  Bed Mobility Training Sit-to-Supine: independent  Bed Mobility Training Supine-to-Sit: independent    Sit-Stand Transfer Training  Transfer Training Sit-to-Stand Transfer: independent;  full weight-bearing   bed rails  Transfer Training Stand-to-Sit Transfer: independent;  full weight-bearing   bed rails  Sit-to-Stand Transfer Training Transfer Safety Analysis: decreased strength;  impaired balance    Gait Training  Gait Training: minimum assist (75% patient effort);  verbal cues;  nonverbal cues (demo/gestures);  1 person assist;  full weight-bearing   HHA;  15 steps at bedside  Gait Analysis: swing-to gait   decreased beth;  increased time in double stance;  decreased velocity of limb motion;  decreased step length;  decreased stride length;  decreased weight-shifting ability;  decreased strength;  impaired balance;  impaired postural control;  15 steps;  HHA  Gait Number of Times:: x 1    Therapeutic Exercise  Therapeutic Exercise Detail: Marching Thoracic expansion with deep breathing Paced breathing           PM&R Impression: as above    Current Disposition Plan Recommendations:    subacute rehab placement

## 2020-11-03 NOTE — PROGRESS NOTE ADULT - SUBJECTIVE AND OBJECTIVE BOX
OVERNIGHT EVENTS:    SUBJECTIVE / INTERVAL HPI: Patient seen and examined at bedside.     VITAL SIGNS:  Vital Signs Last 24 Hrs  T(C): 36.8 (03 Nov 2020 08:41), Max: 36.9 (02 Nov 2020 16:34)  T(F): 98.2 (03 Nov 2020 08:41), Max: 98.5 (02 Nov 2020 16:34)  HR: 83 (03 Nov 2020 08:41) (83 - 104)  BP: 120/71 (03 Nov 2020 08:41) (105/70 - 120/71)  BP(mean): --  RR: 18 (03 Nov 2020 08:41) (18 - 18)  SpO2: 98% (03 Nov 2020 08:41) (93% - 98%)    PHYSICAL EXAM:    General: Well developed, well nourished, no acute distress  HEENT: NC/AT; PERRL, anicteric sclera; MMM  Neck: supple, no JVD  Cardiovascular: +S1/S2, RRR; no murmurs, rubs, gallops  Respiratory: CTAB; no wheezes, rales, or rhonchi  Gastrointestinal: soft, NT/ND, no massess palpated; no rebound tenderness or guarding; +BS  Extremities: warm and well-perfused; no edema, clubbing or cyanosis  Vascular: 2+ radial, DP pulses B/L  Neurological: AAOx3; no focal deficits; KATHY    MEDICATIONS:  MEDICATIONS  (STANDING):  atorvastatin 20 milliGRAM(s) Oral at bedtime  enoxaparin Injectable 30 milliGRAM(s) SubCutaneous at bedtime    MEDICATIONS  (PRN):  acetaminophen   Tablet .. 650 milliGRAM(s) Oral every 6 hours PRN Moderate Pain (4 - 6)  oxycodone    5 mG/acetaminophen 325 mG 2 Tablet(s) Oral every 6 hours PRN Severe Pain (7 - 10)  sodium chloride 0.65% Nasal 1 Spray(s) Both Nostrils every 6 hours PRN Nasal Congestion/Dryness      ALLERGIES:  Allergies    Bactrim (Unknown)  Cleocin HCl (Unknown)  Flagyl (Unknown)  Honey (Unknown)  iodine (Anaphylaxis)  IV Contrast (Anaphylaxis)  Levaquin (Other; Rash)  penicillin (Unknown)  Zithromax (Unknown)    Intolerances        LABS:                        10.1   10.64 )-----------( 453      ( 03 Nov 2020 06:11 )             32.6     11-03    142  |  97  |  25<H>  ----------------------------<  92  4.5   |  36<H>  |  0.76    Ca    8.8      03 Nov 2020 06:11  Phos  3.5     11-03  Mg     2.0     11-03          CAPILLARY BLOOD GLUCOSE          RADIOLOGY & ADDITIONAL TESTS: Reviewed.    PLAN: OVERNIGHT EVENTS: RINA    SUBJECTIVE / INTERVAL HPI: Patient seen and examined at bedside.   Reports continuing to urinate a lot from the diuretic medications.  Breathing is ok.  No fevers, chills, CP, SOB, cough, or abdominal pain.    VITAL SIGNS:  Vital Signs Last 24 Hrs  T(C): 36.8 (03 Nov 2020 08:41), Max: 36.9 (02 Nov 2020 16:34)  T(F): 98.2 (03 Nov 2020 08:41), Max: 98.5 (02 Nov 2020 16:34)  HR: 83 (03 Nov 2020 08:41) (83 - 104)  BP: 120/71 (03 Nov 2020 08:41) (105/70 - 120/71)  BP(mean): --  RR: 18 (03 Nov 2020 08:41) (18 - 18)  SpO2: 98% (03 Nov 2020 08:41) (93% - 98%)    PHYSICAL EXAM:  General: Elderly lady, NAD  HEENT: NC/AT; PERRL, anicteric sclera; MMM; poor dentition  Neck: supple, no JVD  Cardiovascular: +S1/S2, RRR; no murmurs, rubs, gallops  Respiratory: CTAB, decreased breath sounds; no wheezes, crackles or rhonchi  Gastrointestinal: soft, NT/ND, no masses palpated; no rebound tenderness or guarding; +BS  Extremities: 1+ pitting edema in LEs L > R; mild tenderness on palpation of both LEs; no erythema or lesions noted  Vascular: 2+ radial, DP pulses B/L  Neurological: AAOx3; no focal deficits; KATHY    MEDICATIONS:  MEDICATIONS  (STANDING):  atorvastatin 20 milliGRAM(s) Oral at bedtime  enoxaparin Injectable 30 milliGRAM(s) SubCutaneous at bedtime    MEDICATIONS  (PRN):  acetaminophen   Tablet .. 650 milliGRAM(s) Oral every 6 hours PRN Moderate Pain (4 - 6)  oxycodone    5 mG/acetaminophen 325 mG 2 Tablet(s) Oral every 6 hours PRN Severe Pain (7 - 10)  sodium chloride 0.65% Nasal 1 Spray(s) Both Nostrils every 6 hours PRN Nasal Congestion/Dryness      ALLERGIES:  Allergies    Bactrim (Unknown)  Cleocin HCl (Unknown)  Flagyl (Unknown)  Honey (Unknown)  iodine (Anaphylaxis)  IV Contrast (Anaphylaxis)  Levaquin (Other; Rash)  penicillin (Unknown)  Zithromax (Unknown)    Intolerances        LABS:                        10.1   10.64 )-----------( 453      ( 03 Nov 2020 06:11 )             32.6     11-03    142  |  97  |  25<H>  ----------------------------<  92  4.5   |  36<H>  |  0.76    Ca    8.8      03 Nov 2020 06:11  Phos  3.5     11-03  Mg     2.0     11-03            RADIOLOGY & ADDITIONAL TESTS: Reviewed.    PLAN:

## 2020-11-03 NOTE — CHART NOTE - NSCHARTNOTEFT_GEN_A_CORE
Admitting Diagnosis:   Patient is a 87y old  Female who presents with a chief complaint of pleural effusion (31 Oct 2020 11:41)    PAST MEDICAL & SURGICAL HISTORY:  Malignant neoplasm of bronchus and lung, unspecified site  Lung cancer    History of surgery to major organs, presenting hazards to health  removal of R-sided adenocarcinoma, negative lymphnodes    Current Nutrition Order:  regular  Ensure Enlive TID (each 350kcal/20gpro)  Ensure Pudding BID (each 170kcal/4gpro)    PO Intake: Good (%) [   ]  Fair (50-75%) [ x ] Poor (<25%) [   ]    GI Issues:     Pain:    Skin Integrity:    Labs:   11-03    142  |  97  |  25<H>  ----------------------------<  92  4.5   |  36<H>  |  0.76    Ca    8.8      03 Nov 2020 06:11  Phos  3.5     11-03  Mg     2.0     11-03    CAPILLARY BLOOD GLUCOSE    Medications:  MEDICATIONS  (STANDING):  atorvastatin 20 milliGRAM(s) Oral at bedtime  enoxaparin Injectable 30 milliGRAM(s) SubCutaneous at bedtime    MEDICATIONS  (PRN):  acetaminophen   Tablet .. 650 milliGRAM(s) Oral every 6 hours PRN Moderate Pain (4 - 6)  oxycodone   5 mG/acetaminophen 325 mG 2 Tablet(s) Oral every 6 hours PRN Severe Pain (7 - 10)  sodium chloride 0.65% Nasal 1 Spray(s) Both Nostrils every 6 hours PRN Nasal Congestion/Dryness    Weight:  Daily     Daily     Weight Change:     Estimated energy needs:     Subjective:     Previous Nutrition Diagnosis:    Active [   ]  Resolved [   ]    If resolved, new PES:     Goal:    Recommendations:    Education:     Risk Level: High [   ] Moderate [   ] Low [   ] Admitting Diagnosis:   Patient is a 87y old  Female who presents with a chief complaint of pleural effusion (31 Oct 2020 11:41)    PAST MEDICAL & SURGICAL HISTORY:  Malignant neoplasm of bronchus and lung, unspecified site  Lung cancer    History of surgery to major organs, presenting hazards to health  removal of R-sided adenocarcinoma, negative lymphnodes    Current Nutrition Order:  regular  Ensure Enlive TID (each 350kcal/20gpro)  Ensure Pudding BID (each 170kcal/4gpro)    PO Intake: Good (%) [   ]  Fair (50-75%) [ x ] Poor (<25%) [   ]    GI Issues:   Pt denies N/V  +BM 11/2    Pain:  No pain noted    Skin Integrity:  1+ edema to right and left foot  +surgical incision    Labs:   11-03    142  |  97  |  25<H>  ----------------------------<  92  4.5   |  36<H>  |  0.76    Ca    8.8      03 Nov 2020 06:11  Phos  3.5     11-03  Mg     2.0     11-03    CAPILLARY BLOOD GLUCOSE    Medications:  MEDICATIONS  (STANDING):  atorvastatin 20 milliGRAM(s) Oral at bedtime  enoxaparin Injectable 30 milliGRAM(s) SubCutaneous at bedtime    MEDICATIONS  (PRN):  acetaminophen   Tablet .. 650 milliGRAM(s) Oral every 6 hours PRN Moderate Pain (4 - 6)  oxycodone   5 mG/acetaminophen 325 mG 2 Tablet(s) Oral every 6 hours PRN Severe Pain (7 - 10)  sodium chloride 0.65% Nasal 1 Spray(s) Both Nostrils every 6 hours PRN Nasal Congestion/Dryness    Weight: 41.9kg  Daily     Daily no updated weights    Weight Change: no updated weights.IBW:43kg    Estimated energy needs: Based on IBW due to LE swelling. IBW:43kg   25-30kcal:1075-1290kcal   1-1.2gmprotein:43-51.6gmprotein    Subjective: 88yo F, nonsmoker, with PMHx of lung adenocarcinoma (s/p resection/XRT), severe MR, and recent admission 8/2020 for fall and R leg weakness,presented to ED for hypoxemia to 80s on 3L NC (her usual O2), as well as productive cough with green sputum and LE edema. Admitted for management of sepsis 2/2 to pneumonia and acute hypoxic respiratory failure, now resolved and s/p pleuroscopy with talc pleurodesis, and R chest tube removal. Pt seen in room, eating dinner; chicken tenders and veggies. Pt reports tolerating diet okay, reports it just takes her a while to finish meal. Observed Ensure bottles on tray, unopened. Pt reports she likes them and is planning to drink them later. Per team, pending CASPER. Please see recs below. Will continue to follow per RD protocol.     Previous Nutrition Diagnosis: Increased nutrient needs r/t increased Kcal/Protein demands AEB: suspected severe PCM in setting of malignancy and NFPE    Active [ x  ]  Resolved [   ]    If resolved, new PES:     Goal: Meet 80% of EER consistently with no further S/S of malnutrition     Recommendations:  1. Continue current diet, encourage intake ONS  Please update weights 2.Add Ensure Pudding BID(340kcal and 8gmprotein)    Education: RD encouraged ONS.    Risk Level: High [  x ] Moderate [   ] Low [   ]. Admitting Diagnosis:   Patient is a 87y old  Female who presents with a chief complaint of pleural effusion (31 Oct 2020 11:41)    PAST MEDICAL & SURGICAL HISTORY:  Malignant neoplasm of bronchus and lung, unspecified site  Lung cancer    History of surgery to major organs, presenting hazards to health  removal of R-sided adenocarcinoma, negative lymphnodes    Current Nutrition Order:  regular  Ensure Enlive TID (each 350kcal/20gpro)  Ensure Pudding BID (each 170kcal/4gpro)    PO Intake: Good (%) [   ]  Fair (50-75%) [ x ] Poor (<25%) [   ]    GI Issues:   Pt denies N/V  +BM 11/2    Pain:  No pain noted    Skin Integrity:  1+ edema to right and left foot  +surgical incision    Labs:   11-03    142  |  97  |  25<H>  ----------------------------<  92  4.5   |  36<H>  |  0.76    Ca    8.8      03 Nov 2020 06:11  Phos  3.5     11-03  Mg     2.0     11-03    CAPILLARY BLOOD GLUCOSE    Medications:  MEDICATIONS  (STANDING):  atorvastatin 20 milliGRAM(s) Oral at bedtime  enoxaparin Injectable 30 milliGRAM(s) SubCutaneous at bedtime    MEDICATIONS  (PRN):  acetaminophen   Tablet .. 650 milliGRAM(s) Oral every 6 hours PRN Moderate Pain (4 - 6)  oxycodone   5 mG/acetaminophen 325 mG 2 Tablet(s) Oral every 6 hours PRN Severe Pain (7 - 10)  sodium chloride 0.65% Nasal 1 Spray(s) Both Nostrils every 6 hours PRN Nasal Congestion/Dryness    Weight: 41.9kg  Daily     Daily     Weight Change: No new wts    Estimated energy needs: Based on IBW due to LE swelling. IBW:43kg   25-30kcal: 1075-1290kcal   1-1.2gmprotein:43-51.6gmprotein    Subjective: 88yo F, nonsmoker, with PMHx of lung adenocarcinoma (s/p resection/XRT), severe MR, and recent admission 8/2020 for fall and R leg weakness,presented to ED for hypoxemia to 80s on 3L NC (her usual O2), as well as productive cough with green sputum and LE edema. Admitted for management of sepsis 2/2 to pneumonia and acute hypoxic respiratory failure, now resolved and s/p pleuroscopy with talc pleurodesis, and R chest tube removal. Pt seen in room, eating dinner; chicken tenders and veggies. Pt reports tolerating diet okay, reports it just takes her a while to finish meal. Observed Ensure bottles on tray, unopened. Pt reports she likes them and is planning to drink them later. Per team, pending CASPER. Please see recs below. Will continue to follow per RD protocol.     Previous Nutrition Diagnosis: Increased nutrient needs r/t increased Kcal/Protein demands AEB: suspected severe PCM in setting of malignancy and NFPE    Active [ x  ]  Resolved [   ]    If resolved, new PES:     Goal: Meet 80% of EER consistently with no further S/S of malnutrition     Recommendations:  1. Continue current diet, encourage intake of ONS and monitor tolerance  2. Trend wts  3. Monitor lytes and replete  4. RD to remain available prn     Education: Encouraged intake    Risk Level: High [  x ] Moderate [   ] Low [   ].

## 2020-11-03 NOTE — PROGRESS NOTE ADULT - NUTRITIONAL ASSESSMENT
This patient has been assessed with a concern for Malnutrition and has been determined to have a diagnosis/diagnoses of Severe protein-calorie malnutrition.    This patient is being managed with:   Diet NPO after Midnight-     NPO Start Date: 26-Oct-2020   NPO Start Time: 23:59  Entered: Oct 26 2020  1:15PM    Diet Regular-  Entered: Oct 25 2020  5:28PM    Diet Regular-  Supplement Feeding Modality:  Oral  Ensure Pudding Cans or Servings Per Day:  1       Frequency:  Daily  Entered: Oct 23 2020 10:12AM    The following pending diet order is being considered for treatment of Severe protein-calorie malnutrition:null
This patient has been assessed with a concern for Malnutrition and has been determined to have a diagnosis/diagnoses of Severe protein-calorie malnutrition.    This patient is being managed with:   Diet NPO after Midnight-     NPO Start Date: 26-Oct-2020   NPO Start Time: 23:59  Entered: Oct 26 2020  1:15PM    Diet Regular-  Entered: Oct 25 2020  5:28PM    Diet Regular-  Supplement Feeding Modality:  Oral  Ensure Pudding Cans or Servings Per Day:  1       Frequency:  Daily  Entered: Oct 23 2020 10:12AM    The following pending diet order is being considered for treatment of Severe protein-calorie malnutrition:null
This patient has been assessed with a concern for Malnutrition and has been determined to have a diagnosis/diagnoses of Severe protein-calorie malnutrition.    This patient is being managed with:   Diet Regular-  Entered: Oct 25 2020  5:28PM    Diet Regular-  Supplement Feeding Modality:  Oral  Ensure Pudding Cans or Servings Per Day:  1       Frequency:  Daily  Entered: Oct 23 2020 10:12AM    The following pending diet order is being considered for treatment of Severe protein-calorie malnutrition:null
This patient has been assessed with a concern for Malnutrition and has been determined to have a diagnosis/diagnoses of Severe protein-calorie malnutrition.    This patient is being managed with:   Diet Regular-  Supplement Feeding Modality:  Oral  Ensure Pudding Cans or Servings Per Day:  1       Frequency:  Daily  Entered: Oct 23 2020 10:12AM    Diet Regular-  Entered: Oct 22 2020  3:32PM    The following pending diet order is being considered for treatment of Severe protein-calorie malnutrition:null
This patient has been assessed with a concern for Malnutrition and has been determined to have a diagnosis/diagnoses of Severe protein-calorie malnutrition.    This patient is being managed with:   Diet Regular-  Supplement Feeding Modality:  Oral  Ensure Pudding Cans or Servings Per Day:  1       Frequency:  Daily  Entered: Oct 23 2020 10:12AM    Diet Regular-  Entered: Oct 22 2020  3:32PM    The following pending diet order is being considered for treatment of Severe protein-calorie malnutrition:null
This patient has been assessed with a concern for Malnutrition and has been determined to have a diagnosis/diagnoses of Severe protein-calorie malnutrition.    This patient is being managed with:   Diet NPO after Midnight-     NPO Start Date: 26-Oct-2020   NPO Start Time: 23:59  Entered: Oct 26 2020  1:15PM    Diet Regular-  Entered: Oct 25 2020  5:28PM    Diet Regular-  Supplement Feeding Modality:  Oral  Ensure Pudding Cans or Servings Per Day:  1       Frequency:  Daily  Entered: Oct 23 2020 10:12AM    The following pending diet order is being considered for treatment of Severe protein-calorie malnutrition:null
This patient has been assessed with a concern for Malnutrition and has been determined to have a diagnosis/diagnoses of Severe protein-calorie malnutrition.    This patient is being managed with:   Diet NPO after Midnight-     NPO Start Date: 26-Oct-2020   NPO Start Time: 23:59  Entered: Oct 26 2020  1:15PM    Diet Regular-  Entered: Oct 25 2020  5:28PM    Diet Regular-  Supplement Feeding Modality:  Oral  Ensure Pudding Cans or Servings Per Day:  1       Frequency:  Daily  Entered: Oct 23 2020 10:12AM    The following pending diet order is being considered for treatment of Severe protein-calorie malnutrition:null
This patient has been assessed with a concern for Malnutrition and has been determined to have a diagnosis/diagnoses of Severe protein-calorie malnutrition.    This patient is being managed with:   Diet Regular-  Entered: Oct 27 2020  4:48PM    Diet Regular-  Supplement Feeding Modality:  Oral  Ensure Pudding Cans or Servings Per Day:  1       Frequency:  Daily  Entered: Oct 23 2020 10:12AM    The following pending diet order is being considered for treatment of Severe protein-calorie malnutrition:null
This patient has been assessed with a concern for Malnutrition and has been determined to have a diagnosis/diagnoses of Severe protein-calorie malnutrition.    This patient is being managed with:   Diet Regular-  Entered: Oct 27 2020  4:48PM    Diet Regular-  Supplement Feeding Modality:  Oral  Ensure Pudding Cans or Servings Per Day:  1       Frequency:  Daily  Entered: Oct 23 2020 10:12AM    The following pending diet order is being considered for treatment of Severe protein-calorie malnutrition:null
This patient has been assessed with a concern for Malnutrition and has been determined to have a diagnosis/diagnoses of Severe protein-calorie malnutrition.    This patient is being managed with:   Diet Regular-  Supplement Feeding Modality:  Oral  Ensure Pudding Cans or Servings Per Day:  1       Frequency:  Daily  Entered: Oct 23 2020 10:12AM    Diet Regular-  Entered: Oct 22 2020  3:32PM    The following pending diet order is being considered for treatment of Severe protein-calorie malnutrition:null
This patient has been assessed with a concern for Malnutrition and has been determined to have a diagnosis/diagnoses of Severe protein-calorie malnutrition.    This patient is being managed with:   Diet Regular-  Supplement Feeding Modality:  Oral  Ensure Pudding Cans or Servings Per Day:  1       Frequency:  Daily  Entered: Oct 23 2020 10:12AM    Diet Regular-  Entered: Oct 22 2020  3:32PM    The following pending diet order is being considered for treatment of Severe protein-calorie malnutrition:null
This patient has been assessed with a concern for Malnutrition and has been determined to have a diagnosis/diagnoses of Severe protein-calorie malnutrition.    This patient is being managed with:   Diet Regular-  Entered: Oct 27 2020  4:48PM    Diet Regular-  Supplement Feeding Modality:  Oral  Ensure Pudding Cans or Servings Per Day:  1       Frequency:  Daily  Entered: Oct 23 2020 10:12AM    The following pending diet order is being considered for treatment of Severe protein-calorie malnutrition:null

## 2020-11-03 NOTE — PROGRESS NOTE ADULT - PROBLEM SELECTOR PLAN 9
F: none  E: Replete for K<4 Mag<2  N: Regular diet  Nutrition diagnosis: Increased nutrient needs r/t increased Kcal/Protein demands AEB: suspected severe PCM in setting of malignancy and NFPE. Added pudding BID  A: Lovenox 40mg subQ QD     FULL CODE  Disposition: RMF, pending dispo arrangement

## 2020-11-04 ENCOUNTER — TRANSCRIPTION ENCOUNTER (OUTPATIENT)
Age: 85
End: 2020-11-04

## 2020-11-04 VITALS
TEMPERATURE: 97 F | OXYGEN SATURATION: 97 % | HEART RATE: 94 BPM | RESPIRATION RATE: 20 BRPM | DIASTOLIC BLOOD PRESSURE: 78 MMHG | SYSTOLIC BLOOD PRESSURE: 130 MMHG

## 2020-11-04 LAB
HCT VFR BLD CALC: 34.1 % — LOW (ref 34.5–45)
HGB BLD-MCNC: 10.5 G/DL — LOW (ref 11.5–15.5)
MCHC RBC-ENTMCNC: 30.7 PG — SIGNIFICANT CHANGE UP (ref 27–34)
MCHC RBC-ENTMCNC: 30.8 GM/DL — LOW (ref 32–36)
MCV RBC AUTO: 99.7 FL — SIGNIFICANT CHANGE UP (ref 80–100)
NRBC # BLD: 0 /100 WBCS — SIGNIFICANT CHANGE UP (ref 0–0)
PLATELET # BLD AUTO: 452 K/UL — HIGH (ref 150–400)
RBC # BLD: 3.42 M/UL — LOW (ref 3.8–5.2)
RBC # FLD: 15.2 % — HIGH (ref 10.3–14.5)
WBC # BLD: 12.19 K/UL — HIGH (ref 3.8–10.5)
WBC # FLD AUTO: 12.19 K/UL — HIGH (ref 3.8–10.5)

## 2020-11-04 PROCEDURE — 88341 IMHCHEM/IMCYTCHM EA ADD ANTB: CPT

## 2020-11-04 PROCEDURE — 83735 ASSAY OF MAGNESIUM: CPT

## 2020-11-04 PROCEDURE — 82803 BLOOD GASES ANY COMBINATION: CPT

## 2020-11-04 PROCEDURE — 93005 ELECTROCARDIOGRAM TRACING: CPT

## 2020-11-04 PROCEDURE — 87641 MR-STAPH DNA AMP PROBE: CPT

## 2020-11-04 PROCEDURE — U0003: CPT

## 2020-11-04 PROCEDURE — 87507 IADNA-DNA/RNA PROBE TQ 12-25: CPT

## 2020-11-04 PROCEDURE — 85025 COMPLETE CBC W/AUTO DIFF WBC: CPT

## 2020-11-04 PROCEDURE — 88112 CYTOPATH CELL ENHANCE TECH: CPT

## 2020-11-04 PROCEDURE — 85610 PROTHROMBIN TIME: CPT

## 2020-11-04 PROCEDURE — 71045 X-RAY EXAM CHEST 1 VIEW: CPT

## 2020-11-04 PROCEDURE — 99239 HOSP IP/OBS DSCHRG MGMT >30: CPT

## 2020-11-04 PROCEDURE — 97116 GAIT TRAINING THERAPY: CPT

## 2020-11-04 PROCEDURE — 81003 URINALYSIS AUTO W/O SCOPE: CPT

## 2020-11-04 PROCEDURE — 87040 BLOOD CULTURE FOR BACTERIA: CPT

## 2020-11-04 PROCEDURE — 86901 BLOOD TYPING SEROLOGIC RH(D): CPT

## 2020-11-04 PROCEDURE — 87324 CLOSTRIDIUM AG IA: CPT

## 2020-11-04 PROCEDURE — 97110 THERAPEUTIC EXERCISES: CPT

## 2020-11-04 PROCEDURE — 99285 EMERGENCY DEPT VISIT HI MDM: CPT | Mod: 25

## 2020-11-04 PROCEDURE — 87640 STAPH A DNA AMP PROBE: CPT

## 2020-11-04 PROCEDURE — 88342 IMHCHEM/IMCYTCHM 1ST ANTB: CPT

## 2020-11-04 PROCEDURE — 84295 ASSAY OF SERUM SODIUM: CPT

## 2020-11-04 PROCEDURE — 36415 COLL VENOUS BLD VENIPUNCTURE: CPT

## 2020-11-04 PROCEDURE — 88305 TISSUE EXAM BY PATHOLOGIST: CPT

## 2020-11-04 PROCEDURE — 87449 NOS EACH ORGANISM AG IA: CPT

## 2020-11-04 PROCEDURE — 88344 IMHCHEM/IMCYTCHM EA MLT ANTB: CPT

## 2020-11-04 PROCEDURE — 86900 BLOOD TYPING SEROLOGIC ABO: CPT

## 2020-11-04 PROCEDURE — 84100 ASSAY OF PHOSPHORUS: CPT

## 2020-11-04 PROCEDURE — 86850 RBC ANTIBODY SCREEN: CPT

## 2020-11-04 PROCEDURE — 84145 PROCALCITONIN (PCT): CPT

## 2020-11-04 PROCEDURE — 85730 THROMBOPLASTIN TIME PARTIAL: CPT

## 2020-11-04 PROCEDURE — 97530 THERAPEUTIC ACTIVITIES: CPT

## 2020-11-04 PROCEDURE — 84484 ASSAY OF TROPONIN QUANT: CPT

## 2020-11-04 PROCEDURE — 80053 COMPREHEN METABOLIC PANEL: CPT

## 2020-11-04 PROCEDURE — 85027 COMPLETE CBC AUTOMATED: CPT

## 2020-11-04 PROCEDURE — 80048 BASIC METABOLIC PNL TOTAL CA: CPT

## 2020-11-04 PROCEDURE — 99232 SBSQ HOSP IP/OBS MODERATE 35: CPT | Mod: GC

## 2020-11-04 PROCEDURE — 83880 ASSAY OF NATRIURETIC PEPTIDE: CPT

## 2020-11-04 PROCEDURE — 90662 IIV NO PRSV INCREASED AG IM: CPT

## 2020-11-04 PROCEDURE — 96365 THER/PROPH/DIAG IV INF INIT: CPT

## 2020-11-04 PROCEDURE — 88312 SPECIAL STAINS GROUP 1: CPT

## 2020-11-04 PROCEDURE — 96375 TX/PRO/DX INJ NEW DRUG ADDON: CPT

## 2020-11-04 PROCEDURE — 87635 SARS-COV-2 COVID-19 AMP PRB: CPT

## 2020-11-04 PROCEDURE — 82330 ASSAY OF CALCIUM: CPT

## 2020-11-04 PROCEDURE — 84132 ASSAY OF SERUM POTASSIUM: CPT

## 2020-11-04 PROCEDURE — 83605 ASSAY OF LACTIC ACID: CPT

## 2020-11-04 PROCEDURE — 97161 PT EVAL LOW COMPLEX 20 MIN: CPT

## 2020-11-04 NOTE — PROGRESS NOTE ADULT - SUBJECTIVE AND OBJECTIVE BOX
PULMONARY CONSULT SERVICE FOLLOW-UP NOTE    INTERVAL HPI:  Reviewed chart and overnight events; patient seen and examined at bedside.    MEDICATIONS:  Pulmonary:    Antimicrobials:    Anticoagulants:  enoxaparin Injectable 30 milliGRAM(s) SubCutaneous at bedtime    Cardiac:      Allergies    Bactrim (Unknown)  Cleocin HCl (Unknown)  Flagyl (Unknown)  Honey (Unknown)  iodine (Anaphylaxis)  IV Contrast (Anaphylaxis)  Levaquin (Other; Rash)  penicillin (Unknown)  Zithromax (Unknown)    Intolerances        Vital Signs Last 24 Hrs  T(C): 36.2 (04 Nov 2020 08:37), Max: 36.7 (03 Nov 2020 23:14)  T(F): 97.1 (04 Nov 2020 08:37), Max: 98 (03 Nov 2020 23:14)  HR: 94 (04 Nov 2020 08:37) (86 - 113)  BP: 130/78 (04 Nov 2020 08:37) (108/69 - 130/78)  BP(mean): --  RR: 20 (04 Nov 2020 08:37) (18 - 20)  SpO2: 97% (04 Nov 2020 08:37) (94% - 99%)        PHYSICAL EXAM:  Constitutional: WDWN  HEENT: NC/AT; PERRL, anicteric sclera; MMM  Neck: supple  Cardiovascular: +S1/S2, RRR  Respiratory: CTA B/L; no W/R/R  Gastrointestinal: soft, NT/ND  Extremities: WWP; no edema, clubbing or cyanosis  Vascular: 2+ radial pulses B/L  Neurological: awake and alert; LIPSCOMB    LABS:  ABG - ( 03 Nov 2020 13:30 )  pH, Arterial: 7.48  pH, Blood: x     /  pCO2: 53    /  pO2: 84    / HCO3: 38    / Base Excess: 12.8  /  SaO2: 97                  CBC Full  -  ( 04 Nov 2020 06:08 )  WBC Count : 12.19 K/uL  RBC Count : 3.42 M/uL  Hemoglobin : 10.5 g/dL  Hematocrit : 34.1 %  Platelet Count - Automated : 452 K/uL  Mean Cell Volume : 99.7 fl  Mean Cell Hemoglobin : 30.7 pg  Mean Cell Hemoglobin Concentration : 30.8 gm/dL  Auto Neutrophil # : x  Auto Lymphocyte # : x  Auto Monocyte # : x  Auto Eosinophil # : x  Auto Basophil # : x  Auto Neutrophil % : x  Auto Lymphocyte % : x  Auto Monocyte % : x  Auto Eosinophil % : x  Auto Basophil % : x    11-03    142  |  97  |  25<H>  ----------------------------<  92  4.5   |  36<H>  |  0.76    Ca    8.8      03 Nov 2020 06:11  Phos  3.5     11-03  Mg     2.0     11-03                        RADIOLOGY & ADDITIONAL STUDIES: PULMONARY CONSULT SERVICE FOLLOW-UP NOTE    INTERVAL HPI:  Reviewed chart and overnight events; patient seen and examined at bedside. Patient reports feeling well overall. States she is urinating frequently but has no respiratory complaints.     MEDICATIONS:  Pulmonary:    Antimicrobials:    Anticoagulants:  enoxaparin Injectable 30 milliGRAM(s) SubCutaneous at bedtime    Cardiac:      Allergies    Bactrim (Unknown)  Cleocin HCl (Unknown)  Flagyl (Unknown)  Honey (Unknown)  iodine (Anaphylaxis)  IV Contrast (Anaphylaxis)  Levaquin (Other; Rash)  penicillin (Unknown)  Zithromax (Unknown)    Intolerances        Vital Signs Last 24 Hrs  T(C): 36.2 (04 Nov 2020 08:37), Max: 36.7 (03 Nov 2020 23:14)  T(F): 97.1 (04 Nov 2020 08:37), Max: 98 (03 Nov 2020 23:14)  HR: 94 (04 Nov 2020 08:37) (86 - 113)  BP: 130/78 (04 Nov 2020 08:37) (108/69 - 130/78)  BP(mean): --  RR: 20 (04 Nov 2020 08:37) (18 - 20)  SpO2: 97% (04 Nov 2020 08:37) (94% - 99%)        PHYSICAL EXAM:  Constitutional: WDWN  HEENT: NC/AT; PERRL, anicteric sclera; MMM  Neck: supple  Cardiovascular: +S1/S2, RRR  Respiratory: Mildly decreased breath sound at right lung base. CTA B/L; no W/R/R  Gastrointestinal: soft, NT/ND  Extremities: WWP; no edema, clubbing or cyanosis  Vascular: 2+ radial pulses B/L  Neurological: awake and alert; LIPSCOMB    LABS:  ABG - ( 03 Nov 2020 13:30 )  pH, Arterial: 7.48  pH, Blood: x     /  pCO2: 53    /  pO2: 84    / HCO3: 38    / Base Excess: 12.8  /  SaO2: 97                  CBC Full  -  ( 04 Nov 2020 06:08 )  WBC Count : 12.19 K/uL  RBC Count : 3.42 M/uL  Hemoglobin : 10.5 g/dL  Hematocrit : 34.1 %  Platelet Count - Automated : 452 K/uL  Mean Cell Volume : 99.7 fl  Mean Cell Hemoglobin : 30.7 pg  Mean Cell Hemoglobin Concentration : 30.8 gm/dL  Auto Neutrophil # : x  Auto Lymphocyte # : x  Auto Monocyte # : x  Auto Eosinophil # : x  Auto Basophil # : x  Auto Neutrophil % : x  Auto Lymphocyte % : x  Auto Monocyte % : x  Auto Eosinophil % : x  Auto Basophil % : x    11-03    142  |  97  |  25<H>  ----------------------------<  92  4.5   |  36<H>  |  0.76    Ca    8.8      03 Nov 2020 06:11  Phos  3.5     11-03  Mg     2.0     11-03                        RADIOLOGY & ADDITIONAL STUDIES:

## 2020-11-04 NOTE — DISCHARGE NOTE NURSING/CASE MANAGEMENT/SOCIAL WORK - PATIENT PORTAL LINK FT
You can access the FollowMyHealth Patient Portal offered by [facility] by registering at the following website: [email or web address]. By joining [facility]’s FollowMyHealth portal, you will also be able to view your health information using other applications (apps) compatible with our system.

## 2020-11-04 NOTE — PROGRESS NOTE ADULT - PROBLEM SELECTOR PROBLEM 2
Sepsis, due to unspecified organism, unspecified whether acute organ dysfunction present
Acute respiratory failure with hypoxia
Adenocarcinoma of lung, unspecified laterality
Pleural effusion
Pleural effusion
Adenocarcinoma of lung, unspecified laterality
Adenocarcinoma of lung, unspecified laterality
Acute respiratory failure with hypoxia
Acute respiratory failure with hypoxia
Adenocarcinoma of lung, unspecified laterality
Acute respiratory failure with hypoxia

## 2020-11-04 NOTE — PROGRESS NOTE ADULT - PROBLEM SELECTOR PROBLEM 3
Pleural effusion
Acute hypoxemic respiratory failure
Adenocarcinoma of lung
Debility
Debility
Pneumonia
Acute hypoxemic respiratory failure
Adenocarcinoma of lung
Pneumonia
Adenocarcinoma of lung

## 2020-11-04 NOTE — PROGRESS NOTE ADULT - PROBLEM SELECTOR PLAN 1
Patient w/ recurrent right sided pleural effusion, s/p thoracentesis in OP on 8/11 and 10/11 w/ lymphocyte predominant fluid and negative for malignant cells.    However, her effusion remains highly suspicious for malignancy given her complicated cancer history.     - s/p R-pleuroscopy and pleurodesis w/ chest-tube placement on 10/27  - s/p R-chest tube removal 10/29 PM   - Cytology from 10/27 again negative for malignant cells   - recommend continuing lasix 20mg PO daily after discharge  - Hold off on NSAIDs for now    - Pain control to avoid splinting  - Incentive spirometry, PT/OT, out of bed to chair  - patient to follow up with Dr. Nguyen in 1w following discharge

## 2020-11-04 NOTE — PROGRESS NOTE ADULT - PROBLEM SELECTOR PROBLEM 1
Adenocarcinoma of lung, unspecified laterality
Adenocarcinoma of lung, unspecified laterality
Hospital acquired PNA
Pleural effusion
Severe sepsis
Pleural effusion
Severe sepsis
Pleural effusion

## 2020-11-09 ENCOUNTER — NON-APPOINTMENT (OUTPATIENT)
Age: 85
End: 2020-11-09

## 2020-11-09 DIAGNOSIS — Z88.0 ALLERGY STATUS TO PENICILLIN: ICD-10-CM

## 2020-11-09 DIAGNOSIS — R65.20 SEVERE SEPSIS WITHOUT SEPTIC SHOCK: ICD-10-CM

## 2020-11-09 DIAGNOSIS — J90 PLEURAL EFFUSION, NOT ELSEWHERE CLASSIFIED: ICD-10-CM

## 2020-11-09 DIAGNOSIS — Z51.5 ENCOUNTER FOR PALLIATIVE CARE: ICD-10-CM

## 2020-11-09 DIAGNOSIS — R73.03 PREDIABETES: ICD-10-CM

## 2020-11-09 DIAGNOSIS — J96.21 ACUTE AND CHRONIC RESPIRATORY FAILURE WITH HYPOXIA: ICD-10-CM

## 2020-11-09 DIAGNOSIS — A41.9 SEPSIS, UNSPECIFIED ORGANISM: ICD-10-CM

## 2020-11-09 DIAGNOSIS — J18.9 PNEUMONIA, UNSPECIFIED ORGANISM: ICD-10-CM

## 2020-11-09 DIAGNOSIS — R53.1 WEAKNESS: ICD-10-CM

## 2020-11-09 DIAGNOSIS — Z91.041 RADIOGRAPHIC DYE ALLERGY STATUS: ICD-10-CM

## 2020-11-09 DIAGNOSIS — E43 UNSPECIFIED SEVERE PROTEIN-CALORIE MALNUTRITION: ICD-10-CM

## 2020-11-09 DIAGNOSIS — C34.31 MALIGNANT NEOPLASM OF LOWER LOBE, RIGHT BRONCHUS OR LUNG: ICD-10-CM

## 2020-11-09 DIAGNOSIS — I34.0 NONRHEUMATIC MITRAL (VALVE) INSUFFICIENCY: ICD-10-CM

## 2020-11-09 DIAGNOSIS — Z87.81 PERSONAL HISTORY OF (HEALED) TRAUMATIC FRACTURE: ICD-10-CM

## 2020-11-09 DIAGNOSIS — E78.5 HYPERLIPIDEMIA, UNSPECIFIED: ICD-10-CM

## 2020-11-09 DIAGNOSIS — R19.7 DIARRHEA, UNSPECIFIED: ICD-10-CM

## 2020-11-12 ENCOUNTER — APPOINTMENT (OUTPATIENT)
Dept: PULMONOLOGY | Facility: CLINIC | Age: 85
End: 2020-11-12

## 2020-12-21 NOTE — PROGRESS NOTE ADULT - PROBLEM SELECTOR PROBLEM 5
[FreeTextEntry1] : Holli is a 16-year-old girl who has bilateral resolving ankle sprains\par \par Clinical exam and imaging discussed with patient and family at length. Recommendation at this time for the ankles would be to continue with physical therapy and home exercises. Continue with cheer leading practice as tolerated. She should utilize ankle braces during practice for support. She will f/u in 3 weeks prior to her cheer leading season for activity clearance. If there is no improvement we may consider obtaining an MRI at that time. All questions answered. Family and patient verbalizes understanding of the plan. \par \par Lorna YU PA-C, acted as a scribe and documented above information for Dr. Montalvo \par \par The above documentation completed by the scribe is an accurate record of both my words and actions.\par \par \par 
Elevated alkaline phosphatase level

## 2021-01-06 ENCOUNTER — APPOINTMENT (OUTPATIENT)
Dept: UROLOGY | Facility: CLINIC | Age: 86
End: 2021-01-06

## 2021-03-26 ENCOUNTER — INPATIENT (INPATIENT)
Facility: HOSPITAL | Age: 86
LOS: 6 days | Discharge: NURSING, REHAB OR LONG-TERM CARE | DRG: 306 | End: 2021-04-02
Attending: INTERNAL MEDICINE
Payer: MEDICARE

## 2021-03-26 VITALS
HEART RATE: 99 BPM | RESPIRATION RATE: 20 BRPM | HEIGHT: 57.5 IN | WEIGHT: 85.98 LBS | TEMPERATURE: 98 F | DIASTOLIC BLOOD PRESSURE: 74 MMHG | SYSTOLIC BLOOD PRESSURE: 127 MMHG | OXYGEN SATURATION: 100 %

## 2021-03-26 DIAGNOSIS — J90 PLEURAL EFFUSION, NOT ELSEWHERE CLASSIFIED: ICD-10-CM

## 2021-03-26 DIAGNOSIS — J96.01 ACUTE RESPIRATORY FAILURE WITH HYPOXIA: ICD-10-CM

## 2021-03-26 DIAGNOSIS — E78.5 HYPERLIPIDEMIA, UNSPECIFIED: ICD-10-CM

## 2021-03-26 DIAGNOSIS — D64.9 ANEMIA, UNSPECIFIED: ICD-10-CM

## 2021-03-26 DIAGNOSIS — J18.9 PNEUMONIA, UNSPECIFIED ORGANISM: ICD-10-CM

## 2021-03-26 DIAGNOSIS — I34.0 NONRHEUMATIC MITRAL (VALVE) INSUFFICIENCY: ICD-10-CM

## 2021-03-26 DIAGNOSIS — C80.1 MALIGNANT (PRIMARY) NEOPLASM, UNSPECIFIED: ICD-10-CM

## 2021-03-26 DIAGNOSIS — R63.8 OTHER SYMPTOMS AND SIGNS CONCERNING FOOD AND FLUID INTAKE: ICD-10-CM

## 2021-03-26 LAB
ALBUMIN SERPL ELPH-MCNC: 2.7 G/DL — LOW (ref 3.3–5)
ALP SERPL-CCNC: 618 U/L — HIGH (ref 40–120)
ALT FLD-CCNC: 46 U/L — HIGH (ref 10–45)
ANION GAP SERPL CALC-SCNC: 5 MMOL/L — SIGNIFICANT CHANGE UP (ref 5–17)
ANISOCYTOSIS BLD QL: SLIGHT — SIGNIFICANT CHANGE UP
APTT BLD: 27.8 SEC — SIGNIFICANT CHANGE UP (ref 27.5–35.5)
AST SERPL-CCNC: 45 U/L — HIGH (ref 10–40)
BASOPHILS # BLD AUTO: 0.08 K/UL — SIGNIFICANT CHANGE UP (ref 0–0.2)
BASOPHILS NFR BLD AUTO: 0.9 % — SIGNIFICANT CHANGE UP (ref 0–2)
BILIRUB SERPL-MCNC: 0.3 MG/DL — SIGNIFICANT CHANGE UP (ref 0.2–1.2)
BLD GP AB SCN SERPL QL: NEGATIVE — SIGNIFICANT CHANGE UP
BUN SERPL-MCNC: 24 MG/DL — HIGH (ref 7–23)
CALCIUM SERPL-MCNC: 8.4 MG/DL — SIGNIFICANT CHANGE UP (ref 8.4–10.5)
CHLORIDE SERPL-SCNC: 104 MMOL/L — SIGNIFICANT CHANGE UP (ref 96–108)
CO2 SERPL-SCNC: 33 MMOL/L — HIGH (ref 22–31)
CREAT SERPL-MCNC: 0.8 MG/DL — SIGNIFICANT CHANGE UP (ref 0.5–1.3)
EOSINOPHIL # BLD AUTO: 0 K/UL — SIGNIFICANT CHANGE UP (ref 0–0.5)
EOSINOPHIL NFR BLD AUTO: 0 % — SIGNIFICANT CHANGE UP (ref 0–6)
GIANT PLATELETS BLD QL SMEAR: PRESENT — SIGNIFICANT CHANGE UP
GLUCOSE SERPL-MCNC: 95 MG/DL — SIGNIFICANT CHANGE UP (ref 70–99)
HCT VFR BLD CALC: 28.4 % — LOW (ref 34.5–45)
HGB BLD-MCNC: 7.9 G/DL — LOW (ref 11.5–15.5)
HYPOCHROMIA BLD QL: SLIGHT — SIGNIFICANT CHANGE UP
INR BLD: 1.02 — SIGNIFICANT CHANGE UP (ref 0.88–1.16)
LYMPHOCYTES # BLD AUTO: 0.48 K/UL — LOW (ref 1–3.3)
LYMPHOCYTES # BLD AUTO: 5.2 % — LOW (ref 13–44)
MACROCYTES BLD QL: SLIGHT — SIGNIFICANT CHANGE UP
MANUAL SMEAR VERIFICATION: SIGNIFICANT CHANGE UP
MCHC RBC-ENTMCNC: 23.1 PG — LOW (ref 27–34)
MCHC RBC-ENTMCNC: 27.8 GM/DL — LOW (ref 32–36)
MCV RBC AUTO: 83 FL — SIGNIFICANT CHANGE UP (ref 80–100)
MONOCYTES # BLD AUTO: 0.95 K/UL — HIGH (ref 0–0.9)
MONOCYTES NFR BLD AUTO: 10.4 % — SIGNIFICANT CHANGE UP (ref 2–14)
NEUTROPHILS # BLD AUTO: 7.51 K/UL — HIGH (ref 1.8–7.4)
NEUTROPHILS NFR BLD AUTO: 80.9 % — HIGH (ref 43–77)
NEUTS BAND # BLD: 0.9 % — SIGNIFICANT CHANGE UP (ref 0–8)
NRBC # BLD: 1 /100 — HIGH (ref 0–0)
NRBC # BLD: SIGNIFICANT CHANGE UP /100 WBCS (ref 0–0)
NT-PROBNP SERPL-SCNC: 1148 PG/ML — HIGH (ref 0–300)
OVALOCYTES BLD QL SMEAR: SLIGHT — SIGNIFICANT CHANGE UP
PLAT MORPH BLD: ABNORMAL
PLATELET # BLD AUTO: 438 K/UL — HIGH (ref 150–400)
POLYCHROMASIA BLD QL SMEAR: SLIGHT — SIGNIFICANT CHANGE UP
POTASSIUM SERPL-MCNC: 5 MMOL/L — SIGNIFICANT CHANGE UP (ref 3.5–5.3)
POTASSIUM SERPL-SCNC: 5 MMOL/L — SIGNIFICANT CHANGE UP (ref 3.5–5.3)
PROCALCITONIN SERPL-MCNC: 0.09 NG/ML — SIGNIFICANT CHANGE UP (ref 0.02–0.1)
PROT SERPL-MCNC: 7 G/DL — SIGNIFICANT CHANGE UP (ref 6–8.3)
PROTHROM AB SERPL-ACNC: 12.2 SEC — SIGNIFICANT CHANGE UP (ref 10.6–13.6)
RBC # BLD: 3.42 M/UL — LOW (ref 3.8–5.2)
RBC # FLD: 21.1 % — HIGH (ref 10.3–14.5)
RBC BLD AUTO: ABNORMAL
RETICS #: 53 K/UL — SIGNIFICANT CHANGE UP (ref 25–125)
RETICS/RBC NFR: 1.5 % — SIGNIFICANT CHANGE UP (ref 0.5–2.5)
RH IG SCN BLD-IMP: POSITIVE — SIGNIFICANT CHANGE UP
SARS-COV-2 RNA SPEC QL NAA+PROBE: SIGNIFICANT CHANGE UP
SODIUM SERPL-SCNC: 142 MMOL/L — SIGNIFICANT CHANGE UP (ref 135–145)
STOMATOCYTES BLD QL SMEAR: SIGNIFICANT CHANGE UP
TROPONIN T SERPL-MCNC: 0.01 NG/ML — SIGNIFICANT CHANGE UP (ref 0–0.01)
VARIANT LYMPHS # BLD: 1.7 % — SIGNIFICANT CHANGE UP (ref 0–6)
WBC # BLD: 9.18 K/UL — SIGNIFICANT CHANGE UP (ref 3.8–10.5)
WBC # FLD AUTO: 9.18 K/UL — SIGNIFICANT CHANGE UP (ref 3.8–10.5)

## 2021-03-26 PROCEDURE — 71045 X-RAY EXAM CHEST 1 VIEW: CPT | Mod: 26

## 2021-03-26 PROCEDURE — 99285 EMERGENCY DEPT VISIT HI MDM: CPT | Mod: CS

## 2021-03-26 PROCEDURE — 99223 1ST HOSP IP/OBS HIGH 75: CPT | Mod: GC

## 2021-03-26 RX ORDER — FUROSEMIDE 40 MG
20 TABLET ORAL ONCE
Refills: 0 | Status: COMPLETED | OUTPATIENT
Start: 2021-03-26 | End: 2021-03-26

## 2021-03-26 RX ORDER — ATORVASTATIN CALCIUM 80 MG/1
20 TABLET, FILM COATED ORAL AT BEDTIME
Refills: 0 | Status: DISCONTINUED | OUTPATIENT
Start: 2021-03-26 | End: 2021-04-02

## 2021-03-26 RX ORDER — HEPARIN SODIUM 5000 [USP'U]/ML
5000 INJECTION INTRAVENOUS; SUBCUTANEOUS EVERY 12 HOURS
Refills: 0 | Status: DISCONTINUED | OUTPATIENT
Start: 2021-03-26 | End: 2021-03-26

## 2021-03-26 RX ORDER — VANCOMYCIN HCL 1 G
1000 VIAL (EA) INTRAVENOUS ONCE
Refills: 0 | Status: COMPLETED | OUTPATIENT
Start: 2021-03-26 | End: 2021-03-26

## 2021-03-26 RX ORDER — ASPIRIN/CALCIUM CARB/MAGNESIUM 324 MG
81 TABLET ORAL DAILY
Refills: 0 | Status: DISCONTINUED | OUTPATIENT
Start: 2021-03-26 | End: 2021-03-26

## 2021-03-26 RX ORDER — CEFEPIME 1 G/1
1000 INJECTION, POWDER, FOR SOLUTION INTRAMUSCULAR; INTRAVENOUS ONCE
Refills: 0 | Status: COMPLETED | OUTPATIENT
Start: 2021-03-26 | End: 2021-03-26

## 2021-03-26 RX ADMIN — CEFEPIME 100 MILLIGRAM(S): 1 INJECTION, POWDER, FOR SOLUTION INTRAMUSCULAR; INTRAVENOUS at 17:27

## 2021-03-26 RX ADMIN — ATORVASTATIN CALCIUM 20 MILLIGRAM(S): 80 TABLET, FILM COATED ORAL at 22:27

## 2021-03-26 RX ADMIN — Medication 250 MILLIGRAM(S): at 17:53

## 2021-03-26 RX ADMIN — Medication 20 MILLIGRAM(S): at 19:52

## 2021-03-26 NOTE — H&P ADULT - HISTORY OF PRESENT ILLNESS
HPI:  88yo F, nonsmoker, with PMHx of lung adenocarcinoma (s/p resection/XRT), severe MR, and recent admission 8/2020 for fall and R leg weakness and then in 10/2020 for hypoxemia to 80s on 3L NC (her usual O2) found then to have sepsis 2/2 to pneumonia and acute hypoxic respiratory failure and stepped up to [facility] for observation s/p pleuroscopy and talc pleurodesis, now comes in from [facility] with desaturation of O2 to 70% that improved with placement on 2L NC. Pt reports felt sob today. She denies cough or chest pain, except when she tries to drink fluids, she coughs (not with food). She denies headache or dizziness, and denies nausea, vomiting, abdominal pain. Has not had any bloody or dark bowel movements, nor blood in her urine.     In the ED,  VS: T 98, HR 99, /74, RR 20, SpO2 100% 4L NC  Labs: Hgb 7.9 (from baseline of 10-11 in october) otherwise CBC wnl; BMP wnl, BUN/Cr 24/0.8 Alb 2.7 Alk P 618 AST 45 ALT 46 BNP 1148 trop negative   EKG: wnl  CXR: wet read: compared to prior cxr L effusion better, R effusion worse, atelectasis in both lungs that looks similar, patchy opacities on R side which look new  Orders: vanc 1g and cefepime 1g x1 in ED   HPI:  86yo F, nonsmoker, with PMHx of lung adenocarcinoma (s/p resection/XRT), severe MR, and recent admission 8/2020 for fall and R leg weakness and then in 10/2020 for hypoxemia to 80s on 3L NC (her usual O2) found then to have sepsis 2/2 to pneumonia and acute hypoxic respiratory failure and stepped up to [facility] for observation s/p pleuroscopy and talc pleurodesis and R chest tube placement for ~2days, now comes in from [facility] with desaturation of O2 to 70% that improved with placement on 2L NC. Pt reports felt sob today. She denies cough or chest pain, except when she tries to drink fluids, she coughs (not with food). She denies headache or dizziness, and denies nausea, vomiting, abdominal pain. Has not had any bloody or dark bowel movements, nor blood in her urine.     In the ED,  VS: T 98, HR 99, /74, RR 20, SpO2 100% 4L NC  Labs: Hgb 7.9 (from baseline of 10-11 in october) otherwise CBC wnl; BMP wnl, BUN/Cr 24/0.8 Alb 2.7 Alk P 618 AST 45 ALT 46 BNP 1148 trop negative   EKG: wnl  CXR: wet read: compared to prior cxr L effusion better, R effusion worse, atelectasis in both lungs that looks similar, patchy opacities on R side which look new  Orders: vanc 1g and cefepime 1g x1 in ED   HPI:  86yo F, nonsmoker, with PMHx of lung adenocarcinoma (s/p resection/XRT), severe MR, and recent admission 8/2020 for fall and R leg weakness and then in 10/2020 for hypoxemia to 80s on 3L NC (her usual O2) found then to have sepsis 2/2 to pneumonia and acute hypoxic respiratory failure and stepped up to [facility] for observation s/p pleuroscopy and talc pleurodesis and R chest tube placement for ~2days, now comes in from [facility] with desaturation of O2 to 70% that improved with placement on 2L NC. Pt reports felt sob today. She denies cough or chest pain, except when she tries to drink fluids, she coughs (not with food). She denies headache or dizziness, and denies nausea, vomiting, abdominal pain. Has not had any bloody or dark bowel movements, nor blood in her urine. Rectal exam negative in ED.    In the ED,  VS: T 98, HR 99, /74, RR 20, SpO2 100% 4L NC  Labs: Hgb 7.9 (from baseline of 10-11 in october) otherwise CBC wnl; BMP wnl, BUN/Cr 24/0.8 Alb 2.7 Alk P 618 AST 45 ALT 46 BNP 1148 trop negative   EKG: wnl  CXR: wet read: compared to prior cxr L effusion better, R effusion worse, atelectasis in both lungs that looks similar, patchy opacities on R side which look new  Orders: vanc 1g and cefepime 1g x1 in ED   HPI:  86yo F, nonsmoker, with PMHx of lung adenocarcinoma (s/p resection/XRT), severe MR, and recent admission 8/2020 for fall and R leg weakness and then in 10/2020 for hypoxemia to 80s on 3L NC (her usual O2) found then to have sepsis 2/2 to pneumonia and acute hypoxic respiratory failure and stepped up to [facility] for observation s/p pleuroscopy and talc pleurodesis and R chest tube placement for ~2days, now comes in from [facility] with desaturation of O2 to 70% that improved with placement on 2L NC. Pt reports felt sob today. She denies cough or chest pain, except when she tries to drink fluids, she coughs (not with food). She denies headache or dizziness, and denies nausea, vomiting, abdominal pain. Has not had any bloody or dark bowel movements, nor blood in her urine. Rectal exam negative in ED.    In the ED,  VS: T 98, HR 99, /74, RR 20, SpO2 100% 4L NC  Labs: Hgb 7.9 (from baseline of 10-11 in october) otherwise CBC wnl; BMP wnl, BUN/Cr 24/0.8 Alb 2.7 Alk P 618 AST 45 ALT 46 BNP 1148 trop negative   EKG: wnl  CXR: wet read: compared to prior cxr L effusion worse, R effusion better, atelectasis in both lungs that looks similar, patchy opacities on R side which look new  Orders: vanc 1g and cefepime 1g x1 in ED

## 2021-03-26 NOTE — H&P ADULT - NSHPPHYSICALEXAM_GEN_ALL_CORE
VITAL SIGNS:  T(C): 36.9 (03-26-21 @ 14:12), Max: 36.9 (03-26-21 @ 14:12)  T(F): 98.5 (03-26-21 @ 14:12), Max: 98.5 (03-26-21 @ 14:12)  HR: 99 (03-26-21 @ 14:12) (99 - 99)  BP: 127/74 (03-26-21 @ 14:12) (127/74 - 127/74)  BP(mean): --  RR: 20 (03-26-21 @ 14:12) (20 - 20)  SpO2: 100% (03-26-21 @ 14:12) (100% - 100%)  Wt(kg): --    PHYSICAL EXAM:    Constitutional: elderly frail woman sitting comfortably in bed; NAD  Head: NC/AT, slight temporal wasting  Eyes: PERRL, EOMI, clear conjunctiva  ENT: no nasal discharge; uvula midline, no oropharyngeal erythema or exudates; MMM; O2 NC in place  Neck: supple; no JVD or thyromegaly  Respiratory: CTA B/L; slight abnormal motion of chest wall on inspiration/expiration on L side, no W/R/R, no retractions  Cardiac: +S1/S2; RRR; holosystolic murmur 2/6 heard best at the apex  Gastrointestinal: soft, NT/ND; no rebound or guarding; +BSx4  Genitourinary: normal external genitalia  Extremities: WWP, no clubbing or cyanosis; no peripheral edema  Musculoskeletal: NROM x4; no joint swelling, tenderness or erythema  Vascular: 2+ radial, DP pulses B/L  Dermatologic: skin warm, dry and intact; no rashes, wounds, or scars  Neurologic: AAOx3; CNII-XII grossly intact; no focal deficits  Psychiatric: affect and characteristics of appearance, verbalizations, behaviors are appropriate

## 2021-03-26 NOTE — H&P ADULT - PROBLEM SELECTOR PLAN 8
F: none  E: replete K<4, Mg<2  N: NPO pending bedside dysphagia  VTE Prophylaxis: hold in setting of anemia  C: Full Code  D: RMF F: none  E: replete K<4, Mg<2  N: DASH  VTE Prophylaxis: lovenox  C: Full Code  D: F F: none  E: replete K<4, Mg<2  N: DASH  VTE Prophylaxis: holding for potential thora overnight  C: Full Code  D: NAGA

## 2021-03-26 NOTE — H&P ADULT - PROBLEM SELECTOR PLAN 3
History of adenocarcinoma of RLL s/p VATS resection in 2013, ANANYA XRT in 2016, and RMF lesion s/p XRT in 2017. Also per chart review, had recent PET showing 2 new FDG-avid subcentimeter nodules in L subpleural region  - f/u pulm recs  - will need outpatient f/u w/ Dr. Beal on discharge. History of adenocarcinoma of RLL s/p VATS resection in 2013, ANANYA XRT in 2016, and RMF lesion s/p XRT in 2017. Also per chart review, had recent PET showing 2 new FDG-avid subcentimeter nodules in L subpleural region  - f/u pulm recs  - will need outpatient f/u w/ Dr. Beal on discharge.    #Elevated alk phos  could be attributable to underlying malignancy  -ctm  -can consider further imaging or heme onc consult for staging, as perhaps indicative of spread/recurrence of malignancy Hx of recurrent pleural effusions. Last admission had R-sided pleuroscopy w/ pleurodesis 10/27. S/p chest tube removal 10/29.  - pulm consult for possible thora in AM  - repeat AM cxr to assess for worsening of effusion  - per notes, patient supposed to be on lasix however not on med rec from [facility] papers therefore clarify with pulm if patient should be on lasix  - gave 1 dose of 20 IV lasix (dose was 20mg PO per med rec here from past admission)  - c/w NC O2 and wean as tolerated

## 2021-03-26 NOTE — ED PROVIDER NOTE - OBJECTIVE STATEMENT
86 y/o female with hx of pleural effusion, lung ca, HLD, arthritis c/o sob. pt sent in by [facility] rehab for O2 sat 70% which improved with 2L nasal cannula. pt reports felt sob today. no cough or cp. no ha or dizziness. no leg pain or swelling. no abd pain, n/v. no back pain. no further complaints.
normal (ped)...

## 2021-03-26 NOTE — H&P ADULT - NSHPSOCIALHISTORY_GEN_ALL_CORE
denies alcohol, tobacco, and drug use; is in [facility] and denies having anyone around to call to inform them of her hospitalization, states that she makes her own medical decisions

## 2021-03-26 NOTE — H&P ADULT - PROBLEM SELECTOR PLAN 5
Patient has severe mitral regurg however no decreased EF noted on echo from 2019. No history of heart failure however BNP elevated to >1000, which it has been in past as well (1034 in 10/20)  - monitor for increase in LE edema  - caution w/ IVF.   - consider repeat TTE Hgb on admission 7.9, prior admission in the 10s-11s, currently no signs of active bleeding (no hematochezia, melena, hemoptysis, hematuria)  - obtain iron panel  - likely component of iron deficiency as patient was prescribed iron per [facility] med list review  - could also be attributable to chronic disease/underlying malignancy  - trend CBC  - maintain active T&S  - transfuse if Hgb <7 Hgb on admission 7.9, prior admission in the 10s-11s, currently no signs of active bleeding (no hematochezia, melena, hemoptysis, hematuria). Rectal exam negative in ED for blood  - obtain iron panel  - likely component of iron deficiency as patient was prescribed iron per [facility] med list review  - could also be attributable to chronic disease/underlying malignancy  - trend CBC  - maintain active T&S  - transfuse if Hgb <7 Hgb on admission 7.9, prior admission in the 10s-11s, currently no signs of active bleeding (no hematochezia, melena, hemoptysis, hematuria). Rectal exam negative in ED for blood  - obtain iron panel  - likely component of iron deficiency as patient was prescribed iron per [facility] med list review  - could also be attributable to chronic disease/underlying malignancy (likely)  - f/w retic count  - trend CBC  - maintain active T&S  - transfuse if Hgb <7 Hgb on admission 7.9, prior admission in the 10s-11s, currently no signs of active bleeding (no hematochezia, melena, hemoptysis, hematuria). Rectal exam negative in ED for blood  - obtain iron panel  - likely component of iron deficiency as patient was prescribed iron per [facility] med list review  - could also be attributable to chronic disease/underlying malignancy (likely)  - retic count wnl  - trend CBC  - maintain active T&S  - transfuse if Hgb <7

## 2021-03-26 NOTE — ED ADULT NURSE NOTE - OBJECTIVE STATEMENT
88 y/o female BIBEMNS c/o SOB, hx of pleural effusion, afebrile, noted cough. As per EMS patient was 02sat in the 60s at the nursing home.

## 2021-03-26 NOTE — ED PROVIDER NOTE - ATTENDING CONTRIBUTION TO CARE
86 y/o female with hx of pleural effusion, lung ca, HLD, arthritis c/o sob. Pt sent in by [facility] rehab for O2 sat 70% which improved with 2L nasal cannula. Pt reports feeling sob today. no cough or cp. no ha or dizziness. no leg pain or swelling. no abd pain, n/v. no back pain. no further complaints. Pt AAO, NAD, scattered rhonchi b/l. ? COVID, ? worsening lung ca. ecg with no ischemic changes, labs noted, worsening anemia. covid pending. antibiotics given for ? pneumonia on cxr. Admitted to medicine.

## 2021-03-26 NOTE — H&P ADULT - NSHPLABSRESULTS_GEN_ALL_CORE
LABS:                         7.9    9.18  )-----------( 438      ( 26 Mar 2021 15:36 )             28.4     03-26    142  |  104  |  24<H>  ----------------------------<  95  5.0   |  33<H>  |  0.80    Ca    8.4      26 Mar 2021 15:36    TPro  7.0  /  Alb  2.7<L>  /  TBili  0.3  /  DBili  x   /  AST  45<H>  /  ALT  46<H>  /  AlkPhos  618<H>  03-26    PT/INR - ( 26 Mar 2021 15:36 )   PT: 12.2 sec;   INR: 1.02          PTT - ( 26 Mar 2021 15:36 )  PTT:27.8 sec    CARDIAC MARKERS ( 26 Mar 2021 15:36 )  x     / 0.01 ng/mL / x     / x     / x          Serum Pro-Brain Natriuretic Peptide: 1148 pg/mL (03-26 @ 15:36)        RADIOLOGY, EKG & ADDITIONAL TESTS: Reviewed.

## 2021-03-26 NOTE — ED PROVIDER NOTE - CLINICAL SUMMARY MEDICAL DECISION MAKING FREE TEXT BOX
sob with O2 sat in 70's at [facility]. ? COVID, ? worsening lung ca. ecg no ischemic changes, labs noted, worsening anemia. covid pending. antibiotics given for ? pneumonia on cxr. plan for admission to medicine.

## 2021-03-26 NOTE — H&P ADULT - PROBLEM SELECTOR PLAN 6
on home atorvastatin 20mg  -c/w home atorvastatin Patient has severe mitral regurg however no decreased EF noted on echo from 2019. No history of heart failure however BNP elevated to >1000, which it has been in past as well (1034 in 10/20)  - monitor for increase in LE edema  - caution w/ IVF.   - consider repeat TTE Patient has severe mitral regurg however no decreased EF noted on echo from 2019. No history of heart failure however BNP elevated to >1000, which it has been in past as well (1034 in 10/20)  - monitor for increase in LE edema  - caution w/ IVF.   - can consider repeat TTE

## 2021-03-26 NOTE — ED ADULT TRIAGE NOTE - CHIEF COMPLAINT QUOTE
86 y/o female BIBEMS for evaluation of SOB, as per EMS nursing home staff reported patient O2sat at 50%, Pt is awake, aox4. Hx of pleural effusions.

## 2021-03-26 NOTE — H&P ADULT - ATTENDING COMMENTS
[ ] Shortness of Breath   -Patient presenting with SOB and desaturation to the 70s in EMS, reportedly.  -Patient is on 2-3L NC at home – currently on 3-4L NC, sat 99% - seemingly at baseline w/o any difficulty breathing.   -On CXR – initial in ER not drastically different from prior – given Lasix 20mg IV – however, AM CXR w/ what appears to be worsening of R sided effusion.   -On exam, bilateral crackles w/ decreased breath sounds on the R.   -Definitely a degree of fluid in the lungs – strange that R side would accumulate effusion after talc pleurodesis.   -No SIRS criteria, no symptoms of pneumonia, Pro-rodo negative. Opted to not continue antibiotics.   -Continue to monitor I&O – and diurese as needed.   -Pulmonology Consult – seen by Dr Nguyen previously.   -Held Lovenox and ASA should patient undergo thoracentesis tomorrow.   -Likely that patient should have repeat CT Scan after drainage.   -Patient also with known mod MR (rec for [facility] for better assessment made prev) – however, LE without edema, BNP relatively unchanged from prior. Lower suspicion for this as etiology of SOB.     [ ] Pleural Effusions, Etiology unknown   -Prior effusion (s/p thoracentesis and pleuroscopy) without evidence of malignancy on cytopathology. Also deemed non-infectious.  -If for thoracentesis – repeat fluid studies.     [ ] Elevated Alk Phos  -Worsening from prior with a mild transaminitis.   -Potentially related to malignancy? Denies bony pain.   -RUQ Sono    [ ] Anemia  -Hgb 7.9 from a baseline of 10   -No peripheral signs of bleeding and does not report dark stools.   -ZOILA negative in ER.   -Retic Index – hypoproliferative.   -Reported h/o VI – F/u Iron Panel, B12, and Folate.

## 2021-03-26 NOTE — H&P ADULT - ASSESSMENT
86yo F, nonsmoker, with PMHx of lung adenocarcinoma (s/p resection/XRT), severe MR, and recent admission 8/2020 for fall and R leg weakness and then in 10/2020 for hypoxemia to 80s on 3L NC (her usual O2) found then to have sepsis 2/2 to pneumonia and acute hypoxic respiratory failure and stepped up to [facility] for observation s/p pleuroscopy and talc pleurodesis and R chest tube placement for ~2days, now comes in from [facility] with desaturation of O2 to 70% c/f potential pneumonia vs. recurrence of pleural effusion.

## 2021-03-26 NOTE — H&P ADULT - PROBLEM SELECTOR PLAN 4
Hx of recurrent pleural effusions. Last admission had R-sided pleuroscopy w/ pleurodesis 10/27. S/p chest tube removal 10/29.  - pulm consult  - repeat AM cxr to assess for worsening of effusion  - c/w NC O2 Hx of recurrent pleural effusions. Last admission had R-sided pleuroscopy w/ pleurodesis 10/27. S/p chest tube removal 10/29.  - pulm consult  - repeat AM cxr to assess for worsening of effusion  - per notes, patient supposed to be on lasix however not on med rec from [facility] papers therefore clarify with pulm if patient should be on lasix  - c/w NC O2 History of adenocarcinoma of RLL s/p VATS resection in 2013, ANANYA XRT in 2016, and RMF lesion s/p XRT in 2017. Also per chart review, had recent PET showing 2 new FDG-avid subcentimeter nodules in L subpleural region  - f/u pulm recs  - will need outpatient f/u w/ Dr. Beal on discharge.    #Elevated alk phos  could be attributable to underlying malignancy  -ctm  -can consider further imaging or heme onc consult for staging, as perhaps indicative of spread/recurrence of malignancy however pleural effusions were never definitively diagnosed as malignant just presumed

## 2021-03-26 NOTE — H&P ADULT - PROBLEM SELECTOR PLAN 7
on home atorvastatin 20mg and asa 81  -c/w home atorvastatin  -hold asa 81 in setting of anemia and restart as appropriate if no signs of active bleeding and hgb stable on home atorvastatin 20mg and asa 81  -c/w home atorvastatin  -c/w asa 81 on home atorvastatin 20mg and asa 81  -c/w home atorvastatin  -holding ASA in case of thora tomrorow

## 2021-03-26 NOTE — H&P ADULT - PROBLEM SELECTOR PLAN 1
Was admitted for desat to 70s at [facility], which improved with 2L NC O2. Now on ~3-4L satting high 90s when examined  - wet read on cxr: R effusion worse compared to prior cxr from last admission in october and L effusion better; new R-sided opacities  - during last admission, pt with recurrent pleural effusions from known malignancy  - ddx includes: recurrent effusion vs CAP vs covid vs aspiration pneumonia as patient reporting that when she drinks liquids she coughs  - mgmt for possible recurrent effusion as beow  - c/w vanc and cefepime for pneumonia (aspiration vs CAP), mrsa swab and if negative d/c vanc  - NPO pending bedside dysphagia and consider speech and swallow eval  - continue with NC O2 (currently on 3-4L) and wean as tolerated  - f/u official read of cxr Was admitted for desat to 70s at [facility], which improved with 2L NC O2. Now on ~3-4L satting high 90s when examined  - wet read on cxr: R effusion worse compared to prior cxr from last admission in october and L effusion better; new R-sided opacities  - during last admission, pt with recurrent pleural effusions from known malignancy  - ddx includes: recurrent effusion vs CAP vs covid vs aspiration pneumonia as patient reporting that when she drinks liquids she coughs  - mgmt for possible recurrent effusion as beow  - c/w vanc and cefepime for pneumonia (aspiration vs CAP), mrsa swab and if negative d/c vanc  - NPO pending bedside dysphagia and consider speech and swallow eval  - continue with NC O2 (currently on 3-4L) and wean as tolerated  - f/u covid swab  - f/u official read of cxr Was admitted for desat to 70s at [facility], which improved with 2L NC O2. Now on ~3-4L satting high 90s when examined  - wet read on cxr: R effusion worse compared to prior cxr from last admission in october and L effusion better; new R-sided opacities  - during last admission, pt with recurrent pleural effusions from known malignancy  - ddx includes: recurrent effusion vs CAP vs covid vs aspiration pneumonia as patient reporting that when she drinks liquids she coughs  - mgmt for possible recurrent effusion as below  - c/w vanc and cefepime for pneumonia (aspiration vs CAP), mrsa swab and if negative d/c vanc  - vanc dosing by level, f/u trough and redose in AM /27  - cefepime 1g QD, will require ID approval to continue  - continue with NC O2 (currently on 3-4L) and wean as tolerated  - f/u covid swab  - f/u official read of cxr Was admitted for desat to 70s at [facility], which improved with 2L NC O2. Now on ~3-4L satting high 90s when examined  - wet read on cxr: L effusion worse compared to prior cxr from last admission in october and R effusion better; new R-sided opacities  - during last admission, pt with recurrent pleural effusions from known malignancy  - ddx includes: recurrent effusion vs CAP vs covid vs aspiration pneumonia as patient reporting that when she drinks liquids she coughs  - mgmt for possible recurrent effusion and for pneumonia as below  - continue with NC O2 (currently on 3-4L) and wean as tolerated  - f/u covid swab  - f/u official read of cxr

## 2021-03-26 NOTE — H&P ADULT - PROBLEM SELECTOR PLAN 2
As above, new R sided opacities and new hypoxia potentially c/f pneumonia  - s/p 1 dose vanc and cefepime in the ED  - f/u sputum Cx  - MRSA swab and d/c vanc if negative  - wean O2 as tolerated As above, new R sided opacities and new hypoxia potentially c/f pneumonia  - s/p 1 dose vanc and cefepime in the ED  - f/u sputum Cx  - MRSA swab and d/c vanc if negative  - cefepime 1g QD will require ID approval  - wean O2 as tolerated As above, new R sided opacities and new hypoxia potentially c/f pneumonia  - s/p 1 dose vanc and cefepime in the ED  - f/u sputum Cx  - can continue with ceftriaxone as PNA would be CAP and pt allergic to azithro however procal negative therefore can consider utility of abx  - wean O2 as tolerated

## 2021-03-27 LAB
ANION GAP SERPL CALC-SCNC: 8 MMOL/L — SIGNIFICANT CHANGE UP (ref 5–17)
APTT BLD: 28.9 SEC — SIGNIFICANT CHANGE UP (ref 27.5–35.5)
BLD GP AB SCN SERPL QL: NEGATIVE — SIGNIFICANT CHANGE UP
BUN SERPL-MCNC: 25 MG/DL — HIGH (ref 7–23)
CALCIUM SERPL-MCNC: 8.6 MG/DL — SIGNIFICANT CHANGE UP (ref 8.4–10.5)
CHLORIDE SERPL-SCNC: 101 MMOL/L — SIGNIFICANT CHANGE UP (ref 96–108)
CO2 SERPL-SCNC: 35 MMOL/L — HIGH (ref 22–31)
COVID-19 SPIKE DOMAIN AB INTERP: POSITIVE
COVID-19 SPIKE DOMAIN ANTIBODY RESULT: >250 U/ML — HIGH
CREAT SERPL-MCNC: 0.85 MG/DL — SIGNIFICANT CHANGE UP (ref 0.5–1.3)
FERRITIN SERPL-MCNC: 48 NG/ML — SIGNIFICANT CHANGE UP (ref 15–150)
FOLATE SERPL-MCNC: 11.4 NG/ML — SIGNIFICANT CHANGE UP
GLUCOSE SERPL-MCNC: 83 MG/DL — SIGNIFICANT CHANGE UP (ref 70–99)
HAPTOGLOB SERPL-MCNC: 226 MG/DL — HIGH (ref 34–200)
HCT VFR BLD CALC: 26.9 % — LOW (ref 34.5–45)
HGB BLD-MCNC: 7.6 G/DL — LOW (ref 11.5–15.5)
INR BLD: 1.04 — SIGNIFICANT CHANGE UP (ref 0.88–1.16)
IRON SATN MFR SERPL: 23 UG/DL — LOW (ref 30–160)
IRON SATN MFR SERPL: 7 % — LOW (ref 14–50)
LDH SERPL L TO P-CCNC: 380 U/L — HIGH (ref 50–242)
MAGNESIUM SERPL-MCNC: 1.9 MG/DL — SIGNIFICANT CHANGE UP (ref 1.6–2.6)
MCHC RBC-ENTMCNC: 22.8 PG — LOW (ref 27–34)
MCHC RBC-ENTMCNC: 28.3 GM/DL — LOW (ref 32–36)
MCV RBC AUTO: 80.8 FL — SIGNIFICANT CHANGE UP (ref 80–100)
NRBC # BLD: 0 /100 WBCS — SIGNIFICANT CHANGE UP (ref 0–0)
OB PNL STL: POSITIVE
PHOSPHATE SERPL-MCNC: 3.8 MG/DL — SIGNIFICANT CHANGE UP (ref 2.5–4.5)
PLATELET # BLD AUTO: 415 K/UL — HIGH (ref 150–400)
POTASSIUM SERPL-MCNC: 4.5 MMOL/L — SIGNIFICANT CHANGE UP (ref 3.5–5.3)
POTASSIUM SERPL-SCNC: 4.5 MMOL/L — SIGNIFICANT CHANGE UP (ref 3.5–5.3)
PROTHROM AB SERPL-ACNC: 12.4 SEC — SIGNIFICANT CHANGE UP (ref 10.6–13.6)
RBC # BLD: 3.33 M/UL — LOW (ref 3.8–5.2)
RBC # FLD: 21.1 % — HIGH (ref 10.3–14.5)
RETICS #: 58.9 K/UL — SIGNIFICANT CHANGE UP (ref 25–125)
RETICS/RBC NFR: 1.8 % — SIGNIFICANT CHANGE UP (ref 0.5–2.5)
RH IG SCN BLD-IMP: POSITIVE — SIGNIFICANT CHANGE UP
SARS-COV-2 IGG+IGM SERPL QL IA: >250 U/ML — HIGH
SARS-COV-2 IGG+IGM SERPL QL IA: POSITIVE
SARS-COV-2 RNA SPEC QL NAA+PROBE: SIGNIFICANT CHANGE UP
SODIUM SERPL-SCNC: 144 MMOL/L — SIGNIFICANT CHANGE UP (ref 135–145)
TIBC SERPL-MCNC: 339 UG/DL — SIGNIFICANT CHANGE UP (ref 220–430)
UIBC SERPL-MCNC: 316 UG/DL — SIGNIFICANT CHANGE UP (ref 110–370)
VIT B12 SERPL-MCNC: 578 PG/ML — SIGNIFICANT CHANGE UP (ref 232–1245)
WBC # BLD: 9.73 K/UL — SIGNIFICANT CHANGE UP (ref 3.8–10.5)
WBC # FLD AUTO: 9.73 K/UL — SIGNIFICANT CHANGE UP (ref 3.8–10.5)

## 2021-03-27 PROCEDURE — 99233 SBSQ HOSP IP/OBS HIGH 50: CPT | Mod: GC

## 2021-03-27 PROCEDURE — 71045 X-RAY EXAM CHEST 1 VIEW: CPT | Mod: 26

## 2021-03-27 PROCEDURE — 99223 1ST HOSP IP/OBS HIGH 75: CPT | Mod: GC

## 2021-03-27 PROCEDURE — 76705 ECHO EXAM OF ABDOMEN: CPT | Mod: 26

## 2021-03-27 RX ORDER — ASPIRIN/CALCIUM CARB/MAGNESIUM 324 MG
81 TABLET ORAL DAILY
Refills: 0 | Status: DISCONTINUED | OUTPATIENT
Start: 2021-03-27 | End: 2021-04-02

## 2021-03-27 RX ORDER — HEPARIN SODIUM 5000 [USP'U]/ML
5000 INJECTION INTRAVENOUS; SUBCUTANEOUS EVERY 12 HOURS
Refills: 0 | Status: DISCONTINUED | OUTPATIENT
Start: 2021-03-27 | End: 2021-03-27

## 2021-03-27 RX ORDER — FERROUS SULFATE 325(65) MG
325 TABLET ORAL DAILY
Refills: 0 | Status: DISCONTINUED | OUTPATIENT
Start: 2021-03-27 | End: 2021-04-02

## 2021-03-27 RX ADMIN — HEPARIN SODIUM 5000 UNIT(S): 5000 INJECTION INTRAVENOUS; SUBCUTANEOUS at 18:46

## 2021-03-27 RX ADMIN — Medication 325 MILLIGRAM(S): at 12:23

## 2021-03-27 RX ADMIN — ATORVASTATIN CALCIUM 20 MILLIGRAM(S): 80 TABLET, FILM COATED ORAL at 22:04

## 2021-03-27 RX ADMIN — Medication 81 MILLIGRAM(S): at 18:46

## 2021-03-27 NOTE — DIETITIAN INITIAL EVALUATION ADULT. - PROBLEM SELECTOR PLAN 8
F: none  E: replete K<4, Mg<2  N: DASH  VTE Prophylaxis: holding for potential thora overnight  C: Full Code  D: NAGA

## 2021-03-27 NOTE — CONSULT NOTE ADULT - ASSESSMENT
87 year old female nonsmoker with PMHx of lung adenocarcinoma (likely multiple primaries) with complicated oncologic hx, well known to our team last seen in Nov 2020 for recurrent pleural effusion s/p right talc pleurodesis presenting with acute on chronic respiratory failure.     ·	Acute on chronic respiratory failure  ·	lung adenocarcinoma, metachronous / multiple primaries.  ·	Hx Recurrent pleural effusion s/p right chemical pleurodesis.     Per hx patient was hypoxic while on baseline 2L NC at nursing home, CXR In ED showed pleural effusion. POCUS assessment shows Trcae to no pleural effusion bilaterally. Patient did receive 10 mg IV lasix in ED. She is satting 94% on 2 LNC. Does not look toxic.   - NO intervention needed as thers is almost NO pleural effusion suggesting successful Right pleurodesis.  - Prior pleural fluid or pleural biopsy never was malignant.   - She should follow up with Dr. Nguyen outpatient.   _ can be discharged from Pulm point of view.     Pulm will sign off  call us with questions     Patient seen and discussed with Dr Addison.    87 year old female nonsmoker with PMHx of lung adenocarcinoma (likely multiple primaries) with complicated oncologic hx, well known to our team last seen in Nov 2020 for recurrent pleural effusion s/p right talc pleurodesis presenting with acute on chronic respiratory failure.     ·	Acute on chronic respiratory failure  ·	lung adenocarcinoma, metachronous / multiple primaries.  ·	Hx Recurrent pleural effusion s/p right chemical pleurodesis.   ·	Dysphagia    Per hx patient was hypoxic while on baseline 2L NC at nursing home, CXR In ED showed pleural effusion. POCUS assessment shows Trcae to no pleural effusion bilaterally. Patient did receive 10 mg IV lasix in ED. She is satting 94% on 2 LNC. Does not look toxic.   - NO intervention needed as thers is almost NO pleural effusion suggesting successful Right pleurodesis.  - Prior pleural fluid or pleural biopsy never was malignant.   - She should follow up with Dr. Nguyen outpatient.   - consider work up of dysphagia , she c/o coughing / choking when eating ot drinking.   _ can be discharged from Pulm point of view.     Pulm will sign off  call us with questions     Patient seen and discussed with Dr Addison.

## 2021-03-27 NOTE — DIETITIAN INITIAL EVALUATION ADULT. - OTHER CALCULATIONS
ABW used to calculate energy needs due to pt's current body weight within % IBW (93%).  Needs calculated for age and increased secondary to criteria consistent with muscle/fat wasting/malnutrition.

## 2021-03-27 NOTE — PROGRESS NOTE ADULT - SUBJECTIVE AND OBJECTIVE BOX
Pt seen and examined at bedside, no complaints.  comfortable eating breakfast.    Vital Signs Last 24 Hrs  T(C): 36.7 (27 Mar 2021 08:56), Max: 36.9 (26 Mar 2021 14:12)  T(F): 98 (27 Mar 2021 08:56), Max: 98.5 (26 Mar 2021 14:12)  HR: 88 (27 Mar 2021 08:56) (88 - 101)  BP: 113/57 (27 Mar 2021 08:56) (105/58 - 136/60)  BP(mean): --  RR: 20 (27 Mar 2021 08:56) (19 - 20)  SpO2: 95% (27 Mar 2021 12:23) (91% - 100%)    AA and Ox3 NAD  NCAT  neck supple  s1s2 RRR, + 2/6 systolic murmur  lungs cta, mildly decreased BS  abd soft NTND  ext no edema  no focal deficits  skin w/w/p    Complete Blood Count (03.27.21 @ 06:52)    Nucleated RBC: 0 /100 WBCs    WBC Count: 9.73 K/uL    RBC Count: 3.33 M/uL    Hemoglobin: 7.6 g/dL    Hematocrit: 26.9 %    Mean Cell Volume: 80.8 fl    Mean Cell Hemoglobin: 22.8 pg    Mean Cell Hemoglobin Conc: 28.3 gm/dL    Red Cell Distrib Width: 21.1 %    Platelet Count - Automated: 415 K/uL    Basic Metabolic Panel (03.27.21 @ 06:52)    Sodium, Serum: 144 mmol/L    Potassium, Serum: 4.5 mmol/L    Chloride, Serum: 101 mmol/L    Carbon Dioxide, Serum: 35 mmol/L    Anion Gap, Serum: 8 mmol/L    Blood Urea Nitrogen, Serum: 25 mg/dL    Creatinine, Serum: 0.85 mg/dL    Glucose, Serum: 83 mg/dL    Calcium, Total Serum: 8.6 mg/dL    eGFR if Non : 62: The units for eGFR are ml/min/1.73m2 (normalized body surface area). The  eGFR is calculated from a serum creatinine using the CKD-EPI equation.  Other variables required for calculation are race, age and sex. Among  patients with chronic kidney disease (CKD), the eGFR is useful in  determining the stage of disease according to KDOQI CKD classification.  All eGFR results are reported numerically with the following  interpretation.          GFR                    With                        Without     (ml/min/1.73 m2)    Kidney Damage       Kidney Damage        >= 90                    Stage 1                     Normal        60-89                    Stage 2                     Decreased GFR        30-59                    Stage 3         Stage 3        15-29                    Stage 4                     Stage 4        < 15                      Stage 5                     Stage 5  Each stage of CKD assumes that the associated GFR level has been in  effect for at least 3 months. Determination of stages one and two (with  eGFR > 59 ml/min/m2) requires estimation of kidney damage for at least 3  months as defined by structural or functional abnormalities.  Limitations: All estimates of GFR will be less accurate for patientsat  extremes of muscle mass (including but not limited to frail elderly,  critically ill, or cancer patients), those with unusual diets, and those  with conditions associated with reduced secretion or extrarenal  elimination of creatinine. The eGFR equation is not recommended for use  in patients with unstable creatinine levels. mL/min/1.73M2    eGFR if : 71: The units for eGFR are ml/min/1.73m2 (normalized body surface area). The  eGFR is calculated from a serum creatinine using the CKD-EPI equation.  Other variables required for calculation are race, age and sex. Among  patients with chronic kidney disease (CKD), the eGFR is useful in  determining the stage of disease according to KDOQI CKD classification.  All eGFR results are reported numerically with the following  interpretation.          GFR                    With                        Without     (ml/min/1.73 m2)    Kidney Damage       Kidney Damage        >= 90                    Stage 1                     Normal        60-89                    Stage 2                     Decreased GFR        30-59                    Stage 3         Stage 3        15-29                    Stage 4                     Stage 4        < 15                      Stage 5                     Stage 5  Each stage of CKD assumes that the associated GFR level has been in  effect for at least 3 months. Determination of stages one and two (with  eGFR > 59 ml/min/m2) requires estimation of kidney damage for at least 3  months as defined by structural or functional abnormalities.  Limitations: All estimates of GFR will be less accurate for patientsat  extremes of muscle mass (including but not limited to frail elderly,  critically ill, or cancer patients), those with unusual diets, and those  with conditions associated with reduced secretion or extrarenal  elimination of creatinine. The eGFR equation is not recommended for use  in patients with unstable creatinine levels. mL/min/1.73M2

## 2021-03-27 NOTE — DIETITIAN INITIAL EVALUATION ADULT. - PROBLEM SELECTOR PLAN 2
As above, new R sided opacities and new hypoxia potentially c/f pneumonia  - s/p 1 dose vanc and cefepime in the ED  - f/u sputum Cx  - can continue with ceftriaxone as PNA would be CAP and pt allergic to azithro however procal negative therefore can consider utility of abx  - wean O2 as tolerated

## 2021-03-27 NOTE — DIETITIAN INITIAL EVALUATION ADULT. - PROBLEM SELECTOR PLAN 4
History of adenocarcinoma of RLL s/p VATS resection in 2013, ANANYA XRT in 2016, and RMF lesion s/p XRT in 2017. Also per chart review, had recent PET showing 2 new FDG-avid subcentimeter nodules in L subpleural region  - f/u pulm recs  - will need outpatient f/u w/ Dr. Beal on discharge.    #Elevated alk phos  could be attributable to underlying malignancy  -ctm  -can consider further imaging or heme onc consult for staging, as perhaps indicative of spread/recurrence of malignancy however pleural effusions were never definitively diagnosed as malignant just presumed

## 2021-03-27 NOTE — DIETITIAN INITIAL EVALUATION ADULT. - OTHER INFO
Pt with past medical history significant for lung CA s/p resection and RT, severe MR.  Pt admitted for [facility] with desaturations, SOB, coughing with liquids (not solids); PNA vs pleural effusion.    Pt is sleeping at time of assessment; unarousable to verbal stimuli.  Spoke with PCA.   Weight recall: (11/2019) 45.4kg; (08/2020) 40kg; (10/2020) 41.9kg; (3/26/21) 39kg.  Pt noted with ~6.5kg (~14%) weight loss x ~1 year + 4 months.  Pt noted with b/l moderate clavicular wasting, b/l mild buccal wasting, b/l mild temporal wasting and b/l mild orbital wasting; consistent with moderate malnutrition in the setting of chronic illness.  Breakfast tray at bedside.  Pt ate 1/4 bagel, a full yogurt and some fruit.  Per PCA, pt is able to feed herself and tolerated meal well.  No s/s n/v/d/c or pain.

## 2021-03-27 NOTE — CONSULT NOTE ADULT - SUBJECTIVE AND OBJECTIVE BOX
PULMONARY SERVICE INITIAL CONSULT NOTE    HPI:  87 year old female nonsmoker with PMHx of lung adenocarcinoma (likely multiple primaries) with complicated oncologic hx, well known to our team last seen in Nov 2020 for recurrent pleural effusion where we had tried pleurodesis.     Her initial diagnosis of cancer was in 2013 via right RLL VAT's robotic assisted right sub-lobar resection (2 nodules in one wedge), adeno KRAS-wild type, PDL1 not known. Follow up scan in 2014 showed a new lesion in the ANANYA, was biopsied, supposedly adeno, NGS results not known, treated with (5000 cGy over 5 fractions, completed October 2014). Then in 2017 another lesion found in the RML on imaging, no biopsy, empirically treated with radiation (4800 Gy over 4 fractions from 6/6/17- 6/14/17 for a [id number], stage IA NSCLC). She was thought to be too frail for any surgery and refused biopsy. Again, a CT chest 8/15/19 revealed right apical opacity measuring 1.7 x 3.6 x 5.0 cm; both increasing in size from 2017, PET 8/27/19 revealed hypermetabolic opacity in RUL which had increased in density and intensity, Brain MRI for questionable behavior Sept 2019 was negative. She had navigational bronchoscopy in 11/2019; FNA positive for lung adenocarcinoma in RUL (NGS sent: all mutations negative, PDL1<1%) lymph nodes negative. Of note, went into pulmonary edema (valvular disease, HFpEF) and ended up in MICU after bronchoscopy then had CAP. Treated with SBRT 3000 cGy out of a planned 5000 cGy to the RUL tumor with Dr. Cortez (previously saw Dr. Godfrey) from Jan-Feb 2020. Did not complete course because of PNA.  Then a repeat CT done 6/5/20 notable for new 2.5 x 1.0 cm posterior RUL opacity and right-sided pleural effusion increasing in size. PET CT 7/30/20 revealed 2 new FDG-avid sub-centimeter nodules in left sub-pleural region and large right pleural effusion. Fluid from right pleural effusion negative for malignancy. She was admitted with recurrent pleural effusion in 10/2020 where pleural biopsy ( no malignancy) and Talc pleurodesis was performed. Patient was discharged with plans to follow up with Oncology/ Dr Seymour.     [facility] with desaturation of O2 to 70% that improved with placement on 2L NC. Pt reports felt sob today. She denies cough or chest pain, except when she tries to drink fluids, she coughs (not with food). Pulmonary is being consulted for Finding of Pleural effusions and to assess for the need of thoracentesis.     REVIEW OF SYSTEMS:  Constitutional: No fever, or fatigue  Eyes: No eye pain, visual disturbances, or discharge  ENMT:  No difficulty hearing, tinnitus, vertigo; No sinus or throat pain  Neck: No pain, stiffness or neck swelling  Respiratory: see HPI  Cardiovascular: No chest pain, palpitations, dizziness or leg swelling  Gastrointestinal: No abdominal or epigastric pain. No nausea, vomiting or hematemesis; No diarrhea or constipation. No melena or hematochezia.  Genitourinary: No dysuria, frequency, hematuria or incontinence  Neurological: No headaches, memory loss, loss of strength, numbness or tremors  Skin: No itching, burning, rashes or lesions   Lymph Nodes: No enlarged glands  Endocrine: No heat or cold intolerance; No hair loss  Musculoskeletal: No joint pain or swelling; No muscle, back or extremity pain  Psychiatric: No depression, anxiety, mood swings or difficulty sleeping  Heme/Lymph: No easy bruising or bleeding gums  Allergy and Immunologic: No hives or eczema    PAST MEDICAL & SURGICAL HISTORY:  Malignant neoplasm of bronchus and lung, unspecified site  Lung cancer    History of surgery to major organs, presenting hazards to health  removal of R-sided adenocarcinoma, negative lymphnodes        FAMILY HISTORY:  No cancer in family           SOCIAL HISTORY:  Smoking Status: [ ] Current, [ ] Former, [x ] Never  Pack Years:    MEDICATIONS:  Pulmonary:    Antimicrobials:    Anticoagulants:    Onc:    GI/:    Endocrine:  atorvastatin 20 milliGRAM(s) Oral at bedtime    Cardiac:    Other Medications:  ferrous    sulfate 325 milliGRAM(s) Oral daily      Allergies    Bactrim (Unknown)  Cleocin HCl (Unknown)  Flagyl (Unknown)  Honey (Unknown)  iodine (Anaphylaxis)  IV Contrast (Anaphylaxis)  Levaquin (Other; Rash)  penicillin (Unknown)  Zithromax (Unknown)    Intolerances        Vital Signs Last 24 Hrs  T(C): 36.7 (27 Mar 2021 08:56), Max: 36.9 (26 Mar 2021 14:12)  T(F): 98 (27 Mar 2021 08:56), Max: 98.5 (26 Mar 2021 14:12)  HR: 88 (27 Mar 2021 08:56) (88 - 101)  BP: 113/57 (27 Mar 2021 08:56) (105/58 - 136/60)  BP(mean): --  RR: 20 (27 Mar 2021 08:56) (19 - 20)  SpO2: 92% (27 Mar 2021 08:56) (91% - 100%)        PHYSICAL EXAM:  Constitutional: elderly frail woman, comfortable  Head: NC/AT, slight temporal wasting PERRL, EOMI, clear conjunctiva MMM; O2 NC in place  Respiratory: CTA B/L;  Cardiac: +S1/S2; RRR; holosystolic murmur 2/6 heard best at the apex  Gastrointestinal: soft, NT/ND; no rebound or guarding; +BSx4  Genitourinary: normal external genitalia  Extremities: WWP, no clubbing or cyanosis; no peripheral harvinder  Neurologic: AAOx3; CNII-XII grossly intact; no focal deficits          LABS:      CBC Full  -  ( 27 Mar 2021 06:52 )  WBC Count : 9.73 K/uL  RBC Count : 3.33 M/uL  Hemoglobin : 7.6 g/dL  Hematocrit : 26.9 %  Platelet Count - Automated : 415 K/uL  Mean Cell Volume : 80.8 fl  Mean Cell Hemoglobin : 22.8 pg  Mean Cell Hemoglobin Concentration : 28.3 gm/dL  Auto Neutrophil # : x  Auto Lymphocyte # : x  Auto Monocyte # : x  Auto Eosinophil # : x  Auto Basophil # : x  Auto Neutrophil % : x  Auto Lymphocyte % : x  Auto Monocyte % : x  Auto Eosinophil % : x  Auto Basophil % : x    03-27    144  |  101  |  25<H>  ----------------------------<  83  4.5   |  35<H>  |  0.85    Ca    8.6      27 Mar 2021 06:52  Phos  3.8     03-27  Mg     1.9     03-27    TPro  7.0  /  Alb  2.7<L>  /  TBili  0.3  /  DBili  x   /  AST  45<H>  /  ALT  46<H>  /  AlkPhos  618<H>  03-26    PT/INR - ( 27 Mar 2021 06:52 )   PT: 12.4 sec;   INR: 1.04          PTT - ( 27 Mar 2021 06:52 )  PTT:28.9 sec

## 2021-03-27 NOTE — DIETITIAN INITIAL EVALUATION ADULT. - ADD RECOMMEND
1. Monitor PO intake  2. Add Ensure Enlive BID (700kcal, 40g pro) to optimize PO intake  3. Appreciate continued assistance and encouragement with meals

## 2021-03-27 NOTE — DIETITIAN INITIAL EVALUATION ADULT. - PROBLEM SELECTOR PLAN 5
Hgb on admission 7.9, prior admission in the 10s-11s, currently no signs of active bleeding (no hematochezia, melena, hemoptysis, hematuria). Rectal exam negative in ED for blood  - obtain iron panel  - likely component of iron deficiency as patient was prescribed iron per [facility] med list review  - could also be attributable to chronic disease/underlying malignancy (likely)  - retic count wnl  - trend CBC  - maintain active T&S  - transfuse if Hgb <7

## 2021-03-27 NOTE — PROGRESS NOTE ADULT - PROBLEM SELECTOR PLAN 6
Patient has severe mitral regurg however no decreased EF noted on echo from 2019. No history of heart failure however BNP elevated to >1000, which it has been in past as well (1034 in 10/20)  - monitor for increase in LE edema  - caution w/ IVF.   - repeat ECHO

## 2021-03-27 NOTE — DIETITIAN INITIAL EVALUATION ADULT. - PROBLEM SELECTOR PLAN 1
Was admitted for desat to 70s at [facility], which improved with 2L NC O2. Now on ~3-4L satting high 90s when examined  - wet read on cxr: L effusion worse compared to prior cxr from last admission in october and R effusion better; new R-sided opacities  - during last admission, pt with recurrent pleural effusions from known malignancy  - ddx includes: recurrent effusion vs CAP vs covid vs aspiration pneumonia as patient reporting that when she drinks liquids she coughs  - mgmt for possible recurrent effusion and for pneumonia as below  - continue with NC O2 (currently on 3-4L) and wean as tolerated  - f/u covid swab  - f/u official read of cxr

## 2021-03-27 NOTE — DIETITIAN INITIAL EVALUATION ADULT. - PROBLEM SELECTOR PLAN 3
Hx of recurrent pleural effusions. Last admission had R-sided pleuroscopy w/ pleurodesis 10/27. S/p chest tube removal 10/29.  - pulm consult for possible thora in AM  - repeat AM cxr to assess for worsening of effusion  - per notes, patient supposed to be on lasix however not on med rec from [facility] papers therefore clarify with pulm if patient should be on lasix  - gave 1 dose of 20 IV lasix (dose was 20mg PO per med rec here from past admission)  - c/w NC O2 and wean as tolerated

## 2021-03-27 NOTE — DIETITIAN NUTRITION RISK NOTIFICATION - ADDITIONAL COMMENTS/DIETITIAN RECOMMENDATIONS
Weight recall: (11/2019) 45.4kg; (08/2020) 40kg; (10/2020) 41.9kg; (3/26/21) 39kg.  Pt noted with ~6.5kg (~14%) weight loss x ~1 year + 4 months.    Pt noted with b/l moderate clavicular wasting, b/l mild buccal wasting, b/l mild temporal wasting and b/l mild orbital wasting; consistent with moderate malnutrition in the setting of chronic illness.

## 2021-03-27 NOTE — PROGRESS NOTE ADULT - PROBLEM SELECTOR PLAN 5
Hgb on admission 7.9, prior admission in the 10s-11s, currently no signs of active bleeding (no hematochezia, melena, hemoptysis, hematuria). Rectal exam negative in ED for blood  + possible rectal FGD avidity vs normal physiologic. check stool occult.  consider GI.  - obtain iron panel  - likely component of iron deficiency as patient was prescribed iron per [facility] med list review  - could also be attributable to chronic disease/underlying malignancy (likely)  - retic count wnl  - trend CBC  - maintain active T&S  - transfuse if Hgb <7

## 2021-03-27 NOTE — PROGRESS NOTE ADULT - PROBLEM SELECTOR PLAN 2
As above, new R sided opacities and new hypoxia potentially c/w pneumonia  - s/p 1 dose vanc and cefepime in the ED, hold off on further abx as no other s/s infection  - f/u sputum Cx  - wean O2 as tolerated

## 2021-03-27 NOTE — DIETITIAN INITIAL EVALUATION ADULT. - PROBLEM SELECTOR PLAN 6
Patient has severe mitral regurg however no decreased EF noted on echo from 2019. No history of heart failure however BNP elevated to >1000, which it has been in past as well (1034 in 10/20)  - monitor for increase in LE edema  - caution w/ IVF.   - can consider repeat TTE

## 2021-03-27 NOTE — PROGRESS NOTE ADULT - PROBLEM SELECTOR PLAN 1
02 sat 70s on admission, now improved to 95% with 2L NC O2  - wet read on cxr: L effusion worse compared to prior cxr from last admission in october and R effusion better; new R-sided opacities  - during last admission, pt with recurrent pleural effusions from known malignancy  - ddx includes: recurrent effusion vs CAP vs covid vs aspiration pneumonia as patient reporting that when she drinks liquids she coughs  - mgmt for possible recurrent effusion and for pneumonia as below  - continue with NC O2 (currently on 3-4L) and wean as tolerated  - pulm consulted - f/u recs  - f/u official read of cxr

## 2021-03-27 NOTE — DIETITIAN INITIAL EVALUATION ADULT. - PROBLEM SELECTOR PLAN 7
on home atorvastatin 20mg and asa 81  -c/w home atorvastatin  -holding ASA in case of thora tomrorow

## 2021-03-28 LAB
ANION GAP SERPL CALC-SCNC: 3 MMOL/L — LOW (ref 5–17)
ANION GAP SERPL CALC-SCNC: 5 MMOL/L — SIGNIFICANT CHANGE UP (ref 5–17)
BASE EXCESS BLDA CALC-SCNC: 12.1 MMOL/L — HIGH (ref -2–3)
BASE EXCESS BLDA CALC-SCNC: 16.9 MMOL/L — HIGH (ref -2–3)
BUN SERPL-MCNC: 23 MG/DL — SIGNIFICANT CHANGE UP (ref 7–23)
BUN SERPL-MCNC: 24 MG/DL — HIGH (ref 7–23)
CALCIUM SERPL-MCNC: 8.4 MG/DL — SIGNIFICANT CHANGE UP (ref 8.4–10.5)
CALCIUM SERPL-MCNC: 8.5 MG/DL — SIGNIFICANT CHANGE UP (ref 8.4–10.5)
CHLORIDE SERPL-SCNC: 102 MMOL/L — SIGNIFICANT CHANGE UP (ref 96–108)
CHLORIDE SERPL-SCNC: 103 MMOL/L — SIGNIFICANT CHANGE UP (ref 96–108)
CO2 SERPL-SCNC: 37 MMOL/L — HIGH (ref 22–31)
CO2 SERPL-SCNC: 37 MMOL/L — HIGH (ref 22–31)
CREAT SERPL-MCNC: 0.71 MG/DL — SIGNIFICANT CHANGE UP (ref 0.5–1.3)
CREAT SERPL-MCNC: 0.72 MG/DL — SIGNIFICANT CHANGE UP (ref 0.5–1.3)
D DIMER BLD IA.RAPID-MCNC: 352 NG/ML DDU — HIGH
GAS PNL BLDA: SIGNIFICANT CHANGE UP
GAS PNL BLDA: SIGNIFICANT CHANGE UP
GLUCOSE SERPL-MCNC: 127 MG/DL — HIGH (ref 70–99)
GLUCOSE SERPL-MCNC: 97 MG/DL — SIGNIFICANT CHANGE UP (ref 70–99)
HCO3 BLDA-SCNC: 39 MMOL/L — HIGH (ref 21–28)
HCO3 BLDA-SCNC: 45 MMOL/L — HIGH (ref 21–28)
HCT VFR BLD CALC: 26.8 % — LOW (ref 34.5–45)
HCT VFR BLD CALC: 27.1 % — LOW (ref 34.5–45)
HGB BLD-MCNC: 7.5 G/DL — LOW (ref 11.5–15.5)
HGB BLD-MCNC: 7.7 G/DL — LOW (ref 11.5–15.5)
MAGNESIUM SERPL-MCNC: 2.5 MG/DL — SIGNIFICANT CHANGE UP (ref 1.6–2.6)
MCHC RBC-ENTMCNC: 23 PG — LOW (ref 27–34)
MCHC RBC-ENTMCNC: 23.3 PG — LOW (ref 27–34)
MCHC RBC-ENTMCNC: 28 GM/DL — LOW (ref 32–36)
MCHC RBC-ENTMCNC: 28.4 GM/DL — LOW (ref 32–36)
MCV RBC AUTO: 81.9 FL — SIGNIFICANT CHANGE UP (ref 80–100)
MCV RBC AUTO: 82.2 FL — SIGNIFICANT CHANGE UP (ref 80–100)
NRBC # BLD: 0 /100 WBCS — SIGNIFICANT CHANGE UP (ref 0–0)
NRBC # BLD: 0 /100 WBCS — SIGNIFICANT CHANGE UP (ref 0–0)
PCO2 BLDA: 74 MMHG — CRITICAL HIGH (ref 32–45)
PCO2 BLDA: 78 MMHG — CRITICAL HIGH (ref 32–45)
PH BLDA: 7.34 — LOW (ref 7.35–7.45)
PH BLDA: 7.38 — SIGNIFICANT CHANGE UP (ref 7.35–7.45)
PHOSPHATE SERPL-MCNC: 3.3 MG/DL — SIGNIFICANT CHANGE UP (ref 2.5–4.5)
PLATELET # BLD AUTO: 434 K/UL — HIGH (ref 150–400)
PLATELET # BLD AUTO: 435 K/UL — HIGH (ref 150–400)
PO2 BLDA: 197 MMHG — HIGH (ref 83–108)
PO2 BLDA: 87 MMHG — SIGNIFICANT CHANGE UP (ref 83–108)
POTASSIUM SERPL-MCNC: 4.4 MMOL/L — SIGNIFICANT CHANGE UP (ref 3.5–5.3)
POTASSIUM SERPL-MCNC: 4.5 MMOL/L — SIGNIFICANT CHANGE UP (ref 3.5–5.3)
POTASSIUM SERPL-SCNC: 4.4 MMOL/L — SIGNIFICANT CHANGE UP (ref 3.5–5.3)
POTASSIUM SERPL-SCNC: 4.5 MMOL/L — SIGNIFICANT CHANGE UP (ref 3.5–5.3)
RBC # BLD: 3.26 M/UL — LOW (ref 3.8–5.2)
RBC # BLD: 3.31 M/UL — LOW (ref 3.8–5.2)
RBC # FLD: 21.2 % — HIGH (ref 10.3–14.5)
RBC # FLD: 21.2 % — HIGH (ref 10.3–14.5)
SAO2 % BLDA: 96 % — SIGNIFICANT CHANGE UP (ref 95–100)
SAO2 % BLDA: 99 % — SIGNIFICANT CHANGE UP (ref 95–100)
SODIUM SERPL-SCNC: 143 MMOL/L — SIGNIFICANT CHANGE UP (ref 135–145)
SODIUM SERPL-SCNC: 144 MMOL/L — SIGNIFICANT CHANGE UP (ref 135–145)
WBC # BLD: 8.99 K/UL — SIGNIFICANT CHANGE UP (ref 3.8–10.5)
WBC # BLD: 9.6 K/UL — SIGNIFICANT CHANGE UP (ref 3.8–10.5)
WBC # FLD AUTO: 8.99 K/UL — SIGNIFICANT CHANGE UP (ref 3.8–10.5)
WBC # FLD AUTO: 9.6 K/UL — SIGNIFICANT CHANGE UP (ref 3.8–10.5)

## 2021-03-28 PROCEDURE — 71045 X-RAY EXAM CHEST 1 VIEW: CPT | Mod: 26

## 2021-03-28 PROCEDURE — 99233 SBSQ HOSP IP/OBS HIGH 50: CPT

## 2021-03-28 PROCEDURE — 99233 SBSQ HOSP IP/OBS HIGH 50: CPT | Mod: GC

## 2021-03-28 PROCEDURE — 74018 RADEX ABDOMEN 1 VIEW: CPT | Mod: 26

## 2021-03-28 RX ORDER — FUROSEMIDE 40 MG
20 TABLET ORAL ONCE
Refills: 0 | Status: COMPLETED | OUTPATIENT
Start: 2021-03-28 | End: 2021-03-28

## 2021-03-28 RX ADMIN — Medication 20 MILLIGRAM(S): at 18:59

## 2021-03-28 RX ADMIN — Medication 325 MILLIGRAM(S): at 11:32

## 2021-03-28 RX ADMIN — Medication 81 MILLIGRAM(S): at 11:32

## 2021-03-28 NOTE — PROGRESS NOTE ADULT - PROBLEM SELECTOR PLAN 6
Pt with hx of severe MR. Prior echo (8/2020) showing also with normal RV/LV size/fxn with presence of PFO. No known hx of HF.   - F/u repeat TTE  - Caution with IVF

## 2021-03-28 NOTE — PROGRESS NOTE ADULT - PROBLEM SELECTOR PLAN 8
F: None  E: Replete PRN  N: Diet, NPO on BiPAP  DVT ppx: SCD  GI ppx: None  Bowel: None  Dispo: 7LA     FULL CODE

## 2021-03-28 NOTE — PROGRESS NOTE ADULT - SUBJECTIVE AND OBJECTIVE BOX
** TRANSFER FROM [facility] TO [facility] **    HOSPITAL COURSE:  86 yo F, non-smoker with PMHx lung adenocarcinoma (s/p resection/radiation), severe MR, recent admission for sepsis 2/2 PNA with acute hypoxic respiratory failure (10/2020) presenting from [facility] rehab on 3/26 after reportedly having desaturation to 70% on RA with improvement on 2L NC. Patient admitted to [facility] for further management of acute hypoxic respiratory failure in the setting of B/L pleural effusion (L>R) with R lung opacity concerning for PNA however no abx started as no other signs/sx of infxn. Pulm consulted however no plans for intervention as prior pleural studies were negative for malignancy and POCUS showing only trace effusions.     ICU consulted (3/28 PM) after patient with worsening hypoxia and increasing O2 requirements improved with NRB. ABG concerning for respiratory acidosis with pH 7.34, pCO2 74, Bicarb 39. Patient is being stepped up to 7LA for further management of acute hypoxic hypercarbic respiratory failure.    SUBJECTIVE: Patient seen and examined at bedside.  REVIEW OF SYSTEMS:  Earlier in the AM, patient reported that she was feeling tired but improved from yesterday and mostly fine. Had some shortness of breath which she felt was helped with 4L nasal canula. Denied other symptoms.  LAter in the day was paged that sats dropping and was escalated to 6L, as above in hsopital course (and then escalataed to BiPAPA). At this poitn patient complains of increasing dyspnea, feelign hot, feeling chest discomfort and feelign very weak and tired. Continues to be AAO x3, although hard of hearing.     OBJECTIVE:    VITAL SIGNS:  ICU Vital Signs Last 24 Hrs  T(C): 36.6 (28 Mar 2021 15:36), Max: 36.6 (28 Mar 2021 15:36)  T(F): 97.8 (28 Mar 2021 15:36), Max: 97.8 (28 Mar 2021 15:36)  HR: 108 (28 Mar 2021 19:35) (80 - 108)  BP: 116/62 (28 Mar 2021 15:36) (116/62 - 121/66)  BP(mean): --  ABP: --  ABP(mean): --  RR: 24 (28 Mar 2021 19:35) (18 - 24)  SpO2: 100% (28 Mar 2021 19:35) (89% - 100%)        03-27 @ 07:01  -  03-28 @ 07:00  --------------------------------------------------------  IN: 477 mL / OUT: 700 mL / NET: -223 mL      CAPILLARY BLOOD GLUCOSE          PHYSICAL EXAM:    General: Elderly cachectic, chronically ill with evidence of muscle wasting. Appears fatigued and uncomfortable. In mild distress  HEENT: NC/AT; PERRL, clear conjunctiva, very hard of hearing  Neck: supple  Respiratory: b/l crackles and coarse breath sounds throughout lung fields  Cardiovascular: +S1/S2; RRR  Abdomen: soft, hyperactice soudns, mildly distended  Extremities: 2+ peripheral pulses b/l; no LE edema, cachectic extremities.  Skin: normal color and turgor; no rash  Neurological: AAO3  Psychiatry: anxious, pleasant    LABS:                        7.7    8.99  )-----------( 435      ( 28 Mar 2021 17:09 )             27.1     03-28    144  |  102  |  23  ----------------------------<  127<H>  4.5   |  37<H>  |  0.71    Ca    8.4      28 Mar 2021 17:09  Phos  3.3     03-28  Mg     2.5     03-28      PT/INR - ( 27 Mar 2021 06:52 )   PT: 12.4 sec;   INR: 1.04          PTT - ( 27 Mar 2021 06:52 )  PTT:28.9 sec      RADIOLOGY & ADDITIONAL TESTS: Reviewed.    MEDICATIONS:  MEDICATIONS  (STANDING):  aspirin enteric coated 81 milliGRAM(s) Oral daily  atorvastatin 20 milliGRAM(s) Oral at bedtime  ferrous    sulfate 325 milliGRAM(s) Oral daily    MEDICATIONS  (PRN):      ALLERGIES:  Allergies    Bactrim (Unknown)  Cleocin HCl (Unknown)  Flagyl (Unknown)  Honey (Unknown)  iodine (Anaphylaxis)  IV Contrast (Anaphylaxis)  Levaquin (Other; Rash)  penicillin (Unknown)  Zithromax (Unknown)    Intolerances

## 2021-03-28 NOTE — PROGRESS NOTE ADULT - PROBLEM SELECTOR PLAN 4
Patient with hx of RLL adenocarcinoma s/p VATS resection (2013), ANANYA radiation (2016), RMF lesion radiation (2017). Previous PET scan showing new subcentimeter nodules in L subpleural region. Follows with Dr. Delcid as outpatient.  - F/u with Dr. Nguyen (pulmonary) as outpatient   - Palliative consult in AM to address GOC

## 2021-03-28 NOTE — PROGRESS NOTE ADULT - ASSESSMENT
88 yo F, non-smoker with PMHx lung adenocarcinoma (s/p resection/radiation), severe MR, recent admission for sepsis 2/2 PNA with acute hypoxic respiratory failure (10/2020) presenting from [facility] rehab on 3/26 after reportedly having desaturation to 70% on RA with improvement on 2L NC. Initially admitted to [facility] however found to have worsening hypoxia with CO2 retention and increasing O2 requirements so now being stepped up to 7LA for further management.  86 yo F, non-smoker with PMHx lung adenocarcinoma (s/p resection/radiation), severe MR, recent admission for sepsis 2/2 PNA with acute hypoxic respiratory failure (10/2020) presenting from [facility] rehab on 3/26 after reportedly having desaturation to 70% on RA with improvement on 2L NC. Initially admitted to [facility] however found to have worsening hypoxia with CO2 retention and increasing O2 requirements so now being stepped up to 7[facility] for further management.

## 2021-03-28 NOTE — CONSULT NOTE ADULT - SUBJECTIVE AND OBJECTIVE BOX
Critical Care CONSULT NOTE    CHIEF COMPLAINT: Patient is a 87y old  Female who presents with a chief complaint of shortness of breath (28 Mar 2021 16:18)    HPI:  88yo F, nonsmoker, with PMHx of lung adenocarcinoma (s/p resection/XRT), severe MR, and recent admission 8/2020 for fall and R leg weakness and then in 10/2020 for hypoxemia to 80s on 3L NC (her usual O2) found then to have sepsis 2/2 to pneumonia and acute hypoxic respiratory failure, had pleuroscopy and talc pleurodesis and R chest tube placement. She presented this admission from [facility] rehab with desaturation to 70% that improved with 2L NC, she was started on ___ and admitted to [email or web address] for further management.  ICU consult today for new desaturation to ____ on ____ NC.        PAST MEDICAL & SURGICAL HISTORY:  Malignant neoplasm of bronchus and lung, unspecified site  Lung cancer    History of surgery to major organs, presenting hazards to health  removal of R-sided adenocarcinoma, negative lymphnodes    REVIEW OF SYSTEMS: negative except as stated in HPI.    MEDICATIONS  (STANDING):  aspirin enteric coated 81 milliGRAM(s) Oral daily  atorvastatin 20 milliGRAM(s) Oral at bedtime  ferrous    sulfate 325 milliGRAM(s) Oral daily  furosemide   Injectable 20 milliGRAM(s) IV Push once    MEDICATIONS  (PRN):      Allergies    Bactrim (Unknown)  Cleocin HCl (Unknown)  Flagyl (Unknown)  Honey (Unknown)  iodine (Anaphylaxis)  IV Contrast (Anaphylaxis)  Levaquin (Other; Rash)  penicillin (Unknown)  Zithromax (Unknown)    Intolerances    FAMILY HISTORY:  No pertinent family history  No significant family history    SOCIAL HISTORY:     Vital Signs Last 24 Hrs  T(C): 36.6 (28 Mar 2021 15:36), Max: 36.6 (28 Mar 2021 15:36)  T(F): 97.8 (28 Mar 2021 15:36), Max: 97.8 (28 Mar 2021 15:36)  HR: 91 (28 Mar 2021 15:36) (80 - 96)  BP: 116/62 (28 Mar 2021 15:36) (106/58 - 121/66)  BP(mean): --  RR: 22 (28 Mar 2021 15:36) (18 - 22)  SpO2: 97% (28 Mar 2021 15:56) (89% - 97%)    PHYSICAL EXAM:  GEN: alert, NAD, comfortable in bed  HEENT: PERRL, no relative afferent pupillary defect, EOMI, moist MM, no pharyngeal erythema or exudate  CV: RRR, S1/S2, no murmurs appreciated, no JVD, no carotid bruits  RESP: CTA bilaterally, good respiratory effort, good air movement, no wheezes/rales  ABD: soft, BS+, nontender, nondistended, no guarding/rebound  EXTREMITIES: WWP, pulses 2+ in all 4 extremities, no peripheral edema  MSK: full range of motion of all 4 extremities  SKIN: warm, dry, intact, no rashes  NEURO: A&Ox3, no focal deficits, strength 5/5 throughout, no sensory deficits  PSYC: normal mood/affect    LABS: reviewed.                    7.7    8.99  )-----------( 435      ( 28 Mar 2021 17:09 )             27.1     03-28    144  |  102  |  23  ----------------------------<  127<H>  4.5   |  37<H>  |  0.71    Ca    8.4      28 Mar 2021 17:09  Phos  3.3     03-28  Mg     2.5     03-28    PT/INR - ( 27 Mar 2021 06:52 )   PT: 12.4 sec;   INR: 1.04     PTT - ( 27 Mar 2021 06:52 )  PTT:28.9 sec    Culture - Blood (collected 26 Mar 2021 21:00)  Source: .Blood Blood  Preliminary Report (27 Mar 2021 21:01):    No growth at 1 day.    Culture - Blood (collected 26 Mar 2021 21:00)  Source: .Blood Blood  Preliminary Report (27 Mar 2021 21:01):    No growth at 1 day.      RADIOLOGY AND ADDITIONAL TESTS: reviewed.    Chest XR:  EKG:  Echocardiogram: Critical Care CONSULT NOTE    CHIEF COMPLAINT: Patient is a 87y old  Female who presents with a chief complaint of shortness of breath (28 Mar 2021 16:18)    HPI:  86yo F, nonsmoker, with PMHx of lung adenocarcinoma (s/p resection/XRT), severe MR, and recent admission 8/2020 for fall and R leg weakness and then in 10/2020 for hypoxemia to 80s on 3L NC (her usual O2) found then to have sepsis 2/2 to pneumonia and acute hypoxic respiratory failure, had pleuroscopy and talc pleurodesis and R chest tube placement. She presented this admission from [facility] rehab with desaturation to 70% that improved with 2L NC, she was then admitted to [facility] for further management.  ICU consult today for new desaturation to 77% on 6L NC. Patient was then placed on NRB mask and sat increased to 100%. ABG showed pH 7.34, pCO2 74, pO2 197, Bicarb 39, sat 99% on NRB,        PAST MEDICAL & SURGICAL HISTORY:  Malignant neoplasm of bronchus and lung, unspecified site  Lung cancer    History of surgery to major organs, presenting hazards to health  removal of R-sided adenocarcinoma, negative lymphnodes    REVIEW OF SYSTEMS: negative except as stated in HPI.    MEDICATIONS  (STANDING):  aspirin enteric coated 81 milliGRAM(s) Oral daily  atorvastatin 20 milliGRAM(s) Oral at bedtime  ferrous    sulfate 325 milliGRAM(s) Oral daily  furosemide   Injectable 20 milliGRAM(s) IV Push once    MEDICATIONS  (PRN):      Allergies    Bactrim (Unknown)  Cleocin HCl (Unknown)  Flagyl (Unknown)  Honey (Unknown)  iodine (Anaphylaxis)  IV Contrast (Anaphylaxis)  Levaquin (Other; Rash)  penicillin (Unknown)  Zithromax (Unknown)    Intolerances    FAMILY HISTORY:  No pertinent family history  No significant family history    SOCIAL HISTORY:     Vital Signs Last 24 Hrs  T(C): 36.6 (28 Mar 2021 15:36), Max: 36.6 (28 Mar 2021 15:36)  T(F): 97.8 (28 Mar 2021 15:36), Max: 97.8 (28 Mar 2021 15:36)  HR: 91 (28 Mar 2021 15:36) (80 - 96)  BP: 116/62 (28 Mar 2021 15:36) (106/58 - 121/66)  BP(mean): --  RR: 22 (28 Mar 2021 15:36) (18 - 22)  SpO2: 97% (28 Mar 2021 15:56) (89% - 97%)    PHYSICAL EXAM:  GEN: alert, NAD, comfortable in bed  HEENT: PERRL, no relative afferent pupillary defect, EOMI, moist MM, no pharyngeal erythema or exudate  CV: RRR, S1/S2, no murmurs appreciated, no JVD, no carotid bruits  RESP: CTA bilaterally, good respiratory effort, good air movement, no wheezes/rales  ABD: soft, BS+, nontender, nondistended, no guarding/rebound  EXTREMITIES: WWP, pulses 2+ in all 4 extremities, no peripheral edema  MSK: full range of motion of all 4 extremities  SKIN: warm, dry, intact, no rashes  NEURO: A&Ox3, no focal deficits, strength 5/5 throughout, no sensory deficits  PSYC: normal mood/affect    LABS: reviewed.                    7.7    8.99  )-----------( 435      ( 28 Mar 2021 17:09 )             27.1     03-28    144  |  102  |  23  ----------------------------<  127<H>  4.5   |  37<H>  |  0.71    Ca    8.4      28 Mar 2021 17:09  Phos  3.3     03-28  Mg     2.5     03-28    PT/INR - ( 27 Mar 2021 06:52 )   PT: 12.4 sec;   INR: 1.04     PTT - ( 27 Mar 2021 06:52 )  PTT:28.9 sec    Culture - Blood (collected 26 Mar 2021 21:00)  Source: .Blood Blood  Preliminary Report (27 Mar 2021 21:01):    No growth at 1 day.    Culture - Blood (collected 26 Mar 2021 21:00)  Source: .Blood Blood  Preliminary Report (27 Mar 2021 21:01):    No growth at 1 day.      RADIOLOGY AND ADDITIONAL TESTS: reviewed.    Chest XR:  EKG:  Echocardiogram: Critical Care CONSULT NOTE    CHIEF COMPLAINT: Patient is a 87y old  Female who presents with a chief complaint of shortness of breath (28 Mar 2021 16:18)    HPI:  86yo F, nonsmoker, with PMHx of lung adenocarcinoma (s/p resection/XRT), severe MR, and recent admission 8/2020 for fall and R leg weakness and then in 10/2020 for hypoxemia to 80s on 3L NC (her usual O2) found then to have sepsis 2/2 to pneumonia and acute hypoxic respiratory failure, had pleuroscopy and talc pleurodesis and R chest tube placement. She presented this admission from [facility] rehab with desaturation to 70% that improved with 2L NC, she was then admitted to [facility] for further management. Over the course of this hospitalization pt was progressively requiring increasing amouns of O2 until she was on 6L NC.  ICU consult today for new desaturation to 77% on 6L NC. Patient was then placed on NRB mask and sat increased to 100%. CXR showed new fluid in L lung fissure. She was given 20mg IV lasix. ABG showed pH 7.34, pCO2 74, pO2 197, Bicarb 39, sat 99% on NRB. Patient ordered for Bipap at bedside.     On ROS, pt states that she had been feeling unwell and short of breath.    PAST MEDICAL & SURGICAL HISTORY:  Malignant neoplasm of bronchus and lung, unspecified site  Lung cancer    History of surgery to major organs, presenting hazards to health  removal of R-sided adenocarcinoma, negative lymphnodes    REVIEW OF SYSTEMS: negative except as stated in HPI.    MEDICATIONS  (STANDING):  aspirin enteric coated 81 milliGRAM(s) Oral daily  atorvastatin 20 milliGRAM(s) Oral at bedtime  ferrous    sulfate 325 milliGRAM(s) Oral daily  furosemide   Injectable 20 milliGRAM(s) IV Push once    MEDICATIONS  (PRN):    Allergies  Bactrim (Unknown)  Cleocin HCl (Unknown)  Flagyl (Unknown)  Honey (Unknown)  iodine (Anaphylaxis)  IV Contrast (Anaphylaxis)  Levaquin (Other; Rash)  penicillin (Unknown)  Zithromax (Unknown)    Intolerances    FAMILY HISTORY:  No pertinent family history  No significant family history    SOCIAL HISTORY:     Vital Signs Last 24 Hrs  T(C): 36.6 (28 Mar 2021 15:36), Max: 36.6 (28 Mar 2021 15:36)  T(F): 97.8 (28 Mar 2021 15:36), Max: 97.8 (28 Mar 2021 15:36)  HR: 91 (28 Mar 2021 15:36) (80 - 96)  BP: 116/62 (28 Mar 2021 15:36) (106/58 - 121/66)  BP(mean): --  RR: 22 (28 Mar 2021 15:36) (18 - 22)  SpO2: 97% (28 Mar 2021 15:56) (89% - 97%)    PHYSICAL EXAM:  GEN: alert, NAD, comfortable in bed, thin appearing, temporal wasting  HEENT: PERRL, no relative afferent pupillary defect, EOMI, moist MM, no pharyngeal erythema or exudate  CV: RRR, S1/S2, no murmurs appreciated, no JVD, no carotid bruits  RESP: crackles bilaterally, satting 100% on bipap with Fio2 50%, calm speaking in full sentences though the bipap mask, no wheezes/rales  ABD: soft, BS+, nontender, +distended, no guarding/rebound  EXTREMITIES: WWP, pulses 2+ in all 4 extremities, no peripheral edema  MSK: full range of motion of all 4 extremities  SKIN: warm, dry, intact, no rashes  NEURO: A&Ox3, no focal deficits, strength 5/5 throughout, no sensory deficits  PSYC: normal mood/affect    LABS: reviewed.                    7.7    8.99  )-----------( 435      ( 28 Mar 2021 17:09 )             27.1     03-28    144  |  102  |  23  ----------------------------<  127<H>  4.5   |  37<H>  |  0.71    Ca    8.4      28 Mar 2021 17:09  Phos  3.3     03-28  Mg     2.5     03-28    PT/INR - ( 27 Mar 2021 06:52 )   PT: 12.4 sec;   INR: 1.04     PTT - ( 27 Mar 2021 06:52 )  PTT:28.9 sec    Culture - Blood (collected 26 Mar 2021 21:00)  Source: .Blood Blood  Preliminary Report (27 Mar 2021 21:01):    No growth at 1 day.    Culture - Blood (collected 26 Mar 2021 21:00)  Source: .Blood Blood  Preliminary Report (27 Mar 2021 21:01):    No growth at 1 day.      RADIOLOGY AND ADDITIONAL TESTS: reviewed.    Chest XR:  EKG:  Echocardiogram:

## 2021-03-28 NOTE — PROGRESS NOTE ADULT - SUBJECTIVE AND OBJECTIVE BOX
Pt seen and examined at bedside, no complaints.      Vital Signs Last 24 Hrs  T(C): 36.6 (28 Mar 2021 15:36), Max: 36.6 (27 Mar 2021 16:24)  T(F): 97.8 (28 Mar 2021 15:36), Max: 97.8 (27 Mar 2021 16:24)  HR: 91 (28 Mar 2021 15:36) (80 - 96)  BP: 116/62 (28 Mar 2021 15:36) (102/69 - 121/66)  BP(mean): --  RR: 22 (28 Mar 2021 15:36) (18 - 22)  SpO2: 97% (28 Mar 2021 15:56) (89% - 100%)    Awake and alert, NAD  NCAT  neck supple  s1s2 RRR  lungs CTA b/l, mildly decreased BS  abd soft NTND  ext no edema  no focal deficits  skin w/w/p    Complete Blood Count (03.28.21 @ 06:25)    Nucleated RBC: 0 /100 WBCs    WBC Count: 9.60 K/uL    RBC Count: 3.26 M/uL    Hemoglobin: 7.5 g/dL    Hematocrit: 26.8 %    Mean Cell Volume: 82.2 fl    Mean Cell Hemoglobin: 23.0 pg    Mean Cell Hemoglobin Conc: 28.0 gm/dL    Red Cell Distrib Width: 21.2 %    Platelet Count - Automated: 434 K/uL    Basic Metabolic Panel (03.28.21 @ 06:25)    Sodium, Serum: 143 mmol/L    Potassium, Serum: 4.4 mmol/L    Chloride, Serum: 103 mmol/L    Carbon Dioxide, Serum: 37 mmol/L    Anion Gap, Serum: 3 mmol/L    Blood Urea Nitrogen, Serum: 24 mg/dL    Creatinine, Serum: 0.72 mg/dL    Glucose, Serum: 97 mg/dL    Calcium, Total Serum: 8.5 mg/dL    eGFR if Non : 75: The units for eGFR are ml/min/1.73m2 (normalized body surface area). The  eGFR is calculated from a serum creatinine using the CKD-EPI equation.  Other variables required for calculation are race, age and sex. Among  patients with chronic kidney disease (CKD), the eGFR is useful in  determining the stage of disease according to KDOQI CKD classification.  All eGFR results are reported numerically with the following  interpretation.          GFR                    With                        Without     (ml/min/1.73 m2)    Kidney Damage       Kidney Damage        >= 90                    Stage 1                     Normal        60-89                    Stage 2                     Decreased GFR        30-59                    Stage 3         Stage 3        15-29                    Stage 4                     Stage 4        < 15                      Stage 5                     Stage 5  Each stage of CKD assumes that the associated GFR level has been in  effect for at least 3 months. Determination of stages one and two (with  eGFR > 59 ml/min/m2) requires estimation of kidney damage for at least 3  months as defined by structural or functional abnormalities.  Limitations: All estimates of GFR will be less accurate for patientsat  extremes of muscle mass (including but not limited to frail elderly,  critically ill, or cancer patients), those with unusual diets, and those  with conditions associated with reduced secretion or extrarenal  elimination of creatinine. The eGFR equation is not recommended for use  in patients with unstable creatinine levels. mL/min/1.73M2    eGFR if : 87: The units for eGFR are ml/min/1.73m2 (normalized body surface area). The  eGFR is calculated from a serum creatinine using the CKD-EPI equation.  Other variables required for calculation are race, age and sex. Among  patients with chronic kidney disease (CKD), the eGFR is useful in  determining the stage of disease according to KDOQI CKD classification.  All eGFR results are reported numerically with the following  interpretation.          GFR                    With                        Without     (ml/min/1.73 m2)    Kidney Damage       Kidney Damage        >= 90                    Stage 1                     Normal        60-89                    Stage 2                     Decreased GFR        30-59                    Stage 3         Stage 3        15-29                    Stage 4                     Stage 4        < 15                      Stage 5                     Stage 5  Each stage of CKD assumes that the associated GFR level has been in  effect for at least 3 months. Determination of stages one and two (with  eGFR > 59 ml/min/m2) requires estimation of kidney damage for at least 3  months as defined by structural or functional abnormalities.  Limitations: All estimates of GFR will be less accurate for patientsat  extremes of muscle mass (including but not limited to frail elderly,  critically ill, or cancer patients), those with unusual diets, and those  with conditions associated with reduced secretion or extrarenal  elimination of creatinine. The eGFR equation is not recommended for use  in patients with unstable creatinine levels. mL/min/1.73M2

## 2021-03-28 NOTE — PROGRESS NOTE ADULT - ATTENDING COMMENTS
86 y/o F with PMHx Lung adenocarcinoma s/p resection/XRT, severe MR, requiring home O2 2L NC, admitted on 10/2020 with sepsis and CAP s/p pleuroscopy and talc pleurodesis and chest tube placement. Now admitted with worsening hypoxia with o2sat in the 70's (came from rehab center). ICU consult placed given noted hypercapnia on most recent ABG.     Problem list:  Acute hypercapnic respiratory failure   Volume overload  Underlying Lung adeno s/p resection/XRT  severe MR  chronic hypoxic resp failure on home O2 2L    Plan:  Agree to place on NIPPV  Repeat ABG in the AM   Crackles on exam and fluid noted in fissure- reasonable to give another lasix 20mg IV x1 overnight  CXR in the AM   Consider CT chest if no improvement of sx  Upgrade to Telemetry for close hemodynamic monitoring    Prognosis guarded.      Critical care services provided.     I have personally and independently provided 39 minutes of critical care services.  This excludes any time spent on separate procedures or teaching. 88 y/o F with PMHx Lung adenocarcinoma s/p resection/XRT, severe MR, requiring home O2 2L NC, admitted on 10/2020 with sepsis and CAP s/p pleuroscopy and talc pleurodesis and chest tube placement. Now admitted with worsening hypoxia with o2sat in the 70's (came from rehab center). ICU consult placed given noted hypercapnia on most recent ABG.     Problem list:  Acute hypercapnic respiratory failure   Volume overload  Underlying Lung adeno s/p resection/XRT  severe MR  chronic hypoxic resp failure on home O2 2L    Plan:  Agree to place on NIPPV  Repeat ABG in the AM   Crackles on exam and fluid noted in fissure- reasonable to give another lasix 20mg IV x1 overnight  CXR in the AM   Consider CT chest if no improvement of sx  Upgrade to Telemetry for close hemodynamic monitoring    Prognosis guarded.

## 2021-03-28 NOTE — PROGRESS NOTE ADULT - PROBLEM SELECTOR PLAN 5
On admission, initial labs concerning for Hb 7.9 (baseline 10-11 from chart review). Positive FOBT on admission. Likely in setting of active malignancy. Component of VI as patient previously on iron supplementation at [facility].   - Holding pharmacologic DVT ppx at this time  - Will consider GI consult pending [facility]  - F/u iron panel  - Maintain active T/S  - Transfuse for Hb <7 On admission, initial labs concerning for Hb 7.9 (baseline 10-11 from chart review). Positive FOBT on admission. Likely in setting of active malignancy. Component of VI as patient previously on iron supplementation at [facility].   - Holding pharmacologic DVT ppx at this time  - GI consulted, will see pt in AM  - F/u iron panel  - Maintain active T/S  - Transfuse for Hb <7

## 2021-03-28 NOTE — PROGRESS NOTE ADULT - PROBLEM SELECTOR PLAN 3
Hx of recurrent pleural effusions. Last admission had R-sided pleuroscopy w/ pleurodesis 10/27. S/p chest tube removal 10/29.  - per notes, patient supposed to be on lasix however not on med rec from [facility] papers therefore clarify with pulm if patient should be on lasix  - gave 1 dose of 20 IV lasix (dose was 20mg PO per med rec here from past admission)  - c/w NC O2 and wean as tolerated

## 2021-03-28 NOTE — PROGRESS NOTE ADULT - PROBLEM SELECTOR PLAN 6
Patient has severe mitral regurg however no decreased EF noted on echo from 2019. No history of heart failure however BNP elevated to >1000, which it has been in past as well (1034 in 10/20)  - monitor for increase in LE edema  - repeat ECHO

## 2021-03-28 NOTE — PROGRESS NOTE ADULT - PROBLEM SELECTOR PLAN 1
Pt with hx of 2L NC at baseline (on/off at [facility] rehab) px to [facility] after hypoxia to 70s on RA, improved w/ 2L NC. Initial CXR showing B/L pleural effusions (L>R) with R opacity. Pulm consulted, POCUS with minimal effusion and no indication to tap at this time. Stepped up to 7LA after being found to have worsening hypoxia with increasing O2 requirements improved on NRB, respiratory acidosis on ABG. Patient currently being management for acute hypoxic hypercapnic respiratory failure.  - C/w BiPAP overnight   - Repeat ABG and CXR in AM  - Lasix 20mg IVP PRN   - Strict I/O   - Pulm consulted, signed off previously as no interventions indicated  - Wean O2 as tolerated

## 2021-03-28 NOTE — PROGRESS NOTE ADULT - PROBLEM SELECTOR PLAN 5
On admission, initial labs concerning for Hb 7.9 (baseline 10-11 from chart review). Positive FOBT on admission. Likely in setting of active malignancy. Component of VI as patient previously on iron supplementation at [facility].   - Holding pharmacologic DVT ppx at this time  - GI consulted, will see pt in AM  - F/u iron panel  - Maintain active T/S  - Transfuse for Hb <7

## 2021-03-28 NOTE — PROGRESS NOTE ADULT - PROBLEM SELECTOR PLAN 3
Pt with hx of recurrent pleural effusions. Last admission (10/2020) with R-sided pleuroscopy and pleurodesis performed negative for malignancy. Seen by pulm on this admission with bedside POCUS showing minimal effusion, no indication for intervention at this time.   - Management otherwise as above  - Reconsult pulm as indicated

## 2021-03-28 NOTE — PROGRESS NOTE ADULT - PROBLEM SELECTOR PLAN 2
- s/p 1 dose vanc and cefepime in the ED, hold off on further abx as no other s/s infection  - f/u sputum Cx  - wean O2 as tolerated

## 2021-03-28 NOTE — SWALLOW BEDSIDE ASSESSMENT ADULT - ESOPHAGEAL PHASE
SUMMARY AND IMPRESSIONS:  Received patient today sitting up in bed.  Alert and relatively pleasant.  Denies dysphagia, but does report trouble with pills, which is her baseline.  This clinician provided rationale for swallow evaluation, upon which time patient declined to eat/take trials.  With some coaxing, patient accepted thin liquids by straw, which she tolerated w/o incident.  No oropharyngeal swallow issues demonstrated per bedside measure.  No s/s of airway invasion.  Do not believe that further assessment is needed even w/i setting of limited participation today.  Reconsult as needed.  Can consider meds whole in applesauce or pudding.

## 2021-03-28 NOTE — PROGRESS NOTE ADULT - PROBLEM SELECTOR PLAN 5
Hgb on admission 7.9, prior admission in the 10s-11s, currently no signs of active bleeding (no hematochezia, melena, hemoptysis, hematuria). Rectal exam negative in ED for blood  + possible rectal FGD avidity vs normal physiologic. check stool occult.  consider GI.  - obtain iron panel  - likely component of iron deficiency as patient was prescribed iron per [employer] med list review  - could also be attributable to chronic disease/underlying malignancy (likely)  - retic count wnl  - trend CBC  - maintain active T&S  - transfuse if Hgb <7  -stool occult + today, consider GI consult pending collateral from PMD

## 2021-03-28 NOTE — CONSULT NOTE ADULT - ASSESSMENT
88yo F, nonsmoker, with PMHx of lung adenocarcinoma (s/p resection/XRT), severe MR, and recent admission 8/2020 for fall and R leg weakness and then in 10/2020 for hypoxemia and sepsis 2/2 to pneumonia s/p pleuroscopy w/ talc pleurodesis and R chest tube placement. [facility] with hypoxia c/f potential pneumonia vs. recurrence of pleural effusion.    Plan:  #Hypoxic Respiratory Failure    #Lung Cancer 86yo F, nonsmoker, with PMHx of lung adenocarcinoma (s/p resection/XRT), severe MR, and recent admission 8/2020 for fall and R leg weakness and then in 10/2020 for hypoxemia and sepsis 2/2 to pneumonia s/p pleuroscopy w/ talc pleurodesis and R chest tube placement. Presented this admission from [facility] rehab with hypoxia c/f potential pneumonia vs. recurrence of pleural effusion.    Plan:  #Acute Hypercapnic and Hypoxic Respiratory Failure  Pt initially found to be desatting to 77% on 6L NC and was paced on NRB at which point ABG was drawn which showed pH 7.34, pCO2 74, pO2 197, Bicarb 39, O2 sat 99%. Pt placed on Bipap.  - c/w Bipap overnight  - Make NPO  - repeat ABG after 30 minutes on Bipap  - ABG in AM  - Repeat CXR in AM  - Lasix 20mg IV PRN overnight, monitor I&Os  - Consider repeat CT chest in AM given worsening hypoxia over time    [facility]   88yo F, nonsmoker, with PMHx of lung adenocarcinoma (s/p resection/XRT), severe MR, and recent admission 8/2020 for fall and R leg weakness and then in 10/2020 for hypoxemia and sepsis 2/2 to pneumonia s/p pleuroscopy w/ talc pleurodesis and R chest tube placement. Presented this admission from [facility] rehab with hypoxia c/f potential pneumonia vs. recurrence of pleural effusion.    Plan:  #Acute Hypercapnic and Hypoxic Respiratory Failure  Pt initially found to be desatting to 77% on 6L NC and was paced on NRB at which point ABG was drawn which showed pH 7.34, pCO2 74, pO2 197, Bicarb 39, O2 sat 99%. Pt placed on Bipap.  - c/w Bipap overnight  - Make NPO  - repeat ABG after 30 minutes on Bipap  - ABG in AM  - Repeat CXR in AM  - Lasix 20mg IV PRN overnight, monitor I&Os  - Consider repeat CT chest in AM given worsening hypoxia over time      Case discussed with ICU attending, Dr. Sukhjinder Dalton.  Dispo: Step up to [facility] for further monitoring

## 2021-03-28 NOTE — PROGRESS NOTE ADULT - PROBLEM SELECTOR PLAN 2
On admission, CXR showing concern for R-sided opacities concerning for PNA, s/p Vanc/Cefepime x1 in ED. No further abx started as no other signs/sx of infxn.   - Blood cx (3/26): NGTD  - Management of acute hypoxic hypercarbic respiratory failure as above

## 2021-03-28 NOTE — PROGRESS NOTE ADULT - SUBJECTIVE AND OBJECTIVE BOX
** TRANSFER FROM [facility] TO [facility] **    HOSPITAL COURSE:  88 yo F, non-smoker with PMHx lung adenocarcinoma (s/p resection/radiation), severe MR, recent admission for sepsis 2/2 PNA with acute hypoxic respiratory failure (10/2020) presenting from [facility] rehab on 3/26 after reportedly having desaturation to 70% on RA with improvement on 2L NC. Patient admitted to [facility] for further management of acute hypoxic respiratory failure in the setting of B/L pleural effusion (L>R) with R lung opacity concerning for PNA however no abx started as no other signs/sx of infxn. Pulm consulted however no plans for intervention as prior pleural studies were negative for malignancy and POCUS showing only trace effusions.     ICU consulted (3/28 PM) after patient with worsening hypoxia and increasing O2 requirements improved with NRB. ABG concerning for respiratory acidosis with pH 7.34, pCO2 74, Bicarb 39. Patient is being stepped up to [facility] for further management of acute hypoxic hypercarbic respiratory failure.    SUBJECTIVE:   Patient seen on arrival to [facility].     OBJECTIVE:    VITAL SIGNS:  ICU Vital Signs Last 24 Hrs  T(C): 36.6 (28 Mar 2021 15:36), Max: 36.6 (28 Mar 2021 15:36)  T(F): 97.8 (28 Mar 2021 15:36), Max: 97.8 (28 Mar 2021 15:36)  HR: 91 (28 Mar 2021 15:36) (80 - 96)  BP: 116/62 (28 Mar 2021 15:36) (106/58 - 121/66)  RR: 22 (28 Mar 2021 15:36) (18 - 22)  SpO2: 97% (28 Mar 2021 15:56) (89% - 97%)        03-27 @ 07:01  -  03-28 @ 07:00  --------------------------------------------------------  IN: 477 mL / OUT: 700 mL / NET: -223 mL      CAPILLARY BLOOD GLUCOSE          PHYSICAL EXAM:    General: NAD; Lying comfortably in bed  HEENT: NC/AT; PERRL, clear conjunctiva; MMM  Neck: Supple. No JVD or thyromegaly   Respiratory: CTA b/l. No wheezes, rhonchi, rales.  Cardiovascular: +S1/S2; RRR; No murmurs, rubs, gallops.  Abdomen: soft, NT/ND; +BS x4  Extremities: WWP, 2+ peripheral pulses b/l; no LE edema  Skin: normal color and turgor; no rash  Neurological: AOx3    MEDICATIONS:  MEDICATIONS  (STANDING):  aspirin enteric coated 81 milliGRAM(s) Oral daily  atorvastatin 20 milliGRAM(s) Oral at bedtime  ferrous    sulfate 325 milliGRAM(s) Oral daily    MEDICATIONS  (PRN):      ALLERGIES:  Allergies    Bactrim (Unknown)  Cleocin HCl (Unknown)  Flagyl (Unknown)  Honey (Unknown)  iodine (Anaphylaxis)  IV Contrast (Anaphylaxis)  Levaquin (Other; Rash)  penicillin (Unknown)  Zithromax (Unknown)    Intolerances        LABS:                        7.7    8.99  )-----------( 435      ( 28 Mar 2021 17:09 )             27.1     03-28    144  |  102  |  23  ----------------------------<  127<H>  4.5   |  37<H>  |  0.71    Ca    8.4      28 Mar 2021 17:09  Phos  3.3     03-28  Mg     2.5     03-28      PT/INR - ( 27 Mar 2021 06:52 )   PT: 12.4 sec;   INR: 1.04          PTT - ( 27 Mar 2021 06:52 )  PTT:28.9 sec      RADIOLOGY & ADDITIONAL TESTS: Reviewed. ** TRANSFER FROM [facility] TO [facility] **    HOSPITAL COURSE:  86 yo F, non-smoker with PMHx lung adenocarcinoma (s/p resection/radiation), severe MR, recent admission for sepsis 2/2 PNA with acute hypoxic respiratory failure (10/2020) presenting from [facility] rehab on 3/26 after reportedly having desaturation to 70% on RA with improvement on 2L NC. Patient admitted to [facility] for further management of acute hypoxic respiratory failure in the setting of B/L pleural effusion (L>R) with R lung opacity concerning for PNA however no abx started as no other signs/sx of infxn. Pulm consulted however no plans for intervention as prior pleural studies were negative for malignancy and POCUS showing only trace effusions.     ICU consulted (3/28 PM) after patient with worsening hypoxia and increasing O2 requirements improved with NRB. ABG concerning for respiratory acidosis with pH 7.34, pCO2 74, Bicarb 39. Patient is being stepped up to [facility] for further management of acute hypoxic hypercarbic respiratory failure.    SUBJECTIVE:   Patient seen on arrival to [facility].     OBJECTIVE:    VITAL SIGNS:  ICU Vital Signs Last 24 Hrs  T(C): 36.6 (28 Mar 2021 15:36), Max: 36.6 (28 Mar 2021 15:36)  T(F): 97.8 (28 Mar 2021 15:36), Max: 97.8 (28 Mar 2021 15:36)  HR: 91 (28 Mar 2021 15:36) (80 - 96)  BP: 116/62 (28 Mar 2021 15:36) (106/58 - 121/66)  RR: 22 (28 Mar 2021 15:36) (18 - 22)  SpO2: 97% (28 Mar 2021 15:56) (89% - 97%)        03-27 @ 07:01  -  03-28 @ 07:00  --------------------------------------------------------  IN: 477 mL / OUT: 700 mL / NET: -223 mL      CAPILLARY BLOOD GLUCOSE          PHYSICAL EXAM:    General: Elderly cachetic female sitting up in hospital bed on NRB.  HEENT: Bitemporal wasting. Dry MM.  Neck: Supple. No JVD or thyromegaly   Respiratory: CTA b/l. No wheezes, rhonchi, rales.  Cardiovascular: +S1/S2; RRR; Holosystolic murmur heard best at apex.   Abdomen: soft, NT. Distended lower abd. BSx4.   Extremities: WWP, 2+ peripheral pulses b/l; no LE edema  Neurological: AOx3. Hard of hearing. Following commands. Moving all extremities.    MEDICATIONS:  MEDICATIONS  (STANDING):  aspirin enteric coated 81 milliGRAM(s) Oral daily  atorvastatin 20 milliGRAM(s) Oral at bedtime  ferrous    sulfate 325 milliGRAM(s) Oral daily    MEDICATIONS  (PRN):      ALLERGIES:  Allergies    Bactrim (Unknown)  Cleocin HCl (Unknown)  Flagyl (Unknown)  Honey (Unknown)  iodine (Anaphylaxis)  IV Contrast (Anaphylaxis)  Levaquin (Other; Rash)  penicillin (Unknown)  Zithromax (Unknown)    Intolerances        LABS:                        7.7    8.99  )-----------( 435      ( 28 Mar 2021 17:09 )             27.1     03-28    144  |  102  |  23  ----------------------------<  127<H>  4.5   |  37<H>  |  0.71    Ca    8.4      28 Mar 2021 17:09  Phos  3.3     03-28  Mg     2.5     03-28      PT/INR - ( 27 Mar 2021 06:52 )   PT: 12.4 sec;   INR: 1.04          PTT - ( 27 Mar 2021 06:52 )  PTT:28.9 sec      RADIOLOGY & ADDITIONAL TESTS: Reviewed.

## 2021-03-28 NOTE — PROGRESS NOTE ADULT - PROBLEM SELECTOR PLAN 1
02 sat 70s on admission, now fluctuates mid-high 90s on 2-4L NC  comfortable on exam  appreciated pulm recs - no significant pleural effusion  - hold off on abx given WBCs normal, afebrile  - continue with NC O2 (currently on 3-4L) and wean as tolerated  -cxr improved  - follows with o/p pulm for adenocarcinoma

## 2021-03-28 NOTE — PROGRESS NOTE ADULT - ASSESSMENT
88 yo F, non-smoker with PMHx lung adenocarcinoma (s/p resection/radiation), severe MR, recent admission for sepsis 2/2 PNA with acute hypoxic respiratory failure (10/2020) presenting from [facility] rehab on 3/26 after reportedly having desaturation to 70% on RA with improvement on 2L NC. Initially admitted to [facility] however found to have worsening hypoxia with CO2 retention and increasing O2 requirements so now being stepped up to [facility] for further management.

## 2021-03-29 LAB
ALBUMIN SERPL ELPH-MCNC: 2.8 G/DL — LOW (ref 3.3–5)
ALP SERPL-CCNC: 600 U/L — HIGH (ref 40–120)
ALT FLD-CCNC: 40 U/L — SIGNIFICANT CHANGE UP (ref 10–45)
ANION GAP SERPL CALC-SCNC: 7 MMOL/L — SIGNIFICANT CHANGE UP (ref 5–17)
AST SERPL-CCNC: 43 U/L — HIGH (ref 10–40)
BASE EXCESS BLDA CALC-SCNC: 14.4 MMOL/L — HIGH (ref -2–3)
BASOPHILS # BLD AUTO: 0.06 K/UL — SIGNIFICANT CHANGE UP (ref 0–0.2)
BASOPHILS NFR BLD AUTO: 0.7 % — SIGNIFICANT CHANGE UP (ref 0–2)
BILIRUB SERPL-MCNC: 0.2 MG/DL — SIGNIFICANT CHANGE UP (ref 0.2–1.2)
BUN SERPL-MCNC: 28 MG/DL — HIGH (ref 7–23)
CALCIUM SERPL-MCNC: 8.7 MG/DL — SIGNIFICANT CHANGE UP (ref 8.4–10.5)
CHLORIDE SERPL-SCNC: 102 MMOL/L — SIGNIFICANT CHANGE UP (ref 96–108)
CO2 SERPL-SCNC: 36 MMOL/L — HIGH (ref 22–31)
CREAT SERPL-MCNC: 0.81 MG/DL — SIGNIFICANT CHANGE UP (ref 0.5–1.3)
EOSINOPHIL # BLD AUTO: 0.34 K/UL — SIGNIFICANT CHANGE UP (ref 0–0.5)
EOSINOPHIL NFR BLD AUTO: 3.7 % — SIGNIFICANT CHANGE UP (ref 0–6)
GAS PNL BLDA: SIGNIFICANT CHANGE UP
GGT SERPL-CCNC: 439 U/L — HIGH (ref 8–40)
GLUCOSE SERPL-MCNC: 86 MG/DL — SIGNIFICANT CHANGE UP (ref 70–99)
HCO3 BLDA-SCNC: 42 MMOL/L — HIGH (ref 21–28)
HCT VFR BLD CALC: 28.5 % — LOW (ref 34.5–45)
HGB BLD-MCNC: 7.9 G/DL — LOW (ref 11.5–15.5)
IMM GRANULOCYTES NFR BLD AUTO: 0.2 % — SIGNIFICANT CHANGE UP (ref 0–1.5)
LYMPHOCYTES # BLD AUTO: 0.85 K/UL — LOW (ref 1–3.3)
LYMPHOCYTES # BLD AUTO: 9.3 % — LOW (ref 13–44)
MAGNESIUM SERPL-MCNC: 2 MG/DL — SIGNIFICANT CHANGE UP (ref 1.6–2.6)
MCHC RBC-ENTMCNC: 23 PG — LOW (ref 27–34)
MCHC RBC-ENTMCNC: 27.7 GM/DL — LOW (ref 32–36)
MCV RBC AUTO: 83.1 FL — SIGNIFICANT CHANGE UP (ref 80–100)
MONOCYTES # BLD AUTO: 1.14 K/UL — HIGH (ref 0–0.9)
MONOCYTES NFR BLD AUTO: 12.4 % — SIGNIFICANT CHANGE UP (ref 2–14)
NEUTROPHILS # BLD AUTO: 6.75 K/UL — SIGNIFICANT CHANGE UP (ref 1.8–7.4)
NEUTROPHILS NFR BLD AUTO: 73.7 % — SIGNIFICANT CHANGE UP (ref 43–77)
NRBC # BLD: 0 /100 WBCS — SIGNIFICANT CHANGE UP (ref 0–0)
PCO2 BLDA: 77 MMHG — CRITICAL HIGH (ref 32–45)
PH BLDA: 7.36 — SIGNIFICANT CHANGE UP (ref 7.35–7.45)
PHOSPHATE SERPL-MCNC: 3.1 MG/DL — SIGNIFICANT CHANGE UP (ref 2.5–4.5)
PLATELET # BLD AUTO: 465 K/UL — HIGH (ref 150–400)
PO2 BLDA: 164 MMHG — HIGH (ref 83–108)
POTASSIUM SERPL-MCNC: 4.5 MMOL/L — SIGNIFICANT CHANGE UP (ref 3.5–5.3)
POTASSIUM SERPL-SCNC: 4.5 MMOL/L — SIGNIFICANT CHANGE UP (ref 3.5–5.3)
PROT SERPL-MCNC: 7.1 G/DL — SIGNIFICANT CHANGE UP (ref 6–8.3)
RBC # BLD: 3.43 M/UL — LOW (ref 3.8–5.2)
RBC # FLD: 21.1 % — HIGH (ref 10.3–14.5)
SAO2 % BLDA: 100 % — SIGNIFICANT CHANGE UP (ref 95–100)
SODIUM SERPL-SCNC: 145 MMOL/L — SIGNIFICANT CHANGE UP (ref 135–145)
WBC # BLD: 9.16 K/UL — SIGNIFICANT CHANGE UP (ref 3.8–10.5)
WBC # FLD AUTO: 9.16 K/UL — SIGNIFICANT CHANGE UP (ref 3.8–10.5)

## 2021-03-29 PROCEDURE — 99223 1ST HOSP IP/OBS HIGH 75: CPT

## 2021-03-29 PROCEDURE — 71045 X-RAY EXAM CHEST 1 VIEW: CPT | Mod: 26

## 2021-03-29 PROCEDURE — 99233 SBSQ HOSP IP/OBS HIGH 50: CPT | Mod: GC

## 2021-03-29 PROCEDURE — 99233 SBSQ HOSP IP/OBS HIGH 50: CPT

## 2021-03-29 PROCEDURE — 78306 BONE IMAGING WHOLE BODY: CPT | Mod: 26

## 2021-03-29 RX ADMIN — Medication 0.5 MILLIGRAM(S): at 15:33

## 2021-03-29 RX ADMIN — ATORVASTATIN CALCIUM 20 MILLIGRAM(S): 80 TABLET, FILM COATED ORAL at 21:24

## 2021-03-29 RX ADMIN — Medication 325 MILLIGRAM(S): at 12:23

## 2021-03-29 RX ADMIN — Medication 81 MILLIGRAM(S): at 12:23

## 2021-03-29 NOTE — CONSULT NOTE ADULT - SUBJECTIVE AND OBJECTIVE BOX
HPI:  86yo F, nonsmoker, with PMHx of lung adenocarcinoma (s/p resection/XRT), recurrent Pleural effusion s/p chest tube and pleurodesis, severe MR, who initially presented from [facility] rehab with desaturation of O2 to 70%. Pt reports felt sob today. She denies cough or chest pain, except when she tries to drink fluids, she coughs (not with food). She denies headache or dizziness, and denies nausea, vomiting, abdominal pain. Has not had any bloody or dark bowel movements, nor blood in her urine.   Last EGD:  Last C scope:     Rectal exam negative in ED.    In the ED,  VS: T 98, HR 99, /74, RR 20, SpO2 100% 4L NC  Labs: Hgb 7.9 (from baseline of 10-11 in october) otherwise CBC wnl; BMP wnl, BUN/Cr 24/0.8 Alb 2.7 Alk P 618 AST 45 ALT 46 BNP 1148 trop negative   EKG: wnl  CXR: wet read: compared to prior cxr L effusion worse, R effusion better, atelectasis in both lungs that looks similar, patchy opacities on R side which look new  Orders: vanc 1g and cefepime 1g x1 in ED   (26 Mar 2021 17:32)    Allergies    Bactrim (Unknown)  Cleocin HCl (Unknown)  Flagyl (Unknown)  Honey (Unknown)  iodine (Anaphylaxis)  IV Contrast (Anaphylaxis)  Levaquin (Other; Rash)  penicillin (Unknown)  Zithromax (Unknown)    Intolerances      Home Medications:  acetaminophen 325 mg oral tablet: 2 tab(s) orally every 6 hours, As needed,  pain (12 Oct 2020 10:18)  aspirin 81 mg oral delayed release tablet: 1 tab(s) orally every 24 hours (12 Oct 2020 10:18)    MEDICATIONS:  MEDICATIONS  (STANDING):  aspirin enteric coated 81 milliGRAM(s) Oral daily  atorvastatin 20 milliGRAM(s) Oral at bedtime  ferrous    sulfate 325 milliGRAM(s) Oral daily    MEDICATIONS  (PRN):    PAST MEDICAL & SURGICAL HISTORY:  Malignant neoplasm of bronchus and lung, unspecified site  Lung cancer    History of surgery to major organs, presenting hazards to health  removal of R-sided adenocarcinoma, negative lymphnodes      FAMILY HISTORY:  No pertinent family history  No significant family history      SOCIAL HISTORY:  [place]: [ ] Current, [ ] Former, [ ] Never; Pack Years:  Alcohol:  Illicit Drugs:    REVIEW OF SYSTEMS:  CONSTITUTIONAL: No weakness, fevers or chills  HEENT: No visual changes; No vertigo or throat pain   NECK: No pain or stiffness  RESPIRATORY: No cough, wheezing, hemoptysis; No shortness of breath  CARDIOVASCULAR: No chest pain or palpitations  GASTROINTESTINAL: No abdominal or epigastric pain. No nausea, vomiting, or hematemesis; No diarrhea or constipation. No melena or hematochezia.  GENITOURINARY: No dysuria, frequency or hematuria  NEUROLOGICAL: No numbness or weakness  SKIN: No itching, burning, rashes, or lesions   All other 10 review of systems is negative unless indicated above.    Vital Signs Last 24 Hrs  T(C): 36.8 (29 Mar 2021 06:00), Max: 37.1 (29 Mar 2021 01:00)  T(F): 98.3 (29 Mar 2021 06:00), Max: 98.7 (29 Mar 2021 01:00)  HR: 82 (29 Mar 2021 06:22) (82 - 108)  BP: 139/62 (29 Mar 2021 04:06) (104/51 - 139/62)  BP(mean): 89 (29 Mar 2021 04:06) (74 - 89)  RR: 20 (29 Mar 2021 04:06) (20 - 26)  SpO2: 100% (29 Mar 2021 06:22) (89% - 100%)      PHYSICAL EXAM:    General: Well developed; well nourished; in no acute distress  Eyes: Anicteric sclerae, moist conjunctivae  HENT: Moist mucous membranes  Neck: Trachea midline, supple  Lungs: Normal respiratory effors and no intercostal retractions  Cardiovascular: RRR  Abdomen: Soft, non-tender non-distended; Normal bowel sounds; No rebound or guarding  Extremities: Normal range of motion, No clubbing, cyanosis or edema  Neurological: Alert and oriented x3  Skin: Warm and dry. No obvious rash    LABS:                        7.9    9.16  )-----------( 465      ( 29 Mar 2021 06:22 )             28.5     03-29    145  |  102  |  28<H>  ----------------------------<  86  4.5   |  36<H>  |  0.81    Ca    8.7      29 Mar 2021 06:22  Phos  3.1     03-29  Mg     2.0     03-29    TPro  7.1  /  Alb  2.8<L>  /  TBili  0.2  /  DBili  x   /  AST  43<H>  /  ALT  40  /  AlkPhos  600<H>  03-29            Culture - Blood (collected 26 Mar 2021 21:00)  Source: .Blood Blood  Preliminary Report (28 Mar 2021 21:01):    No growth at 2 days.    Culture - Blood (collected 26 Mar 2021 21:00)  Source: .Blood Blood  Preliminary Report (28 Mar 2021 21:01):    No growth at 2 days.      RADIOLOGY & ADDITIONAL STUDIES:      HPI:  86yo F, nonsmoker, with PMHx of lung adenocarcinoma (s/p resection/XRT), recurrent Pleural effusion s/p chest tube and pleurodesis, severe MR, who initially presented from [facility] rehab with desaturation of O2 to 70%. Pt reports felt sob today. She denies cough or chest pain, except when she tries to drink fluids, she coughs (not with food). She denies headache or dizziness, and denies nausea, vomiting, abdominal pain. Has not had any bloody or dark bowel movements, nor blood in her urine.   Last EGD: 4-5 yrs ago, normal per pt  Last C scope: never    Rectal exam negative in ED.    In the ED,  VS: T 98, HR 99, /74, RR 20, SpO2 100% 4L NC  Labs: Hgb 7.9 (from baseline of 10-11 in october) otherwise CBC wnl; BMP wnl, BUN/Cr 24/0.8 Alb 2.7 Alk P 618 AST 45 ALT 46 BNP 1148 trop negative   EKG: wnl  CXR: wet read: compared to prior cxr L effusion worse, R effusion better, atelectasis in both lungs that looks similar, patchy opacities on R side which look new  Orders: vanc 1g and cefepime 1g x1 in ED   (26 Mar 2021 17:32)    Allergies    Bactrim (Unknown)  Cleocin HCl (Unknown)  Flagyl (Unknown)  Honey (Unknown)  iodine (Anaphylaxis)  IV Contrast (Anaphylaxis)  Levaquin (Other; Rash)  penicillin (Unknown)  Zithromax (Unknown)    Intolerances      Home Medications:  acetaminophen 325 mg oral tablet: 2 tab(s) orally every 6 hours, As needed,  pain (12 Oct 2020 10:18)  aspirin 81 mg oral delayed release tablet: 1 tab(s) orally every 24 hours (12 Oct 2020 10:18)    MEDICATIONS:  MEDICATIONS  (STANDING):  aspirin enteric coated 81 milliGRAM(s) Oral daily  atorvastatin 20 milliGRAM(s) Oral at bedtime  ferrous    sulfate 325 milliGRAM(s) Oral daily    MEDICATIONS  (PRN):    PAST MEDICAL & SURGICAL HISTORY:  Malignant neoplasm of bronchus and lung, unspecified site  Lung cancer    History of surgery to major organs, presenting hazards to health  removal of R-sided adenocarcinoma, negative lymphnodes      FAMILY HISTORY:  No pertinent family history  No significant family history      SOCIAL HISTORY:  [place]: [ ] Current, [ ] Former, [ ] Never; Pack Years:  Alcohol:  Illicit Drugs:    REVIEW OF SYSTEMS:  CONSTITUTIONAL: No weakness, fevers or chills  HEENT: No visual changes; No vertigo or throat pain   NECK: No pain or stiffness  RESPIRATORY: No cough, wheezing, hemoptysis; No shortness of breath  CARDIOVASCULAR: No chest pain or palpitations  GASTROINTESTINAL: No abdominal or epigastric pain. No nausea, vomiting, or hematemesis; No diarrhea or constipation. No melena or hematochezia.  GENITOURINARY: No dysuria, frequency or hematuria  NEUROLOGICAL: No numbness or weakness  SKIN: No itching, burning, rashes, or lesions   All other 10 review of systems is negative unless indicated above.    Vital Signs Last 24 Hrs  T(C): 36.8 (29 Mar 2021 06:00), Max: 37.1 (29 Mar 2021 01:00)  T(F): 98.3 (29 Mar 2021 06:00), Max: 98.7 (29 Mar 2021 01:00)  HR: 82 (29 Mar 2021 06:22) (82 - 108)  BP: 139/62 (29 Mar 2021 04:06) (104/51 - 139/62)  BP(mean): 89 (29 Mar 2021 04:06) (74 - 89)  RR: 20 (29 Mar 2021 04:06) (20 - 26)  SpO2: 100% (29 Mar 2021 06:22) (89% - 100%)      PHYSICAL EXAM:    General: Well developed; well nourished; in no acute distress  Eyes: Anicteric sclerae, moist conjunctivae  HENT: Moist mucous membranes  Neck: Trachea midline, supple  Lungs: Normal respiratory effors and no intercostal retractions  Cardiovascular: RRR  Abdomen: Soft, non-tender non-distended; Normal bowel sounds; No rebound or guarding  Extremities: Normal range of motion, No clubbing, cyanosis or edema  Neurological: Alert and oriented x3  Skin: Warm and dry. No obvious rash    LABS:                        7.9    9.16  )-----------( 465      ( 29 Mar 2021 06:22 )             28.5     03-29    145  |  102  |  28<H>  ----------------------------<  86  4.5   |  36<H>  |  0.81    Ca    8.7      29 Mar 2021 06:22  Phos  3.1     03-29  Mg     2.0     03-29    TPro  7.1  /  Alb  2.8<L>  /  TBili  0.2  /  DBili  x   /  AST  43<H>  /  ALT  40  /  AlkPhos  600<H>  03-29            Culture - Blood (collected 26 Mar 2021 21:00)  Source: .Blood Blood  Preliminary Report (28 Mar 2021 21:01):    No growth at 2 days.    Culture - Blood (collected 26 Mar 2021 21:00)  Source: .Blood Blood  Preliminary Report (28 Mar 2021 21:01):    No growth at 2 days.      RADIOLOGY & ADDITIONAL STUDIES:

## 2021-03-29 NOTE — PROGRESS NOTE ADULT - PROBLEM SELECTOR PLAN 1
On admission 3/25 02 sat 70s. On RMF 3/28 requiring NRB, ABG showed pH 7.34, pCO2 74, pO2 197, Bicarb 39, sat 99%, placed on BiPAP and given laisx 20 IVP. By AM of 3/29 sating on home O2 requirements.   - appreciated pulm recs - no significant pleural effusion  - hold off on abx given WBCs normal, afebrile  - continue with NC O2 (currently on 3-4L) and wean as tolerated  - cxr improved  - follows with o/p pulm for adenocarcinoma

## 2021-03-29 NOTE — PROGRESS NOTE ADULT - PROBLEM SELECTOR PLAN 8
F: none  E: replete K<4, Mg<2  N: DASH  VTE Prophylaxis: holding for potential thora overnight  C: Full Code  D: NAGA F: none  E: replete K<4, Mg<2  N: DASH  VTE Prophylaxis:   C: Full Code  D: 7L

## 2021-03-29 NOTE — PROGRESS NOTE ADULT - PROBLEM SELECTOR PLAN 5
Hgb on admission 7.9, prior admission in the 10s-11s, currently no signs of active bleeding (no hematochezia, melena, hemoptysis, hematuria). Rectal exam negative in ED for blood. Per GI consider out pt endoscopy once medically stable.   + possible rectal FGD avidity vs normal physiologic. check stool occult.  consider GI.  - obtain iron panel  - likely component of iron deficiency as patient was prescribed iron per [facility] med list review  - could also be attributable to chronic disease/underlying malignancy (likely)  - retic count wnl  - trend CBC  - maintain active T&S  - transfuse if Hgb <7 Patient has severe mitral regurg however no decreased EF noted on echo from 2019. No history of heart failure however BNP elevated to >1000, which it has been in past as well (1034 in 10/20)  - monitor for increase in LE edema  - repeat ECHO

## 2021-03-29 NOTE — CONSULT NOTE ADULT - ATTENDING COMMENTS
Pt seen and examined, case d/w fellow -- Dr. No.  Anemia with respiratory failure -- when optimized can consider an EGD and a colonoscopy to evaluate the etiology.  F/u abdominal US, GGT, consider fractionating alk phos.
88 y/o F with PMHx Lung adenocarcinoma s/p resection/XRT, severe MR, requiring home O2 2L NC, admitted on 10/2020 with sepsis and CAP s/p pleuroscopy and talc pleurodesis and chest tube placement. Now admitted with worsening hypoxia with o2sat in the 70's (came from rehab center). ICU consult placed given noted hypercapnia on most recent ABG.     Problem list:  Acute hypercapnic respiratory failure   Volume overload  Underlying Lung adeno s/p resection/XRT  severe MR  chronic hypoxic resp failure on home O2 2L    Plan:  Agree to place on NIPPV  Repeat ABG in the AM   Crackles on exam and fluid noted in fissure- reasonable to give another lasix 20mg IV x1 overnight  CXR in the AM   Consider CT chest if no improvement of sx  Upgrade to Telemetry for close hemodynamic monitoring    Prognosis guarded.

## 2021-03-29 NOTE — PROGRESS NOTE ADULT - ASSESSMENT
86yo F, nonsmoker, with PMHx of lung adenocarcinoma (s/p resection/XRT), severe MR,  s/p 8/2020 pleuroscopy and talc pleurodesis and R chest tube placement for ~2days, presenting from [facility] rehab with hypoxemic and hypercapnic respiratory failure due to recurrence of pleural effusion vs pulmonary edema

## 2021-03-29 NOTE — PROGRESS NOTE ADULT - ATTENDING COMMENTS
Awake alert, appears back at baseline few ronchi, satting well on nasal prongs.  Will monitor for 24 hours, need bone scan for elevated alk phos suspect bone lesions and palliative care to see to determine overall LOC and whether anemia should be pursued. Awake alert, appears back at baseline few ronchi, satting well on nasal prongs.  Will monitor for 24 hours, need bone scan for elevated alk phos suspect bone lesions (liver ultrasound without masses) and palliative care to see to determine overall LOC and whether anemia should be pursued.    GGT markedly elevated, would hold on bone scan, fractionate alk phos, if negative no bone sr and will need ct of abdomen.

## 2021-03-29 NOTE — PROGRESS NOTE ADULT - SUBJECTIVE AND OBJECTIVE BOX
PULMONARY CONSULT FOLLOW-UP NOTE    INTERVAL HISTORY:   Reviewed chart and overnight events; patient seen and examined at bedside.  no complains   was moved to [facility] overnight.     MEDICATIONS:  Pulmonary:    Antimicrobials:    Anticoagulants:  aspirin enteric coated 81 milliGRAM(s) Oral daily    Cardiac:      Allergies    Bactrim (Unknown)  Cleocin HCl (Unknown)  Flagyl (Unknown)  Honey (Unknown)  iodine (Anaphylaxis)  IV Contrast (Anaphylaxis)  Levaquin (Other; Rash)  penicillin (Unknown)  Zithromax (Unknown)    Intolerances        Vital Signs Last 24 Hrs  T(C): 36.6 (29 Mar 2021 09:45), Max: 37.1 (29 Mar 2021 01:00)  T(F): 97.9 (29 Mar 2021 09:45), Max: 98.7 (29 Mar 2021 01:00)  HR: 83 (29 Mar 2021 09:40) (82 - 108)  BP: 123/58 (29 Mar 2021 08:39) (104/51 - 139/62)  BP(mean): 83 (29 Mar 2021 08:39) (74 - 89)  RR: 20 (29 Mar 2021 09:40) (20 - 26)  SpO2: 100% (29 Mar 2021 09:40) (89% - 100%)    03-29 @ 07:01  -  03-29 @ 11:29  --------------------------------------------------------  IN: 0 mL / OUT: 375 mL / NET: -375 mL          PHYSICAL EXAM:  Constitutional: elderly frail woman, comfortable  Head: NC/AT, slight temporal wasting, 2 L NC  Respiratory: CTA B/L;  Cardiac: +S1/S2; RRR; holosystolic murmur 2/6 heard best at the apex  Gastrointestinal: soft, NT/ND; no rebound or guarding; +BSx4  Genitourinary: normal external genitalia  Extremities: WWP, no clubbing or cyanosis; no peripheral harvinder  Neurologic: AAOx3; CNII-XII grossly intact; no focal deficits      LABS:  ABG - ( 29 Mar 2021 01:44 )  pH, Arterial: 7.36  pH, Blood: x     /  pCO2: 77    /  pO2: 164   / HCO3: 42    / Base Excess: 14.4  /  SaO2: 100         CBC Full  -  ( 29 Mar 2021 06:22 )  WBC Count : 9.16 K/uL  RBC Count : 3.43 M/uL  Hemoglobin : 7.9 g/dL  Hematocrit : 28.5 %  Platelet Count - Automated : 465 K/uL  Mean Cell Volume : 83.1 fl  Mean Cell Hemoglobin : 23.0 pg  Mean Cell Hemoglobin Concentration : 27.7 gm/dL  Auto Neutrophil # : 6.75 K/uL  Auto Lymphocyte # : 0.85 K/uL  Auto Monocyte # : 1.14 K/uL  Auto Eosinophil # : 0.34 K/uL  Auto Basophil # : 0.06 K/uL  Auto Neutrophil % : 73.7 %  Auto Lymphocyte % : 9.3 %  Auto Monocyte % : 12.4 %  Auto Eosinophil % : 3.7 %  Auto Basophil % : 0.7 %    03-29    145  |  102  |  28<H>  ----------------------------<  86  4.5   |  36<H>  |  0.81    Ca    8.7      29 Mar 2021 06:22  Phos  3.1     03-29  Mg     2.0     03-29    TPro  7.1  /  Alb  2.8<L>  /  TBili  0.2  /  DBili  x   /  AST  43<H>  /  ALT  40  /  AlkPhos  600<H>  03-29

## 2021-03-29 NOTE — PROGRESS NOTE ADULT - PROBLEM SELECTOR PLAN 3
Hx of recurrent pleural effusions. Last admission had R-sided pleuroscopy w/ pleurodesis 10/27/2020. S/p chest tube removal 10/29.  - per notes, patient supposed to be on lasix however not on med rec from [facility] papers therefore clarify with pulm if patient should be on lasix  - gave 1 dose of 20 IV lasix (dose was 20mg PO per med rec here from past admission)  - c/w NC O2 and wean as tolerated History of adenocarcinoma of RLL s/p VATS resection in 2013, ANANYA XRT in 2016, and RMF lesion s/p XRT in 2017. Also per chart review, had recent PET showing 2 new FDG-avid subcentimeter nodules in L subpleural region  - f/u pulm recs  - will need outpatient f/u w/ Dr. Beal on discharge.    #Elevated alk phos  could be attributable to underlying malignancy  - f/u ggt  -can consider further imaging or heme onc consult for staging, as perhaps indicative of spread/recurrence of malignancy however pleural effusions were never definitively diagnosed as malignant just presumed

## 2021-03-29 NOTE — PROGRESS NOTE ADULT - PROBLEM SELECTOR PLAN 4
History of adenocarcinoma of RLL s/p VATS resection in 2013, ANANYA XRT in 2016, and RMF lesion s/p XRT in 2017. Also per chart review, had recent PET showing 2 new FDG-avid subcentimeter nodules in L subpleural region  - f/u pulm recs  - will need outpatient f/u w/ Dr. Beal on discharge.    #Elevated alk phos  could be attributable to underlying malignancy  - f/u ggt  -can consider further imaging or heme onc consult for staging, as perhaps indicative of spread/recurrence of malignancy however pleural effusions were never definitively diagnosed as malignant just presumed Hgb on admission 7.9, prior admission in the 10s-11s, currently no signs of active bleeding (no hematochezia, melena, hemoptysis, hematuria). Rectal exam negative in ED for blood. Per GI consider out pt endoscopy once medically stable.   + possible rectal FGD avidity vs normal physiologic. check stool occult.  consider GI.  - obtain iron panel  - likely component of iron deficiency as patient was prescribed iron per [facility] med list review  - could also be attributable to chronic disease/underlying malignancy (likely)  - retic count wnl  - trend CBC  - maintain active T&S  - transfuse if Hgb <7

## 2021-03-29 NOTE — PROGRESS NOTE ADULT - PROBLEM SELECTOR PLAN 2
S/p 1 dose vanc and cefepime in the ED, hold off on further abx as no other s/s infection  - f/u sputum Cx  - wean O2 as tolerated Hx of recurrent pleural effusions. Last admission had R-sided pleuroscopy w/ pleurodesis 10/27/2020. S/p chest tube removal 10/29.  - per discharge note from 10/2020 patient discharged on lasix 20mg qd, not on med rec from [facility] papers therefore clarify with pulm if patient should be on lasix  - c/w NC O2 and wean as tolerated

## 2021-03-29 NOTE — PROGRESS NOTE ADULT - PROBLEM SELECTOR PLAN 6
Patient has severe mitral regurg however no decreased EF noted on echo from 2019. No history of heart failure however BNP elevated to >1000, which it has been in past as well (1034 in 10/20)  - monitor for increase in LE edema  - repeat ECHO on home atorvastatin 20mg and asa 81  -c/w home atorvastatin 20mg qd  - c/w ASA 81mg qd

## 2021-03-29 NOTE — PROGRESS NOTE ADULT - PROBLEM SELECTOR PLAN 7
on home atorvastatin 20mg and asa 81  -c/w home atorvastatin  -holding ASA in case of thora RESOLVED  S/p 1 dose vanc and cefepime in the ED, hold off on further abx as no other s/s infection  - f/u sputum Cx  - wean O2 as tolerated

## 2021-03-29 NOTE — CONSULT NOTE ADULT - ASSESSMENT
86yo F, nonsmoker, with PMHx of lung adenocarcinoma (s/p resection/XRT), recurrent Pleural effusion s/p chest tube and pleurodesis, severe MR, who initially presented from [facility] rehab with desaturation of O2 to 70%. Admitted for hypoxic, hypercapnic respiratory failure, GI consulted for anemia    Normocytic anemia: hb last 3 months around 8, no acute drop, prior to that hb 10-11  -denies any melena/hematochezia   -rectal exam   -has e/o iron deficiency on iron panel   - 86yo F, nonsmoker, with PMHx of lung adenocarcinoma (s/p resection/XRT), recurrent Pleural effusion s/p chest tube and pleurodesis, severe MR, who initially presented from [facility] rehab with desaturation of O2 to 70%. Admitted for hypoxic, hypercapnic respiratory failure, GI consulted for anemia    Normocytic anemia: hb last 3 months around 8, no acute drop, prior to that hb 10-11  -denies any melena/hematochezia   -rectal exam : ext hemorrhoids, brown stool in vault  -has e/o iron deficiency on iron panel   -no e/o active bleeding at this time, also has acute resp failure on NRB, can discuss non urgent endoscopic evaluation once medically optimized, also to consider pt has significant comorbidities so will decide based on risk/benefit assessment and pt/family preference   88yo F, nonsmoker, with PMHx of lung adenocarcinoma (s/p resection/XRT), recurrent Pleural effusion s/p chest tube and pleurodesis, severe MR, who initially presented from [facility] rehab with desaturation of O2 to 70%. Admitted for hypoxic, hypercapnic respiratory failure, GI consulted for anemia    Normocytic anemia: hb last 3 months around 8, no acute drop, prior to that hb 10-11  -denies any melena/hematochezia   -rectal exam : ext hemorrhoids, brown stool in vault  -has e/o iron deficiency on iron panel   -no e/o active bleeding at this time, also has acute resp failure on NRB, can discuss non urgent endoscopic evaluation once medically optimized, also to consider pt has significant comorbidities so will decide based on risk/benefit assessment and pt/family preference      Elevated LTs:  -ALP at 600, AST slightly above normal, ALT/BILI/INR normal  -abd soft , non tender  -check GGT  - US RUQ PENDING

## 2021-03-29 NOTE — PROGRESS NOTE ADULT - SUBJECTIVE AND OBJECTIVE BOX
O/N: stepped up for hypercapneic and hypoxic respiratory failure 2/2 worsening L pleural effusion (severe MR vs malignant). Placed on bipap.     Subjective: Denies f/c/CP/SOB. Refusing BiPAP, at times NRB and NC.     VITAL SIGNS:  T(F): 97.9 (03-29-21 @ 09:45)  HR: 88 (03-29-21 @ 08:39)  BP: 123/58 (03-29-21 @ 08:39)  RR: 20 (03-29-21 @ 08:39)  SpO2: 100% (03-29-21 @ 08:39)  Wt(kg): --    PHYSICAL EXAM:  General: Elderly cachetic female sitting up in hospital bed on NRB.  HEENT: Bitemporal wasting. Dry MM.  Neurologic: AAOx3; CNII-XII grossly intact; no focal deficits  Psychiatric: affect and characteristics of  appropriate, agitated but at times redirectable.    Constitutional: cachetic bitemporal wasting.   Head: NC/AT  Eyes: PERRL, EOMI, clear conjunctiva  Neck: supple; no JVD or thyromegaly  Respiratory: inspiratory rhonchi and expiratory wheezing B/L , no retractions  Cardiovascular: +S1/S2; RRR; Holosystolic murmur heard best at apex, PMI laterally displaced  Gastrointestinal: soft, NT/ND; no rebound or guarding; +BSx4  Back: severe scoliosis  Extremities: no clubbing or cyanosis; no peripheral edema  Musculoskeletal: no joint swelling, tenderness or erythema  Vascular: 2+ radial, femoral, DP/PT pulses B/L  Dermatologic: skin warm, dry and intact; no rashes, wounds, or scars  Lymphatic: no submandibular or cervical LAD      MEDICATIONS  (STANDING):  aspirin enteric coated 81 milliGRAM(s) Oral daily  atorvastatin 20 milliGRAM(s) Oral at bedtime  ferrous    sulfate 325 milliGRAM(s) Oral daily    MEDICATIONS  (PRN):      Allergies    Bactrim (Unknown)  Cleocin HCl (Unknown)  Flagyl (Unknown)  Honey (Unknown)  iodine (Anaphylaxis)  IV Contrast (Anaphylaxis)  Levaquin (Other; Rash)  penicillin (Unknown)  Zithromax (Unknown)    Intolerances        LABS:                        7.9    9.16  )-----------( 465      ( 29 Mar 2021 06:22 )             28.5     03-29    145  |  102  |  28<H>  ----------------------------<  86  4.5   |  36<H>  |  0.81    Ca    8.7      29 Mar 2021 06:22  Phos  3.1     03-29  Mg     2.0     03-29    TPro  7.1  /  Alb  2.8<L>  /  TBili  0.2  /  DBili  x   /  AST  43<H>  /  ALT  40  /  AlkPhos  600<H>  03-29          RADIOLOGY & ADDITIONAL TESTS: Reviewed

## 2021-03-30 DIAGNOSIS — R53.81 OTHER MALAISE: ICD-10-CM

## 2021-03-30 DIAGNOSIS — Z51.5 ENCOUNTER FOR PALLIATIVE CARE: ICD-10-CM

## 2021-03-30 DIAGNOSIS — C34.90 MALIGNANT NEOPLASM OF UNSPECIFIED PART OF UNSPECIFIED BRONCHUS OR LUNG: ICD-10-CM

## 2021-03-30 LAB
ALBUMIN SERPL ELPH-MCNC: 2.8 G/DL — LOW (ref 3.3–5)
ALP SERPL-CCNC: 533 U/L — HIGH (ref 40–120)
ALT FLD-CCNC: 34 U/L — SIGNIFICANT CHANGE UP (ref 10–45)
ANION GAP SERPL CALC-SCNC: 6 MMOL/L — SIGNIFICANT CHANGE UP (ref 5–17)
AST SERPL-CCNC: 32 U/L — SIGNIFICANT CHANGE UP (ref 10–40)
BILIRUB SERPL-MCNC: 0.2 MG/DL — SIGNIFICANT CHANGE UP (ref 0.2–1.2)
BLD GP AB SCN SERPL QL: NEGATIVE — SIGNIFICANT CHANGE UP
BUN SERPL-MCNC: 25 MG/DL — HIGH (ref 7–23)
CALCIUM SERPL-MCNC: 9.2 MG/DL — SIGNIFICANT CHANGE UP (ref 8.4–10.5)
CHLORIDE SERPL-SCNC: 98 MMOL/L — SIGNIFICANT CHANGE UP (ref 96–108)
CO2 SERPL-SCNC: 39 MMOL/L — HIGH (ref 22–31)
CREAT SERPL-MCNC: 0.77 MG/DL — SIGNIFICANT CHANGE UP (ref 0.5–1.3)
GLUCOSE SERPL-MCNC: 86 MG/DL — SIGNIFICANT CHANGE UP (ref 70–99)
HCT VFR BLD CALC: 28 % — LOW (ref 34.5–45)
HGB BLD-MCNC: 7.7 G/DL — LOW (ref 11.5–15.5)
MAGNESIUM SERPL-MCNC: 2.1 MG/DL — SIGNIFICANT CHANGE UP (ref 1.6–2.6)
MCHC RBC-ENTMCNC: 22.7 PG — LOW (ref 27–34)
MCHC RBC-ENTMCNC: 27.5 GM/DL — LOW (ref 32–36)
MCV RBC AUTO: 82.6 FL — SIGNIFICANT CHANGE UP (ref 80–100)
NRBC # BLD: 0 /100 WBCS — SIGNIFICANT CHANGE UP (ref 0–0)
PHOSPHATE SERPL-MCNC: 3.5 MG/DL — SIGNIFICANT CHANGE UP (ref 2.5–4.5)
PLATELET # BLD AUTO: 483 K/UL — HIGH (ref 150–400)
POTASSIUM SERPL-MCNC: 4.5 MMOL/L — SIGNIFICANT CHANGE UP (ref 3.5–5.3)
POTASSIUM SERPL-SCNC: 4.5 MMOL/L — SIGNIFICANT CHANGE UP (ref 3.5–5.3)
PROT SERPL-MCNC: 6.9 G/DL — SIGNIFICANT CHANGE UP (ref 6–8.3)
RBC # BLD: 3.39 M/UL — LOW (ref 3.8–5.2)
RBC # FLD: 21.8 % — HIGH (ref 10.3–14.5)
RH IG SCN BLD-IMP: POSITIVE — SIGNIFICANT CHANGE UP
SODIUM SERPL-SCNC: 143 MMOL/L — SIGNIFICANT CHANGE UP (ref 135–145)
WBC # BLD: 8.95 K/UL — SIGNIFICANT CHANGE UP (ref 3.8–10.5)
WBC # FLD AUTO: 8.95 K/UL — SIGNIFICANT CHANGE UP (ref 3.8–10.5)

## 2021-03-30 PROCEDURE — 99223 1ST HOSP IP/OBS HIGH 75: CPT

## 2021-03-30 PROCEDURE — 99233 SBSQ HOSP IP/OBS HIGH 50: CPT | Mod: GC

## 2021-03-30 PROCEDURE — 99232 SBSQ HOSP IP/OBS MODERATE 35: CPT

## 2021-03-30 PROCEDURE — 99358 PROLONG SERVICE W/O CONTACT: CPT

## 2021-03-30 PROCEDURE — 71045 X-RAY EXAM CHEST 1 VIEW: CPT | Mod: 26

## 2021-03-30 RX ORDER — FUROSEMIDE 40 MG
20 TABLET ORAL DAILY
Refills: 0 | Status: DISCONTINUED | OUTPATIENT
Start: 2021-03-30 | End: 2021-04-01

## 2021-03-30 RX ORDER — BUDESONIDE AND FORMOTEROL FUMARATE DIHYDRATE 160; 4.5 UG/1; UG/1
2 AEROSOL RESPIRATORY (INHALATION)
Refills: 0 | Status: DISCONTINUED | OUTPATIENT
Start: 2021-03-30 | End: 2021-04-02

## 2021-03-30 RX ORDER — TIOTROPIUM BROMIDE 18 UG/1
1 CAPSULE ORAL; RESPIRATORY (INHALATION) DAILY
Refills: 0 | Status: DISCONTINUED | OUTPATIENT
Start: 2021-03-30 | End: 2021-04-02

## 2021-03-30 RX ADMIN — Medication 81 MILLIGRAM(S): at 12:44

## 2021-03-30 RX ADMIN — ATORVASTATIN CALCIUM 20 MILLIGRAM(S): 80 TABLET, FILM COATED ORAL at 22:09

## 2021-03-30 RX ADMIN — Medication 325 MILLIGRAM(S): at 12:44

## 2021-03-30 RX ADMIN — Medication 20 MILLIGRAM(S): at 12:44

## 2021-03-30 RX ADMIN — BUDESONIDE AND FORMOTEROL FUMARATE DIHYDRATE 2 PUFF(S): 160; 4.5 AEROSOL RESPIRATORY (INHALATION) at 17:44

## 2021-03-30 RX ADMIN — TIOTROPIUM BROMIDE 1 CAPSULE(S): 18 CAPSULE ORAL; RESPIRATORY (INHALATION) at 12:44

## 2021-03-30 NOTE — PHYSICAL THERAPY INITIAL EVALUATION ADULT - LEVEL OF INDEPENDENCE: SUPINE/SIT, REHAB EVAL
Patient Information     Patient Name MRN Sex Yolie Sahu [id number] Female 1960      Telephone Encounter by Nevin Landis at 2018 11:19 AM     Author:  Nevin Landis Service:  (none) Author Type:  (none)     Filed:  2018 11:20 AM Encounter Date:  2018 Status:  Signed     :  Nevin Landis            Patient would like a work in before 18 for an Ambien refill. PCJ is not available. Please call as soon as possible.          contact guard

## 2021-03-30 NOTE — CONSULT NOTE ADULT - SUBJECTIVE AND OBJECTIVE BOX
LAZARO GRANT          MRN-[id number]            (1933)    HPI:  86yo F, nonsmoker, with PMHx of lung adenocarcinoma (s/p resection/XRT), severe MR, and recent admission 2020 for fall and R leg weakness and then in 10/2020 for hypoxemia to 80s on 3L NC (her usual O2) found then to have sepsis 2/2 to pneumonia and acute hypoxic respiratory failure and stepped up to [facility] for observation s/p pleuroscopy and talc pleurodesis and R chest tube placement for ~2days, now comes in from [facility] rehab with desaturation of O2 to 70% that improved with placement on 2L NC. Pt reports felt sob today. She denies cough or chest pain, except when she tries to drink fluids, she coughs (not with food). She denies headache or dizziness, and denies nausea, vomiting, abdominal pain. Has not had any bloody or dark bowel movements, nor blood in her urine. Rectal exam negative in ED.    In the ED,  VS: T 98, HR 99, /74, RR 20, SpO2 100% 4L NC  Labs: Hgb 7.9 (from baseline of 10- in october) otherwise CBC wnl; BMP wnl, BUN/Cr 24/0.8 Alb 2.7 Alk P 618 AST 45 ALT 46 BNP 1148 trop negative   EKG: wnl  CXR: wet read: compared to prior cxr L effusion worse, R effusion better, atelectasis in both lungs that looks similar, patchy opacities on R side which look new  Orders: vanc 1g and cefepime 1g x1 in ED   (26 Mar 2021 17:32)      PAST MEDICAL & SURGICAL HISTORY:  Malignant neoplasm of bronchus and lung, unspecified site  Lung cancer    History of surgery to major organs, presenting hazards to health  removal of R-sided adenocarcinoma, negative lymphnodes    FAMILY HISTORY:  No pertinent family history  No significant family history     Reviewed and found non contributory in mother or father    SOCIAL HISTORY: denies alcohol, tobacco, and drug use; is in [facility] rehab and denies having anyone around to call to inform them of her hospitalization, states that she makes her own medical decisions    ROS:    Unable to attain due to:  n/a                     Dyspnea (Edith 0-10):    0                    N/V (Y/N):                   N           Secretions (Y/N) :        N        Agitation(Y/N): N  Pain (Y/N):     N  -Provocation/Palliation: n/a   -Quality/Quantity: n/a   -Radiating: n/a   -Severity: n/a   -Timing/Frequency: n/a   -Impact on ADLs: n/a     General: + weakness  HEENT:    Denied  Neck:  Denied  CVS:  Denied  Resp:  Denied  GI:  Denied  :  Denied  Musc:  Denied  Neuro:  Denied  Psych:  Denied  Skin:  Denied  Lymph:  Denied    Allergies    Bactrim (Unknown)  Cleocin HCl (Unknown)  Flagyl (Unknown)  Honey (Unknown)  iodine (Anaphylaxis)  IV Contrast (Anaphylaxis)  Levaquin (Other; Rash)  penicillin (Unknown)  Zithromax (Unknown)    Intolerances      Opiate Naive (Y/N): Y  -iStop reviewed (Y/N): Y (Ref#: [id number]            )    Medications:      MEDICATIONS  (STANDING):  aspirin enteric coated 81 milliGRAM(s) Oral daily  atorvastatin 20 milliGRAM(s) Oral at bedtime  budesonide  80 MICROgram(s)/formoterol 4.5 MICROgram(s) Inhaler 2 Puff(s) Inhalation two times a day  ferrous    sulfate 325 milliGRAM(s) Oral daily  furosemide    Tablet 20 milliGRAM(s) Oral daily  tiotropium 18 MICROgram(s) Capsule 1 Capsule(s) Inhalation daily    MEDICATIONS  (PRN):      Labs:    CBC:                        7.7    8.95  )-----------( 483      ( 30 Mar 2021 07:57 )             28.0     CMP:        143  |  98  |  25<H>  ----------------------------<  86  4.5   |  39<H>  |  0.77    Ca    9.2      30 Mar 2021 07:57  Phos  3.5       Mg     2.1         TPro  6.9  /  Alb  2.8<L>  /  TBili  0.2  /  DBili  x   /  AST  32  /  ALT  34  /  AlkPhos  533<H>      Imaging:  < from: Xray Chest 1 View- PORTABLE-Routine (Xray Chest 1 View- PORTABLE-Routine in AM.) (21 @ 07:30) >  Findings/  impression: Heart size within normal limits, thoracic aortic and mitral and is calcification. Bilateral opacities/right pleural effusion, stable. Right basilar bullae. Stable bony structures, dextro scoliosis.    < end of copied text >      PEx:  T(C): 36.4 (21 @ 09:28), Max: 37.1 (21 @ 05:00)  HR: 90 (21 @ 12:52) (76 - 98)  BP: 126/60 (21 @ 12:52) (116/57 - 136/64)  RR: 18 (21 @ 08:20) (16 - 18)  SpO2: 92% (21 @ 12:52) (92% - 100%)  Wt(kg): 39kg  Daily     Daily   CAPILLARY BLOOD GLUCOSE        I&O's Summary    29 Mar 2021 07:01  -  30 Mar 2021 07:00  --------------------------------------------------------  IN: 240 mL / OUT: 975 mL / NET: -735 mL    GENERAL:  [ x]Alert  [x]Oriented x 3  [ ]Lethargic  [ ]Cachexia  [ ]Unarousable  [ ]Verbal  [ ]Non-Verbal  Behavioral:   [ ] Anxiety  [ ] Delirium [ ] Agitation [ x] Other - calm   HEENT:  [ ]Normal   [x ]Dry mouth   [ ]ET Tube/Trach  [ ]Oral lesions  PULMONARY:   [ ]Clear  [ ]Tachypnea  [ ]Audible excessive secretions   [ x]Rhonchi        [ ]Right [ ]Left [ x]Bilateral  [ ]Crackles        [ ]Right [ ]Left [ ]Bilateral  [ ]Wheezing     [ ]Right [ ]Left [ ]Bilateral  CARDIOVASCULAR:    [ x]Regular [ ]Irregular [ ]Tachy  [ ]Renny [ ]Murmur [ ]Other  GASTROINTESTINAL:  [ x]Soft  [ ]Distended   [ ]+BS  [x ]Non tender [ ]Tender  [ ]PEG [ ]OGT/ NGT  Last BM:   GENITOURINARY:  [ ]Normal [x ] Incontinent   [ ]Oliguria/Anuria   [ ]Mcduffie  MUSCULOSKELETAL:   [ ]Normal   [x ]Weakness  [ ]Bed/Wheelchair bound [ ]Edema  NEUROLOGIC:   [ x]No focal deficits  [ ] Cognitive impairment  [ ] Dysphagia [ ]Dysarthria [ ] Paresis [ ]Other   SKIN:   [ x]Normal   [ ]Pressure ulcer(s)  [ ]Rash      Preadmit Karnofsky:  50%           Current Karnofsky:     40%  Cachexia (Y/N): Y  BMI: 18.3kg/m2    Advanced Directives:     Full Code     HCP    DECISION MAKER: Patient is able to make her own decisions.   LEGAL SURROGATE: Dr. Maynor Gonzalez cell: [phone] / Office line: [phone]    GOALS OF CARE DISCUSSION       Palliative care info/counseling provided	           Documentation of GOC: Full code 	          REFERRALS	        Unit SW/Case Mgmt       PT/OT

## 2021-03-30 NOTE — PROGRESS NOTE ADULT - PROBLEM SELECTOR PLAN 7
RESOLVED  S/p 1 dose vanc and cefepime in the ED, hold off on further abx as no other s/s infection  - f/u sputum Cx  - wean O2 as tolerated

## 2021-03-30 NOTE — PROGRESS NOTE ADULT - ASSESSMENT
86yo F, nonsmoker, with PMHx of lung adenocarcinoma (s/p resection/XRT), severe MR,  s/p 8/2020 pleuroscopy and talc pleurodesis and R chest tube placement for ~2days, presenting from [facility] rehab with hypoxemic and hypercapnic respiratory failure due to recurrence of pleural effusion vs pulmonary edema

## 2021-03-30 NOTE — PROGRESS NOTE ADULT - PROBLEM SELECTOR PLAN 3
History of adenocarcinoma of RLL s/p VATS resection in 2013, ANANYA XRT in 2016, and RMF lesion s/p XRT in 2017. Also per chart review, had recent PET showing 2 new FDG-avid subcentimeter nodules in L subpleural region  - f/u pulm recs  - will need outpatient f/u w/ Dr. Beal on discharge.    #Elevated alk phos  Initially thought could be attributable to underlying malignancy, but gtt also elevated therefore perhaps hepatobiliary, RUQ only with 0.8 cm cyst. PET scan negative for mets  -can consider further imaging or heme onc consult for staging, as perhaps indicative of spread/recurrence of malignancy however pleural effusions were never definitively diagnosed as malignant just presumed

## 2021-03-30 NOTE — PROGRESS NOTE ADULT - SUBJECTIVE AND OBJECTIVE BOX
O/N: PET scan negative for mets    Subjective: no complains, SOB decreasing    VITAL SIGNS:  T(F): 97.6 (03-30-21 @ 09:28)  HR: 87 (03-30-21 @ 08:20)  BP: 136/64 (03-30-21 @ 08:20)  RR: 18 (03-30-21 @ 08:20)  SpO2: 98% (03-30-21 @ 08:20)  Wt(kg): --    PHYSICAL EXAM:  General: Elderly cachetic female sitting up in hospital bed on NRB.  HEENT: Bitemporal wasting. Dry MM.  Neurologic: AAOx3; CNII-XII grossly intact; no focal deficits  Psychiatric: affect and characteristics of  appropriate, agitated but at times redirectable.    Constitutional: cachetic bitemporal wasting.   Head: NC/AT  Eyes: PERRL, EOMI, clear conjunctiva  Neck: supple; no JVD or thyromegaly  Respiratory: inspiratory rhonchi and expiratory wheezing B/L , no retractions  Cardiovascular: +S1/S2; RRR; Holosystolic murmur heard best at apex, PMI laterally displaced  Gastrointestinal: soft, NT/ND; no rebound or guarding; +BSx4  Back: severe scoliosis  Extremities: no clubbing or cyanosis; no peripheral edema  Musculoskeletal: no joint swelling, tenderness or erythema  Vascular: 2+ radial, femoral, DP/PT pulses B/L  Dermatologic: skin warm, dry and intact; no rashes, wounds, or scars  Lymphatic: no submandibular or cervical LAD      MEDICATIONS  (STANDING):  aspirin enteric coated 81 milliGRAM(s) Oral daily  atorvastatin 20 milliGRAM(s) Oral at bedtime  budesonide  80 MICROgram(s)/formoterol 4.5 MICROgram(s) Inhaler 2 Puff(s) Inhalation two times a day  ferrous    sulfate 325 milliGRAM(s) Oral daily  furosemide    Tablet 20 milliGRAM(s) Oral daily  tiotropium 18 MICROgram(s) Capsule 1 Capsule(s) Inhalation daily    MEDICATIONS  (PRN):      Allergies    Bactrim (Unknown)  Cleocin HCl (Unknown)  Flagyl (Unknown)  Honey (Unknown)  iodine (Anaphylaxis)  IV Contrast (Anaphylaxis)  Levaquin (Other; Rash)  penicillin (Unknown)  Zithromax (Unknown)    Intolerances        LABS:                        7.7    8.95  )-----------( 483      ( 30 Mar 2021 07:57 )             28.0     03-30    143  |  98  |  25<H>  ----------------------------<  86  4.5   |  39<H>  |  0.77    Ca    9.2      30 Mar 2021 07:57  Phos  3.5     03-30  Mg     2.1     03-30    TPro  6.9  /  Alb  2.8<L>  /  TBili  0.2  /  DBili  x   /  AST  32  /  ALT  34  /  AlkPhos  533<H>  03-30          RADIOLOGY & ADDITIONAL TESTS: Reviewed     TRANSFER NOTE: 7L TO [facility]    HOSPITAL COURSE  88 yo F, non-smoker with PMHx lung adenocarcinoma (s/p resection/radiation), severe MR, recent admission for sepsis 2/2 PNA with acute hypoxic respiratory failure (10/2020) presenting from [facility] rehab on 3/26 acute on chronic hypoxemic respiratory failure. Initially admitted to [facility] for further management of acute hypoxic respiratory failure in the setting of B/L pleural effusion (L>R) with R lung opacity, no abx started as no other signs/sx of infxn. Pulm consulted however no plans for intervention as prior pleural studies were negative for malignancy and POCUS showing only trace effusions. ICU consulted (3/28 PM) after patient with worsening hypoxia and hypercapnea (ABG concerning for respiratory acidosis with pH 7.34, pCO2 74, Bicarb 39), patient stepped up to 7LA for BiPAP treatment of acute hypoxic hypercarbic respiratory failure. By following AM (3/29) patient back on nasal cannula. Due to known cancer and elevated alkphos/GGT, PET scan negative for mets. Stable for step down as on home O2 requirements.     O/N: PET scan negative for mets    Subjective: no complains, SOB decreasing    VITAL SIGNS:  T(F): 97.6 (03-30-21 @ 09:28)  HR: 87 (03-30-21 @ 08:20)  BP: 136/64 (03-30-21 @ 08:20)  RR: 18 (03-30-21 @ 08:20)  SpO2: 98% (03-30-21 @ 08:20)  Wt(kg): --    PHYSICAL EXAM:  General: Elderly cachetic female sitting up in hospital bed on NRB.  HEENT: Bitemporal wasting. Dry MM.  Neurologic: AAOx3; CNII-XII grossly intact; no focal deficits  Psychiatric: affect and characteristics of  appropriate, agitated but at times redirectable.    Constitutional: cachetic bitemporal wasting.   Head: NC/AT  Eyes: PERRL, EOMI, clear conjunctiva  Neck: supple; no JVD or thyromegaly  Respiratory: inspiratory rhonchi and expiratory wheezing B/L , no retractions  Cardiovascular: +S1/S2; RRR; Holosystolic murmur heard best at apex, PMI laterally displaced  Gastrointestinal: soft, NT/ND; no rebound or guarding; +BSx4  Back: severe scoliosis  Extremities: no clubbing or cyanosis; no peripheral edema  Musculoskeletal: no joint swelling, tenderness or erythema  Vascular: 2+ radial, femoral, DP/PT pulses B/L  Dermatologic: skin warm, dry and intact; no rashes, wounds, or scars  Lymphatic: no submandibular or cervical LAD      MEDICATIONS  (STANDING):  aspirin enteric coated 81 milliGRAM(s) Oral daily  atorvastatin 20 milliGRAM(s) Oral at bedtime  budesonide  80 MICROgram(s)/formoterol 4.5 MICROgram(s) Inhaler 2 Puff(s) Inhalation two times a day  ferrous    sulfate 325 milliGRAM(s) Oral daily  furosemide    Tablet 20 milliGRAM(s) Oral daily  tiotropium 18 MICROgram(s) Capsule 1 Capsule(s) Inhalation daily    MEDICATIONS  (PRN):      Allergies    Bactrim (Unknown)  Cleocin HCl (Unknown)  Flagyl (Unknown)  Honey (Unknown)  iodine (Anaphylaxis)  IV Contrast (Anaphylaxis)  Levaquin (Other; Rash)  penicillin (Unknown)  Zithromax (Unknown)    Intolerances        LABS:                        7.7    8.95  )-----------( 483      ( 30 Mar 2021 07:57 )             28.0     03-30    143  |  98  |  25<H>  ----------------------------<  86  4.5   |  39<H>  |  0.77    Ca    9.2      30 Mar 2021 07:57  Phos  3.5     03-30  Mg     2.1     03-30    TPro  6.9  /  Alb  2.8<L>  /  TBili  0.2  /  DBili  x   /  AST  32  /  ALT  34  /  AlkPhos  533<H>  03-30          RADIOLOGY & ADDITIONAL TESTS: Reviewed

## 2021-03-30 NOTE — CONSULT NOTE ADULT - PROBLEM SELECTOR RECOMMENDATION 4
Palliative medicine was introduced to patient. She was not in an talkative mood. Briefly touched upon GOC and she stated that she needs time to think about it. Patient to go back to [facility] tomorrow most likely. Emotional support was provided.  As discussed during the palliative IDT meeting, the patients PSSA screening did not identify any current psychosocial need or spiritual support deficits.

## 2021-03-30 NOTE — CONSULT NOTE ADULT - PROBLEM SELECTOR RECOMMENDATION 3
Patient with a history of lung cancer in her RLL s/p VATS resection in 2013, ANANYA XRT in 2016, and RMF lesion s/p XRT in 2017. Also per chart review, had recent PET showing 2 new FDG-avid subcentimeter nodules in L subpleural. Appreciate pulmonary and oncology involvement.

## 2021-03-30 NOTE — PHYSICAL THERAPY INITIAL EVALUATION ADULT - ADDITIONAL COMMENTS
Pt reports she lives alone and has no stairs. Pt admitted to [facility] from [facility]. Pt reports at [facility] she was ambulatory with RW.

## 2021-03-30 NOTE — PROGRESS NOTE ADULT - SUBJECTIVE AND OBJECTIVE BOX
GASTROENTEROLOGY PROGRESS NOTE  Patient seen and examined at bedside. Drowsy, answers some questions.     PERTINENT REVIEW OF SYSTEMS:  CONSTITUTIONAL: No weakness, fevers or chills  HEENT: No visual changes; No vertigo or throat pain   GASTROINTESTINAL: As above.  NEUROLOGICAL: No numbness or weakness  SKIN: No itching, burning, rashes, or lesions     Allergies    Bactrim (Unknown)  Cleocin HCl (Unknown)  Flagyl (Unknown)  Honey (Unknown)  iodine (Anaphylaxis)  IV Contrast (Anaphylaxis)  Levaquin (Other; Rash)  penicillin (Unknown)  Zithromax (Unknown)    Intolerances      MEDICATIONS:  MEDICATIONS  (STANDING):  aspirin enteric coated 81 milliGRAM(s) Oral daily  atorvastatin 20 milliGRAM(s) Oral at bedtime  budesonide  80 MICROgram(s)/formoterol 4.5 MICROgram(s) Inhaler 2 Puff(s) Inhalation two times a day  ferrous    sulfate 325 milliGRAM(s) Oral daily  furosemide    Tablet 20 milliGRAM(s) Oral daily  tiotropium 18 MICROgram(s) Capsule 1 Capsule(s) Inhalation daily    MEDICATIONS  (PRN):    Vital Signs Last 24 Hrs  T(C): 36.4 (30 Mar 2021 14:11), Max: 37.1 (30 Mar 2021 05:00)  T(F): 97.5 (30 Mar 2021 14:11), Max: 98.7 (30 Mar 2021 05:00)  HR: 90 (30 Mar 2021 12:52) (76 - 98)  BP: 126/60 (30 Mar 2021 12:52) (116/57 - 136/64)  BP(mean): 86 (30 Mar 2021 12:52) (78 - 92)  RR: 18 (30 Mar 2021 08:20) (16 - 18)  SpO2: 92% (30 Mar 2021 12:52) (92% - 100%)    03-29 @ 07:01  -  03-30 @ 07:00  --------------------------------------------------------  IN: 240 mL / OUT: 975 mL / NET: -735 mL    03-30 @ 07:01  -  03-30 @ 16:05  --------------------------------------------------------  IN: 150 mL / OUT: 0 mL / NET: 150 mL      PHYSICAL EXAM:    General: cachectic, ill appearing; in no acute distress  HEENT: MMM, conjunctiva and sclera clear  Gastrointestinal: Soft non-tender non-distended; Normal bowel sounds; No hepatosplenomegaly. No rebound or guarding  Skin: Warm and dry. No obvious rash    LABS:                        7.7    8.95  )-----------( 483      ( 30 Mar 2021 07:57 )             28.0     03-30    143  |  98  |  25<H>  ----------------------------<  86  4.5   |  39<H>  |  0.77    Ca    9.2      30 Mar 2021 07:57  Phos  3.5     03-30  Mg     2.1     03-30    TPro  6.9  /  Alb  2.8<L>  /  TBili  0.2  /  DBili  x   /  AST  32  /  ALT  34  /  AlkPhos  533<H>  03-30                      RADIOLOGY & ADDITIONAL STUDIES:  Reviewed

## 2021-03-30 NOTE — PROGRESS NOTE ADULT - PROBLEM SELECTOR PLAN 1
On admission 3/25 02 sat 70s. On RMF 3/28 requiring NRB, ABG showed pH 7.34, pCO2 74, pO2 197, Bicarb 39, sat 99%, placed on BiPAP and given laisx 20 IVP. By AM of 3/29 sating on home O2 requirements.   - appreciated pulm recs - no significant pleural effusion  - hold off on abx given WBCs normal, afebrile  - continue with NC O2 (currently on 3-4L) and wean as tolerated  - cxr improved  - follows with o/p pulm for adenocarcinoma  - c/w laisx 20mg PO qd    #Hypercapnea  Given CO2 retention on ABG suggestive of COPD, will start symbicort/spiriva  - c/w symbicort 2 puffs BID/spiriva qd

## 2021-03-30 NOTE — PROGRESS NOTE ADULT - ATTENDING COMMENTS
The patient is clinically stable.  She is arousable but most likely sleeping.  The patient refused noninvasive ventilation overnight.  Her oxygen saturation is 100% on 4 L.  Decrease the oxygen to accept saturation above 92%.  The lowest blood gas the patient had a pH 7.48 and a CO2 of 58.  Patient most likely lives in a higher CO2.  Will follow up on clinical status.  No evidence of CO2 narcosis.  No evidence of underlying acute pulmonary infection.  Will start bronchodilators.  Out of bed in chair.  Avoid narcotics.  Palliative care consult  Patient seen and examined with house-staff during bedside rounds.  Resident note read, including vitals, physical findings, laboratory data, and radiological reports.   Revisions included below.  Direct personal management at bed side and extensive interpretation of the data.  Plan was outlined and discussed in details with the housestaff.  Decision making of high complexity  Action taken for acute disease activity to reflect the level of care provided:  - medication reconciliation  - review laboratory data      The patient's was admitted for toxic metabolic encephalopathy possibility related to acute on top of chronic hyper Thing hypoxic respiratory failure related to COPD lung cancer status post pleurodesis.  The patient is sleepy but arousable.

## 2021-03-30 NOTE — CONSULT NOTE ADULT - ASSESSMENT
87 year old woman with debility,  Acute respiratory failure with hypoxia, lung cancer, and encounter for palliative care.

## 2021-03-30 NOTE — PROGRESS NOTE ADULT - ATTENDING COMMENTS
88yo F, nonsmoker, with PMHx of lung adenocarcinoma (s/p resection/XRT), recurrent Pleural effusion s/p chest tube and pleurodesis, severe MR, who initially presented from [facility] rehab with desaturation of O2 to 70%. Admitted for hypoxic, hypercapnic respiratory failure, GI consulted for anemia    Seen/discussed with fellow  Agree with above

## 2021-03-30 NOTE — PHYSICAL THERAPY INITIAL EVALUATION ADULT - DIAGNOSIS, PT EVAL
5A: Primary Prevention/Risk Reduction for Loss of Balance and Falling physical therapist 6B: Impaired Aerobic Capacity/Endurance Associated with Deconditioning

## 2021-03-30 NOTE — PHYSICAL THERAPY INITIAL EVALUATION ADULT - PERTINENT HX OF CURRENT PROBLEM, REHAB EVAL
88yo F, nonsmoker, with PMHx of lung adenocarcinoma (s/p resection/XRT), severe MR,  s/p 8/2020 pleuroscopy and talc pleurodesis and R chest tube placement for ~2days, presenting from [facility] rehab with hypoxemic and hypercapnic respiratory failure due to recurrence of pleural effusion vs pulmonary edema

## 2021-03-30 NOTE — PROGRESS NOTE ADULT - ASSESSMENT
86yo F, nonsmoker, with PMHx of lung adenocarcinoma (s/p resection/XRT), recurrent Pleural effusion s/p chest tube and pleurodesis, severe MR, who initially presented from [facility] rehab with desaturation of O2 to 70%. Admitted for hypoxic, hypercapnic respiratory failure, GI consulted for anemia    Normocytic anemia: hb last 3 months around 8, no acute drop, prior to that hb 10-11  -denies any melena/hematochezia   -rectal exam : ext hemorrhoids, brown stool in vault  -has e/o iron deficiency on iron panel   -no e/o active bleeding at this time, i.e melena/hematochezia  - patient can follow up outpatient to evaluate for possible EGD+colonoscopy in w/u of VI  - can f/u with myself and Dr. Mcfarland; office will call her to schedule appointment    Thank you for allowing us to participate in the care of this patient.  GI will sign off. Please call back with any questions or concerns.     Gauri Ventura MD  PGY-4, Gastroenterology Fellow  pager: [phone]

## 2021-03-30 NOTE — PROGRESS NOTE ADULT - PROBLEM SELECTOR PLAN 4
Hgb on admission 7.9, prior admission in the 10s-11s, currently no signs of active bleeding (no hematochezia, melena, hemoptysis, hematuria). Rectal exam negative in ED for blood. Per GI consider out pt endoscopy once medically stable.   + possible rectal FGD avidity vs normal physiologic. check stool occult.  consider GI.  - obtain iron panel  - likely component of iron deficiency as patient was prescribed iron per [facility] med list review  - could also be attributable to chronic disease/underlying malignancy (likely)  - retic count wnl  - trend CBC  - maintain active T&S  - transfuse if Hgb <7

## 2021-03-31 DIAGNOSIS — Z71.89 OTHER SPECIFIED COUNSELING: ICD-10-CM

## 2021-03-31 LAB
ALBUMIN SERPL ELPH-MCNC: 2.4 G/DL — LOW (ref 3.3–5)
ALP SERPL-CCNC: 483 U/L — HIGH (ref 40–120)
ALT FLD-CCNC: 31 U/L — SIGNIFICANT CHANGE UP (ref 10–45)
ANION GAP SERPL CALC-SCNC: 5 MMOL/L — SIGNIFICANT CHANGE UP (ref 5–17)
AST SERPL-CCNC: 33 U/L — SIGNIFICANT CHANGE UP (ref 10–40)
BASOPHILS # BLD AUTO: 0.08 K/UL — SIGNIFICANT CHANGE UP (ref 0–0.2)
BASOPHILS NFR BLD AUTO: 0.7 % — SIGNIFICANT CHANGE UP (ref 0–2)
BILIRUB SERPL-MCNC: 0.2 MG/DL — SIGNIFICANT CHANGE UP (ref 0.2–1.2)
BUN SERPL-MCNC: 25 MG/DL — HIGH (ref 7–23)
CALCIUM SERPL-MCNC: 8.4 MG/DL — SIGNIFICANT CHANGE UP (ref 8.4–10.5)
CHLORIDE SERPL-SCNC: 100 MMOL/L — SIGNIFICANT CHANGE UP (ref 96–108)
CO2 SERPL-SCNC: 37 MMOL/L — HIGH (ref 22–31)
CREAT SERPL-MCNC: 0.75 MG/DL — SIGNIFICANT CHANGE UP (ref 0.5–1.3)
CULTURE RESULTS: SIGNIFICANT CHANGE UP
CULTURE RESULTS: SIGNIFICANT CHANGE UP
EOSINOPHIL # BLD AUTO: 0.37 K/UL — SIGNIFICANT CHANGE UP (ref 0–0.5)
EOSINOPHIL NFR BLD AUTO: 3.4 % — SIGNIFICANT CHANGE UP (ref 0–6)
GLUCOSE SERPL-MCNC: 78 MG/DL — SIGNIFICANT CHANGE UP (ref 70–99)
HCT VFR BLD CALC: 27.4 % — LOW (ref 34.5–45)
HGB BLD-MCNC: 7.5 G/DL — LOW (ref 11.5–15.5)
IMM GRANULOCYTES NFR BLD AUTO: 0.5 % — SIGNIFICANT CHANGE UP (ref 0–1.5)
LYMPHOCYTES # BLD AUTO: 0.76 K/UL — LOW (ref 1–3.3)
LYMPHOCYTES # BLD AUTO: 7 % — LOW (ref 13–44)
MAGNESIUM SERPL-MCNC: 1.8 MG/DL — SIGNIFICANT CHANGE UP (ref 1.6–2.6)
MCHC RBC-ENTMCNC: 22.7 PG — LOW (ref 27–34)
MCHC RBC-ENTMCNC: 27.4 GM/DL — LOW (ref 32–36)
MCV RBC AUTO: 82.8 FL — SIGNIFICANT CHANGE UP (ref 80–100)
MONOCYTES # BLD AUTO: 1.42 K/UL — HIGH (ref 0–0.9)
MONOCYTES NFR BLD AUTO: 13.1 % — SIGNIFICANT CHANGE UP (ref 2–14)
NEUTROPHILS # BLD AUTO: 8.14 K/UL — HIGH (ref 1.8–7.4)
NEUTROPHILS NFR BLD AUTO: 75.3 % — SIGNIFICANT CHANGE UP (ref 43–77)
NRBC # BLD: 0 /100 WBCS — SIGNIFICANT CHANGE UP (ref 0–0)
PHOSPHATE SERPL-MCNC: 3.4 MG/DL — SIGNIFICANT CHANGE UP (ref 2.5–4.5)
PLATELET # BLD AUTO: 426 K/UL — HIGH (ref 150–400)
POTASSIUM SERPL-MCNC: 4.1 MMOL/L — SIGNIFICANT CHANGE UP (ref 3.5–5.3)
POTASSIUM SERPL-SCNC: 4.1 MMOL/L — SIGNIFICANT CHANGE UP (ref 3.5–5.3)
PROT SERPL-MCNC: 6.6 G/DL — SIGNIFICANT CHANGE UP (ref 6–8.3)
RBC # BLD: 3.31 M/UL — LOW (ref 3.8–5.2)
RBC # FLD: 22.1 % — HIGH (ref 10.3–14.5)
SODIUM SERPL-SCNC: 142 MMOL/L — SIGNIFICANT CHANGE UP (ref 135–145)
SPECIMEN SOURCE: SIGNIFICANT CHANGE UP
SPECIMEN SOURCE: SIGNIFICANT CHANGE UP
WBC # BLD: 10.82 K/UL — HIGH (ref 3.8–10.5)
WBC # FLD AUTO: 10.82 K/UL — HIGH (ref 3.8–10.5)

## 2021-03-31 PROCEDURE — 99233 SBSQ HOSP IP/OBS HIGH 50: CPT | Mod: GC

## 2021-03-31 PROCEDURE — 99233 SBSQ HOSP IP/OBS HIGH 50: CPT

## 2021-03-31 PROCEDURE — 99358 PROLONG SERVICE W/O CONTACT: CPT

## 2021-03-31 RX ORDER — MAGNESIUM OXIDE 400 MG ORAL TABLET 241.3 MG
400 TABLET ORAL ONCE
Refills: 0 | Status: COMPLETED | OUTPATIENT
Start: 2021-03-31 | End: 2021-03-31

## 2021-03-31 RX ORDER — ENOXAPARIN SODIUM 100 MG/ML
30 INJECTION SUBCUTANEOUS DAILY
Refills: 0 | Status: DISCONTINUED | OUTPATIENT
Start: 2021-03-31 | End: 2021-04-02

## 2021-03-31 RX ADMIN — MAGNESIUM OXIDE 400 MG ORAL TABLET 400 MILLIGRAM(S): 241.3 TABLET ORAL at 18:15

## 2021-03-31 RX ADMIN — Medication 20 MILLIGRAM(S): at 06:08

## 2021-03-31 RX ADMIN — Medication 81 MILLIGRAM(S): at 11:58

## 2021-03-31 RX ADMIN — BUDESONIDE AND FORMOTEROL FUMARATE DIHYDRATE 2 PUFF(S): 160; 4.5 AEROSOL RESPIRATORY (INHALATION) at 06:08

## 2021-03-31 RX ADMIN — TIOTROPIUM BROMIDE 1 CAPSULE(S): 18 CAPSULE ORAL; RESPIRATORY (INHALATION) at 11:58

## 2021-03-31 RX ADMIN — ATORVASTATIN CALCIUM 20 MILLIGRAM(S): 80 TABLET, FILM COATED ORAL at 23:21

## 2021-03-31 RX ADMIN — ENOXAPARIN SODIUM 30 MILLIGRAM(S): 100 INJECTION SUBCUTANEOUS at 18:15

## 2021-03-31 RX ADMIN — Medication 325 MILLIGRAM(S): at 11:58

## 2021-03-31 RX ADMIN — Medication 40 MILLIGRAM(S): at 18:15

## 2021-03-31 RX ADMIN — BUDESONIDE AND FORMOTEROL FUMARATE DIHYDRATE 2 PUFF(S): 160; 4.5 AEROSOL RESPIRATORY (INHALATION) at 18:15

## 2021-03-31 NOTE — PROGRESS NOTE ADULT - PROBLEM SELECTOR PLAN 2
Hx of recurrent pleural effusions. Last admission had R-sided pleuroscopy w/ pleurodesis 10/27/2020. S/p chest tube removal 10/29.  - per discharge note from 10/2020 patient discharged on lasix 20mg qd, not on med rec from [facility] papers therefore clarify with pulm if patient should be on lasix  - c/w NC O2 and wean as tolerated

## 2021-03-31 NOTE — PROGRESS NOTE ADULT - PROBLEM SELECTOR PLAN 4
Patient appears comfortable. No distress noted. Still does not want to discuss GOC, and just wants to go back to Rehab. Palliative care will sign off at this time. Please reconsult as needed.

## 2021-03-31 NOTE — PROGRESS NOTE ADULT - PROBLEM SELECTOR PLAN 8
F: none  E: replete K<4, Mg<2  N: DASH  VTE Prophylaxis:   C: Full Code  D: 7L F: none  E: replete K<4, Mg<2  N: DASH  VTE Prophylaxis: Lovenox  C: Full Code  D: 7L

## 2021-03-31 NOTE — PROGRESS NOTE ADULT - SUBJECTIVE AND OBJECTIVE BOX
INTERVAL HPI/OVERNIGHT EVENTS:  O/N:  This morning: Patient was seen and examined at bedside. No complaints at this time.     VITAL SIGNS:  T(F): 98.2 (03-31-21 @ 05:57)  HR: 90 (03-31-21 @ 03:49)  BP: 108/54 (03-31-21 @ 03:49)  RR: 18 (03-31-21 @ 03:49)  SpO2: 92% (03-31-21 @ 03:49)  Wt(kg): --    PHYSICAL EXAM:          MEDICATIONS  (STANDING):  aspirin enteric coated 81 milliGRAM(s) Oral daily  atorvastatin 20 milliGRAM(s) Oral at bedtime  budesonide  80 MICROgram(s)/formoterol 4.5 MICROgram(s) Inhaler 2 Puff(s) Inhalation two times a day  ferrous    sulfate 325 milliGRAM(s) Oral daily  furosemide    Tablet 20 milliGRAM(s) Oral daily  tiotropium 18 MICROgram(s) Capsule 1 Capsule(s) Inhalation daily    MEDICATIONS  (PRN):      Allergies    Bactrim (Unknown)  Cleocin HCl (Unknown)  Flagyl (Unknown)  Honey (Unknown)  iodine (Anaphylaxis)  IV Contrast (Anaphylaxis)  Levaquin (Other; Rash)  penicillin (Unknown)  Zithromax (Unknown)    Intolerances        LABS:                        7.5    10.82 )-----------( 426      ( 31 Mar 2021 06:54 )             27.4     03-30    143  |  98  |  25<H>  ----------------------------<  86  4.5   |  39<H>  |  0.77    Ca    9.2      30 Mar 2021 07:57  Phos  3.5     03-30  Mg     2.1     03-30    TPro  6.9  /  Alb  2.8<L>  /  TBili  0.2  /  DBili  x   /  AST  32  /  ALT  34  /  AlkPhos  533<H>  03-30                RADIOLOGY & ADDITIONAL TESTS:  Reviewed INTERVAL HPI/OVERNIGHT EVENTS:  O/N: desatted to 70% with good wave form placed on NRB. transitioned off NRB, replaced O2 pulse ox. back on 6L NC.  This morning: Patient was seen and examined at bedside. No complaints at this time. Unsure why she is in hospital, asking for clarification.    VITAL SIGNS:  T(F): 98.2 (03-31-21 @ 05:57)  HR: 90 (03-31-21 @ 03:49)  BP: 108/54 (03-31-21 @ 03:49)  RR: 18 (03-31-21 @ 03:49)  SpO2: 92% (03-31-21 @ 03:49)  Wt(kg): --    PHYSICAL EXAM:    General: Elderly cachetic female sitting up in hospital bed on 6L NC  HEENT: NC/AT, dry MM, EOMI  Respiratory: scattered wheezing, crackles  Cardiovascular: +S1/S2; RRR  Gastrointestinal: soft, NT/ND  : (+) suprapubic distension withh primafit cath in place  Extremities: legs dry, well perfused, no edema  Musculoskeletal: kyphosis of back  Neurologic: AAOx3, hard of hearing  Psychiatric: somewhat agitated but able to converse        MEDICATIONS  (STANDING):  aspirin enteric coated 81 milliGRAM(s) Oral daily  atorvastatin 20 milliGRAM(s) Oral at bedtime  budesonide  80 MICROgram(s)/formoterol 4.5 MICROgram(s) Inhaler 2 Puff(s) Inhalation two times a day  ferrous    sulfate 325 milliGRAM(s) Oral daily  furosemide    Tablet 20 milliGRAM(s) Oral daily  tiotropium 18 MICROgram(s) Capsule 1 Capsule(s) Inhalation daily    MEDICATIONS  (PRN):      Allergies    Bactrim (Unknown)  Cleocin HCl (Unknown)  Flagyl (Unknown)  Honey (Unknown)  iodine (Anaphylaxis)  IV Contrast (Anaphylaxis)  Levaquin (Other; Rash)  penicillin (Unknown)  Zithromax (Unknown)    Intolerances        LABS:                        7.5    10.82 )-----------( 426      ( 31 Mar 2021 06:54 )             27.4     03-30    143  |  98  |  25<H>  ----------------------------<  86  4.5   |  39<H>  |  0.77    Ca    9.2      30 Mar 2021 07:57  Phos  3.5     03-30  Mg     2.1     03-30    TPro  6.9  /  Alb  2.8<L>  /  TBili  0.2  /  DBili  x   /  AST  32  /  ALT  34  /  AlkPhos  533<H>  03-30                RADIOLOGY & ADDITIONAL TESTS:  Reviewed

## 2021-03-31 NOTE — PROGRESS NOTE ADULT - PROBLEM SELECTOR PLAN 3
History of adenocarcinoma of RLL s/p VATS resection in 2013, ANANYA XRT in 2016, and RMF lesion s/p XRT in 2017. Also per chart review, had recent PET showing 2 new FDG-avid subcentimeter nodules in L subpleural region  - f/u pulm recs  - will need outpatient f/u w/ Dr. Beal on discharge.    #Elevated alk phos  Initially thought could be attributable to underlying malignancy, but gtt also elevated therefore perhaps hepatobiliary, RUQ only with 0.8 cm cyst. PET scan negative for mets  -can consider further imaging or heme onc consult for staging, as perhaps indicative of spread/recurrence of malignancy however pleural effusions were never definitively diagnosed as malignant just presumed History of adenocarcinoma of RLL s/p VATS resection in 2013, ANANYA XRT in 2016, and RMF lesion s/p XRT in 2017. Also per chart review, had recent PET showing 2 new FDG-avid subcentimeter nodules in L subpleural region  - f/u pulm recs  - will need outpatient f/u w/ Dr. Beal on discharge.    #Elevated alk phos  Initially thought could be attributable to underlying malignancy, but gtt also elevated therefore perhaps hepatobiliary, RUQ only with 0.8 cm cyst. PET scan negative for mets  -can consider further imaging or heme onc consult for staging, as perhaps indicative of spread/recurrence of malignancy however pleural effusions were never definitively diagnosed as malignant just presumed  -[facility] care consulted and saw pt 3/31, she does not want to discuss GOC, and just wants to go back to Rehab -> cont. ongoing discussion w/ pt and family

## 2021-03-31 NOTE — PROGRESS NOTE ADULT - SUBJECTIVE AND OBJECTIVE BOX
LAZARO GRANT             [id number]    CC: shortness of breath    HPI:  HPI:  86yo F, nonsmoker, with PMHx of lung adenocarcinoma (s/p resection/XRT), severe MR, and recent admission 8/2020 for fall and R leg weakness and then in 10/2020 for hypoxemia to 80s on 3L NC (her usual O2) found then to have sepsis 2/2 to pneumonia and acute hypoxic respiratory failure and stepped up to 7La for observation s/p pleuroscopy and talc pleurodesis and R chest tube placement for ~2days, now comes in from [facility] rehab with desaturation of O2 to 70% that improved with placement on 2L NC. Pt reports felt sob today. She denies cough or chest pain, except when she tries to drink fluids, she coughs (not with food). She denies headache or dizziness, and denies nausea, vomiting, abdominal pain. Has not had any bloody or dark bowel movements, nor blood in her urine. Rectal exam negative in ED.    In the ED,  VS: T 98, HR 99, /74, RR 20, SpO2 100% 4L NC  Labs: Hgb 7.9 (from baseline of 10-11 in october) otherwise CBC wnl; BMP wnl, BUN/Cr 24/0.8 Alb 2.7 Alk P 618 AST 45 ALT 46 BNP 1148 trop negative   EKG: wnl  CXR: wet read: compared to prior cxr L effusion worse, R effusion better, atelectasis in both lungs that looks similar, patchy opacities on R side which look new  Orders: vanc 1g and cefepime 1g x1 in ED   (26 Mar 2021 17:32)    SUBJECTIVE: Patient resting in bed in no distress.     ROS:  DYSPNEA (Edith): 0	  NAUS/VOM: N	  SECRETIONS: N	  AGITATION: N  Pain (Y/N):   N    -Provocation/Palliation: n/a   -Quality/Quantity: n/a   -Radiating: n/a   -Severity: n/a   -Timing/Frequency: n/a    -Impact on ADLs: n/a     OTHER REVIEW OF SYSTEMS: + weakness   UNABLE TO OBTAIN  due to: n/a     PEx:  T(C): 36.3 (03-31-21 @ 10:07), Max: 36.8 (03-31-21 @ 05:57)  HR: 86 (03-31-21 @ 08:39) (84 - 90)  BP: 114/56 (03-31-21 @ 08:39) (91/50 - 144/66)  RR: 18 (03-31-21 @ 08:39) (15 - 18)  SpO2: 92% (03-31-21 @ 08:39) (92% - 100%)  Wt(kg): 39kg      GENERAL:  [ x]Alert  [x]Oriented x 3  [ ]Lethargic  [ ]Cachexia  [ ]Unarousable  [ ]Verbal  [ ]Non-Verbal  Behavioral:   [ ] Anxiety  [ ] Delirium [ ] Agitation [ x] Other - calm   HEENT:  [ ]Normal   [x ]Dry mouth   [ ]ET Tube/Trach  [ ]Oral lesions  PULMONARY:   [ ]Clear  [ ]Tachypnea  [ ]Audible excessive secretions   [ x]Rhonchi        [ ]Right [ ]Left [ x]Bilateral  [ ]Crackles        [ ]Right [ ]Left [ ]Bilateral  [ ]Wheezing     [ ]Right [ ]Left [ ]Bilateral  CARDIOVASCULAR:    [ x]Regular [ ]Irregular [ ]Tachy  [ ]Renny [ ]Murmur [ ]Other  GASTROINTESTINAL:  [ x]Soft  [ ]Distended   [ ]+BS  [x ]Non tender [ ]Tender  [ ]PEG [ ]OGT/ NGT  Last BM: 3/29/2021  GENITOURINARY:  [ ]Normal [x ] Incontinent   [ ]Oliguria/Anuria   [ ]Mcduffie  MUSCULOSKELETAL:   [ ]Normal   [x ]Weakness  [ ]Bed/Wheelchair bound [ ]Edema  NEUROLOGIC:   [ x]No focal deficits  [ ] Cognitive impairment  [ ] Dysphagia [ ]Dysarthria [ ] Paresis [ ]Other   SKIN:   [ x]Normal   [ ]Pressure ulcer(s)  [ ]Rash      ALLERGIES: Bactrim (Unknown)  Cleocin HCl (Unknown)  Flagyl (Unknown)  Honey (Unknown)  iodine (Anaphylaxis)  IV Contrast (Anaphylaxis)  Levaquin (Other; Rash)  penicillin (Unknown)  Zithromax (Unknown)      OPIATE NAÏVE (Y/N):    MEDICATIONS: REVIEWED  MEDICATIONS  (STANDING):  aspirin enteric coated 81 milliGRAM(s) Oral daily  atorvastatin 20 milliGRAM(s) Oral at bedtime  budesonide  80 MICROgram(s)/formoterol 4.5 MICROgram(s) Inhaler 2 Puff(s) Inhalation two times a day  ferrous    sulfate 325 milliGRAM(s) Oral daily  furosemide    Tablet 20 milliGRAM(s) Oral daily  tiotropium 18 MICROgram(s) Capsule 1 Capsule(s) Inhalation daily    MEDICATIONS  (PRN):      LABS: REVIEWED  CBC:                        7.5    10.82 )-----------( 426      ( 31 Mar 2021 06:54 )             27.4     CMP:    03-31    142  |  100  |  25<H>  ----------------------------<  78  4.1   |  37<H>  |  0.75    Ca    8.4      31 Mar 2021 06:54  Phos  3.4     03-31  Mg     1.8     03-31    TPro  6.6  /  Alb  2.4<L>  /  TBili  0.2  /  DBili  x   /  AST  33  /  ALT  31  /  AlkPhos  483<H>  03-31      IMAGING: REVIEWED    ADVANCED DIRECTIVES:            FULL CODE         DECISION MAKER: Patient is able to make her own decisions.   LEGAL SURROGATE: Dr. Maynor Gonzalez cell: [phone] / Office line: [phone]    PSYCHOSOCIAL-SPIRITUAL ASSESSMENT:       Reviewed       Care plan unchanged    GOALS OF CARE DISCUSSION       Palliative care info/counseling provided	           Documentation of GOC: Full code   	      AGENCY CHOICE DISCUSSED:           [facility]    REFERRALS	        Unit SW/Case Mgmt        PT/OT

## 2021-03-31 NOTE — PROGRESS NOTE ADULT - PROBLEM SELECTOR PLAN 2
Patient presented to ED with hypercapnia, which was resolved with BIPAP. Patient might have COPD per primary team and was started on Symbicort and Spiriva. Continue care as per primary team.

## 2021-03-31 NOTE — PROGRESS NOTE ADULT - PROBLEM SELECTOR PLAN 5
Patient has severe mitral regurg however no decreased EF noted on echo from 2019. No history of heart failure however BNP elevated to >1000, which it has been in past as well (1034 in 10/20)  - monitor for increase in LE edema  - repeat ECHO Patient has severe mitral regurg however no decreased EF noted on echo from 2020. No history of heart failure however BNP elevated to >1000, which it has been in past as well (1034 in 10/20)  - monitor for increase in LE edema  - repeat ECHO

## 2021-03-31 NOTE — PROGRESS NOTE ADULT - PROBLEM SELECTOR PLAN 4
Hgb on admission 7.9, prior admission in the 10s-11s, currently no signs of active bleeding (no hematochezia, melena, hemoptysis, hematuria). Rectal exam negative in ED for blood. Per GI consider out pt endoscopy once medically stable.   + possible rectal FGD avidity vs normal physiologic. check stool occult.  consider GI.  - obtain iron panel  - likely component of iron deficiency as patient was prescribed iron per [facility] med list review  - could also be attributable to chronic disease/underlying malignancy (likely)  - retic count wnl  - trend CBC  - maintain active T&S  - transfuse if Hgb <7 Hgb on admission 7.9, prior admission in the 10s-11s, currently no signs of active bleeding (no hematochezia, melena, hemoptysis, hematuria). Rectal exam negative in ED for blood. Per GI consider out pt endoscopy once medically stable.   + possible rectal FGD avidity vs normal physiologic. FOBT positive. low iron counts. Per GI can follow up outpatient to evaluate for possible EGD+colonoscopy in w/u of VI.  - likely component of iron deficiency as patient was prescribed iron per [facility] med list review  - could also be attributable to chronic disease/underlying malignancy (likely)  - retic count wnl  - trend CBC  - maintain active T&S  - transfuse if Hgb <7

## 2021-03-31 NOTE — PROGRESS NOTE ADULT - PROBLEM SELECTOR PLAN 5
Patient last assessed: 3/30/2021   to manage: GOC/AD, symptoms, and support.  30 Minutes; Start: 0700am End: 0730am , of non-face-to-face prolonged service provided that relates to (face-to-face) care that has or will occur and ongoing patient management, including one or more of the following:   - Reviewed records from other physicians or other health care professional services, including one or more of the following: other medical records and diagnostic / radiology study results

## 2021-03-31 NOTE — PROGRESS NOTE ADULT - PROBLEM SELECTOR PLAN 1
On admission 3/25 02 sat 70s. On RMF 3/28 requiring NRB, ABG showed pH 7.34, pCO2 74, pO2 197, Bicarb 39, sat 99%, placed on BiPAP and given laisx 20 IVP. By AM of 3/29 sating on home O2 requirements.   - appreciated pulm recs - no significant pleural effusion  - hold off on abx given WBCs normal, afebrile  - continue with NC O2 (currently on 3-4L) and wean as tolerated  - cxr improved  - follows with o/p pulm for adenocarcinoma  - c/w laisx 20mg PO qd    #Hypercapnea  Given CO2 retention on ABG suggestive of COPD, will start symbicort/spiriva  - c/w symbicort 2 puffs BID/spiriva qd On admission 3/25 02 sat 70s. On RMF 3/28 requiring NRB, ABG showed pH 7.34, pCO2 74, pO2 197, Bicarb 39, sat 99%, placed on BiPAP and given lasix 20 IVP. By AM of 3/29 sating on home O2 requirements. Of note, pt has hx of PFO noted on echo in 8/2020  - appreciated pulm recs - no significant pleural effusion  - hold off on abx given WBCs normal, afebrile  - continue with NC O2 (currently on 3-6L) and wean as tolerated  - cxr improved  - follows with o/p pulm for adenocarcinoma  - c/w laisx 20mg PO qd    #Hypercapnea  Given CO2 retention on ABG suggestive of COPD, will start symbicort/spiriva  - c/w symbicort 2 puffs BID/spiriva qd

## 2021-03-31 NOTE — PROGRESS NOTE ADULT - ASSESSMENT
86yo F, nonsmoker, with PMHx of lung adenocarcinoma (s/p resection/XRT), severe MR,  s/p 8/2020 pleuroscopy and talc pleurodesis and R chest tube placement for ~2days, presenting from [facility] rehab with hypoxemic and hypercapnic respiratory failure due to recurrence of pleural effusion vs pulmonary edema 87F, remote smoking hx, with PMH of lung adenocarcinoma (s/p resection/XRT), severe MR, PFO, s/p 8/2020 pleuroscopy and talc pleurodesis and R chest tube placement for ~2days, presenting from [facility] rehab with hypoxemic and hypercapnic respiratory failure due to recurrence of pleural effusion vs pulmonary edema

## 2021-03-31 NOTE — PROGRESS NOTE ADULT - ATTENDING COMMENTS
Patient seen and examined with house-staff during bedside rounds.  Resident note read, including vitals, physical findings, laboratory data, and radiological reports.   Revisions included below.  Direct personal management at bed side and extensive interpretation of the data.  Plan was outlined and discussed in details with the housestaff.  Decision making of high complexity  Action taken for acute disease activity to reflect the level of care provided:  - medication reconciliation  - review laboratory data  Patient is stable.  Still unable to wean the patient off oxygen.  The patient had periodic episode of desaturation the most likely related to mucous plugging.  Chest x-ray is stable and even improved prior to the previous one most likely related to the negative fluid balance.  Patient is on triple therapy.  Will start steroids 40 mg of prednisone daily for 5 4 days.  Out of bed in chair.  Wean oxygen except saturation 88%.  Start DVT prophylaxis as the patient is hypercoagulable

## 2021-04-01 PROCEDURE — 93306 TTE W/DOPPLER COMPLETE: CPT | Mod: 26

## 2021-04-01 PROCEDURE — 99232 SBSQ HOSP IP/OBS MODERATE 35: CPT | Mod: GC

## 2021-04-01 RX ADMIN — BUDESONIDE AND FORMOTEROL FUMARATE DIHYDRATE 2 PUFF(S): 160; 4.5 AEROSOL RESPIRATORY (INHALATION) at 17:48

## 2021-04-01 RX ADMIN — BUDESONIDE AND FORMOTEROL FUMARATE DIHYDRATE 2 PUFF(S): 160; 4.5 AEROSOL RESPIRATORY (INHALATION) at 06:09

## 2021-04-01 RX ADMIN — ENOXAPARIN SODIUM 30 MILLIGRAM(S): 100 INJECTION SUBCUTANEOUS at 11:21

## 2021-04-01 RX ADMIN — Medication 325 MILLIGRAM(S): at 12:15

## 2021-04-01 RX ADMIN — Medication 20 MILLIGRAM(S): at 06:09

## 2021-04-01 RX ADMIN — Medication 81 MILLIGRAM(S): at 12:16

## 2021-04-01 RX ADMIN — TIOTROPIUM BROMIDE 1 CAPSULE(S): 18 CAPSULE ORAL; RESPIRATORY (INHALATION) at 12:15

## 2021-04-01 RX ADMIN — ATORVASTATIN CALCIUM 20 MILLIGRAM(S): 80 TABLET, FILM COATED ORAL at 21:48

## 2021-04-01 NOTE — PROGRESS NOTE ADULT - NUTRITIONAL ASSESSMENT
This patient has been assessed with a concern for Malnutrition and has been determined to have a diagnosis/diagnoses of Moderate protein-calorie malnutrition and Underweight (BMI < 19).    This patient is being managed with:   Diet DASH/TLC-  Sodium & Cholesterol Restricted  Supplement Feeding Modality:  Oral  Ensure Enlive Cans or Servings Per Day:  1       Frequency:  Two Times a day  Entered: Mar 27 2021  1:33PM    
This patient has been assessed with a concern for Malnutrition and has been determined to have a diagnosis/diagnoses of Moderate protein-calorie malnutrition and Underweight (BMI < 19).    This patient is being managed with:   Diet NPO-  Entered: Mar 28 2021  7:14PM    
This patient has been assessed with a concern for Malnutrition and has been determined to have a diagnosis/diagnoses of Moderate protein-calorie malnutrition and Underweight (BMI < 19).    This patient is being managed with:   Diet DASH/TLC-  Sodium & Cholesterol Restricted  Supplement Feeding Modality:  Oral  Ensure Enlive Cans or Servings Per Day:  1       Frequency:  Two Times a day  Entered: Mar 29 2021  9:35AM    
This patient has been assessed with a concern for Malnutrition and has been determined to have a diagnosis/diagnoses of Moderate protein-calorie malnutrition and Underweight (BMI < 19).    This patient is being managed with:   Diet DASH/TLC-  Sodium & Cholesterol Restricted  Supplement Feeding Modality:  Oral  Ensure Enlive Cans or Servings Per Day:  1       Frequency:  Two Times a day  Entered: Mar 27 2021  1:33PM    
This patient has been assessed with a concern for Malnutrition and has been determined to have a diagnosis/diagnoses of Moderate protein-calorie malnutrition and Underweight (BMI < 19).    This patient is being managed with:   Diet DASH/TLC-  Sodium & Cholesterol Restricted  Supplement Feeding Modality:  Oral  Ensure Enlive Cans or Servings Per Day:  1       Frequency:  Two Times a day  Entered: Mar 29 2021  9:35AM

## 2021-04-01 NOTE — PROGRESS NOTE ADULT - SUBJECTIVE AND OBJECTIVE BOX
INTERVAL HPI/OVERNIGHT EVENTS:  O/N:  This morning: Patient was seen and examined at bedside.       VITAL SIGNS:  T(F): 97.8 (04-01-21 @ 05:56)  HR: 82 (04-01-21 @ 04:10)  BP: 109/52 (04-01-21 @ 04:10)  RR: 18 (04-01-21 @ 04:10)  SpO2: 100% (04-01-21 @ 04:10)  Wt(kg): --    PHYSICAL EXAM:          MEDICATIONS  (STANDING):  aspirin enteric coated 81 milliGRAM(s) Oral daily  atorvastatin 20 milliGRAM(s) Oral at bedtime  budesonide  80 MICROgram(s)/formoterol 4.5 MICROgram(s) Inhaler 2 Puff(s) Inhalation two times a day  enoxaparin Injectable 30 milliGRAM(s) SubCutaneous daily  ferrous    sulfate 325 milliGRAM(s) Oral daily  furosemide    Tablet 20 milliGRAM(s) Oral daily  tiotropium 18 MICROgram(s) Capsule 1 Capsule(s) Inhalation daily    MEDICATIONS  (PRN):      Allergies    Bactrim (Unknown)  Cleocin HCl (Unknown)  Flagyl (Unknown)  Honey (Unknown)  iodine (Anaphylaxis)  IV Contrast (Anaphylaxis)  Levaquin (Other; Rash)  penicillin (Unknown)  Zithromax (Unknown)    Intolerances        LABS:                        7.5    10.82 )-----------( 426      ( 31 Mar 2021 06:54 )             27.4     03-31    142  |  100  |  25<H>  ----------------------------<  78  4.1   |  37<H>  |  0.75    Ca    8.4      31 Mar 2021 06:54  Phos  3.4     03-31  Mg     1.8     03-31    TPro  6.6  /  Alb  2.4<L>  /  TBili  0.2  /  DBili  x   /  AST  33  /  ALT  31  /  AlkPhos  483<H>  03-31                RADIOLOGY & ADDITIONAL TESTS:  Reviewed ****************** TRANSFER NOTE [facility] TO [facility] ******************    HOSPITAL COURSE:    87F, remote smoking history, with PMH lung adenocarcinoma (s/p resection/radiation), severe MR, recent admission for sepsis 2/2 PNA with acute hypoxic respiratory failure (10/2020) presented from [facility] rehab on 3/26 acute with chronic hypoxemic respiratory failure. She was initially admitted to [facility] for further management of acute hypoxic respiratory failure in the setting of B/L pleural effusion (L>R) with R lung opacity, no abx started as no other signs/sx of infxn. Pulm consulted however no plans for intervention as prior pleural studies were negative for malignancy and POCUS showing only trace effusions. ICU consulted (3/28 PM) after patient with worsening hypoxia and hypercapnea (ABG concerning for respiratory acidosis with pH 7.34, pCO2 74, Bicarb 39), patient stepped up to 7LA for BiPAP treatment of acute hypoxic hypercarbic respiratory failure. By following AM (3/29) patient back on nasal cannula. Due to known cancer and elevated alkphos/GGT, PET scan obtained which showed _____    INTERVAL HPI/OVERNIGHT EVENTS:  O/N:  This morning: Patient was seen and examined at bedside.       VITAL SIGNS:  T(F): 97.8 (04-01-21 @ 05:56)  HR: 82 (04-01-21 @ 04:10)  BP: 109/52 (04-01-21 @ 04:10)  RR: 18 (04-01-21 @ 04:10)  SpO2: 100% (04-01-21 @ 04:10)  Wt(kg): --    PHYSICAL EXAM:          MEDICATIONS  (STANDING):  aspirin enteric coated 81 milliGRAM(s) Oral daily  atorvastatin 20 milliGRAM(s) Oral at bedtime  budesonide  80 MICROgram(s)/formoterol 4.5 MICROgram(s) Inhaler 2 Puff(s) Inhalation two times a day  enoxaparin Injectable 30 milliGRAM(s) SubCutaneous daily  ferrous    sulfate 325 milliGRAM(s) Oral daily  furosemide    Tablet 20 milliGRAM(s) Oral daily  tiotropium 18 MICROgram(s) Capsule 1 Capsule(s) Inhalation daily    MEDICATIONS  (PRN):      Allergies    Bactrim (Unknown)  Cleocin HCl (Unknown)  Flagyl (Unknown)  Honey (Unknown)  iodine (Anaphylaxis)  IV Contrast (Anaphylaxis)  Levaquin (Other; Rash)  penicillin (Unknown)  Zithromax (Unknown)    Intolerances        LABS:                        7.5    10.82 )-----------( 426      ( 31 Mar 2021 06:54 )             27.4     03-31    142  |  100  |  25<H>  ----------------------------<  78  4.1   |  37<H>  |  0.75    Ca    8.4      31 Mar 2021 06:54  Phos  3.4     03-31  Mg     1.8     03-31    TPro  6.6  /  Alb  2.4<L>  /  TBili  0.2  /  DBili  x   /  AST  33  /  ALT  31  /  AlkPhos  483<H>  03-31                RADIOLOGY & ADDITIONAL TESTS:  Reviewed ****************** TRANSFER NOTE [facility] TO [facility] ******************    HOSPITAL COURSE:    87F, remote smoking history, with PMH lung adenocarcinoma (s/p resection/radiation), severe MR, recent admission for sepsis 2/2 PNA with acute hypoxic respiratory failure (10/2020) presented from [facility] rehab on 3/26 with acute on chronic hypoxemic respiratory failure. She was initially admitted to [facility] for further management of acute hypoxic respiratory failure in the setting of bilateral pleural effusion (L>R) with R lung opacity, no abx started as no other signs/sx of infxn. Pulm consulted however no plans for intervention as prior pleural studies were negative for malignancy and POCUS showed only trace effusions. ICU consulted (3/28) for worsening hypoxia and hypercapnea, patient stepped up to 7LA for BiPAP treatment of acute hypoxic hypercarbic respiratory failure. By following AM (3/29) patient was back on nasal cannula. Pt also with known cancer and elevated alkphos/GGT, recently seen by [facility] but pt declined discussing GOC at the time. On 4/1, pt otherwise breathing comfortably on 6L NC (on 3L at home), stable for stepdown to RMF.    INTERVAL HPI/OVERNIGHT EVENTS:  O/N: RINA  This morning: Patient was seen and examined at bedside. Requesting breakfast.      VITAL SIGNS:  T(F): 97.8 (04-01-21 @ 05:56)  HR: 82 (04-01-21 @ 04:10)  BP: 109/52 (04-01-21 @ 04:10)  RR: 18 (04-01-21 @ 04:10)  SpO2: 100% (04-01-21 @ 04:10)  Wt(kg): --    PHYSICAL EXAM:    General: Elderly cachetic female sitting up in hospital bed on 6L NC  HEENT: NC/AT, dry MM, EOMI  Respiratory: decreased air movement in lung fields bilaterally  Cardiovascular: +S1/S2; RRR  Gastrointestinal: abd soft, NT/ND  : (+) suprapubic distension with primafit cath in place  Extremities: legs warm, dry, well perfused, no edema  Musculoskeletal: severe kyphosis of back  Neurologic: AAOx3, hard of hearing  Psychiatric: very talkative today      MEDICATIONS  (STANDING):  aspirin enteric coated 81 milliGRAM(s) Oral daily  atorvastatin 20 milliGRAM(s) Oral at bedtime  budesonide  80 MICROgram(s)/formoterol 4.5 MICROgram(s) Inhaler 2 Puff(s) Inhalation two times a day  enoxaparin Injectable 30 milliGRAM(s) SubCutaneous daily  ferrous    sulfate 325 milliGRAM(s) Oral daily  furosemide    Tablet 20 milliGRAM(s) Oral daily  tiotropium 18 MICROgram(s) Capsule 1 Capsule(s) Inhalation daily    MEDICATIONS  (PRN):      Allergies    Bactrim (Unknown)  Cleocin HCl (Unknown)  Flagyl (Unknown)  Honey (Unknown)  iodine (Anaphylaxis)  IV Contrast (Anaphylaxis)  Levaquin (Other; Rash)  penicillin (Unknown)  Zithromax (Unknown)    Intolerances        LABS:                        7.5    10.82 )-----------( 426      ( 31 Mar 2021 06:54 )             27.4     03-31    142  |  100  |  25<H>  ----------------------------<  78  4.1   |  37<H>  |  0.75    Ca    8.4      31 Mar 2021 06:54  Phos  3.4     03-31  Mg     1.8     03-31    TPro  6.6  /  Alb  2.4<L>  /  TBili  0.2  /  DBili  x   /  AST  33  /  ALT  31  /  AlkPhos  483<H>  03-31                RADIOLOGY & ADDITIONAL TESTS:  Reviewed

## 2021-04-01 NOTE — PROGRESS NOTE ADULT - PROBLEM SELECTOR PLAN 4
Hgb on admission 7.9, prior admission in the 10s-11s, currently no signs of active bleeding (no hematochezia, melena, hemoptysis, hematuria). Rectal exam negative in ED for blood. PET scan from 2020 with focal FDG avidity in rectum cld be normal. FOBT positive. low iron counts.   - Per GI can follow up outpatient to evaluate for possible EGD+colonoscopy in w/u of VI.  - likely component of iron deficiency as patient was prescribed iron per [facility] med list review  - could also be attributable to chronic disease/underlying malignancy (likely)  - retic count wnl  - trend CBC  - maintain active T&S  - transfuse if Hgb <7

## 2021-04-01 NOTE — PROGRESS NOTE ADULT - ASSESSMENT
87F, remote smoking hx, with PMH of lung adenocarcinoma (s/p resection/XRT), severe MR, PFO, s/p 8/2020 pleuroscopy and talc pleurodesis and R chest tube placement for ~2days, presenting from [facility] rehab with hypoxemic and hypercapnic respiratory failure likely 2/2 recurrence of pleural effusion vs pulmonary edema

## 2021-04-01 NOTE — PROGRESS NOTE ADULT - PROBLEM SELECTOR PLAN 5
Patient has severe mitral regurg however no decreased EF noted on echo from 2020. No history of heart failure however BNP elevated to >1000, which it has been in past as well (1034 in 10/20).   - monitor for increase in LE edema  - f/u repeat ECHO

## 2021-04-01 NOTE — PROGRESS NOTE ADULT - PROBLEM SELECTOR PROBLEM 1
Acute respiratory failure with hypoxia
Debility
Acute respiratory failure with hypoxia

## 2021-04-01 NOTE — PROGRESS NOTE ADULT - PROBLEM SELECTOR PLAN 5
Patient has severe mitral regurg however no decreased EF noted on echo from 2020. No history of heart failure however BNP elevated to >1000, which it has been in past as well (1034 in 10/20)  - monitor for increase in LE edema  - repeat ECHO Patient has severe mitral regurg however no decreased EF noted on echo from 2020. No history of heart failure however BNP elevated to >1000, which it has been in past as well (1034 in 10/20).   - monitor for increase in LE edema  - repeat ECHO this admission pending

## 2021-04-01 NOTE — PROGRESS NOTE ADULT - PROBLEM SELECTOR PLAN 8
F: none  E: replete K<4, Mg<2  N: DASH  VTE Prophylaxis: Lovenox  C: Full Code  D: 7L F: none  E: replete K<4, Mg<2  N: DASH  VTE Prophylaxis: Lovenox  C: Full Code  D: 7L -> RMF

## 2021-04-01 NOTE — PROGRESS NOTE ADULT - ASSESSMENT
87F, remote smoking hx, with PMH of lung adenocarcinoma (s/p resection/XRT), severe MR, PFO, s/p 8/2020 pleuroscopy and talc pleurodesis and R chest tube placement for ~2days, presenting from [facility] rehab with hypoxemic and hypercapnic respiratory failure due to recurrence of pleural effusion vs pulmonary edema 87F, remote smoking hx, with PMH of lung adenocarcinoma (s/p resection/XRT), severe MR, PFO, s/p 8/2020 pleuroscopy and talc pleurodesis and R chest tube placement for ~2days, presenting from [facility] rehab with hypoxemic and hypercapnic respiratory failure likely 2/2 recurrence of pleural effusion vs pulmonary edema

## 2021-04-01 NOTE — PROGRESS NOTE ADULT - ATTENDING COMMENTS
Patient seen and examined with house-staff during bedside rounds.  Resident note read, including vitals, physical findings, laboratory data, and radiological reports.   Revisions included below.  Direct personal management at bed side and extensive interpretation of the data.  Plan was outlined and discussed in details with the housestaff.  Decision making of high complexity  Action taken for acute disease activity to reflect the level of care provided:  - medication reconciliation  - review laboratory data  The patient is clinically stable.  The patient is tolerating oral diet.  The patient is on bronchodilatorAs pulmonary steroids was discontinued.  Patient may have periodic episode of plugging.  Out of bed in chair.  Start physical therapy.  Patient is hemodynamically stable.  Transfer to regular floor

## 2021-04-01 NOTE — PROGRESS NOTE ADULT - REASON FOR ADMISSION
shortness of breath

## 2021-04-01 NOTE — PROGRESS NOTE ADULT - PROBLEM SELECTOR PLAN 1
On admission 3/25 02 sat 70s. On RMF 3/28 requiring NRB, ABG showed pH 7.34, pCO2 74, pO2 197, Bicarb 39, sat 99%, placed on BiPAP and given lasix 20 IVP. By AM of 3/29 sating on home O2 requirements. Of note, pt has hx of PFO noted on echo in 8/2020  - pulm consulted, per pulm patient w/ known PFO which may be leading to transient shunting and desaturation, rec lasix 20mg po qd and outpatient f/u  - hold off on abx given WBCs normal, afebrile  - continue with NC O2 (currently on 3-6L) and wean as tolerated  - follows with o/p pulm (Wendy) for adenocarcinoma  - willd hold off on lasix 20mg PO qd as patient w/ metabolic alkalosis (bicarb 42)     #Hypercapnea  Given CO2 retention on ABG suggestive of COPD, was started on symbicort/spiriva  - c/w symbicort 2 puffs BID/spiriva qd

## 2021-04-01 NOTE — CHART NOTE - NSCHARTNOTEFT_GEN_A_CORE
Admitting Diagnosis:   Patient is a 87y old  Female who presents with a chief complaint of shortness of breath (01 Apr 2021 15:02)      PAST MEDICAL & SURGICAL HISTORY:  Malignant neoplasm of bronchus and lung, unspecified site  Lung cancer    History of surgery to major organs, presenting hazards to health  removal of R-sided adenocarcinoma, negative lymphnodes        Current Nutrition Order:  DASH, Ensure Enlive TID (1050 kcal, 60g protein, 540mL free H2O)     PO Intake: Good (%) [  X ]  Fair (50-75%) [   ] Poor (<25%) [   ]    GI Issues: No N/V/C/D reported at this time. +BM 3/29    Pain: She denied pain at time of visit     Skin Integrity: Paul 16; no edema present   PU recorded in flowsheet but no specifics on location/stage     Labs:   04-01    141  |  94<L>  |  24<H>  ----------------------------<  145<H>  4.3   |  42<H>  |  0.70    Ca    9.1      01 Apr 2021 07:14  Phos  3.4     03-31  Mg     1.8     03-31    TPro  7.5  /  Alb  3.0<L>  /  TBili  0.2  /  DBili  x   /  AST  37  /  ALT  36  /  AlkPhos  510<H>  04-01    CAPILLARY BLOOD GLUCOSE          Medications:  MEDICATIONS  (STANDING):  aspirin enteric coated 81 milliGRAM(s) Oral daily  atorvastatin 20 milliGRAM(s) Oral at bedtime  budesonide  80 MICROgram(s)/formoterol 4.5 MICROgram(s) Inhaler 2 Puff(s) Inhalation two times a day  enoxaparin Injectable 30 milliGRAM(s) SubCutaneous daily  ferrous    sulfate 325 milliGRAM(s) Oral daily  tiotropium 18 MICROgram(s) Capsule 1 Capsule(s) Inhalation daily    MEDICATIONS  (PRN):      Weight: 39kg     Weight Change: No new weights recorded since admit     Nutrition Focused Physical Exam: Completed [  X ]  Not Pertinent [   ]  >>please see malnutrition note     Estimated energy needs: Height 57"; ABW 39kg; IBW 40kg; 98%IBW; BMI 18.2  ABW used to calculate energy needs due to pt's current body weight within % IBW (98%).  Needs calculated for age and increased secondary to criteria consistent with muscle/fat wasting/malnutrition.  Calories: 25-30 kcal/kg = 975-1170 kcal/day  Protein: 1.2-1.4 g/kg = 47-55g protein/day  Fluids per team 2/2 compromised respiratory status     Subjective: 87F, remote smoking hx, with PMH of lung adenocarcinoma (s/p resection/XRT), severe MR, PFO, s/p 8/2020 pleuroscopy and talc pleurodesis and R chest tube placement for ~2days, presenting from [facility] rehab with hypoxemic and hypercapnic respiratory failure likely 2/2 recurrence of pleural effusion vs pulmonary edema. ICU consulted (3/28) for worsening hypoxia and hypercapnea, patient stepped up to 7LA for BiPAP treatment of acute hypoxic hypercarbic respiratory failure. S/p PET scan, which did not show any mets. Pt now weaned to NC (6L) and pending step down to RMF. Pt seen in room, awake, alert, very pleasant. She was sitting up in bed eating lunch. Observed 100% intake of breakfast tray and 100% intake of lunch. She endorsed feeling well and having a good appetite. She denied N/V/C/D or pain. Last BM 3/29. Afebrile. Lytes WNL, , AlkPhos 510 (H). Palliative consulted, though pt declined discussion and asking to return to [facility] [place]. Will continue to follow per RD protocol.     Previous Nutrition Diagnosis:  Inadequate energy intake r/t inability to consume sufficient energy AEB notable muscle/fat wasting consistent with malnutrition; ~14% weight loss x>1 year.   Active [ X  ]  Resolved [   ]    If resolved, new PES:     Goal: Pt will consistently meet >75% of daily EER    Recommendations:  1. Continue with current diet order and encourage PO intake   2. Monitor lytes and replete prn. POC BG q6hrs   3. Pain and bowel regimens per team     Education: encouraged PO intake; food preferences obtained    Risk Level: High [   ] Moderate [ X  ] Low [   ]
PALLIATIVE MEDICINE COORDINATION OF CARE NOTE FOR LAZARO JUANITA  [  ] ED Trigger   [  ] MICU Trigger     [ X ] Consult    [  ] AI Comanagement    Patient last assessed: _10/29/2021____  to manage: GOC/AD, Symptoms, and Support was recommended: _GOC____    ___30___ Minutes; Start: __1030am___  End: _1100am___, of non-face-to-face prolonged service provided that relates to (face-to-face) care that has or will occur and ongoing patient management, including one or more of the following:   - Reviewed records from other physicians or other health care professional services, including one or more of the following: other medical records and diagnostic / radiology study results     HPI:  HPI:  88yo F, nonsmoker, with PMHx of lung adenocarcinoma (s/p resection/XRT), severe MR, and recent admission 8/2020 for fall and R leg weakness and then in 10/2020 for hypoxemia to 80s on 3L NC (her usual O2) found then to have sepsis 2/2 to pneumonia and acute hypoxic respiratory failure and stepped up to 7La for observation s/p pleuroscopy and talc pleurodesis and R chest tube placement for ~2days, now comes in from [facility] rehab with desaturation of O2 to 70% that improved with placement on 2L NC. Pt reports felt sob today. She denies cough or chest pain, except when she tries to drink fluids, she coughs (not with food). She denies headache or dizziness, and denies nausea, vomiting, abdominal pain. Has not had any bloody or dark bowel movements, nor blood in her urine. Rectal exam negative in ED.    In the ED,  VS: T 98, HR 99, /74, RR 20, SpO2 100% 4L NC  Labs: Hgb 7.9 (from baseline of 10-11 in october) otherwise CBC wnl; BMP wnl, BUN/Cr 24/0.8 Alb 2.7 Alk P 618 AST 45 ALT 46 BNP 1148 trop negative   EKG: wnl  CXR: wet read: compared to prior cxr L effusion worse, R effusion better, atelectasis in both lungs that looks similar, patchy opacities on R side which look new  Orders: vanc 1g and cefepime 1g x1 in ED   (26 Mar 2021 17:32)      - Other: [facility] reviewed.    Rx found on [facility] review. Ref #: [id number]    - Other: Medication reviewed.    The patient HAS NOT used PRN's in the last 24h.    MEDICATIONS  (STANDING):  aspirin enteric coated 81 milliGRAM(s) Oral daily  atorvastatin 20 milliGRAM(s) Oral at bedtime  budesonide  80 MICROgram(s)/formoterol 4.5 MICROgram(s) Inhaler 2 Puff(s) Inhalation two times a day  ferrous    sulfate 325 milliGRAM(s) Oral daily  furosemide    Tablet 20 milliGRAM(s) Oral daily  tiotropium 18 MICROgram(s) Capsule 1 Capsule(s) Inhalation daily    MEDICATIONS  (PRN):    - Other: Advanced directives     Full Code     No documented MOLST form found on [facility]     HCP form found on [facility] on 11/20/2013 admission page 18 listing Blanca nicolas [phone] / [phone]     No Living will / POA / Advance directives found on [facility] / [facility].     GOC notes on [facility] 10/28/2020    - Other: Coordination/Plan of care     __4__ admissions in 1 year     Current admission LOS: _4__ days     LACE score: __14__ ADVANCE ILLNESS PATIENT.     Patient previously seen by palliative medicine consult service.      Consult request for: " for GOC   "    Full consult to follow within 24h.
Discussed w/ pt that she needs to wear bipap in order to decrease her CO2 levels. Pt says it's uncomfortable and does not want it. Pt A+Ox3 (self, place, year). Pt okay with wearing nasal canula in the meantime, mentating well.

## 2021-04-01 NOTE — PROGRESS NOTE ADULT - PROBLEM SELECTOR PLAN 4
Hgb on admission 7.9, prior admission in the 10s-11s, currently no signs of active bleeding (no hematochezia, melena, hemoptysis, hematuria). Rectal exam negative in ED for blood. Per GI consider out pt endoscopy once medically stable.   + possible rectal FGD avidity vs normal physiologic. FOBT positive. low iron counts. Per GI can follow up outpatient to evaluate for possible EGD+colonoscopy in w/u of VI.  - likely component of iron deficiency as patient was prescribed iron per [facility] med list review  - could also be attributable to chronic disease/underlying malignancy (likely)  - retic count wnl  - trend CBC  - maintain active T&S  - transfuse if Hgb <7 Hgb on admission 7.9, prior admission in the 10s-11s, currently no signs of active bleeding (no hematochezia, melena, hemoptysis, hematuria). Rectal exam negative in ED for blood. PET scan from 2020 with focal FDG avidity in rectum cld be normal. FOBT positive. low iron counts.   - Per GI can follow up outpatient to evaluate for possible EGD+colonoscopy in w/u of VI.  - likely component of iron deficiency as patient was prescribed iron per [facility] med list review  - could also be attributable to chronic disease/underlying malignancy (likely)  - retic count wnl  - trend CBC  - maintain active T&S  - transfuse if Hgb <7

## 2021-04-01 NOTE — PROGRESS NOTE ADULT - PROVIDER SPECIALTY LIST ADULT
Critical Care
Internal Medicine
Internal Medicine
Gastroenterology
Pulmonology
Internal Medicine
Palliative Care
Internal Medicine

## 2021-04-01 NOTE — PROGRESS NOTE ADULT - PROBLEM SELECTOR PLAN 3
History of adenocarcinoma of RLL s/p VATS resection in 2013, ANANYA XRT in 2016, and RMF lesion s/p XRT in 2017. Also per chart review, had recent PET showing 2 new FDG-avid subcentimeter nodules in L subpleural region  - f/u pulm recs  - will need outpatient f/u w/ Dr. Beal on discharge.    #Elevated alk phos  Initially thought could be attributable to underlying malignancy, but gtt also elevated therefore perhaps hepatobiliary, RUQ only with 0.8 cm cyst. PET scan negative for mets  -can consider further imaging or heme onc consult for staging, as perhaps indicative of spread/recurrence of malignancy however pleural effusions were never definitively diagnosed as malignant just presumed  -[facility] care consulted and saw pt 3/31, she does not want to discuss GOC, and just wants to go back to Rehab -> cont. ongoing discussion w/ pt and family

## 2021-04-01 NOTE — PROGRESS NOTE ADULT - PROBLEM SELECTOR PLAN 1
On admission 3/25 02 sat 70s. On RMF 3/28 requiring NRB, ABG showed pH 7.34, pCO2 74, pO2 197, Bicarb 39, sat 99%, placed on BiPAP and given lasix 20 IVP. By AM of 3/29 sating on home O2 requirements. Of note, pt has hx of PFO noted on echo in 8/2020  - appreciated pulm recs - no significant pleural effusion  - hold off on abx given WBCs normal, afebrile  - continue with NC O2 (currently on 3-6L) and wean as tolerated  - cxr improved  - follows with o/p pulm for adenocarcinoma  - c/w laisx 20mg PO qd    #Hypercapnea  Given CO2 retention on ABG suggestive of COPD, will start symbicort/spiriva  - c/w symbicort 2 puffs BID/spiriva qd On admission 3/25 02 sat 70s. On RMF 3/28 requiring NRB, ABG showed pH 7.34, pCO2 74, pO2 197, Bicarb 39, sat 99%, placed on BiPAP and given lasix 20 IVP. By AM of 3/29 sating on home O2 requirements. Of note, pt has hx of PFO noted on echo in 8/2020  - appreciated pulm recs - no significant pleural effusion  - hold off on abx given WBCs normal, afebrile  - continue with NC O2 (currently on 3-6L) and wean as tolerated  - follows with o/p pulm for adenocarcinoma  - c/w lasix 20mg PO qd    #Hypercapnea  Given CO2 retention on ABG suggestive of COPD, was started on symbicort/spiriva  - c/w symbicort 2 puffs BID/spiriva qd

## 2021-04-01 NOTE — PROGRESS NOTE ADULT - PROBLEM SELECTOR PLAN 7
RESOLVED  S/p 1 dose vanc and cefepime in the ED, hold off on further abx as no other s/s infection  - f/u sputum Cx  - wean O2 as tolerated RESOLVED  S/p 1 dose vanc and cefepime in the ED, hold off on further abx as no other s/s infection  - wean O2 as tolerated

## 2021-04-01 NOTE — PROGRESS NOTE ADULT - PROBLEM SELECTOR PLAN 2
Hx of recurrent pleural effusions. Last admission had R-sided pleuroscopy w/ pleurodesis 10/27/2020. S/p chest tube removal 10/29.  - pulm rec lasix 20mg po qd however patient w/ metabolic alkalosis (bicarb 42), will hold off on further lasix for now- c/w NC O2 and wean as tolerated

## 2021-04-02 ENCOUNTER — TRANSCRIPTION ENCOUNTER (OUTPATIENT)
Age: 86
End: 2021-04-02

## 2021-04-02 VITALS — TEMPERATURE: 97 F | SYSTOLIC BLOOD PRESSURE: 108 MMHG | HEART RATE: 88 BPM | DIASTOLIC BLOOD PRESSURE: 63 MMHG

## 2021-04-02 LAB
ALBUMIN SERPL ELPH-MCNC: 2.9 G/DL — LOW (ref 3.3–5)
ALP SERPL-CCNC: 454 U/L — HIGH (ref 40–120)
ALT FLD-CCNC: 34 U/L — SIGNIFICANT CHANGE UP (ref 10–45)
ANION GAP SERPL CALC-SCNC: 8 MMOL/L — SIGNIFICANT CHANGE UP (ref 5–17)
AST SERPL-CCNC: 36 U/L — SIGNIFICANT CHANGE UP (ref 10–40)
BILIRUB SERPL-MCNC: 0.2 MG/DL — SIGNIFICANT CHANGE UP (ref 0.2–1.2)
BLD GP AB SCN SERPL QL: NEGATIVE — SIGNIFICANT CHANGE UP
BUN SERPL-MCNC: 27 MG/DL — HIGH (ref 7–23)
CALCIUM SERPL-MCNC: 9.2 MG/DL — SIGNIFICANT CHANGE UP (ref 8.4–10.5)
CHLORIDE SERPL-SCNC: 91 MMOL/L — LOW (ref 96–108)
CO2 SERPL-SCNC: 39 MMOL/L — HIGH (ref 22–31)
CREAT SERPL-MCNC: 0.71 MG/DL — SIGNIFICANT CHANGE UP (ref 0.5–1.3)
GLUCOSE SERPL-MCNC: 220 MG/DL — HIGH (ref 70–99)
HCT VFR BLD CALC: 29.3 % — LOW (ref 34.5–45)
HGB BLD-MCNC: 8.3 G/DL — LOW (ref 11.5–15.5)
MAGNESIUM SERPL-MCNC: 2 MG/DL — SIGNIFICANT CHANGE UP (ref 1.6–2.6)
MCHC RBC-ENTMCNC: 23.5 PG — LOW (ref 27–34)
MCHC RBC-ENTMCNC: 28.3 GM/DL — LOW (ref 32–36)
MCV RBC AUTO: 83 FL — SIGNIFICANT CHANGE UP (ref 80–100)
NRBC # BLD: 0 /100 WBCS — SIGNIFICANT CHANGE UP (ref 0–0)
PHOSPHATE SERPL-MCNC: 2.8 MG/DL — SIGNIFICANT CHANGE UP (ref 2.5–4.5)
PLATELET # BLD AUTO: 457 K/UL — HIGH (ref 150–400)
POTASSIUM SERPL-MCNC: 4.6 MMOL/L — SIGNIFICANT CHANGE UP (ref 3.5–5.3)
POTASSIUM SERPL-SCNC: 4.6 MMOL/L — SIGNIFICANT CHANGE UP (ref 3.5–5.3)
PROT SERPL-MCNC: 7 G/DL — SIGNIFICANT CHANGE UP (ref 6–8.3)
RBC # BLD: 3.53 M/UL — LOW (ref 3.8–5.2)
RBC # FLD: 23.8 % — HIGH (ref 10.3–14.5)
RH IG SCN BLD-IMP: POSITIVE — SIGNIFICANT CHANGE UP
SODIUM SERPL-SCNC: 138 MMOL/L — SIGNIFICANT CHANGE UP (ref 135–145)
WBC # BLD: 8.15 K/UL — SIGNIFICANT CHANGE UP (ref 3.8–10.5)
WBC # FLD AUTO: 8.15 K/UL — SIGNIFICANT CHANGE UP (ref 3.8–10.5)

## 2021-04-02 PROCEDURE — 83540 ASSAY OF IRON: CPT

## 2021-04-02 PROCEDURE — 83550 IRON BINDING TEST: CPT

## 2021-04-02 PROCEDURE — 86850 RBC ANTIBODY SCREEN: CPT

## 2021-04-02 PROCEDURE — 85379 FIBRIN DEGRADATION QUANT: CPT

## 2021-04-02 PROCEDURE — 82272 OCCULT BLD FECES 1-3 TESTS: CPT

## 2021-04-02 PROCEDURE — 86900 BLOOD TYPING SEROLOGIC ABO: CPT

## 2021-04-02 PROCEDURE — 78306 BONE IMAGING WHOLE BODY: CPT

## 2021-04-02 PROCEDURE — 85025 COMPLETE CBC W/AUTO DIFF WBC: CPT

## 2021-04-02 PROCEDURE — 83880 ASSAY OF NATRIURETIC PEPTIDE: CPT

## 2021-04-02 PROCEDURE — 74018 RADEX ABDOMEN 1 VIEW: CPT

## 2021-04-02 PROCEDURE — 83615 LACTATE (LD) (LDH) ENZYME: CPT

## 2021-04-02 PROCEDURE — 97161 PT EVAL LOW COMPLEX 20 MIN: CPT

## 2021-04-02 PROCEDURE — 82977 ASSAY OF GGT: CPT

## 2021-04-02 PROCEDURE — 82746 ASSAY OF FOLIC ACID SERUM: CPT

## 2021-04-02 PROCEDURE — 99285 EMERGENCY DEPT VISIT HI MDM: CPT | Mod: 25

## 2021-04-02 PROCEDURE — 93306 TTE W/DOPPLER COMPLETE: CPT

## 2021-04-02 PROCEDURE — 86901 BLOOD TYPING SEROLOGIC RH(D): CPT

## 2021-04-02 PROCEDURE — 80053 COMPREHEN METABOLIC PANEL: CPT

## 2021-04-02 PROCEDURE — 76705 ECHO EXAM OF ABDOMEN: CPT

## 2021-04-02 PROCEDURE — U0003: CPT

## 2021-04-02 PROCEDURE — 94640 AIRWAY INHALATION TREATMENT: CPT

## 2021-04-02 PROCEDURE — A9503: CPT

## 2021-04-02 PROCEDURE — 71045 X-RAY EXAM CHEST 1 VIEW: CPT

## 2021-04-02 PROCEDURE — 84100 ASSAY OF PHOSPHORUS: CPT

## 2021-04-02 PROCEDURE — 86769 SARS-COV-2 COVID-19 ANTIBODY: CPT

## 2021-04-02 PROCEDURE — 83010 ASSAY OF HAPTOGLOBIN QUANT: CPT

## 2021-04-02 PROCEDURE — 82607 VITAMIN B-12: CPT

## 2021-04-02 PROCEDURE — U0005: CPT

## 2021-04-02 PROCEDURE — 85045 AUTOMATED RETICULOCYTE COUNT: CPT

## 2021-04-02 PROCEDURE — 97530 THERAPEUTIC ACTIVITIES: CPT

## 2021-04-02 PROCEDURE — 85610 PROTHROMBIN TIME: CPT

## 2021-04-02 PROCEDURE — 84145 PROCALCITONIN (PCT): CPT

## 2021-04-02 PROCEDURE — 85730 THROMBOPLASTIN TIME PARTIAL: CPT

## 2021-04-02 PROCEDURE — 87040 BLOOD CULTURE FOR BACTERIA: CPT

## 2021-04-02 PROCEDURE — 36415 COLL VENOUS BLD VENIPUNCTURE: CPT

## 2021-04-02 PROCEDURE — 84484 ASSAY OF TROPONIN QUANT: CPT

## 2021-04-02 PROCEDURE — 82728 ASSAY OF FERRITIN: CPT

## 2021-04-02 PROCEDURE — 85027 COMPLETE CBC AUTOMATED: CPT

## 2021-04-02 PROCEDURE — 71045 X-RAY EXAM CHEST 1 VIEW: CPT | Mod: 26

## 2021-04-02 PROCEDURE — 80048 BASIC METABOLIC PNL TOTAL CA: CPT

## 2021-04-02 PROCEDURE — 82803 BLOOD GASES ANY COMBINATION: CPT

## 2021-04-02 PROCEDURE — 94660 CPAP INITIATION&MGMT: CPT

## 2021-04-02 PROCEDURE — 83735 ASSAY OF MAGNESIUM: CPT

## 2021-04-02 RX ORDER — FUROSEMIDE 40 MG
1 TABLET ORAL
Qty: 0 | Refills: 0 | DISCHARGE

## 2021-04-02 RX ORDER — BUDESONIDE AND FORMOTEROL FUMARATE DIHYDRATE 160; 4.5 UG/1; UG/1
2 AEROSOL RESPIRATORY (INHALATION)
Qty: 0 | Refills: 0 | DISCHARGE
Start: 2021-04-02

## 2021-04-02 RX ORDER — TIOTROPIUM BROMIDE 18 UG/1
1 CAPSULE ORAL; RESPIRATORY (INHALATION)
Qty: 0 | Refills: 0 | DISCHARGE
Start: 2021-04-02

## 2021-04-02 RX ORDER — FERROUS SULFATE 325(65) MG
1 TABLET ORAL
Qty: 0 | Refills: 0 | DISCHARGE
Start: 2021-04-02

## 2021-04-02 RX ADMIN — BUDESONIDE AND FORMOTEROL FUMARATE DIHYDRATE 2 PUFF(S): 160; 4.5 AEROSOL RESPIRATORY (INHALATION) at 08:58

## 2021-04-02 RX ADMIN — ENOXAPARIN SODIUM 30 MILLIGRAM(S): 100 INJECTION SUBCUTANEOUS at 12:49

## 2021-04-02 RX ADMIN — Medication 81 MILLIGRAM(S): at 12:48

## 2021-04-02 RX ADMIN — Medication 325 MILLIGRAM(S): at 12:49

## 2021-04-02 NOTE — DISCHARGE NOTE PROVIDER - PROVIDER TOKENS
PROVIDER:[TOKEN:[[id number]],FOLLOWUP:[1 week],ESTABLISHEDPATIENT:[T]],PROVIDER:[TOKEN:[[id number]],SCHEDULEDAPPT:[04/15/2021],SCHEDULEDAPPTTIME:[10:30 AM]] PROVIDER:[TOKEN:[[id number]],FOLLOWUP:[1 week],ESTABLISHEDPATIENT:[T]],PROVIDER:[TOKEN:[[id number]],SCHEDULEDAPPT:[04/15/2021],SCHEDULEDAPPTTIME:[10:30 AM]],PROVIDER:[TOKEN:[[id number]]]

## 2021-04-02 NOTE — DISCHARGE NOTE NURSING/CASE MANAGEMENT/SOCIAL WORK - PATIENT PORTAL LINK FT
You can access the FollowMyHealth Patient Portal offered by [facility] by registering at the following website: [email or web address]. By joining [facility]’s FollowMyHealth portal, you will also be able to view your health information using other applications (apps) compatible with our system.

## 2021-04-02 NOTE — DISCHARGE NOTE PROVIDER - CARE PROVIDERS DIRECT ADDRESSES
,DirectAddress_Unknown,[email or web address] ,DirectAddress_[email or web address]

## 2021-04-02 NOTE — DISCHARGE NOTE NURSING/CASE MANAGEMENT/SOCIAL WORK - NSDCFUADDAPPT_GEN_ALL_CORE_FT
Please follow-up with Dr. Nguyen, the lung specialist, on 4/15/2021 at 10:30AM at [address] [place] [address], [address]. Please bring your insurance card and updated medication list to this appointment.    If you would like to have further workup (endoscopy and/or colonoscopy) to evaluate for any gastrointestinal bleeding, you can call the office of Dr. Serna or Dr. Mcfarland, the gastroenterologists, at [phone].

## 2021-04-02 NOTE — DISCHARGE NOTE PROVIDER - NSDCCPTREATMENT_GEN_ALL_CORE_FT
PRINCIPAL PROCEDURE  Procedure: Nuclear medicine  Findings and Treatment:   EXAM:  NM BONE IMG WHOLE BODY                        PROCEDURE DATE:  03/29/2021    INTERPRETATION:  WHOLE BODY BONE SCAN  Indication: Markedly elevated alkaline phosphatase.  Procedure: Following the intravenous administration of approximately 20 mCi of technetium 99m labeled MDP, images of the whole body were obtained in the anterior and posterior projections at about two hours.  Findings: There is grossly irregular activity in the midthoracic region corresponding to the location of the known compression fracture in T7 with scoliosis and degenerative changes. Significant artifact overlies the right hand and the left antecubital region, presumably because of difficulties with the injection of the radiopharmaceutical.  Increased activity is seen laterally in the upper chest wall on both sides which may be related to the patient's kyphosis. In addition, there is increased activity in the right sacroiliac joint near its lower border. Degenerative disease is confirmed in this area on a prior CT scan of the pelvis dated 8/18/2020.  Impression: No evidence of metastatic disease. There is an old compression fracture in T7 with associated kyphosis and degenerative changes. Please see details above.       PRINCIPAL PROCEDURE  Procedure: Nuclear medicine  Findings and Treatment:   EXAM:  NM BONE IMG WHOLE BODY                        PROCEDURE DATE:  03/29/2021    INTERPRETATION:  WHOLE BODY BONE SCAN  Indication: Markedly elevated alkaline phosphatase.  Procedure: Following the intravenous administration of approximately 20 mCi of technetium 99m labeled MDP, images of the whole body were obtained in the anterior and posterior projections at about two hours.  Findings: There is grossly irregular activity in the midthoracic region corresponding to the location of the known compression fracture in T7 with scoliosis and degenerative changes. Significant artifact overlies the right hand and the left antecubital region, presumably because of difficulties with the injection of the radiopharmaceutical.  Increased activity is seen laterally in the upper chest wall on both sides which may be related to the patient's kyphosis. In addition, there is increased activity in the right sacroiliac joint near its lower border. Degenerative disease is confirmed in this area on a prior CT scan of the pelvis dated 8/18/2020.  Impression: No evidence of metastatic disease. There is an old compression fracture in T7 with associated kyphosis and degenerative changes. Please see details above.      SECONDARY PROCEDURE  Procedure: Ultrasound, abdomen, limited  Findings and Treatment: EXAM:  US ABDOMEN LIMITED                        PROCEDURE DATE:  03/27/2021    INTERPRETATION:  RIGHT UPPER QUADRANT ULTRASOUND dated 3/27/2021 5:53 PM  INDICATION: Transaminitis. Elevated alkaline phosphatase. Suspicion for malignancy.  PRIOR STUDIES: Right upper quadrant ultrasound from 1/5/2016.  FINDINGS:  Liver: The liver is normal in size and echogenicity. 0.8 cm right liver lobe cyst. Visualized segments of the hepatic and portal veins are patent and demonstrate normal directionality of flow.  Intrahepatic ducts: Not dilated.  Common bile duct: Normal diameter, measuring 0.1 cm.  Gallbladder: No visible gallstones. No wall thickening or pericholecystic fluid.  Pancreas: The visualized portions were normal in appearance.  Abdominal aorta: The visualized portions were unremarkable.  Inferior vena cava: The visualized portions were normal in appearance.  Right kidney: Small parapelvic cyst. Normal echogenicity. No hydronephrosis. Length of 8.9 cm.  Ascites: None.  IMPRESSION:  No acute or relevant findings.

## 2021-04-02 NOTE — DISCHARGE NOTE PROVIDER - CARE PROVIDER_API CALL
Josr Junior)  Internal Medicine  [address] [address]  Phone: [phone]  Fax: [phone]  Established Patient  Follow Up Time: 1 week    Arianne Nguyen)  Pulmonary Disease  [address], [address] [place] [address]  Phone: [phone]  Fax: [phone]  Scheduled Appointment: 04/15/2021 10:30 AM   Josr Junior)  Internal Medicine  [address] [address]  Phone: [phone]  Fax: [phone]  Established Patient  Follow Up Time: 1 week    Arianne Nguyen)  Pulmonary Disease  [address], [address]  [place] [address]  Phone: [phone]  Fax: [phone]  Scheduled Appointment: 04/15/2021 10:30 AM    Jerald Mcfarland)  Gastroenterology; Internal Medicine  [address] [place] [address]  Phone: [phone]  Fax: [phone]  Follow Up Time:

## 2021-04-02 NOTE — DISCHARGE NOTE PROVIDER - HOSPITAL COURSE
#Discharge: do not delete    Patient is __ yo M/F with past medical history of _____  Presented with _____, found to have _____  Problem List/Main Diagnoses (system-based):   Inpatient treatment course:   New medications:   Labs to be followed outpatient:   Exam to be followed outpatient:    #Discharge: do not delete    Patient is 86 yo F with past medical history of lung adenocarcinoma (s/p resection/XRT), severe MR, PFO, s/p 8/2020 pleuroscopy and talc pleurodesis and R chest tube placement for ~2days  Presented with from [facility] rehab with hypoxemic and hypercapnic respiratory failure  Problem List/Main Diagnoses (system-based):   Acute respiratory failure with hypoxia.   On admission 3/25 02 sat 70s. On RMF 3/28 requiring NRB, ABG showed pH 7.34, pCO2 74, pO2 197, Bicarb 39, sat 99%, placed on BiPAP and given lasix 20 IVP. By AM of 3/29 sating on home O2 requirements. Of note, pt has hx of PFO noted on echo in 8/2020  - pulm consulted, per pulm patient w/ known PFO which may be leading to transient shunting and desaturation, rec lasix 20mg po qd and outpatient f/u  - no abx given as WBCs normal, afebrile  - continued with NC O2 (on 3L at baseline)  - follow-up with o/p pulm (Wendy) for adenocarcinoma scheduled  - can continue lasix 20mg PO every other day given metabolic alkalosis    #Hypercapnea  Given CO2 retention on ABG suggestive of COPD, was started on symbicort/spiriva  - c/w symbicort 2 puffs BID/spiriva qd.     Pleural effusion.  Hx of recurrent pleural effusions. Last admission had R-sided pleuroscopy w/ pleurodesis 10/27/2020. S/p chest tube removal 10/29.  - pulm rec lasix 20mg po qd however patient w/ metabolic alkalosis (bicarb 42), will continue with lasix 20mg every other day    Adenocarcinoma.  History of adenocarcinoma of RLL s/p VATS resection in 2013, ANANYA XRT in 2016, and RMF lesion s/p XRT in 2017. Also per chart review, had recent PET showing 2 new FDG-avid subcentimeter nodules in L subpleural region  - outpatient f/u w/ Dr. Beal    #Elevated alk phos  Initially thought could be attributable to underlying malignancy, but gtt also elevated therefore perhaps hepatobiliary, RUQ only with 0.8 cm cyst. PET scan negative for mets  -can consider further imaging or heme onc consult for staging, as perhaps indicative of spread/recurrence of malignancy however pleural effusions were never definitively diagnosed as malignant just presumed  -[facility] care consulted and saw pt 3/31, she does not want to discuss GOC, and just wants to go back to Rehab    Anemia  Hgb on admission 7.9, prior admission in the 10s-11s, currently no signs of active bleeding (no hematochezia, melena, hemoptysis, hematuria). Rectal exam negative in ED for blood. PET scan from 2020 with focal FDG avidity in rectum cld be normal. FOBT positive. low iron counts. retic count wnl  - likely component of iron deficiency as patient was prescribed iron per [facility] med list review; could also be attributable to chronic disease/underlying malignancy (likely)  - Per GI can follow up outpatient to evaluate for possible EGD+colonoscopy in w/u of VI.    Mitral regurgitation.  Patient has severe mitral regurg however no decreased EF noted on echo from 2020. No history of heart failure however BNP elevated to >1000, which it has been in past as well (1034 in 10/20).   - monitor for increase in LE edema  - repeat ECHO:   1. Severely dilated left atrium.   2. The mitral valve is mildly thickened. Mitral annular calcification noted. There is moderate-to-severe posteriorly directed mitral regurgitation.   3. Pulmonary artery systolic pressure is 41 mmHg.   4. The right ventricle is mildly dilated. Right ventricular systolic function is normal.   5. The left ventricle is normal in size, wall thickness, and systolic function with a calculated ejection fraction of 63%.   6. No pericardial effusion.   7. Compared to the previous TTE performed on 8/21/2020, there have been no significant interval changes.    Hyperlipidemia.  on home atorvastatin 20mg and asa 81  -c/w home atorvastatin 20mg qd  -c/w ASA 81mg qd.    Inpatient treatment course: as above  New medications: lasix 20mg every other day  Labs to be followed outpatient: BMP  Exam to be followed outpatient:

## 2021-04-02 NOTE — DISCHARGE NOTE PROVIDER - DETAILS OF MALNUTRITION DIAGNOSIS/DIAGNOSES
This patient has been assessed with a concern for Malnutrition and was treated during this hospitalization for the following Nutrition diagnosis/diagnoses:     -  03/27/2021: Moderate protein-calorie malnutrition   -  03/27/2021: Underweight (BMI < 19)

## 2021-04-02 NOTE — DISCHARGE NOTE PROVIDER - NSDCFUADDAPPT_GEN_ALL_CORE_FT
Please follow-up with Dr. Nguyen, the lung specialist, on 4/15/2021 at 10:30AM at [address] [place] [address], [address] Floor. Please bring your insurance card and updated medication list to this appointment. Please follow-up with Dr. Nguyen, the lung specialist, on 4/15/2021 at 10:30AM at [address] [place] [address], 4[address] Floor. Please bring your insurance card and updated medication list to this appointment.    If you would like to have further workup (endoscopy and/or colonoscopy) to evaluate for any gastrointestinal bleeding, you can call the office of Dr. Serna or Dr. Mcfarland, the gastroenterologists, at [phone].

## 2021-04-02 NOTE — DISCHARGE NOTE PROVIDER - NSDCMRMEDTOKEN_GEN_ALL_CORE_FT
acetaminophen 325 mg oral tablet: 2 tab(s) orally every 6 hours, As needed,  pain  aspirin 81 mg oral delayed release tablet: 1 tab(s) orally every 24 hours  Lipitor 20 mg oral tablet: 1 tab(s) orally once a day   acetaminophen 325 mg oral tablet: 2 tab(s) orally every 6 hours, As needed,  pain  aspirin 81 mg oral delayed release tablet: 1 tab(s) orally every 24 hours  budesonide-formoterol 80 mcg-4.5 mcg/inh inhalation aerosol: 2 puff(s) inhaled 2 times a day  ferrous sulfate 325 mg (65 mg elemental iron) oral tablet: 1 tab(s) orally once a day  Lasix 20 mg oral tablet: 1 tab(s) orally every other day  Lipitor 20 mg oral tablet: 1 tab(s) orally once a day  tiotropium 18 mcg inhalation capsule: 1 cap(s) inhaled once a day

## 2021-04-05 ENCOUNTER — TRANSCRIPTION ENCOUNTER (OUTPATIENT)
Age: 86
End: 2021-04-05

## 2021-04-08 DIAGNOSIS — E87.2 ACIDOSIS: ICD-10-CM

## 2021-04-08 DIAGNOSIS — Z99.81 DEPENDENCE ON SUPPLEMENTAL OXYGEN: ICD-10-CM

## 2021-04-08 DIAGNOSIS — C34.90 MALIGNANT NEOPLASM OF UNSPECIFIED PART OF UNSPECIFIED BRONCHUS OR LUNG: ICD-10-CM

## 2021-04-08 DIAGNOSIS — J44.9 CHRONIC OBSTRUCTIVE PULMONARY DISEASE, UNSPECIFIED: ICD-10-CM

## 2021-04-08 DIAGNOSIS — R74.01 ELEVATION OF LEVELS OF LIVER TRANSAMINASE LEVELS: ICD-10-CM

## 2021-04-08 DIAGNOSIS — E78.5 HYPERLIPIDEMIA, UNSPECIFIED: ICD-10-CM

## 2021-04-08 DIAGNOSIS — R13.10 DYSPHAGIA, UNSPECIFIED: ICD-10-CM

## 2021-04-08 DIAGNOSIS — J96.22 ACUTE AND CHRONIC RESPIRATORY FAILURE WITH HYPERCAPNIA: ICD-10-CM

## 2021-04-08 DIAGNOSIS — Q21.1 ATRIAL SEPTAL DEFECT: ICD-10-CM

## 2021-04-08 DIAGNOSIS — E44.0 MODERATE PROTEIN-CALORIE MALNUTRITION: ICD-10-CM

## 2021-04-08 DIAGNOSIS — E83.39 OTHER DISORDERS OF PHOSPHORUS METABOLISM: ICD-10-CM

## 2021-04-08 DIAGNOSIS — I34.0 NONRHEUMATIC MITRAL (VALVE) INSUFFICIENCY: ICD-10-CM

## 2021-04-08 DIAGNOSIS — Z79.82 LONG TERM (CURRENT) USE OF ASPIRIN: ICD-10-CM

## 2021-04-08 DIAGNOSIS — J15.9 UNSPECIFIED BACTERIAL PNEUMONIA: ICD-10-CM

## 2021-04-08 DIAGNOSIS — Z91.041 RADIOGRAPHIC DYE ALLERGY STATUS: ICD-10-CM

## 2021-04-08 DIAGNOSIS — J98.11 ATELECTASIS: ICD-10-CM

## 2021-04-08 DIAGNOSIS — Z88.0 ALLERGY STATUS TO PENICILLIN: ICD-10-CM

## 2021-04-08 DIAGNOSIS — J96.21 ACUTE AND CHRONIC RESPIRATORY FAILURE WITH HYPOXIA: ICD-10-CM

## 2021-04-08 DIAGNOSIS — E87.70 FLUID OVERLOAD, UNSPECIFIED: ICD-10-CM

## 2021-04-08 DIAGNOSIS — Z88.1 ALLERGY STATUS TO OTHER ANTIBIOTIC AGENTS STATUS: ICD-10-CM

## 2021-04-08 DIAGNOSIS — Z51.5 ENCOUNTER FOR PALLIATIVE CARE: ICD-10-CM

## 2021-04-08 DIAGNOSIS — D63.0 ANEMIA IN NEOPLASTIC DISEASE: ICD-10-CM

## 2021-04-09 ENCOUNTER — TRANSCRIPTION ENCOUNTER (OUTPATIENT)
Age: 86
End: 2021-04-09

## 2021-04-15 ENCOUNTER — APPOINTMENT (OUTPATIENT)
Dept: PULMONOLOGY | Facility: CLINIC | Age: 86
End: 2021-04-15

## 2021-04-20 ENCOUNTER — TRANSCRIPTION ENCOUNTER (OUTPATIENT)
Age: 86
End: 2021-04-20

## 2021-04-21 ENCOUNTER — TRANSCRIPTION ENCOUNTER (OUTPATIENT)
Age: 86
End: 2021-04-21

## 2021-04-28 ENCOUNTER — TRANSCRIPTION ENCOUNTER (OUTPATIENT)
Age: 86
End: 2021-04-28

## 2021-05-24 NOTE — DIETITIAN INITIAL EVALUATION ADULT. - NUTRITON FOCUSED PHYSICAL EXAM
Form was fax to [facility] and patient aware.
Pt is calling to check up on paperwork to sebastian can you please check into this and make sure it gets back. Pt will be out of supplies today.  Please call her back today
yes...

## 2021-05-25 NOTE — PHYSICAL EXAM
[Well Groomed] : well groomed [General Appearance - In No Acute Distress] : no acute distress [Abdomen Soft] : soft [Costovertebral Angle Tenderness] : no ~M costovertebral angle tenderness [Abdomen Tenderness] : non-tender [Heart Rate And Rhythm] : Heart rate and rhythm were normal [Skin Color & Pigmentation] : normal skin color and pigmentation [] : no respiratory distress [Oriented To Time, Place, And Person] : oriented to person, place, and time [Affect] : the affect was normal [Not Anxious] : not anxious [Mood] : the mood was normal [No Focal Deficits] : no focal deficits [Normal Station and Gait] : the gait and station were normal for the patient's age [FreeTextEntry1] : thin ,

## 2021-05-27 NOTE — ED ADULT NURSE NOTE - SPUTUM COLOR
Left message to call back for: Maida Mother, guardian, has consent to communicate  Information to relay to patient:  LMTCB    Please give below message regarding Dayday from Dr. Parikh    Please call patient's caregiver (?)   The labs show that Dayday does seem to concentrate the urine appropriately when he restricts fluids.  I would try to monitor the fluid intake a bit closer and try to have him stick with no more than 10 cups of fluid daily.  He can suck on biotene lozenges if his mouth feels dry, or possibly chew gum?  This should help with his excessive urination.   SUBHASH Larry, [facility]   reported per pt/yellow

## 2021-06-11 NOTE — H&P ADULT - NSHPPOADEEPVENOUSTHROMB_GEN_A_CORE
Problem: Potential for Falls  Goal: Patient will remain free of falls  Description: INTERVENTIONS:  - Assess patient frequently for physical needs  -  Identify cognitive and physical deficits and behaviors that affect risk of falls    -  [place] fall precautions as indicated by assessment   - Educate patient/family on patient safety including physical limitations  - Instruct patient to call for assistance with activity based on assessment  - Modify environment to reduce risk of injury  - Consider OT/PT consult to assist with strengthening/mobility  Outcome: Progressing     Problem: Prexisting or High Potential for Compromised Skin Integrity  Goal: Skin integrity is maintained or improved  Description: INTERVENTIONS:  - Identify patients at risk for skin breakdown  - Assess and monitor skin integrity  - Assess and monitor nutrition and hydration status  - Monitor labs   - Assess for incontinence   - Turn and reposition patient  - Assist with mobility/ambulation  - Relieve pressure over bony prominences  - Avoid friction and shearing  - Provide appropriate hygiene as needed including keeping skin clean and dry  - Evaluate need for skin moisturizer/barrier cream  - Collaborate with interdisciplinary team   - Patient/family teaching  - Consider wound care consult   Outcome: Progressing     Problem: PAIN - ADULT  Goal: Verbalizes/displays adequate comfort level or baseline comfort level  Description: Interventions:  - Encourage patient to monitor pain and request assistance  - Assess pain using appropriate pain scale  - Administer analgesics based on type and severity of pain and evaluate response  - Implement non-pharmacological measures as appropriate and evaluate response  - Consider cultural and social influences on pain and pain management  - Notify physician/advanced practitioner if interventions unsuccessful or patient reports new pain  Outcome: Progressing     Problem: SAFETY ADULT  Goal: Patient will remain free of falls  Description: INTERVENTIONS:  - Assess patient frequently for physical needs  -  Identify cognitive and physical deficits and behaviors that affect risk of falls    -  [place] fall precautions as indicated by assessment   - Educate patient/family on patient safety including physical limitations  - Instruct patient to call for assistance with activity based on assessment  - Modify environment to reduce risk of injury  - Consider OT/PT consult to assist with strengthening/mobility  Outcome: Progressing  Goal: Maintain or return to baseline ADL function  Description: INTERVENTIONS:  -  Assess patient's ability to carry out ADLs; assess patient's baseline for ADL function and identify physical deficits which impact ability to perform ADLs (bathing, care of mouth/teeth, toileting, grooming, dressing, etc )  - Assess/evaluate cause of self-care deficits   - Assess range of motion  - Assess patient's mobility; develop plan if impaired  - Assess patient's need for assistive devices and provide as appropriate  - Encourage maximum independence but intervene and supervise when necessary  - Involve family in performance of ADLs  - Assess for home care needs following discharge   - Consider OT consult to assist with ADL evaluation and planning for discharge  - Provide patient education as appropriate  Outcome: Progressing  Goal: Maintain or return mobility status to optimal level  Description: INTERVENTIONS:  - Assess patient's baseline mobility status (ambulation, transfers, stairs, etc )    - Identify cognitive and physical deficits and behaviors that affect mobility  - Identify mobility aids required to assist with transfers and/or ambulation (gait belt, sit-to-stand, lift, walker, cane, etc )  - [place] fall precautions as indicated by assessment  - Record patient progress and toleration of activity level on Mobility SBAR; progress patient to next Phase/Stage  - Instruct patient to call for assistance with activity based on assessment  - Consider rehabilitation consult to assist with strengthening/weightbearing, etc   Outcome: Progressing     Problem: DISCHARGE PLANNING  Goal: Discharge to home or other facility with appropriate resources  Description: INTERVENTIONS:  - Identify barriers to discharge w/patient and caregiver  - Arrange for needed discharge resources and transportation as appropriate  - Identify discharge learning needs (meds, wound care, etc )  - Arrange for interpretive services to assist at discharge as needed  - Refer to Case Management Department for coordinating discharge planning if the patient needs post-hospital services based on physician/advanced practitioner order or complex needs related to functional status, cognitive ability, or social support system  Outcome: Progressing     Problem: SAFETY ADULT  Goal: Patient will remain free of falls  Description: INTERVENTIONS:  - Assess patient frequently for physical needs  -  Identify cognitive and physical deficits and behaviors that affect risk of falls    -  [place] fall precautions as indicated by assessment   - Educate patient/family on patient safety including physical limitations  - Instruct patient to call for assistance with activity based on assessment  - Modify environment to reduce risk of injury  - Consider OT/PT consult to assist with strengthening/mobility  Outcome: Progressing  Goal: Maintain or return to baseline ADL function  Description: INTERVENTIONS:  -  Assess patient's ability to carry out ADLs; assess patient's baseline for ADL function and identify physical deficits which impact ability to perform ADLs (bathing, care of mouth/teeth, toileting, grooming, dressing, etc )  - Assess/evaluate cause of self-care deficits   - Assess range of motion  - Assess patient's mobility; develop plan if impaired  - Assess patient's need for assistive devices and provide as appropriate  - Encourage maximum independence but intervene and supervise when necessary  - Involve family in performance of ADLs  - Assess for home care needs following discharge   - Consider OT consult to assist with ADL evaluation and planning for discharge  - Provide patient education as appropriate  Outcome: Progressing  Goal: Maintain or return mobility status to optimal level  Description: INTERVENTIONS:  - Assess patient's baseline mobility status (ambulation, transfers, stairs, etc )    - Identify cognitive and physical deficits and behaviors that affect mobility  - Identify mobility aids required to assist with transfers and/or ambulation (gait belt, sit-to-stand, lift, walker, cane, etc )  - [place] fall precautions as indicated by assessment  - Record patient progress and toleration of activity level on Mobility SBAR; progress patient to next Phase/Stage  - Instruct patient to call for assistance with activity based on assessment  - Consider rehabilitation consult to assist with strengthening/weightbearing, etc   Outcome: Progressing     Problem: Knowledge Deficit  Goal: Patient/family/caregiver demonstrates understanding of disease process, treatment plan, medications, and discharge instructions  Description: Complete learning assessment and assess knowledge base    Interventions:  - Provide teaching at level of understanding  - Provide teaching via preferred learning methods  Outcome: Progressing     Problem: SKIN/TISSUE INTEGRITY - ADULT  Goal: Skin integrity remains intact  Description: INTERVENTIONS  - Identify patients at risk for skin breakdown  - Assess and monitor skin integrity  - Assess and monitor nutrition and hydration status  - Monitor labs (i e  albumin)  - Assess for incontinence   - Turn and reposition patient  - Assist with mobility/ambulation  - Relieve pressure over bony prominences  - Avoid friction and shearing  - Provide appropriate hygiene as needed including keeping skin clean and dry  - Evaluate need for skin moisturizer/barrier cream  - Collaborate with interdisciplinary team (i e  Nutrition, Rehabilitation, etc )   - Patient/family teaching  Outcome: Progressing  Goal: Incision(s), wounds(s) or drain site(s) healing without S/S of infection  Description: INTERVENTIONS  - Assess and document risk factors for skin impairment   - Assess and document dressing, incision, wound bed, drain sites and surrounding tissue  - Consider nutrition services referral as needed  - Oral mucous membranes remain intact  - Provide patient/ family education  Outcome: Progressing     Problem: MUSCULOSKELETAL - ADULT  Goal: Maintain or return mobility to safest level of function  Description: INTERVENTIONS:  - Assess patient's ability to carry out ADLs; assess patient's baseline for ADL function and identify physical deficits which impact ability to perform ADLs (bathing, care of mouth/teeth, toileting, grooming, dressing, etc )  - Assess/evaluate cause of self-care deficits   - Assess range of motion  - Assess patient's mobility  - Assess patient's need for assistive devices and provide as appropriate  - Encourage maximum independence but intervene and supervise when necessary  - Involve family in performance of ADLs  - Assess for home care needs following discharge   - Consider OT consult to assist with ADL evaluation and planning for discharge  - Provide patient education as appropriate  Outcome: Progressing  Goal: Maintain proper alignment of affected body part  Description: INTERVENTIONS:  - Support, maintain and protect limb and body alignment  - Provide patient/ family with appropriate education  Outcome: Progressing no

## 2021-06-25 NOTE — PATIENT PROFILE ADULT - TRANSPORTATION
Cooperative, denies SI /HI. Compliant with medications.      COVID screening has been completed and no concerns for COVID a this time. Social distancing and mask usage was followed.   no

## 2021-07-28 NOTE — PROGRESS NOTE ADULT - PROBLEM SELECTOR PLAN 2
Mom called wanting to get a referral to speech therapy for Emmanuelle. Please use an  for this call.   #Acute Hypoxic Respiratory Failure   2/2 to flash pulmonary edema s/p extubation in setting of outpatient bronchoscopy and hypertensive with sBPs> 200. Re-intubated. CXR with bilateral asymmetrical opacities/right pleural effusion, now s/p extubation.  - patient hemodynamically stable, saturating well on RA   - no longer requiring Lasix

## 2021-08-31 NOTE — ED PROVIDER NOTE - WR ORDER ID 1
The following are the instructions for home care, to inform you about your treatment and to help you with comfort and to control the pain and symptoms.    Please proceed to Emergency Room at any time if your medical condition would worsen significantly.    Resting of the affected area is the main principle of treatment.     Maximum dose of Tylenol (acetaminophen) is 500 mg OTC every 6 hours taken orally for pain control, as needed, for the next few days.    Maximum dose of ibuprofen is 600 mg OTC every 6 hours taken orally as needed for pain, for the next few days, always taken with food and with monitoring for upper abdomen discomfort because ibuprofen may cause upset stomach or stomach ulcers.    Please consider application of over-the-counter OTC Lidocaine-containing antipain ointments like Biofreeze OTC or Icy Hot OTC every few hours on the skin at the pain area. Wash your hands after application of lidocaine.  Cooling with ice packs (while awake) may help.  Warm compresses (while awake) later on may be considered.    Please contact primary care provider to reevaluate the recovery process.  I have placed a referral to Physical Therapy.  I have placed a referral to occupational medicine specialist.  Consider consultation with orthopedics specialist.    Please call back with any additional questions to make sure that your needs are attended and all of your questions have been answered.   If any tests have been performed and the test results are not available at this time, we will notify you about the test results as soon as they become available.  Please share this information with any of your family members if you prefer.      
[id number]

## 2021-09-29 NOTE — DIETITIAN INITIAL EVALUATION ADULT. - CHIEF COMPLAINT
[facility]     Emergency Department     Faculty Attestation    I performed a history and physical examination of the patient and discussed management with the resident. I reviewed the resident´s note and agree with the documented findings and plan of care. Any areas of disagreement are noted on the chart. I was personally present for the key portions of any procedures. I have documented in the chart those procedures where I was not present during the key portions. I have reviewed the emergency nurses triage note. I agree with the chief complaint, past medical history, past surgical history, allergies, medications, social and family history as documented unless otherwise noted below. For Physician Assistant/ Nurse Practitioner cases/documentation I have personally evaluated this patient and have completed at least one if not all key elements of the E/M (history, physical exam, and MDM). Additional findings are as noted. Pain lower midline C-spine, no signs of head trauma, equal breath sounds, chest nontender, heart tones normal, pain right lower quadrant of the abdomen. Pain left wrist distal radius, no snuffbox pain. No bruising seen over the abdominal wall.      Jennifer Vargas MD  09/28/21 [id number] The patient is a 87y Female complaining of shortness of breath.

## 2021-10-08 NOTE — ED PROVIDER NOTE - PMH
Malignant neoplasm of bronchus and lung, unspecified site  Lung cancer   Interpolation Flap Text: A decision was made to reconstruct the defect utilizing an interpolation axial flap and a staged reconstruction.  A telfa template was made of the defect.  This telfa template was then used to outline the interpolation flap.  The donor area for the pedicle flap was then injected with anesthesia.  The flap was excised through the skin and subcutaneous tissue down to the layer of the underlying musculature.  The interpolation flap was carefully excised within this deep plane to maintain its blood supply.  The edges of the donor site were undermined.   The donor site was closed in a primary fashion.  The pedicle was then rotated into position and sutured.  Once the tube was sutured into place, adequate blood supply was confirmed with blanching and refill.  The pedicle was then wrapped with xeroform gauze and dressed appropriately with a telfa and gauze bandage to ensure continued blood supply and protect the attached pedicle.

## 2021-10-26 NOTE — ED ADULT NURSE NOTE - CHIEF COMPLAINT QUOTE
----- Message from Jett Gutierrez MD sent at 10/26/2021 10:20 AM EDT -----  Concern for possible stenosis of the vertebral artery on the left side.  Recommend follow up MRA of the head without contrast.     as per endoscopy staff patient had bronchoscopy but after extubation patient started to go into respiratory distress.

## 2022-02-23 NOTE — ED PROVIDER NOTE - SKIN, MLM
Oriented - self; Oriented - place; Oriented - time Skin normal color for race, warm, dry and intact. No evidence of rash.

## 2022-03-22 NOTE — PHYSICAL THERAPY INITIAL EVALUATION ADULT - TRANSFER SAFETY CONCERNS NOTED: SIT/STAND, REHAB EVAL
Dizziness started yesterday, room is spinning and pt feels unsteady, worse in the morning   decreased weight-shifting ability/losing balance

## 2022-04-19 ENCOUNTER — INPATIENT (INPATIENT)
Facility: HOSPITAL | Age: 87
LOS: 0 days | Discharge: NURSING, REHAB OR LONG-TERM CARE | DRG: 812 | End: 2022-04-20
Attending: INTERNAL MEDICINE | Admitting: INTERNAL MEDICINE
Payer: COMMERCIAL

## 2022-04-19 VITALS
WEIGHT: 110.01 LBS | SYSTOLIC BLOOD PRESSURE: 146 MMHG | HEIGHT: 57.5 IN | HEART RATE: 97 BPM | RESPIRATION RATE: 16 BRPM | TEMPERATURE: 98 F | DIASTOLIC BLOOD PRESSURE: 79 MMHG | OXYGEN SATURATION: 100 %

## 2022-04-19 DIAGNOSIS — Z85.118 PERSONAL HISTORY OF OTHER MALIGNANT NEOPLASM OF BRONCHUS AND LUNG: ICD-10-CM

## 2022-04-19 DIAGNOSIS — E78.5 HYPERLIPIDEMIA, UNSPECIFIED: ICD-10-CM

## 2022-04-19 DIAGNOSIS — I27.20 PULMONARY HYPERTENSION, UNSPECIFIED: ICD-10-CM

## 2022-04-19 DIAGNOSIS — Z29.9 ENCOUNTER FOR PROPHYLACTIC MEASURES, UNSPECIFIED: ICD-10-CM

## 2022-04-19 DIAGNOSIS — I34.0 NONRHEUMATIC MITRAL (VALVE) INSUFFICIENCY: ICD-10-CM

## 2022-04-19 DIAGNOSIS — D64.9 ANEMIA, UNSPECIFIED: ICD-10-CM

## 2022-04-19 LAB
ALBUMIN SERPL ELPH-MCNC: 3.8 G/DL — SIGNIFICANT CHANGE UP (ref 3.3–5)
ALP SERPL-CCNC: 143 U/L — HIGH (ref 40–120)
ALT FLD-CCNC: 15 U/L — SIGNIFICANT CHANGE UP (ref 10–45)
ANION GAP SERPL CALC-SCNC: 6 MMOL/L — SIGNIFICANT CHANGE UP (ref 5–17)
ANISOCYTOSIS BLD QL: SLIGHT — SIGNIFICANT CHANGE UP
APPEARANCE UR: CLEAR — SIGNIFICANT CHANGE UP
AST SERPL-CCNC: 23 U/L — SIGNIFICANT CHANGE UP (ref 10–40)
BACTERIA # UR AUTO: PRESENT /HPF
BASOPHILS # BLD AUTO: 0.06 K/UL — SIGNIFICANT CHANGE UP (ref 0–0.2)
BASOPHILS NFR BLD AUTO: 0.9 % — SIGNIFICANT CHANGE UP (ref 0–2)
BILIRUB SERPL-MCNC: <0.2 MG/DL — SIGNIFICANT CHANGE UP (ref 0.2–1.2)
BILIRUB UR-MCNC: NEGATIVE — SIGNIFICANT CHANGE UP
BLD GP AB SCN SERPL QL: NEGATIVE — SIGNIFICANT CHANGE UP
BUN SERPL-MCNC: 25 MG/DL — HIGH (ref 7–23)
CALCIUM SERPL-MCNC: 9.1 MG/DL — SIGNIFICANT CHANGE UP (ref 8.4–10.5)
CHLORIDE SERPL-SCNC: 101 MMOL/L — SIGNIFICANT CHANGE UP (ref 96–108)
CO2 SERPL-SCNC: 36 MMOL/L — HIGH (ref 22–31)
COLOR SPEC: YELLOW — SIGNIFICANT CHANGE UP
COMMENT - URINE: SIGNIFICANT CHANGE UP
CREAT SERPL-MCNC: 0.72 MG/DL — SIGNIFICANT CHANGE UP (ref 0.5–1.3)
DIFF PNL FLD: ABNORMAL
EGFR: 80 ML/MIN/1.73M2 — SIGNIFICANT CHANGE UP
ELLIPTOCYTES BLD QL SMEAR: SLIGHT — SIGNIFICANT CHANGE UP
EOSINOPHIL # BLD AUTO: 0.19 K/UL — SIGNIFICANT CHANGE UP (ref 0–0.5)
EOSINOPHIL NFR BLD AUTO: 2.7 % — SIGNIFICANT CHANGE UP (ref 0–6)
EPI CELLS # UR: ABNORMAL /HPF (ref 0–5)
GIANT PLATELETS BLD QL SMEAR: PRESENT — SIGNIFICANT CHANGE UP
GLUCOSE SERPL-MCNC: 111 MG/DL — HIGH (ref 70–99)
GLUCOSE UR QL: NEGATIVE — SIGNIFICANT CHANGE UP
HCT VFR BLD CALC: 25.8 % — LOW (ref 34.5–45)
HGB BLD-MCNC: 7.1 G/DL — LOW (ref 11.5–15.5)
KETONES UR-MCNC: NEGATIVE — SIGNIFICANT CHANGE UP
LEUKOCYTE ESTERASE UR-ACNC: NEGATIVE — SIGNIFICANT CHANGE UP
LYMPHOCYTES # BLD AUTO: 0.43 K/UL — LOW (ref 1–3.3)
LYMPHOCYTES # BLD AUTO: 6.2 % — LOW (ref 13–44)
MANUAL SMEAR VERIFICATION: SIGNIFICANT CHANGE UP
MCHC RBC-ENTMCNC: 22.8 PG — LOW (ref 27–34)
MCHC RBC-ENTMCNC: 27.5 GM/DL — LOW (ref 32–36)
MCV RBC AUTO: 83 FL — SIGNIFICANT CHANGE UP (ref 80–100)
MONOCYTES # BLD AUTO: 0.31 K/UL — SIGNIFICANT CHANGE UP (ref 0–0.9)
MONOCYTES NFR BLD AUTO: 4.4 % — SIGNIFICANT CHANGE UP (ref 2–14)
NEUTROPHILS # BLD AUTO: 5.98 K/UL — SIGNIFICANT CHANGE UP (ref 1.8–7.4)
NEUTROPHILS NFR BLD AUTO: 85.8 % — HIGH (ref 43–77)
NITRITE UR-MCNC: NEGATIVE — SIGNIFICANT CHANGE UP
PH UR: 6 — SIGNIFICANT CHANGE UP (ref 5–8)
PLAT MORPH BLD: ABNORMAL
PLATELET # BLD AUTO: 456 K/UL — HIGH (ref 150–400)
POLYCHROMASIA BLD QL SMEAR: SLIGHT — SIGNIFICANT CHANGE UP
POTASSIUM SERPL-MCNC: 4.9 MMOL/L — SIGNIFICANT CHANGE UP (ref 3.5–5.3)
POTASSIUM SERPL-SCNC: 4.9 MMOL/L — SIGNIFICANT CHANGE UP (ref 3.5–5.3)
PROT SERPL-MCNC: 7.1 G/DL — SIGNIFICANT CHANGE UP (ref 6–8.3)
PROT UR-MCNC: NEGATIVE MG/DL — SIGNIFICANT CHANGE UP
RBC # BLD: 3.11 M/UL — LOW (ref 3.8–5.2)
RBC # FLD: 20.1 % — HIGH (ref 10.3–14.5)
RBC BLD AUTO: SIGNIFICANT CHANGE UP
RBC CASTS # UR COMP ASSIST: < 5 /HPF — SIGNIFICANT CHANGE UP
RH IG SCN BLD-IMP: POSITIVE — SIGNIFICANT CHANGE UP
SARS-COV-2 RNA SPEC QL NAA+PROBE: SIGNIFICANT CHANGE UP
SODIUM SERPL-SCNC: 143 MMOL/L — SIGNIFICANT CHANGE UP (ref 135–145)
SP GR SPEC: >=1.03 — SIGNIFICANT CHANGE UP (ref 1–1.03)
TARGETS BLD QL SMEAR: SLIGHT — SIGNIFICANT CHANGE UP
UROBILINOGEN FLD QL: 0.2 E.U./DL — SIGNIFICANT CHANGE UP
WBC # BLD: 6.97 K/UL — SIGNIFICANT CHANGE UP (ref 3.8–10.5)
WBC # FLD AUTO: 6.97 K/UL — SIGNIFICANT CHANGE UP (ref 3.8–10.5)
WBC UR QL: < 5 /HPF — SIGNIFICANT CHANGE UP

## 2022-04-19 PROCEDURE — 71045 X-RAY EXAM CHEST 1 VIEW: CPT | Mod: 26

## 2022-04-19 PROCEDURE — 93010 ELECTROCARDIOGRAM REPORT: CPT

## 2022-04-19 PROCEDURE — 99285 EMERGENCY DEPT VISIT HI MDM: CPT

## 2022-04-19 RX ORDER — ATORVASTATIN CALCIUM 80 MG/1
20 TABLET, FILM COATED ORAL AT BEDTIME
Refills: 0 | Status: DISCONTINUED | OUTPATIENT
Start: 2022-04-19 | End: 2022-04-20

## 2022-04-19 RX ORDER — TIOTROPIUM BROMIDE 18 UG/1
1 CAPSULE ORAL; RESPIRATORY (INHALATION) DAILY
Refills: 0 | Status: DISCONTINUED | OUTPATIENT
Start: 2022-04-19 | End: 2022-04-20

## 2022-04-19 RX ORDER — ACETAMINOPHEN 500 MG
650 TABLET ORAL EVERY 6 HOURS
Refills: 0 | Status: DISCONTINUED | OUTPATIENT
Start: 2022-04-19 | End: 2022-04-20

## 2022-04-19 RX ORDER — BUDESONIDE AND FORMOTEROL FUMARATE DIHYDRATE 160; 4.5 UG/1; UG/1
2 AEROSOL RESPIRATORY (INHALATION)
Refills: 0 | Status: DISCONTINUED | OUTPATIENT
Start: 2022-04-19 | End: 2022-04-20

## 2022-04-19 RX ORDER — FERROUS SULFATE 325(65) MG
325 TABLET ORAL DAILY
Refills: 0 | Status: DISCONTINUED | OUTPATIENT
Start: 2022-04-19 | End: 2022-04-20

## 2022-04-19 RX ADMIN — BUDESONIDE AND FORMOTEROL FUMARATE DIHYDRATE 2 PUFF(S): 160; 4.5 AEROSOL RESPIRATORY (INHALATION) at 21:37

## 2022-04-19 RX ADMIN — Medication 650 MILLIGRAM(S): at 22:15

## 2022-04-19 RX ADMIN — ATORVASTATIN CALCIUM 20 MILLIGRAM(S): 80 TABLET, FILM COATED ORAL at 21:38

## 2022-04-19 RX ADMIN — Medication 650 MILLIGRAM(S): at 21:37

## 2022-04-19 NOTE — H&P ADULT - NSHPLABSRESULTS_GEN_ALL_CORE
LABS:                         7.1    6.97  )-----------( 456      ( 19 Apr 2022 15:24 )             25.8     04-19    143  |  101  |  25<H>  ----------------------------<  111<H>  4.9   |  36<H>  |  0.72    Ca    9.1      19 Apr 2022 15:24    TPro  7.1  /  Alb  3.8  /  TBili  <0.2  /  DBili  x   /  AST  23  /  ALT  15  /  AlkPhos  143<H>  04-19                  RADIOLOGY, EKG & ADDITIONAL TESTS: Reviewed.

## 2022-04-19 NOTE — H&P ADULT - ASSESSMENT
88 year old female with history of lung adenocarcinoma (s/p resection/XRT), severe MR, PFO, s/p 8/2020 pleuroscopy and talc pleurodesis and R chest tube placement for ~ 2 days presents to ED from [facility] secondary to concern for Hgb of 6.2 per recent blood work admitted for w/u of anemia.

## 2022-04-19 NOTE — H&P ADULT - PROBLEM SELECTOR PLAN 6
F: s/p 1 unit prbc   E: replete prn   N: regular diet, NPO AM   DVT: SCD's   GI: PPI (to start after NPO lifted)   Dispo: UES

## 2022-04-19 NOTE — ED ADULT NURSE NOTE - OBJECTIVE STATEMENT
88y female bibems from [facility] for hemoglobin of 6.2. Per EMS, pt hypoxic in field. On 4L NC, O2 sat 100%. Pt A&Ox1 at baseline and currently oriented to self. States "I was breathing bad earlier, but I'm better now."

## 2022-04-19 NOTE — ED PROVIDER NOTE - CLINICAL SUMMARY MEDICAL DECISION MAKING FREE TEXT BOX
Patient in ED as sent from [facility] 2/2 concern for low Hgb.  Patient awake and alert on arrival to ED and without acute complaints or concerns.  Benign physical exam outside of pitting edema to bilateral LEs.  No resp distress, saturating 100% on 2 L n/c (patient notes baseline oxygen dep).  Labs, EKG reviewed and Hgb noted.  Dr. Junior contacted and **************. Patient in ED as sent from [facility] 2/2 concern for low Hgb.  Patient awake and alert on arrival to ED and without acute complaints or concerns.  Benign physical exam outside of pitting edema to bilateral LEs.  No resp distress, saturating 100% on 2 L n/c (patient notes baseline oxygen dep).  Labs, EKG reviewed and Hgb noted.  Dr. Junior contacted and in agreement with plan for admission.  Requesting unit pRBCs which is ordered in ED and consent obtained from patient.  Patient is aware of plan for admission and in agreement.  Will admit at this time.

## 2022-04-19 NOTE — H&P ADULT - NSHPPHYSICALEXAM_GEN_ALL_CORE
VITAL SIGNS:  T(C): 36.4 (04-19-22 @ 17:37), Max: 36.6 (04-19-22 @ 14:45)  T(F): 97.5 (04-19-22 @ 17:37), Max: 97.8 (04-19-22 @ 14:45)  HR: 96 (04-19-22 @ 17:37) (81 - 97)  BP: 162/84 (04-19-22 @ 17:37) (140/65 - 162/84)  BP(mean): --  RR: 17 (04-19-22 @ 17:37) (16 - 17)  SpO2: 96% (04-19-22 @ 17:37) (96% - 100%)  Wt(kg): --    PHYSICAL EXAM:    Constitutional: resting comfortably in bed; , anxious  Head: NC/AT  Eyes:, EOMI, anicteric sclera, pale conjunctiva   ENT: no nasal discharge; uvula midline, no oropharyngeal erythema or exudates; dry MM   Respiratory: CTA B/L; no W/R/R, no retractions  Cardiac: +S1/S2; RRR; 3/6 Systolic murmur loudest over the mitral area   Gastrointestinal: soft, NT/ND; no rebound or guarding; +BSx4  Extremities: WWP, no clubbing or cyanosis; 2+ BL pitting edema to the knees   Musculoskeletal: NROM x4; no joint swelling, tenderness or erythema  Vascular: 2+ radial,  DP/PT pulses B/L  Dermatologic: skin warm, dry and intact; no rashes, wounds, or scars  Lymphatic: no submandibular or cervical LAD   Neurologic: AAOx3 (apparently baseline AOx2) CNII-XII grossly intact; no focal deficits

## 2022-04-19 NOTE — ED PROVIDER NOTE - PHYSICAL EXAMINATION
VITAL SIGNS: I have reviewed nursing notes and confirm.  CONSTITUTIONAL: Non toxic; in no acute distress.   SKIN:  Warm and dry, no acute rash.   HEAD:  Normocephalic, atraumatic.  EYES: PERRL.  EOM intact; conjunctiva and sclera clear.  ENT: No nasal discharge; airway clear.   NECK: Supple; non tender.  CARD: S1, S2 normal; no murmurs, gallops, or rubs. Regular rate and rhythm.   RESP:  Clear to auscultation b/l, no wheezes, rales or rhonchi.  ABD: Normal bowel sounds; soft; non-distended; non-tender; no guarding/rebound.  EXT: Normal ROM. + Pitting edema to bilateral LEs.    NEURO: Alert, oriented, grossly unremarkable.  5/5 strength x 4 extremities against gravity and external force.  No drift x 4 extremities.  Sensation intact and symmetric x 4 extremities.  No facial asymmetry.    PSYCH: Cooperative, mood and affect appropriate.

## 2022-04-19 NOTE — ED ADULT NURSE NOTE - NS ED NURSE LEVEL OF CONSCIOUSNESS AFFECT
Breathing spontaneous and unlabored. Breath sounds clear and equal bilaterally with regular rhythm.
Calm

## 2022-04-19 NOTE — ED PROVIDER NOTE - NS ED ROS FT
Constitutional: No fever or chills.   Eyes: No pain, blurry vision, or discharge.  ENMT: No hearing changes, pain, discharge or infections. No neck pain or stiffness.  Cardiac: No chest pain, SOB or edema. No chest pain with exertion.  Respiratory: No shortness of breath, + home oxygen dependent.  No cough or respiratory distress. No hemoptysis. No history of asthma or RAD.  GI: No nausea, vomiting, diarrhea or abdominal pain.  : No dysuria, frequency or burning.  MS: No myalgia, muscle weakness, joint pain or back pain.  Neuro: No headache or weakness. No LOC.  Skin: No skin rash.   Endocrine: No history of thyroid disease or diabetes.  Except as documented in the HPI, all other systems are negative.

## 2022-04-19 NOTE — H&P ADULT - PROBLEM SELECTOR PLAN 4
- per TTE, patient with PASP of 41  - follows with Dr. Nguyen outpatient   - euvolemic on exam, lasix regimen as above

## 2022-04-19 NOTE — ED ADULT TRIAGE NOTE - CHIEF COMPLAINT QUOTE
PT BIBEMS for low hemoglobin of 6.2. Pt AAOx1. Pt had low O2% as per EMS and was put on NC 4L, pt improved to 100%. Pt unable to provide further hx.

## 2022-04-19 NOTE — H&P ADULT - PROBLEM SELECTOR PLAN 2
- s/p resection/XRT  - on home O2, 2L   - Dual inhalers at home, obtain further collateral about pulmonary history

## 2022-04-19 NOTE — H&P ADULT - PROBLEM SELECTOR PLAN 1
- Normocytic. VI vs acute blood loss vs hemolysis.  - s/p 1 unit prbc  - Dr. Baires consulted, will see the patient 4/20  - f/u hemolysis labs, iron panel   - c/w home ferrous sulfate daily   - will make NPO AM for potential GI w/u tomorrow

## 2022-04-19 NOTE — H&P ADULT - HISTORY OF PRESENT ILLNESS
88 year old female with history of lung adenocarcinoma (s/p resection/XRT), severe MR, PFO, s/p 8/2020 pleuroscopy and talc pleurodesis and R chest tube placement for ~ 2 days presents to ED from [facility] secondary to concern for Hgb of 6.2 per recent blood work (baseline normal Hb ~12). She states that she has not experienced any new symptoms, including syncope, SOB or fatigue. She reports that she is on 2L NS baseline at home. She denies noticing any blood in her stool, blood in her urine or hemoptysis although she does endorse "mild" nosebleeds a few times per week. Patient denies associated fever, chills, headache, visual changes, chest pain, shortness of breath, abdominal pain, nausea, emesis, changes to bowel movements, peripheral edema, calf pain/tenderness, recent travel, known sick contacts or any additional acute complaints or concerns at this time.    ED Course:   Vitals: 97.8, 97, 146/79, 16, 100% RA  Labs: wbc wnl, Hb 7.1, MCV 83, , HCO3- 36 (appears chronic), BUN 25, Alk phos 143  EKG: pending   Interventions: 1 unit prbc

## 2022-04-19 NOTE — ED ADULT NURSE NOTE - NSIMPLEMENTINTERV_GEN_ALL_ED
Implemented All Fall with Harm Risk Interventions:  [place] to call system. Call bell, personal items and telephone within reach. Instruct patient to call for assistance. Room bathroom lighting operational. Non-slip footwear when patient is off stretcher. Physically safe environment: no spills, clutter or unnecessary equipment. Stretcher in lowest position, wheels locked, appropriate side rails in place. Provide visual cue, wrist band, yellow gown, etc. Monitor gait and stability. Monitor for mental status changes and reorient to person, place, and time. Review medications for side effects contributing to fall risk. Reinforce activity limits and safety measures with patient and family. Provide visual clues: red socks.

## 2022-04-19 NOTE — H&P ADULT - PROBLEM SELECTOR PLAN 3
- Moderate to Severe mitral regurg, EF 63%   - monitor fluid status, f/u EKG   - patient takes Lasix 20mg every other day, unclear when last dose was

## 2022-04-19 NOTE — ED PROVIDER NOTE - OBJECTIVE STATEMENT
88 year old female with history of lung adenocarcinoma (s/p resection/XRT), severe MR, PFO, s/p 8/2020 pleuroscopy and talc pleurodesis and R chest tube placement for ~ 2 days presents to ED from [facility] secondary to concern for Hgb in 6s per recent blood work.  Patient is awake, alert and oriented to person, place and time on arrival to ED despite triage documentation.  Patient states she is home oxygen dependent around the clock at "1 or 2."  Patient denies associated fever, chills, headache, visual changes, chest pain, shortness of breath, abdominal pain, nausea, emesis, changes to bowel movements, peripheral edema, calf pain/tenderness, recent travel, known sick contacts or any additional acute complaints or concerns at this time.

## 2022-04-20 ENCOUNTER — TRANSCRIPTION ENCOUNTER (OUTPATIENT)
Age: 87
End: 2022-04-20

## 2022-04-20 VITALS
HEART RATE: 85 BPM | DIASTOLIC BLOOD PRESSURE: 64 MMHG | RESPIRATION RATE: 18 BRPM | SYSTOLIC BLOOD PRESSURE: 149 MMHG | OXYGEN SATURATION: 97 % | TEMPERATURE: 98 F

## 2022-04-20 LAB
ALBUMIN SERPL ELPH-MCNC: 3.4 G/DL — SIGNIFICANT CHANGE UP (ref 3.3–5)
ALP SERPL-CCNC: 147 U/L — HIGH (ref 40–120)
ALT FLD-CCNC: 14 U/L — SIGNIFICANT CHANGE UP (ref 10–45)
ANION GAP SERPL CALC-SCNC: 10 MMOL/L — SIGNIFICANT CHANGE UP (ref 5–17)
APTT BLD: 30.4 SEC — SIGNIFICANT CHANGE UP (ref 27.5–35.5)
AST SERPL-CCNC: 23 U/L — SIGNIFICANT CHANGE UP (ref 10–40)
BASOPHILS # BLD AUTO: 0.05 K/UL — SIGNIFICANT CHANGE UP (ref 0–0.2)
BASOPHILS NFR BLD AUTO: 0.6 % — SIGNIFICANT CHANGE UP (ref 0–2)
BILIRUB SERPL-MCNC: 0.4 MG/DL — SIGNIFICANT CHANGE UP (ref 0.2–1.2)
BUN SERPL-MCNC: 18 MG/DL — SIGNIFICANT CHANGE UP (ref 7–23)
CALCIUM SERPL-MCNC: 9 MG/DL — SIGNIFICANT CHANGE UP (ref 8.4–10.5)
CHLORIDE SERPL-SCNC: 101 MMOL/L — SIGNIFICANT CHANGE UP (ref 96–108)
CO2 SERPL-SCNC: 33 MMOL/L — HIGH (ref 22–31)
CREAT SERPL-MCNC: 0.7 MG/DL — SIGNIFICANT CHANGE UP (ref 0.5–1.3)
CULTURE RESULTS: SIGNIFICANT CHANGE UP
EGFR: 83 ML/MIN/1.73M2 — SIGNIFICANT CHANGE UP
EOSINOPHIL # BLD AUTO: 0.11 K/UL — SIGNIFICANT CHANGE UP (ref 0–0.5)
EOSINOPHIL NFR BLD AUTO: 1.3 % — SIGNIFICANT CHANGE UP (ref 0–6)
FERRITIN SERPL-MCNC: 28 NG/ML — SIGNIFICANT CHANGE UP (ref 15–150)
GLUCOSE SERPL-MCNC: 80 MG/DL — SIGNIFICANT CHANGE UP (ref 70–99)
HAPTOGLOB SERPL-MCNC: 111 MG/DL — SIGNIFICANT CHANGE UP (ref 34–200)
HCT VFR BLD CALC: 30.5 % — LOW (ref 34.5–45)
HGB BLD-MCNC: 8.9 G/DL — LOW (ref 11.5–15.5)
IMM GRANULOCYTES NFR BLD AUTO: 0.3 % — SIGNIFICANT CHANGE UP (ref 0–1.5)
INR BLD: 0.93 — SIGNIFICANT CHANGE UP (ref 0.88–1.16)
IRON SATN MFR SERPL: 26 % — SIGNIFICANT CHANGE UP (ref 14–50)
IRON SATN MFR SERPL: 88 UG/DL — SIGNIFICANT CHANGE UP (ref 30–160)
LDH SERPL L TO P-CCNC: 242 U/L — SIGNIFICANT CHANGE UP (ref 50–242)
LYMPHOCYTES # BLD AUTO: 1.19 K/UL — SIGNIFICANT CHANGE UP (ref 1–3.3)
LYMPHOCYTES # BLD AUTO: 13.5 % — SIGNIFICANT CHANGE UP (ref 13–44)
MAGNESIUM SERPL-MCNC: 2 MG/DL — SIGNIFICANT CHANGE UP (ref 1.6–2.6)
MCHC RBC-ENTMCNC: 24.5 PG — LOW (ref 27–34)
MCHC RBC-ENTMCNC: 29.2 GM/DL — LOW (ref 32–36)
MCV RBC AUTO: 84 FL — SIGNIFICANT CHANGE UP (ref 80–100)
MONOCYTES # BLD AUTO: 1.07 K/UL — HIGH (ref 0–0.9)
MONOCYTES NFR BLD AUTO: 12.2 % — SIGNIFICANT CHANGE UP (ref 2–14)
NEUTROPHILS # BLD AUTO: 6.35 K/UL — SIGNIFICANT CHANGE UP (ref 1.8–7.4)
NEUTROPHILS NFR BLD AUTO: 72.1 % — SIGNIFICANT CHANGE UP (ref 43–77)
NRBC # BLD: 0 /100 WBCS — SIGNIFICANT CHANGE UP (ref 0–0)
PHOSPHATE SERPL-MCNC: 3.3 MG/DL — SIGNIFICANT CHANGE UP (ref 2.5–4.5)
PLATELET # BLD AUTO: 497 K/UL — HIGH (ref 150–400)
POTASSIUM SERPL-MCNC: 4.6 MMOL/L — SIGNIFICANT CHANGE UP (ref 3.5–5.3)
POTASSIUM SERPL-SCNC: 4.6 MMOL/L — SIGNIFICANT CHANGE UP (ref 3.5–5.3)
PROT SERPL-MCNC: 6.9 G/DL — SIGNIFICANT CHANGE UP (ref 6–8.3)
PROTHROM AB SERPL-ACNC: 11 SEC — SIGNIFICANT CHANGE UP (ref 10.5–13.4)
RBC # BLD: 3.63 M/UL — LOW (ref 3.8–5.2)
RBC # BLD: 3.63 M/UL — LOW (ref 3.8–5.2)
RBC # FLD: 18.5 % — HIGH (ref 10.3–14.5)
RETICS #: 60.6 K/UL — SIGNIFICANT CHANGE UP (ref 25–125)
RETICS/RBC NFR: 1.7 % — SIGNIFICANT CHANGE UP (ref 0.5–2.5)
SODIUM SERPL-SCNC: 144 MMOL/L — SIGNIFICANT CHANGE UP (ref 135–145)
SPECIMEN SOURCE: SIGNIFICANT CHANGE UP
TIBC SERPL-MCNC: 334 UG/DL — SIGNIFICANT CHANGE UP (ref 220–430)
TRANSFERRIN SERPL-MCNC: 284 MG/DL — SIGNIFICANT CHANGE UP (ref 200–360)
UIBC SERPL-MCNC: 246 UG/DL — SIGNIFICANT CHANGE UP (ref 110–370)
WBC # BLD: 8.8 K/UL — SIGNIFICANT CHANGE UP (ref 3.8–10.5)
WBC # FLD AUTO: 8.8 K/UL — SIGNIFICANT CHANGE UP (ref 3.8–10.5)

## 2022-04-20 PROCEDURE — 99285 EMERGENCY DEPT VISIT HI MDM: CPT

## 2022-04-20 PROCEDURE — 85045 AUTOMATED RETICULOCYTE COUNT: CPT

## 2022-04-20 PROCEDURE — 83010 ASSAY OF HAPTOGLOBIN QUANT: CPT

## 2022-04-20 PROCEDURE — 84466 ASSAY OF TRANSFERRIN: CPT

## 2022-04-20 PROCEDURE — 83540 ASSAY OF IRON: CPT

## 2022-04-20 PROCEDURE — 83550 IRON BINDING TEST: CPT

## 2022-04-20 PROCEDURE — 36430 TRANSFUSION BLD/BLD COMPNT: CPT

## 2022-04-20 PROCEDURE — 83615 LACTATE (LD) (LDH) ENZYME: CPT

## 2022-04-20 PROCEDURE — 36415 COLL VENOUS BLD VENIPUNCTURE: CPT

## 2022-04-20 PROCEDURE — 86850 RBC ANTIBODY SCREEN: CPT

## 2022-04-20 PROCEDURE — 86901 BLOOD TYPING SEROLOGIC RH(D): CPT

## 2022-04-20 PROCEDURE — 71045 X-RAY EXAM CHEST 1 VIEW: CPT | Mod: 26

## 2022-04-20 PROCEDURE — 83735 ASSAY OF MAGNESIUM: CPT

## 2022-04-20 PROCEDURE — 84100 ASSAY OF PHOSPHORUS: CPT

## 2022-04-20 PROCEDURE — 86900 BLOOD TYPING SEROLOGIC ABO: CPT

## 2022-04-20 PROCEDURE — 85610 PROTHROMBIN TIME: CPT

## 2022-04-20 PROCEDURE — 85025 COMPLETE CBC W/AUTO DIFF WBC: CPT

## 2022-04-20 PROCEDURE — P9040: CPT

## 2022-04-20 PROCEDURE — 87635 SARS-COV-2 COVID-19 AMP PRB: CPT

## 2022-04-20 PROCEDURE — 85730 THROMBOPLASTIN TIME PARTIAL: CPT

## 2022-04-20 PROCEDURE — 94640 AIRWAY INHALATION TREATMENT: CPT

## 2022-04-20 PROCEDURE — 71045 X-RAY EXAM CHEST 1 VIEW: CPT

## 2022-04-20 PROCEDURE — 82728 ASSAY OF FERRITIN: CPT

## 2022-04-20 PROCEDURE — 81001 URINALYSIS AUTO W/SCOPE: CPT

## 2022-04-20 PROCEDURE — 87086 URINE CULTURE/COLONY COUNT: CPT

## 2022-04-20 PROCEDURE — 80053 COMPREHEN METABOLIC PANEL: CPT

## 2022-04-20 PROCEDURE — 86923 COMPATIBILITY TEST ELECTRIC: CPT

## 2022-04-20 RX ORDER — LISINOPRIL 2.5 MG/1
1 TABLET ORAL
Qty: 0 | Refills: 0 | DISCHARGE
Start: 2022-04-20

## 2022-04-20 RX ORDER — LISINOPRIL 2.5 MG/1
5 TABLET ORAL EVERY 24 HOURS
Refills: 0 | Status: DISCONTINUED | OUTPATIENT
Start: 2022-04-20 | End: 2022-04-20

## 2022-04-20 RX ADMIN — TIOTROPIUM BROMIDE 1 CAPSULE(S): 18 CAPSULE ORAL; RESPIRATORY (INHALATION) at 13:24

## 2022-04-20 RX ADMIN — BUDESONIDE AND FORMOTEROL FUMARATE DIHYDRATE 2 PUFF(S): 160; 4.5 AEROSOL RESPIRATORY (INHALATION) at 10:45

## 2022-04-20 RX ADMIN — Medication 325 MILLIGRAM(S): at 13:17

## 2022-04-20 RX ADMIN — LISINOPRIL 5 MILLIGRAM(S): 2.5 TABLET ORAL at 13:17

## 2022-04-20 NOTE — DISCHARGE NOTE PROVIDER - HOSPITAL COURSE
#Discharge: do not delete    88 year old female with history of lung adenocarcinoma (s/p resection/XRT), severe MR, PFO, s/p 8/2020 pleuroscopy and talc pleurodesis and R chest tube placement for ~ 2 days presents to ED from [facility] BIBEMS secondary to concern for Hgb of 6.2 per recent blood work admitted for w/u of anemia.     Hospital course (by problem):     #Anemia  Normocytic. VI vs acute blood loss vs hemolysis.  - s/p 1 unit prbc  - Dr. Baires consulted, will see the patient 4/20  - f/u hemolysis labs, iron panel   - c/w home ferrous sulfate daily   - will make NPO AM for potential GI w/u tomorrow.    #History of lung cancer  s/p resection/XRT. on home O2, 2LNC  - Dual inhalers at home, obtain further collateral about pulmonary history.    #Mitral regurgitation  Moderate to Severe mitral regurg, EF 63%   - monitor fluid status, f/u EKG   - patient previously on Lasix 20mg every other day, states hasn't taken it in over a month    #Pulmonary hypertension  ·  Plan: - per TTE, patient with PASP of 41  - follows with Dr. Nguyen outpatient   - euvolemic on exam, lasix regimen as above.    #Hyperlipidemia  ·  Plan: - c/e home atorvastatin 20mg nightly at bed.      Patient was discharged to: [facility]    New medications:   Changes to old medications:  Medications that were stopped:    Items to follow up as outpatient:    Physical exam at the time of discharge:  General: in no acute distress, speaking in full sentences on 4L NC, no accessory muscle use  Eyes: EOMI intact bilaterally. Anicteric sclerae, moist conjunctivae  HENT: Moist mucous membranes  Neck: Trachea midline, supple  Lungs: Equal breath sounds B/L. Fine bibasilar rales. Mild left sided expiratory wheeze. No ronchi.  Cardiovascular: RRR. No murmurs, rubs, or gallops  Abdomen: Soft, non-tender non-distended; No rebound or guarding  Extremities: WWP, No clubbing or cyanosis. Bilateral 2+ pitting edema to mid-calf. No calf tenderness bilaterally.  MSK: No midline bony tenderness. No CVA tenderness bilaterally  Neurological: Alert and oriented x3  Skin: Warm and dry. No obvious rash #Discharge: do not delete    88 year old female with history of lung adenocarcinoma (s/p resection/XRT), severe MR, PFO, s/p 8/2020 pleuroscopy and talc pleurodesis and R chest tube placement for ~ 2 days presents to ED from [facility] BIBEMS secondary to concern for Hgb of 6.2 per recent blood work admitted for w/u of anemia.     Hospital course (by problem):     #Anemia  Normocytic. VI vs acute blood loss vs hemolysis. Iron panel wnl, LDH/haptoglobin wnl.  - s/p 1 unit prbc  - GI consulted (Dr. Baires), not recommending endoscopy at this time  - f/u hemolysis labs, iron panel   - c/w home ferrous sulfate daily   - will make NPO AM for potential GI w/u tomorrow.    #History of lung cancer  s/p resection/XRT. on home O2, 2LNC  - Dual inhalers at home, obtain further collateral about pulmonary history.    #Mitral regurgitation  Moderate to Severe mitral regurg, EF 63%   - monitor fluid status, f/u EKG   - patient previously on Lasix 20mg every other day, states hasn't taken it in over a month    #Pulmonary hypertension  ·  Plan: - per TTE, patient with PASP of 41  - follows with Dr. Nguyen outpatient   - euvolemic on exam, lasix regimen as above.    #Hyperlipidemia  ·  Plan: - c/e home atorvastatin 20mg nightly at bed.      Patient was discharged to: [facility]    New medications:   Changes to old medications:  Medications that were stopped:    Items to follow up as outpatient:    Physical exam at the time of discharge:  General: in no acute distress, speaking in full sentences on 4L NC, no accessory muscle use  Eyes: EOMI intact bilaterally. Anicteric sclerae, moist conjunctivae  HENT: Moist mucous membranes  Neck: Trachea midline, supple  Lungs: Equal breath sounds B/L. Fine bibasilar rales. Mild left sided expiratory wheeze. No ronchi.  Cardiovascular: RRR. No murmurs, rubs, or gallops  Abdomen: Soft, non-tender non-distended; No rebound or guarding  Extremities: WWP, No clubbing or cyanosis. Bilateral 2+ pitting edema to mid-calf. No calf tenderness bilaterally.  MSK: No midline bony tenderness. No CVA tenderness bilaterally  Neurological: Alert and oriented x3  Skin: Warm and dry. No obvious rash #Discharge: do not delete    88 year old female with history of lung adenocarcinoma (s/p resection/XRT), severe MR, PFO, s/p 8/2020 pleuroscopy and talc pleurodesis and R chest tube placement for ~ 2 days presents to ED from [facility] BIBEMS secondary to concern for Hgb of 6.2 per recent blood work admitted for w/u of anemia.     Hospital course (by problem):     #Anemia  Normocytic. VI vs acute blood loss vs hemolysis. Iron panel wnl, LDH/haptoglobin wnl.  - s/p 1 unit prbc  - GI consulted (Dr. Baires), not recommending endoscopy at this time  - f/u hemolysis labs, iron panel   - c/w home ferrous sulfate daily     #History of lung cancer  s/p resection/XRT. on home O2, 2LNC  - Dual inhalers at home, obtain further collateral about pulmonary history.    #Mitral regurgitation  Moderate to Severe mitral regurg, EF 63%   - monitor fluid status, f/u EKG   - patient previously on Lasix 20mg every other day, states hasn't taken it in over a month    #Pulmonary hypertension  - per TTE, patient with PASP of 41  - continued follow-up with Dr. Nguyen outpatient   - euvolemic on exam, lasix regimen as above.    #Hyperlipidemia  - c/w home atorvastatin 20mg nightly      Patient was discharged to: [facility]    New medications: None  Changes to old medications: None  Medications that were stopped: None    Items to follow up as outpatient:    Physical exam at the time of discharge:  General: in no acute distress, speaking in full sentences on 4L NC, no accessory muscle use  Eyes: EOMI intact bilaterally. Anicteric sclerae, moist conjunctivae  HENT: Moist mucous membranes  Neck: Trachea midline, supple  Lungs: Equal breath sounds B/L. Fine bibasilar rales. Mild left sided expiratory wheeze. No ronchi.  Cardiovascular: RRR. No murmurs, rubs, or gallops  Abdomen: Soft, non-tender non-distended; No rebound or guarding  Extremities: WWP, No clubbing or cyanosis. Bilateral 2+ pitting edema to mid-calf. No calf tenderness bilaterally.  MSK: No midline bony tenderness. No CVA tenderness bilaterally  Neurological: Alert and oriented x3  Skin: Warm and dry. No obvious rash #Discharge: do not delete    88 year old female with history of lung adenocarcinoma (s/p resection/XRT), severe MR, PFO, s/p 8/2020 pleuroscopy and talc pleurodesis and R chest tube placement for ~ 2 days presents to ED from [facility] BIBEMS secondary to concern for Hgb of 6.2 per recent blood work admitted for w/u of anemia.     Hospital course (by problem):     #Anemia  Normocytic. VI vs acute blood loss vs hemolysis. Less likely VI or hemolysis, as iron panel wnl, LDH/haptoglobin wnl. However, no signs of acute blood loss on exam or history.  - s/p 1 unit pRBC, responded appropriately  - GI consulted (Dr. Baires), not recommending endoscopy at this time  - c/w home ferrous sulfate daily  - follow up with primary care provider, Dr. Junior, upon discharge    #History of lung cancer  s/p resection/XRT. on home O2, 2LNC  - Dual inhalers at home, obtain further collateral about pulmonary history.    #Mitral regurgitation  Moderate to Severe mitral regurg, EF 63%   - monitor fluid status, f/u EKG   - patient previously on Lasix 20mg every other day, states hasn't taken it in over a month    #Pulmonary hypertension  - per TTE, patient with PASP of 41  - continued follow-up with Dr. Nguyen outpatient   - euvolemic on exam, lasix regimen as above.    #Hyperlipidemia  - c/w home atorvastatin 20mg nightly      Patient was discharged to: [facility]    New medications: None  Changes to old medications: None  Medications that were stopped: None    Items to follow up as outpatient:    Physical exam at the time of discharge:  General: in no acute distress, speaking in full sentences on 4L NC, no accessory muscle use  Eyes: EOMI intact bilaterally. Anicteric sclerae, moist conjunctivae  HENT: Moist mucous membranes  Neck: Trachea midline, supple  Lungs: Equal breath sounds B/L. Fine bibasilar rales. Mild left sided expiratory wheeze. No ronchi.  Cardiovascular: RRR. No murmurs, rubs, or gallops  Abdomen: Soft, non-tender non-distended; No rebound or guarding  Extremities: WWP, No clubbing or cyanosis. Bilateral 2+ pitting edema to mid-calf. No calf tenderness bilaterally.  MSK: No midline bony tenderness. No CVA tenderness bilaterally  Neurological: Alert and oriented x3  Skin: Warm and dry. No obvious rash #Discharge: do not delete    88 year old female with history of lung adenocarcinoma (s/p resection/XRT), severe MR, PFO, s/p 8/2020 pleuroscopy and talc pleurodesis and R chest tube placement for ~ 2 days presents to ED from [facility] BIBEMS secondary to concern for Hgb of 6.2 per recent blood work admitted for w/u of anemia.     Hospital course (by problem):     #Anemia  Normocytic. VI vs acute blood loss vs hemolysis. Less likely VI or hemolysis, as iron panel wnl, LDH/haptoglobin wnl. However, no signs of acute blood loss on exam or history. Pt notes intermittent epistaxis at home lasting 5-10 minutes "a few times per week," denies easy bruising/bleeding.  - s/p 1 unit pRBC, responded appropriately  - GI consulted (Dr. Baires), not recommending endoscopy at this time  - c/w home ferrous sulfate daily  - follow up with primary care provider, Dr. Junior, upon discharge    #History of lung cancer  s/p resection/XRT. on home O2, 2LNC  - Dual inhalers at home, will continue upon discharge    #Mitral regurgitation  Moderate to Severe mitral regurg, EF 63%   - per surescripts, pt on Lasix 20mg every other day  - will continue on discharge    #Pulmonary hypertension  - per TTE, patient with PASP of 41  - continued follow-up with Dr. Nguyen outpatient   - euvolemic on exam, lasix regimen as above.    #Hyperlipidemia  - c/w home atorvastatin 20mg nightly      Patient was discharged to: [facility]    New medications: None  Changes to old medications: None  Medications that were stopped: None    Items to follow up as outpatient: continue to monitor for signs/symptoms of active bleeding after discharge    Physical exam at the time of discharge:  General: in no acute distress, speaking in full sentences on 4L NC, no accessory muscle use. anxious appearing.  Eyes: EOMI intact bilaterally. Anicteric sclerae, moist conjunctivae  HENT: Moist mucous membranes  Neck: Trachea midline, supple  Lungs: Equal breath sounds B/L. Fine bibasilar rales. Mild left sided expiratory wheeze. No ronchi.  Cardiovascular: RRR. No murmurs, rubs, or gallops  Abdomen: Soft, non-tender non-distended; No rebound or guarding  Extremities: WWP, No clubbing or cyanosis. Bilateral 2+ pitting edema to mid-calf. No calf tenderness bilaterally.  MSK: No midline bony tenderness. No CVA tenderness bilaterally  Neurological: Alert and oriented x3  Skin: Warm and dry. No obvious rash #Discharge: do not delete    88 year old female with history of lung adenocarcinoma (s/p resection/XRT), severe MR, PFO, s/p 8/2020 pleuroscopy and talc pleurodesis and R chest tube placement for ~ 2 days presents to ED from [facility] BIBEMS secondary to concern for Hgb of 6.2 per recent blood work admitted for w/u of anemia.     Hospital course (by problem):     #Anemia  Normocytic. VI vs acute blood loss vs hemolysis. Less likely VI or hemolysis, as iron panel wnl, LDH/haptoglobin wnl. However, no signs of acute blood loss on exam or history. Pt notes intermittent epistaxis at home lasting 5-10 minutes "a few times per week," denies easy bruising/bleeding.  - s/p 1 unit pRBC, responded appropriately  - GI consulted (Dr. Baires), not recommending endoscopy at this time  - c/w home ferrous sulfate daily  - follow up with primary care provider, Dr. Junior, upon discharge    #History of lung cancer  s/p resection/XRT. on home O2, 2LNC  - Dual inhalers at home, will continue upon discharge    #Mitral regurgitation  Moderate to Severe mitral regurg, EF 63%   - per surescripts, pt on Lasix 20mg every other day  - will continue on discharge    #Pulmonary hypertension  - per TTE, patient with PASP of 41  - continued follow-up with Dr. Nguyen outpatient   - euvolemic on exam, lasix regimen as above.    #Hyperlipidemia  - c/w home atorvastatin 20mg nightly    #Elevated blood pressure  Systolic -170 during admission  - started lisinopril 5mg daily      Patient was discharged to: [facility]    New medications: STARTED lisinopril 5mg daily  Changes to old medications: None  Medications that were stopped: None    Items to follow up as outpatient: continue to monitor for signs/symptoms of active bleeding after discharge    Physical exam at the time of discharge:  General: in no acute distress, speaking in full sentences on 4L NC, no accessory muscle use. anxious appearing.  Eyes: EOMI intact bilaterally. Anicteric sclerae, moist conjunctivae  HENT: Moist mucous membranes  Neck: Trachea midline, supple  Lungs: Equal breath sounds B/L. Fine bibasilar rales. Mild left sided expiratory wheeze. No ronchi.  Cardiovascular: RRR. No murmurs, rubs, or gallops  Abdomen: Soft, non-tender non-distended; No rebound or guarding  Extremities: WWP, No clubbing or cyanosis. Bilateral 2+ pitting edema to mid-calf. No calf tenderness bilaterally.  MSK: No midline bony tenderness. No CVA tenderness bilaterally  Neurological: Alert and oriented x3  Skin: Warm and dry. No obvious rash #Discharge: do not delete    88 year old female with history of lung adenocarcinoma (s/p resection/XRT), severe MR, PFO, s/p 8/2020 pleuroscopy and talc pleurodesis and R chest tube placement for ~ 2 days presents to ED from [facility] BIBEMS secondary to concern for Hgb of 6.2 per recent blood work admitted for w/u of anemia.     Hospital course (by problem):     #Anemia  Normocytic. VI vs acute blood loss vs hemolysis. Less likely VI or hemolysis, as iron panel wnl, LDH/haptoglobin wnl. However, no signs of acute blood loss on exam or history. Pt notes intermittent epistaxis at home lasting 5-10 minutes "a few times per week," denies easy bruising/bleeding.  - s/p 1 unit pRBC, responded appropriately  - GI consulted (Dr. Baires), not recommending endoscopy at this time  - c/w home ferrous sulfate daily  - follow up with primary care provider, Dr. Junior, upon discharge    #History of lung cancer  s/p resection/XRT. on home O2, 2LNC  - Dual inhalers at home, will continue upon discharge    #Mitral regurgitation  Moderate to Severe mitral regurg, EF 63%   - per surescripts, pt on Lasix 20mg every other day  - will continue on discharge    #Pulmonary hypertension  - per TTE, patient with PASP of 41  - continued follow-up with Dr. Nguyen outpatient   - euvolemic on exam, lasix regimen as above.    #Hyperlipidemia  - c/w home atorvastatin 20mg nightly    #Elevated blood pressure  Systolic -170 during admission. Asymptomatic, denies chest pain, vision change, headache, palpitations.  - started lisinopril 5mg daily      Patient was discharged to: [facility]    New medications: STARTED lisinopril 5mg daily  Changes to old medications: None  Medications that were stopped: None    Items to follow up as outpatient: continue to monitor for signs/symptoms of active bleeding after discharge    Physical exam at the time of discharge:  General: in no acute distress, speaking in full sentences on 4L NC, no accessory muscle use. anxious appearing.  Eyes: EOMI intact bilaterally. Anicteric sclerae, moist conjunctivae  HENT: Moist mucous membranes  Neck: Trachea midline, supple  Lungs: Equal breath sounds B/L. Fine bibasilar rales. Mild left sided expiratory wheeze. No ronchi.  Cardiovascular: RRR. No murmurs, rubs, or gallops  Abdomen: Soft, non-tender non-distended; No rebound or guarding  Extremities: WWP, No clubbing or cyanosis. Bilateral 2+ pitting edema to mid-calf. No calf tenderness bilaterally.  MSK: No midline bony tenderness. No CVA tenderness bilaterally  Neurological: Alert and oriented x3  Skin: Warm and dry. No obvious rash

## 2022-04-20 NOTE — PATIENT PROFILE ADULT - FALL HARM RISK - HARM RISK INTERVENTIONS

## 2022-04-20 NOTE — PROGRESS NOTE ADULT - SUBJECTIVE AND OBJECTIVE BOX
Internal Medicine Progress Note  Kailash Rivera, PGY-1  Pager: [phone]    ******INCOMPLETE******    Patient is a 88y old  Female who presents with a chief complaint of   OVERNIGHT EVENTS/INTERVAL HPI:    REVIEW OF SYSTEMS:  All other review of systems is negative unless indicated above.    OBJECTIVE:  T(C): 36.4 (04-20-22 @ 05:38), Max: 36.6 (04-19-22 @ 14:45)  HR: 75 (04-20-22 @ 05:38) (75 - 102)  BP: 159/71 (04-20-22 @ 05:38) (131/64 - 169/66)  RR: 19 (04-20-22 @ 05:38) (16 - 19)  SpO2: 93% (04-20-22 @ 05:38) (93% - 100%)  Daily Height in cm: 146.05 (19 Apr 2022 14:45)    Daily     Physical Exam:  General: in no acute distress  Eyes: EOMI intact bilaterally. Anicteric sclerae, moist conjunctivae  HENT: Moist mucous membranes  Neck: Trachea midline, supple  Lungs: CTA B/L. No wheezes, rales, or ronchi  Cardiovascular: RRR. No murmurs, rubs, or gallops  Abdomen: Soft, non-tender non-distended; No rebound or guarding  Extremities: Normal range of motion, No clubbing, cyanosis or edema  MSK: No midline bony tenderness. No CVA tenderness bilaterally  Neurological: Alert and oriented x3  Skin: Warm and dry. No obvious rash     Medications:  MEDICATIONS  (STANDING):  atorvastatin 20 milliGRAM(s) Oral at bedtime  budesonide  80 MICROgram(s)/formoterol 4.5 MICROgram(s) Inhaler 2 Puff(s) Inhalation two times a day  ferrous    sulfate 325 milliGRAM(s) Oral daily  tiotropium 18 MICROgram(s) Capsule 1 Capsule(s) Inhalation daily    MEDICATIONS  (PRN):  acetaminophen     Tablet .. 650 milliGRAM(s) Oral every 6 hours PRN Temp greater or equal to 38C (100.4F), Mild Pain (1 - 3)      Labs:                        7.1    6.97  )-----------( 456      ( 19 Apr 2022 15:24 )             25.8     04-19    143  |  101  |  25<H>  ----------------------------<  111<H>  4.9   |  36<H>  |  0.72    Ca    9.1      19 Apr 2022 15:24    TPro  7.1  /  Alb  3.8  /  TBili  <0.2  /  DBili  x   /  AST  23  /  ALT  15  /  AlkPhos  143<H>  04-19      Urinalysis Basic - ( 19 Apr 2022 21:25 )    Color: Yellow / Appearance: Clear / SG: >=1.030 / pH: x  Gluc: x / Ketone: NEGATIVE  / Bili: Negative / Urobili: 0.2 E.U./dL   Blood: x / Protein: NEGATIVE mg/dL / Nitrite: NEGATIVE   Leuk Esterase: NEGATIVE / RBC: < 5 /HPF / WBC < 5 /HPF   Sq Epi: x / Non Sq Epi: 5-10 /HPF / Bacteria: Present /HPF      COVID-19 PCR: NotDetec (19 Apr 2022 15:24)      Radiology: Reviewed

## 2022-04-20 NOTE — DISCHARGE NOTE PROVIDER - CARE PROVIDER_API CALL
Josr Junior)  Internal Medicine  [address] [address]  Phone: [phone]  Fax: [phone]  Follow Up Time: 1 week   Josr Junior)  Internal Medicine  [address] [address]  Phone: [phone]  Fax: [phone]  Follow Up Time: Routine

## 2022-04-20 NOTE — DISCHARGE NOTE NURSING/CASE MANAGEMENT/SOCIAL WORK - NSDCPEFALRISK_GEN_ALL_CORE
For information on Fall & Injury Prevention, visit: [email or web address] OR  [email or web address] OR  https://www.cdc.gov/steadi/patient.html

## 2022-04-20 NOTE — CONSULT NOTE ADULT - SUBJECTIVE AND OBJECTIVE BOX
88 year old female with history of lung adenocarcinoma (s/p resection/XRT), severe MR, PFO, s/p 8/2020 pleuroscopy and talc pleurodesis and R chest tube placement for ~ 2 days presents to ED from [facility] secondary to concern for Hgb of 6.2 per recent blood work (baseline normal Hb ~9 based on prior admits). She states that she has not experienced any new symptoms, including syncope, SOB or fatigue. She reports that she is on 2L NS. Per patient she gets ibuprofen mixed with apple sauce once in a while at rehab facility for leg pain.  Per patient she had been having "significant nose bleed" in rehab. She denies noticing any blood in her stool, blood in her urine or hemoptysis.    ED Course:   Vitals: 97.8, 97, 146/79, 16, 100% RA  Labs: wbc wnl, Hb 7.1, MCV 83, , HCO3- 36 (appears chronic), BUN 25, Alk phos 143  EKG: pending   Interventions: 1 unit prbc  (19 Apr 2022 17:48)      REVIEW OF SYSTEMS:  Constitutional: No fever, weight loss or fatigue  ENMT:  No difficulty hearing, tinnitus, vertigo; No sinus or throat pain + epistaxis  Respiratory: No cough, wheezing, chills or hemoptysis  Cardiovascular: No chest pain, palpitations, dizziness or leg swelling  Gastrointestinal: No abdominal or epigastric pain. No nausea, vomiting or hematemesis; No diarrhea or constipation. No melena or hematochezia.  Skin: No itching, burning, rashes or lesions   Musculoskeletal: No joint pain or swelling; No muscle, back or extremity pain    PAST MEDICAL & SURGICAL HISTORY:  Malignant neoplasm of bronchus and lung, unspecified site  Lung cancer    History of surgery to major organs, presenting hazards to health  removal of R-sided adenocarcinoma, negative lymphnodes        FAMILY HISTORY:  No pertinent family history  No significant family history        SOCIAL HISTORY:  Smoking Status: [ ] Current, [ ] Former, [ ] Never  Pack Years:    MEDICATIONS:  MEDICATIONS  (STANDING):  atorvastatin 20 milliGRAM(s) Oral at bedtime  budesonide  80 MICROgram(s)/formoterol 4.5 MICROgram(s) Inhaler 2 Puff(s) Inhalation two times a day  ferrous    sulfate 325 milliGRAM(s) Oral daily  tiotropium 18 MICROgram(s) Capsule 1 Capsule(s) Inhalation daily    MEDICATIONS  (PRN):  acetaminophen     Tablet .. 650 milliGRAM(s) Oral every 6 hours PRN Temp greater or equal to 38C (100.4F), Mild Pain (1 - 3)      Allergies    Bactrim (Unknown)  Cleocin HCl (Unknown)  Flagyl (Unknown)  Honey (Unknown)  iodine (Anaphylaxis)  IV Contrast (Anaphylaxis)  Levaquin (Other; Rash)  penicillin (Unknown)  Zithromax (Unknown)    Intolerances        Vital Signs Last 24 Hrs  T(C): 36.4 (20 Apr 2022 05:38), Max: 36.6 (19 Apr 2022 14:45)  T(F): 97.6 (20 Apr 2022 05:38), Max: 97.9 (19 Apr 2022 18:00)  HR: 75 (20 Apr 2022 05:38) (75 - 102)  BP: 159/71 (20 Apr 2022 05:38) (131/64 - 169/66)  BP(mean): --  RR: 19 (20 Apr 2022 05:38) (16 - 19)  SpO2: 93% (20 Apr 2022 05:38) (93% - 100%)        PHYSICAL EXAM:    General: poorly nourished; in no acute distress  HEENT: MMM, conjunctiva and sclera clear  Gastrointestinal: Soft, non-tender non-distended; Normal bowel sounds; No rebound or guarding  Extremities: Normal range of motion, No clubbing, cyanosis or edema  Neurological: Alert and oriented x3  Skin: Warm and dry. No obvious rash      LABS:                        8.9    8.80  )-----------( 497      ( 20 Apr 2022 08:07 )             30.5     04-20    144  |  101  |  x   ----------------------------<  x   4.6   |  x   |  x     Ca    9.1      19 Apr 2022 15:24  Phos  3.3     04-20  Mg     2.0     04-20    TPro  7.1  /  Alb  3.8  /  TBili  <0.2  /  DBili  x   /  AST  23  /  ALT  15  /  AlkPhos  143<H>  04-19          RADIOLOGY & ADDITIONAL STUDIES:

## 2022-04-20 NOTE — DISCHARGE NOTE NURSING/CASE MANAGEMENT/SOCIAL WORK - PATIENT PORTAL LINK FT
You can access the FollowMyHealth Patient Portal offered by [facility] by registering at the following website: [email or web address]. By joining [employer]’s FollowMyHealth portal, you will also be able to view your health information using other applications (apps) compatible with our system.

## 2022-04-20 NOTE — DISCHARGE NOTE PROVIDER - NSDCCPCAREPLAN_GEN_ALL_CORE_FT
PRINCIPAL DISCHARGE DIAGNOSIS  Diagnosis: Anemia  Assessment and Plan of Treatment: You were diagnosed with anemia, or low counts of your red blood cells. You received a transfusion with one unit of packed red blood cells to correct your anemia, with appropriate improvement in your blood counts and your symptoms. While you were in the hospital, there were no signs of active bleeding. Please continue to follow up with your primary care physician for routine monitoring of your blood count.  ---  Please seek immediate medical attention if you develop signs or symptoms of anemia or active bleeding, including lightheadedness, fainting, worsening shortness of breath, chest pain, palpitations, vomiting blood, blood in the urine/stool, or easy bruising/bleeding.

## 2022-04-20 NOTE — DISCHARGE NOTE PROVIDER - NSDCMRMEDTOKEN_GEN_ALL_CORE_FT
acetaminophen 325 mg oral tablet: 2 tab(s) orally every 6 hours, As needed,  pain  aspirin 81 mg oral delayed release tablet: 1 tab(s) orally every 24 hours  budesonide-formoterol 80 mcg-4.5 mcg/inh inhalation aerosol: 2 puff(s) inhaled 2 times a day  ferrous sulfate 325 mg (65 mg elemental iron) oral tablet: 1 tab(s) orally once a day  Lasix 20 mg oral tablet: 1 tab(s) orally every other day  Lipitor 20 mg oral tablet: 1 tab(s) orally once a day  tiotropium 18 mcg inhalation capsule: 1 cap(s) inhaled once a day   acetaminophen 325 mg oral tablet: 2 tab(s) orally every 6 hours, As needed,  pain  aspirin 81 mg oral delayed release tablet: 1 tab(s) orally every 24 hours  budesonide-formoterol 80 mcg-4.5 mcg/inh inhalation aerosol: 2 puff(s) inhaled 2 times a day  ferrous sulfate 325 mg (65 mg elemental iron) oral tablet: 1 tab(s) orally once a day  Lasix 20 mg oral tablet: 1 tab(s) orally every other day  Lipitor 20 mg oral tablet: 1 tab(s) orally once a day  tiotropium 18 mcg inhalation capsule: 1 cap(s) inhaled once a day  Zestril 5 mg oral tablet: 1 tab(s) orally every 24 hours

## 2022-04-20 NOTE — PROGRESS NOTE ADULT - ASSESSMENT
88 year old female with history of lung adenocarcinoma (s/p resection/XRT), severe MR, PFO, s/p 8/2020 pleuroscopy and talc pleurodesis and R chest tube placement for ~ 2 days presents to ED from [facility] secondary to concern for Hgb of 6.2 per recent blood work admitted for w/u of anemia.

## 2022-04-20 NOTE — PATIENT PROFILE ADULT - FUNCTIONAL ASSESSMENT - DAILY ACTIVITY 4.
3 = A little assistance
[FreeTextEntry1] : 46-year-old female with good general health presents for her yearly physical.\par \par The patient had a history of possibly having pulmonary hypertension and has seen the pulmonary hypertension specialist  last about 2016 who felt she did not have pulmonary hypertension. The recommendation was that she have a followup stress echocardiogram which she did not do.\par he had been recommended that the patient followup but she states she's been fine now for 5 years since last visit and declines any followup.\par \par History of pulmonary nodule with CAT scan in 2016 showing no change over 3 years therefore is benign.\par \par The patient has a history of reflux which is totally dependent on her diet and had been on pantoprazole .\par The patient states that she eats and drinks more over the summer and gets more reflux. Her father had a history of esophageal cancer.\par \par Thyroid cystic lesions for which she has had an aspiration and follows with the head and neck surgeon.\par No further followup with head and neck surgery but patient was sent for a thyroid sonogram October 2020 with no change in nodules.\par \par She does have a history of a colon polyp for which she had colonoscopy July 2016.\par \par Overall the patient is feeling well without complaints of chest pain, palpitations, shortness of breath or edema.\par The patient's weight is about the same without any dietary changes or exercise.\par \par the patient is  with 2 children and works as a  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

## 2022-04-20 NOTE — DISCHARGE NOTE NURSING/CASE MANAGEMENT/SOCIAL WORK - NSDCVIVACCINE_GEN_ALL_CORE_FT
influenza, high-dose, quadrivalent; 01-Nov-2020 12:29; Filomena Yan (RN); [employer]; [id number] (Exp. Date: 30-Jun-2021); IntraMuscular; Dorsogluteal Left.; 0.7 milliLiter(s); VIS (VIS Published: 15-Aug-2019, VIS Presented: 01-Nov-2020);

## 2022-04-20 NOTE — DISCHARGE NOTE PROVIDER - PROVIDER TOKENS
PROVIDER:[TOKEN:[[id number]],FOLLOWUP:[1 week]] PROVIDER:[TOKEN:[[id number]],FOLLOWUP:[Routine]]

## 2022-04-20 NOTE — CONSULT NOTE ADULT - ASSESSMENT
per Internal Medicine    88 year old female with history of lung adenocarcinoma (s/p resection/XRT), severe MR, PFO, s/p 8/2020 pleuroscopy and talc pleurodesis and R chest tube placement for ~ 2 days [facility] ED [facility] secondary to concern for Hgb of 6.2 per recent blood work admitted for w/u of anemia.     Problem/Plan - 1:  ·  Problem: Anemia.   ·  Plan: - Normocytic. VI vs acute blood loss vs hemolysis.  - s/p 1 unit prbc  - Dr. Baires consulted, will see the patient 4/20  - f/u hemolysis labs, iron panel   - c/w home ferrous sulfate daily   - will make NPO AM for potential GI w/u tomorrow.    Problem/Plan - 2:  ·  Problem: History of lung cancer.   ·  Plan: - s/p resection/XRT  - on home O2, 2L   - Dual inhalers at home, obtain further collateral about pulmonary history.    Problem/Plan - 3:  ·  Problem: Mitral regurgitation.   ·  Plan: - Moderate to Severe mitral regurg, EF 63%   - monitor fluid status, f/u EKG   - patient takes Lasix 20mg every other day, unclear when last dose was.    Problem/Plan - 4:  ·  Problem: Pulmonary hypertension.   ·  Plan: - per TTE, patient with PASP of 41  - follows with Dr. Nguyen outpatient   - euvolemic on exam, lasix regimen as above.    Problem/Plan - 5:  ·  Problem: Hyperlipidemia.   ·  Plan: - c/e home atorvastatin 20mg nightly at bed.    Problem/Plan - 6:  ·  Problem: Prophylactic measure.   ·  Plan: F: s/p 1 unit prbc   E: replete prn   N: regular diet, NPO AM   DVT: SCD's   GI: PPI (to start after NPO lifted) 
acute on chronic anemia  reported nosebleeds  agree with heme workup  ? ENT  oral H2 blocker for history of NSAIDs use  follow clinical course

## 2022-04-20 NOTE — DISCHARGE NOTE PROVIDER - CARE PROVIDERS DIRECT ADDRESSES
,DirectAddress_Unknown Other Procedure: cosmetic biopsy/pathology Introduction Text (Please End With A Colon): The following procedure was deferred: Detail Level: Zone

## 2022-04-20 NOTE — CONSULT NOTE ADULT - SUBJECTIVE AND OBJECTIVE BOX
Patient is a 88y old  Female who presents with a chief complaint of acute on chronic anemia (20 Apr 2022 09:04)        HPI:  88 year old female with history of lung adenocarcinoma (s/p resection/XRT), severe MR, PFO, s/p 8/2020 pleuroscopy and talc pleurodesis and R chest tube placement for ~ 2 days presents to ED from [facility] secondary to concern for Hgb of 6.2 per recent blood work (baseline normal Hb ~12). She states that she has not experienced any new symptoms, including syncope, SOB or fatigue. She reports that she is on 2L NS baseline at home. She denies noticing any blood in her stool, blood in her urine or hemoptysis although she does endorse "mild" nosebleeds a few times per week. Patient denies associated fever, chills, headache, visual changes, chest pain, shortness of breath, abdominal pain, nausea, emesis, changes to bowel movements, peripheral edema, calf pain/tenderness, recent travel, known sick contacts or any additional acute complaints or concerns at this time.    ED Course:   Vitals: 97.8, 97, 146/79, 16, 100% RA  Labs: wbc wnl, Hb 7.1, MCV 83, , HCO3- 36 (appears chronic), BUN 25, Alk phos 143  EKG: pending   Interventions: 1 unit prbc  (19 Apr 2022 17:48)      PAST MEDICAL & SURGICAL HISTORY:  Malignant neoplasm of bronchus and lung, unspecified site  Lung cancer    History of surgery to major organs, presenting hazards to health  removal of R-sided adenocarcinoma, negative lymphnodes        MEDICATIONS  (STANDING):  atorvastatin 20 milliGRAM(s) Oral at bedtime  budesonide  80 MICROgram(s)/formoterol 4.5 MICROgram(s) Inhaler 2 Puff(s) Inhalation two times a day  ferrous    sulfate 325 milliGRAM(s) Oral daily  lisinopril 5 milliGRAM(s) Oral every 24 hours  tiotropium 18 MICROgram(s) Capsule 1 Capsule(s) Inhalation daily    MEDICATIONS  (PRN):  acetaminophen     Tablet .. 650 milliGRAM(s) Oral every 6 hours PRN Temp greater or equal to 38C (100.4F), Mild Pain (1 - 3)        FAMILY HISTORY:  No pertinent family history  No significant family history      CBC Full  -  ( 20 Apr 2022 08:07 )  WBC Count : 8.80 K/uL  RBC Count : 3.63 M/uL  Hemoglobin : 8.9 g/dL  Hematocrit : 30.5 %  Platelet Count - Automated : 497 K/uL  Mean Cell Volume : 84.0 fl  Mean Cell Hemoglobin : 24.5 pg  Mean Cell Hemoglobin Concentration : 29.2 gm/dL  Auto Neutrophil # : 6.35 K/uL  Auto Lymphocyte # : 1.19 K/uL  Auto Monocyte # : 1.07 K/uL  Auto Eosinophil # : 0.11 K/uL  Auto Basophil # : 0.05 K/uL  Auto Neutrophil % : 72.1 %  Auto Lymphocyte % : 13.5 %  Auto Monocyte % : 12.2 %  Auto Eosinophil % : 1.3 %  Auto Basophil % : 0.6 %      04-20    144  |  101  |  18  ----------------------------<  80  4.6   |  33<H>  |  0.70    Ca    9.0      20 Apr 2022 08:07  Phos  3.3     04-20  Mg     2.0     04-20    TPro  6.9  /  Alb  3.4  /  TBili  0.4  /  DBili  x   /  AST  23  /  ALT  14  /  AlkPhos  147<H>  04-20      Urinalysis Basic - ( 19 Apr 2022 21:25 )    Color: Yellow / Appearance: Clear / SG: >=1.030 / pH: x  Gluc: x / Ketone: NEGATIVE  / Bili: Negative / Urobili: 0.2 E.U./dL   Blood: x / Protein: NEGATIVE mg/dL / Nitrite: NEGATIVE   Leuk Esterase: NEGATIVE / RBC: < 5 /HPF / WBC < 5 /HPF   Sq Epi: x / Non Sq Epi: 5-10 /HPF / Bacteria: Present /HPF          Radiology:    < from: Xray Chest 1 View- PORTABLE-Urgent (Xray Chest 1 View- PORTABLE-Urgent .) (04.20.22 @ 04:47) >  ACC: [id number] EXAM:  XR CHEST PORTABLE URGENT 1V                          PROCEDURE DATE:  04/20/2022          INTERPRETATION:  Portable chest    HISTORY: Shortness of breath follow-up abnormal exam    IMPRESSION:    Similar appearance to prior exam 4/19/2022 with scattered right lung   infiltrates and small right pleural effusion. Mitral annulus   calcification. No definite acute infiltrate appearing. No pneumothorax.              Vital Signs Last 24 Hrs  T(C): 36.6 (20 Apr 2022 11:16), Max: 36.6 (19 Apr 2022 14:45)  T(F): 97.9 (20 Apr 2022 11:16), Max: 97.9 (19 Apr 2022 18:00)  HR: 75 (20 Apr 2022 11:16) (75 - 102)  BP: 170/72 (20 Apr 2022 11:16) (131/64 - 179/79)  BP(mean): --  RR: 18 (20 Apr 2022 11:16) (16 - 19)  SpO2: 100% (20 Apr 2022 11:16) (93% - 100%)        REVIEW OF SYSTEMS:      CONSTITUTIONAL: No fever, weight loss, or fatigue  EYES: No eye pain, visual disturbances, or discharge  ENMT:  No difficulty hearing, tinnitus, vertigo; No sinus or throat pain  NECK: No pain or stiffness  BREASTS: No pain, masses, or nipple discharge  RESPIRATORY: No cough, wheezing, chills or hemoptysis; No shortness of breath  CARDIOVASCULAR: No chest pain, palpitations, dizziness, or leg swelling  GASTROINTESTINAL: No abdominal or epigastric pain. No nausea, vomiting, or hematemesis; No diarrhea or constipation. No melena or hematochezia.  GENITOURINARY: No dysuria, frequency, hematuria, or incontinence  NEUROLOGICAL: No headaches, memory loss, loss of strength, numbness, or tremors  SKIN: No itching, burning, rashes, or lesions   LYMPH NODES: No enlarged glands  ENDOCRINE: No heat or cold intolerance; No hair loss  MUSCULOSKELETAL: No joint pain or swelling; No muscle, back, or extremity pain  PSYCHIATRIC: No depression, anxiety, mood swings, or difficulty sleeping  HEME/LYMPH:  per HPI  ALLERGY AND IMMUNOLOGIC: No hives or eczema   VASCULAR: no swelling, erythema,            Physical Exam:  89 yo  woman lying in semi Quintero's position, awake, alert,  no acute complaints    Head: normocephalic, atraumatic    Eyes: PERRLA, EOMI, no nystagmus, sclera anicteric    ENT: nasal discharge, uvula midline, no oropharyngeal erythema/exudate    Neck: supple, negative JVD, negative carotid bruits, no thyromegaly    Chest: CTA bilaterally, neg wheeze/ rhonchi/ rales/ crackles/ egophany    Cardiovascular: regular rate and rhythm, III/VI systolic murmur    Abdomen: soft, non distended, non tender to palpation in all 4 quadrants, negative rebound/guarding, normal bowel sounds    Extremities: WWP, neg cyanosis/clubbing/edema, negative calf tenderness to palpation, negative Pearl's sign    Neurologic Exam:    Alert and oriented to person, place    Motor Exam:    Right UE:           no focal weakness,  > 3+/5 throughout                               Left UE:             no focal weakness,  > 3+/5 throughout    Right LE:            no focal weakness,  > 3+/5 throughout    Left LE:              no focal weakness,  > 3+/5 throughout    Sensation:         intact to light touch x 4 extremities                        DTR:                     biceps/brachioradialis: equal                                              patella/ankle: equal Gait:  not tested              PM&R Impression:    1) deconditioned  2) no focal weakness    Recommendations/ Plan :    1) Physical / Occupational therapy focusing on therapeutic exercises, bed mobility/transfer out of bed evaluation, progressive ambulation with assistive devices prn.    2) Anticipated Disposition Plan/Recs:   return to [facility]

## 2022-04-25 NOTE — ED ADULT NURSE NOTE - SUICIDE SCREENING QUESTION 3
Department of Anesthesiology  Postprocedure Note    Patient: Ki Stafford  MRN: [id number]  YOB: 1951  Date of evaluation: 4/25/2022  Time:  8:57 AM     Procedure Summary     Date: 04/25/22 Room / Location: [address] [place] [address] 03 / [facility] ENDO    Anesthesia Start: 0703 Anesthesia Stop: 5118    Procedure: COLONOSCOPY DIAGNOSTIC w/ CLIP APPLICATION IN DISTAL TRANSVERSE COLON (N/A ) Diagnosis: (HISTORY OF COLON POLYPS  (PT HAS HAD COVID VACCINE))    Surgeons: Kaelyn Amos MD Responsible Provider: Candy Turner MD    Anesthesia Type: general ASA Status: 3          Anesthesia Type: general    Kaity Phase I: Kaity Score: 10    Kaity Phase II: Kaity Score: 10    Last vitals: Reviewed and per EMR flowsheets.        Anesthesia Post Evaluation    Comments: POST- ANESTHESIA EVALUATION       Pt Name: Ki Stafford  MRN: [id number]  YOB: 1951  Date of evaluation: 4/25/2022  Time:  8:57 AM      BP (!) 144/70   Pulse 62   Temp 97 °F (36.1 °C) (Temporal)   Resp 16   Ht 5' 9\" (1.753 m)   Wt 277 lb (125.6 kg)   SpO2 92%   BMI 40.91 kg/m²      Consciousness Level  Awake  Cardiopulmonary Status  Stable  Pain Adequately Treated YES  Nausea / Vomiting  NO  Adequate Hydration  YES  Anesthesia Related Complications NONE      Electronically signed by Candy Turner MD on 4/25/2022 at 8:57 AM
No

## 2022-04-27 DIAGNOSIS — Z88.0 ALLERGY STATUS TO PENICILLIN: ICD-10-CM

## 2022-04-27 DIAGNOSIS — Z88.8 ALLERGY STATUS TO OTHER DRUGS, MEDICAMENTS AND BIOLOGICAL SUBSTANCES: ICD-10-CM

## 2022-04-27 DIAGNOSIS — I34.0 NONRHEUMATIC MITRAL (VALVE) INSUFFICIENCY: ICD-10-CM

## 2022-04-27 DIAGNOSIS — Z79.899 OTHER LONG TERM (CURRENT) DRUG THERAPY: ICD-10-CM

## 2022-04-27 DIAGNOSIS — Z79.82 LONG TERM (CURRENT) USE OF ASPIRIN: ICD-10-CM

## 2022-04-27 DIAGNOSIS — Z91.018 ALLERGY TO OTHER FOODS: ICD-10-CM

## 2022-04-27 DIAGNOSIS — Z91.041 RADIOGRAPHIC DYE ALLERGY STATUS: ICD-10-CM

## 2022-04-27 DIAGNOSIS — Z88.1 ALLERGY STATUS TO OTHER ANTIBIOTIC AGENTS STATUS: ICD-10-CM

## 2022-04-27 DIAGNOSIS — Z85.118 PERSONAL HISTORY OF OTHER MALIGNANT NEOPLASM OF BRONCHUS AND LUNG: ICD-10-CM

## 2022-04-27 DIAGNOSIS — Z90.2 ACQUIRED ABSENCE OF LUNG [PART OF]: ICD-10-CM

## 2022-04-27 DIAGNOSIS — Z88.2 ALLERGY STATUS TO SULFONAMIDES: ICD-10-CM

## 2022-04-27 DIAGNOSIS — I27.20 PULMONARY HYPERTENSION, UNSPECIFIED: ICD-10-CM

## 2022-04-27 DIAGNOSIS — E78.5 HYPERLIPIDEMIA, UNSPECIFIED: ICD-10-CM

## 2022-04-27 DIAGNOSIS — Q21.1 ATRIAL SEPTAL DEFECT: ICD-10-CM

## 2022-04-27 DIAGNOSIS — D64.9 ANEMIA, UNSPECIFIED: ICD-10-CM

## 2022-04-27 DIAGNOSIS — Z92.3 PERSONAL HISTORY OF IRRADIATION: ICD-10-CM

## 2022-04-27 DIAGNOSIS — Z20.822 CONTACT WITH AND (SUSPECTED) EXPOSURE TO COVID-19: ICD-10-CM

## 2022-06-02 NOTE — DISCHARGE NOTE PROVIDER - NSDCCPCAREPLAN_GEN_ALL_CORE_FT
PRINCIPAL DISCHARGE DIAGNOSIS  Diagnosis: Acute respiratory failure with hypoxia  Assessment and Plan of Treatment: You came in with difficulty breathing with a decrease in your blood oxygen levels, or acute respiratory failure. You did not have any signs of infection and were not treated with antibiotics. The pulmonologists, or lung specialists, evaluated you. It is likely that your shortness of breath and decrease in blood oxygen levels are due to your known heart defect, patent foramen ovale (PFO), and that this will likely to continue to happen and but self-resolve. You can continue to use oxygen via nasal cannula (or tubing to the nose) at the rehab facility. Please follow-up with Dr. Nguyen, the lung specialist, at your scheduled follow-up appointment.      SECONDARY DISCHARGE DIAGNOSES  Diagnosis: Pleural effusion  Assessment and Plan of Treatment:     Diagnosis: Lung cancer  Assessment and Plan of Treatment: Lung cancer    Diagnosis: Anemia  Assessment and Plan of Treatment:      PRINCIPAL DISCHARGE DIAGNOSIS  Diagnosis: Acute respiratory failure with hypoxia  Assessment and Plan of Treatment: You came in with difficulty breathing with a decrease in your blood oxygen levels, or acute respiratory failure. You did not have any signs of infection and were not treated with antibiotics. The pulmonologists, or lung specialists, evaluated you. It is likely that your shortness of breath and decrease in blood oxygen levels are due to your known heart defect, patent foramen ovale (PFO), and that this will likely to continue to happen and but self-resolve. You can continue to use oxygen via nasal cannula (or tubing to the nose) at the rehab facility. Please follow-up with Dr. Nguyen, the lung specialist, at your scheduled follow-up appointment.      SECONDARY DISCHARGE DIAGNOSES  Diagnosis: Pleural effusion  Assessment and Plan of Treatment: You have a history of pleural effusion, or "water on the lungs". This is when fluid builds up between the pleura of the lungs, or the thin layers that surround the lung/chest.    Diagnosis: Lung cancer  Assessment and Plan of Treatment: Lung cancer    Diagnosis: Anemia  Assessment and Plan of Treatment:      PRINCIPAL DISCHARGE DIAGNOSIS  Diagnosis: Acute respiratory failure with hypoxia  Assessment and Plan of Treatment: You came in with difficulty breathing with a decrease in your blood oxygen levels, or acute respiratory failure. You did not have any signs of infection and were not treated with antibiotics. The pulmonologists, or lung specialists, evaluated you. It is likely that your shortness of breath and decrease in blood oxygen levels are due to your known heart defect, patent foramen ovale (PFO), and that this will likely to continue to happen and but self-resolve. You can continue to use oxygen via nasal cannula (or tubing to the nose) at the rehab facility. Please follow-up with Dr. Nguyen, the lung specialist, at your scheduled follow-up appointment.      SECONDARY DISCHARGE DIAGNOSES  Diagnosis: Pleural effusion  Assessment and Plan of Treatment: You have a history of pleural effusion, or "water on the lungs". This is when fluid builds up between the pleura of the lungs, or the thin layers that surround the lung/chest. You had a procedure, pleurodesis in October of 2020, to have the space closed to prevent more fluid from building up. You can continue to take lasix, or the water pill, 20mg every other day to help remove the fluid. Follow-up with Dr. Junior at the rehab facility and Dr. Nguyen, the lung specialist, at your scheduled follow-up appointment.    Diagnosis: Lung cancer  Assessment and Plan of Treatment: You have a history of lung cancer for which you have been treated for in the past. Please follow-up with Dr. Nguyen at your scheduled follow-up appointment.    Diagnosis: Anemia  Assessment and Plan of Treatment: You were found to have anemia, or low blood levels. You did not have any signs of heavy active bleeding. You have low iron counts, which can cause anemia, and you can continue taking ferrous sulfate supplements. The anemia could also be due to chronic disease and underlying cancer. You were evaluated by the gastroenterologists(GI) and you can follow-up with the GI team as an outpatient to have an endoscopy and colonoscopy (or cameras placed through the mouth and rectum to look for any bleeding).     no

## 2022-06-27 NOTE — DISCHARGE NOTE NURSING/CASE MANAGEMENT/SOCIAL WORK - NSDCPETBCESMAN_GEN_ALL_CORE
Pt states she had some shakes today and became worried because her recent stress test \"wasn't good\". Pt has been checking her BP today and its fluctuating which worried her, she also developed a HA and states her left shoulder started to hurt about an hour ago which is reproducible in triage. If you are a smoker, it is important for your health to stop smoking. Please be aware that second hand smoke is also harmful.

## 2022-08-09 NOTE — OCCUPATIONAL THERAPY INITIAL EVALUATION ADULT - LEVEL OF INDEPENDENCE: STAND/SIT, REHAB EVAL
contact guard Unna Boot Text: An Unna boot was placed to help immobilize the limb and facilitate more rapid healing.

## 2022-08-29 NOTE — ED ADULT NURSE NOTE - NSFALLRSKPSTHSTOCCUR_ED_ALL_ED
Discussed discharge with Patricia Raines RN. He reports discharge today and waiting on transport time [facility]. He declined offer to help with VAC removal and moist packing to prepare for discharge.    JAIME AguirreN Single Mechanical/Accidental Fall

## 2022-12-09 NOTE — DISCHARGE NOTE PROVIDER - NSDCQMERRANDS_GEN_ALL_CORE
No Cough, Adult      Coughing is a reflex that clears your throat and your airways (respiratory system). Coughing helps to heal and protect your lungs. It is normal to cough occasionally, but a cough that happens with other symptoms or lasts a long time may be a sign of a condition that needs treatment. An acute cough may only last 2–3 weeks, while a chronic cough may last 8 or more weeks.    Coughing is commonly caused by:  •Infection of the respiratory systemby viruses or bacteria.      •Breathing in substances that irritate your lungs.      •Allergies.      •Asthma.      •Mucus that runs down the back of your throat (postnasal drip).      •Smoking.      •Acid backing up from the stomach into the esophagus (gastroesophageal reflux).      •Certain medicines.      •Chronic lung problems.      •Other medical conditions such as heart failure or a blood clot in the lung (pulmonary embolism).        Follow these instructions at home:    Medicines     •Take over-the-counter and prescription medicines only as told by your health care provider.      •Talk with your health care provider before you take a cough suppressant medicine.        Lifestyle      •Avoid cigarette smoke. Do not use any products that contain nicotine or tobacco, such as cigarettes, e-cigarettes, and chewing tobacco. If you need help quitting, ask your health care provider.      •Drink enough fluid to keep your urine pale yellow.      •Avoid caffeine.      • Do not drink alcohol if your health care provider tells you not to drink.        General instructions      •Pay close attention to changes in your cough. Tell your health care provider about them.      •Always cover your mouth when you cough.      •Avoid things that make you cough, such as perfume, candles, cleaning products, or campfire or tobacco smoke.      •If the air is dry, use a cool mist vaporizer or humidifier in your bedroom or your home to help loosen secretions.      •If your cough is worse at night, try to sleep in a semi-upright position.      •Rest as needed.      •Keep all follow-up visits as told by your health care provider. This is important.        Contact a health care provider if you:    •Have new symptoms.      •Cough up pus.      •Have a cough that does not get better after 2–3 weeks or gets worse.      •Cannot control your cough with cough suppressant medicines and you are losing sleep.      •Have pain that gets worse or pain that is not helped with medicine.      •Have a fever.      •Have unexplained weight loss.      •Have night sweats.        Get help right away if:    •You cough up blood.      •You have difficulty breathing.      •Your heartbeat is very fast.      These symptoms may represent a serious problem that is an emergency. Do not wait to see if the symptoms will go away. Get medical help right away. Call your local emergency services (911 in the [place] Do not drive yourself to the hospital.       Summary    •Coughing is a reflex that clears your throat and your airways. It is normal to cough occasionally, but a cough that happens with other symptoms or lasts a long time may be a sign of a condition that needs treatment.      •Take over-the-counter and prescription medicines only as told by your health care provider.      •Always cover your mouth when you cough.      •Contact a health care provider if you have new symptoms or a cough that does not get better after 2–3 weeks or gets worse.      This information is not intended to replace advice given to you by your health care provider. Make sure you discuss any questions you have with your health care provider.

## 2023-02-22 NOTE — ED ADULT NURSE NOTE - NSFALLRSKUNASSIST_ED_ALL_ED
Problem: Pain  Goal: #Acceptable pain level achieved/maintained at rest using NRS/Faces  Description: This goal is used for patients who can self-report.  Acceptable means the level is at or below the identified comfort/function goal.  Outcome: Outcome Met, Continue evaluating goal progress toward completion     Problem: Pressure Injury, Risk for  Goal: # Skin remains intact  Outcome: Outcome Met, Continue evaluating goal progress toward completion     Problem: VTE, Risk for  Goal: # No s/s of VTE  Outcome: Outcome Met, Continue evaluating goal progress toward completion     Problem: Breathing Pattern Ineffective  Goal: Air exchange is effective, demonstrated by Sp02 sat of greater then or = 92% (or as ordered)  Outcome: Outcome Met, Continue evaluating goal progress toward completion      no

## 2024-04-05 NOTE — ED ADULT NURSE NOTE - CAS TRG GEN SKIN COLOR
- H/H on admit 7.2/23   - Endorses weakness, Denies other symptoms of anemia  - 1u pRBC given in ED  - Will repeat CBC in AM and continue to transfuse to maintain H/H > 8/24    Normal for race

## 2024-09-26 NOTE — ED ADULT NURSE NOTE - NS ED NURSE PATIENT LEFT UNIT TIME
-- DO NOT REPLY / DO NOT REPLY ALL --  -- This inbox is not monitored. If this was sent to the wrong provider or department, reroute message to [facility] pool. 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 No  Patient has been advised the message will be addressed within 2-3 business days.                 13:30

## 2025-02-18 NOTE — DISCHARGE NOTE PROVIDER - NSCORESITESY/N_GEN_A_CORE_RD
"Recommendations from today's MTM visit:                                                    MTM (medication therapy management) is a service provided by a clinical pharmacist designed to help you get the most of out of your medicines.   Today we reviewed what your medicines are for, how to know if they are working, that your medicines are safe and how to make your medicine regimen as easy as possible.      No changes today    Follow-up:   Tuesday May 13, 2025   Appt at 1:00 PM (30 min)  Telephone          It was great speaking with you today.  I value your experience and would be very thankful for your time in providing feedback in our clinic survey. In the next few days, you may receive an email or text message from [employer] with a link to a survey related to your  clinical pharmacist.\"     To schedule another MTM appointment, please call the clinic directly or you may call the MTM scheduling line at [phone] or toll-free at [phone].     My Clinical Pharmacist's contact information:                                                      Please feel free to contact me with any questions or concerns you have.      [place] (Hailie), MaureenD, MPH  Medication Therapy Management Pharmacist    " No

## 2025-02-22 NOTE — ED PROVIDER NOTE - WR ORDER STATUS 1
.Interventional Cardiology  Coronary Angiogram/Angioplasty/Stent/Atherectomy Discharge  Instructions -   Radial (wrist) Approach     The instructions below are to help you understand how to take care of yourself. There is also information about when to call the doctor or emergency services.    **Do not stop your aspirin or platelet inhibitor unless directed by your Cardiologist.  These medications help to prevent platelets in your blood from sticking together and forming a clot.   Examples of these medications are: Ticagrelor (Brilinta), Clopidogrel (Plavix), Prasugrel (Effient)    For 24 hours after procedure:  Do not subject hand/arm to any forceful movements for 24 hours, such as supporting weight when rising from a chair or bed.  Do not drive a car for 24 hours.  The dressing on the puncture site may be removed after 24 hours and left open to air. If minor oozing, you may apply a Band-Aid and remove after 12 hours.   You may shower on the day after your procedure. Do not take a tub bath or wash dishes (no soaking wrist) with the puncture site in water for 3 days after the procedure.    For 48 hours following the procedure:  Do not operate a lawnmower, motorcycle, chainsaw or all-terrain vehicle.  Do not lift anything heavier than 5-10 pounds with affected arm for 5 days.  Avoid excessive bending (flexion/extension) wrist movement.  Do not engage in vigorous exercise (i.e. tennis, golf) using the affected arm for 5 days after discharge.  You may return to work in 72 hours if no complications and no heavy lifting.    If bleeding should occur following discharge:  Sit down and apply firm pressure with your thumb against the puncture site and fingers against back of wrist for 10 minutes.  If the bleeding stops, continue to rest, keeping your wrist still for 2 hours. Notify your doctor as soon as possible.  If bleeding does not stop after 10 minutes or if there is a large amount of bleeding or spurting, call 911  immediately. Do not drive yourself to the hospital.           Contact the Heart Clinic at [phone] if you develop:  Fever over 100.4, that lasts more than one day  Redness, heat, or pus at the puncture site  Change in color or temperature of the hand or arm    Expect mild tingling of hand and tenderness at the puncture site for up to 3 days. You may take Tylenol or a pain medicine recommended by your doctor.                       Our Cardiac Rehab staff may visit briefly with you while your in the hospital.   If they miss you, someone will contact you after you are home.  You are encouraged to enroll in an Outpatient Cardiac Rehab Program       Your Procedural Physician was: Kian  the phone number is: [phone]    [facility]:  [phone]  If you are calling after hours, please listen to the entire voicemail, a live  will answer at the end of the message           Upon discharge follow up with Rimma Pascual PA-C in 1 month  Follow up with Dr. Sinclair in 3-4 months  Likely repeat echocardiogram outpatient in 3 months     Performed

## 2025-06-12 NOTE — ED PROVIDER NOTE - CADM POA URETHRAL CATHETER
Anesthesia Post Evaluation    Patient: Caryn Sparks    Procedure(s) Performed: Procedure(s) (LRB):  COLONOSCOPY (N/A)    Final Anesthesia Type: general      Patient location during evaluation: PACU  Patient participation: Yes- Able to Participate  Level of consciousness: awake and alert  Post-procedure vital signs: reviewed and stable  Pain management: adequate  Airway patency: patent    PONV status at discharge: No PONV  Anesthetic complications: no      Cardiovascular status: blood pressure returned to baseline  Respiratory status: unassisted, room air and spontaneous ventilation  Hydration status: euvolemic  Follow-up not needed.              Vitals Value Taken Time   BP 82/49 06/12/25 13:50   Temp 36.6 °C (97.9 °F) 06/12/25 13:45   Pulse 98 06/12/25 14:45   Resp 20 06/12/25 14:45   SpO2 99 % 06/12/25 14:45         No case tracking events are documented in the log.      Pain/Roxanne Score: Presence of Pain: non-verbal indicators absent (6/12/2025  1:45 PM)  Roxanne Score: 10 (6/12/2025  2:35 PM)          
No